# Patient Record
Sex: FEMALE | Race: WHITE | NOT HISPANIC OR LATINO | Employment: OTHER | URBAN - METROPOLITAN AREA
[De-identification: names, ages, dates, MRNs, and addresses within clinical notes are randomized per-mention and may not be internally consistent; named-entity substitution may affect disease eponyms.]

---

## 2017-01-13 ENCOUNTER — TELEPHONE (OUTPATIENT)
Dept: FAMILY MEDICINE | Facility: CLINIC | Age: 67
End: 2017-01-13

## 2017-01-13 NOTE — TELEPHONE ENCOUNTER
----- Message from Earline Cisse sent at 1/13/2017  8:14 AM CST -----  Contact: cell# [phone]  Patient wants to stop the PREDNISONE 20mg. Says it has her eating like crazy. Can you just stop taking? Can she take every other day? Or maybe 1/2 a pill?

## 2017-01-16 ENCOUNTER — OFFICE VISIT (OUTPATIENT)
Dept: BARIATRICS | Facility: CLINIC | Age: 67
End: 2017-01-16
Payer: COMMERCIAL

## 2017-01-16 VITALS
HEIGHT: 64 IN | BODY MASS INDEX: 39.37 KG/M2 | HEART RATE: 58 BPM | WEIGHT: 230.63 LBS | DIASTOLIC BLOOD PRESSURE: 72 MMHG | SYSTOLIC BLOOD PRESSURE: 110 MMHG

## 2017-01-16 DIAGNOSIS — E66.9 OBESITY, CLASS II, BMI 35-39.9: ICD-10-CM

## 2017-01-16 DIAGNOSIS — I87.2 VENOUS INSUFFICIENCY OF BOTH LOWER EXTREMITIES: Primary | ICD-10-CM

## 2017-01-16 DIAGNOSIS — Z98.84 GASTRIC BANDING STATUS: ICD-10-CM

## 2017-01-16 PROCEDURE — 99999 PR PBB SHADOW E&M-EST. PATIENT-LVL III: CPT | Mod: PBBFAC,,, | Performed by: PHYSICIAN ASSISTANT

## 2017-01-16 PROCEDURE — 99213 OFFICE O/P EST LOW 20 MIN: CPT | Mod: 25,S$GLB,, | Performed by: PHYSICIAN ASSISTANT

## 2017-01-16 PROCEDURE — S2083 ADJUSTMENT GASTRIC BAND: HCPCS | Mod: S$GLB,,, | Performed by: PHYSICIAN ASSISTANT

## 2017-01-16 PROCEDURE — 1159F MED LIST DOCD IN RCRD: CPT | Mod: S$GLB,,, | Performed by: PHYSICIAN ASSISTANT

## 2017-01-16 NOTE — PROGRESS NOTES
BARIATRIC LAP BAND FOLLOW UP    Chief Complaint: Follow-up (band)    HPI: Luz Bailey is a 66 y.o. female with a Body mass index is 39.58 kg/(m^2). who presents for follow up s/p Realize C Lap Band on 10/11/2011.  She had about 1 ml saline removed from her band and she states she has no feelings of satiety and her portions are extremely large.  She was able to eat a large plate of shrimp with mashed potatoes and salad.  She would like a band fill today.  She also wants help losing this weight again.  She was recently on steroids and stopped them yesterday.  She has lost 32 lbs approximately 25% of her excess weight.  She has no other complaints.     EXERCISE and VITAMINS: Reviewed in bariatric assessment.      DIET:  Regular bariatric diet.  3 large meals with snacks daily.  Eating starches and sweets.    MEDICATIONS AND ALLERGIES:  Reviewed in Navigator.    Review of Systems   Constitutional: Negative for chills, fever and weight loss.   Eyes: Negative for blurred vision, double vision and pain.   Respiratory: Negative for cough, shortness of breath and wheezing.    Cardiovascular: Negative for chest pain, palpitations and leg swelling.   Gastrointestinal: Negative for abdominal pain, blood in stool, constipation, diarrhea, heartburn, melena, nausea and vomiting.   Genitourinary: Negative for dysuria, frequency and hematuria.   Skin: Negative for itching and rash.   Neurological: Negative for headaches.   Psychiatric/Behavioral: Negative for depression and suicidal ideas.     Vitals:    01/16/17 1313   BP: 110/72   Pulse: (!) 58     Physical Exam   Constitutional: She is oriented to person, place, and time. She appears well-developed and well-nourished. No distress.   HENT:   Head: Normocephalic and atraumatic.   Eyes: Conjunctivae are normal. Right eye exhibits no discharge. Left eye exhibits no discharge. No scleral icterus.   Cardiovascular: Normal rate and regular rhythm.    Pulmonary/Chest: Effort  normal. No respiratory distress. She has wheezes.   Abdominal: Soft. Bowel sounds are normal. She exhibits no distension. There is no tenderness.   Musculoskeletal: She exhibits edema (bilateral pitting edema).   Neurological: She is alert and oriented to person, place, and time.   Skin: Skin is warm and dry. She is not diaphoretic.   Psychiatric: She has a normal mood and affect. Her behavior is normal.   Nursing note and vitals reviewed.    ASSESSMENT:   -  Bilateral pitting edema to the shin.  -  Inadequate band satiety.  -  12 pound weight gain since last visit in April 2016.  -  Obesity, Body mass index is 39.58 kg/(m^2). s/p Realize C Band on 10/12/11.   -  Fair overall weight loss, 32 lbs, 25% EWL.  -  Poor diet: low daily protein and excess simple carbohydrates.  -  No Exercise regimen.  -  Good vitamin regimen.  -  Not at risk for fall or abuse.     PLAN:                              -  Add 0.7 cc saline to band.  -  Modified Liquid Diet for the next month.  -  Discontinue eating all starchy carbs, anything made with: flour, rice, potato, corn, peas, and junk food (candy and chips).  -  Reviewed the importance of eating 3 solid meals and avoiding slider foods for better satiety and weight loss.    -  Patient is to keep a detailed food diary and bring back for review at next visit.  -  No Bariatric Registered Dietician Available.  All Diet education and counseling done by PA.  -  Restart regular exercise.   -  Follow up with PCP for pitting edema.   -  Return to clinic in 3-4 weeks or sooner if needed.    -  Call the office for any issues.    15 minute visit, over 50% of time spent counseling patient face to face on diet, exercise, and weight loss.    BAND ADJUSTMENT:    Provider(s) Performing the Procedure: Tiffani Vanegas PA-C    Description of the Procedure: Patient was lying supine on the exam table. The area above the port site was prepped with Chlora-prep. Then, the port site was accessed with a  2-inch Osorio needle, and saline was easily aspirated. 0.7 cc of normal saline was added to the band. The patient tolerated the procedure well and was able to drink liquids without difficulty.    The patient is to begin a liquid diet for 3 days, then advance the diet as tolerated.    Total amount in Band: 7.7 mL    Findings: clear saline in band    Estimated Blood Loss: minimal    Specimen(s) Removed: None    Post-procedure Diagnosis: band adjustment

## 2017-01-16 NOTE — MR AVS SNAPSHOT
[facility] - Bariatric Surgery  [address]  [facility] [address]  Phone: [phone]  Fax: [phone]                  Luz Bailey   2017 1:20 PM   Office Visit    Description:  Female : 1950   Provider:  Tiffani Vanegas PA-C   Department:  [facility] - Bariatric Surgery           Reason for Visit     Follow-up                To Do List           Future Appointments        Provider Department Dept Phone    2017 1:00 PM MD Shaniqua Rossi  Family Medicine [phone]    2017 8:20 AM Tiffani Vanegas PA-C [facility] - Bariatric Surgery [phone]    3/13/2017 2:00 PM [facility] [phone]      Goals (5 Years of Data)     None      [facility] On Call     [facility] On Call Nurse Care Line -  Assistance  Registered nurses in the [facility] On Call Center provide clinical advisement, health education, appointment booking, and other advisory services.  Call for this free service at [phone].             Medications           Message regarding Medications     Verify the changes and/or additions to your medication regime listed below are the same as discussed with your clinician today.  If any of these changes or additions are incorrect, please notify your healthcare provider.             Verify that the below list of medications is an accurate representation of the medications you are currently taking.  If none reported, the list may be blank. If incorrect, please contact your healthcare provider. Carry this list with you in case of emergency.           Current Medications     ACETAMINOPHEN (TYLENOL ORAL) Take by mouth every 6 (six) hours as needed.     allopurinol (ZYLOPRIM) 100 MG tablet Take 1 tablet (100 mg total) by mouth once daily. For gout prevention    amitriptyline (ELAVIL) 10 MG tablet TAKE ONE TABLET BY MOUTH QHS    ascorbic acid (VITAMIN C) 100 MG tablet Take 100 mg by mouth once daily.    BIOTIN ORAL Take by mouth  "once daily.    bumetanide (BUMEX) 2 MG tablet Take 1 tablet (2 mg total) by mouth 2 (two) times daily.    citalopram (CELEXA) 20 MG tablet TAKE ONE TABLET BY MOUTH ONCE DAILY    cyanocobalamin 1,000 mcg/mL injection INJECT 1 ML INTRAMUSCULARLY EVERY 30 DAYS.    estradiol (ESTRACE) 0.5 MG tablet Take 1 tablet (0.5 mg total) by mouth once daily.    fluticasone (FLONASE) 50 mcg/actuation nasal spray 1 spray by Nasal route once daily.     hydrocodone-acetaminophen 5-325mg (NORCO) 5-325 mg per tablet Take 1 tablet by mouth nightly as needed for Pain.    levothyroxine (SYNTHROID) 50 MCG tablet TAKE ONE TABLET BY MOUTH BEFORE BREAKFAST    potassium chloride (KLOR-CON) 10 MEQ TbSR Take 2 tablets (20 mEq total) by mouth once daily. With lasix    ranitidine (ZANTAC) 150 MG tablet Take 150 mg by mouth once daily.           Clinical Reference Information           Vital Signs - Last Recorded  Most recent update: 1/16/2017  1:14 PM by Jaja Nieto MA    BP Pulse Ht Wt BMI    110/72 (!) 58 5' 4" (1.626 m) 104.6 kg (230 lb 9.6 oz) 39.58 kg/m2      Blood Pressure          Most Recent Value    BP  110/72      Allergies as of 1/16/2017     Omeprazole      Immunizations Administered on Date of Encounter - 1/16/2017     None      Instructions    - Add 0.7 ml cc in band  - Modified Liquid diet for the next month  - No starches or sugars  - RTC 1 month    - To lose weight you want to cut 100% starchy carbohydrates out of your diet (bread, rice, pasta, potatoes, granola, flour, corn, peas, oatmeal, grits, tortillas, crackers, chips) and get  grams of protein.  Aim for 100 grams of protein daily.    - Premier Protein (Chocolate, Bananas & Cream, Strawberries & Cream, Vanilla) [employer] or [employer]    - Syntrax [place] from [employer], www.bariatricadCerulean Pharmaage.com, www.bariatricchoice.com. (LACTOSE FREE)    - Atkins Lift - [employer] & [employer] (LACTOSE FREE)    - Veggetti Pro from [employer], [employer], [employer]    - www.[employer] " (cauliflower, cloud bread, quest bar cookies, eggplant, zucchini, zucchini noodles, crustless quiche, no carb meals, taco lettuce boats)    - http://theworldaccordingtomeghna.Tugende.[employer] Sciences/

## 2017-01-16 NOTE — PATIENT INSTRUCTIONS
- Add 0.7 ml cc in band  - Modified Liquid diet for the next month  - No starches or sugars  - RTC 1 month    - To lose weight you want to cut 100% starchy carbohydrates out of your diet (bread, rice, pasta, potatoes, granola, flour, corn, peas, oatmeal, grits, tortillas, crackers, chips) and get  grams of protein.  Aim for 100 grams of protein daily.    - Premier Protein (Chocolate, Bananas & Cream, Strawberries & Cream, Vanilla) [employer] or [employer]    - Syntrax [place] from [employer], www.bariatricadvantage.com, www.bariatricchoice.com. (LACTOSE FREE)    - Atkins Lift - [employer] & [employer] (LACTOSE FREE)    - Veggetti Pro from [employer], [employer], [employer]    - www.[employer].com (cauliflower, cloud bread, quest bar cookies, eggplant, zucchini, zucchini noodles, crustless quiche, no carb meals, taco lettuce boats)    - http://theworldaccoyuvalingtobillace.LoveByte.com/

## 2017-01-17 ENCOUNTER — OFFICE VISIT (OUTPATIENT)
Dept: FAMILY MEDICINE | Facility: CLINIC | Age: 67
End: 2017-01-17
Payer: COMMERCIAL

## 2017-01-17 DIAGNOSIS — M79.662 PAIN IN BOTH LOWER LEGS: ICD-10-CM

## 2017-01-17 DIAGNOSIS — R60.0 BILATERAL LEG EDEMA: ICD-10-CM

## 2017-01-17 DIAGNOSIS — R60.1 GENERALIZED EDEMA: Primary | ICD-10-CM

## 2017-01-17 DIAGNOSIS — E66.9 OBESITY, CLASS II, BMI 35-39.9: ICD-10-CM

## 2017-01-17 DIAGNOSIS — M79.661 PAIN IN BOTH LOWER LEGS: ICD-10-CM

## 2017-01-17 PROCEDURE — 99213 OFFICE O/P EST LOW 20 MIN: CPT | Mod: S$GLB,,, | Performed by: FAMILY MEDICINE

## 2017-01-17 PROCEDURE — 1159F MED LIST DOCD IN RCRD: CPT | Mod: S$GLB,,, | Performed by: FAMILY MEDICINE

## 2017-01-17 NOTE — PROGRESS NOTES
Subjective:      Patient ID: Luz Bailey is a 66 y.o. female.    Chief Complaint: No chief complaint on file.    HPI Comments: Follow up leg issues; off prednisone; caused her to eat; off for one day; still wioth redness, swelling and pain of lower ant legs, not as bad for all 3 symptoms as before. Still has arthritis pain that did not respond to prednisone; lower back hurts the most.saw lap band doctor and had band tightened, new diet, to lose weight;    Review of Systems   Cardiovascular: Positive for leg swelling.   Skin: Positive for color change and rash.   All other systems reviewed and are negative.    Objective:     Physical Exam   Constitutional: She is oriented to person, place, and time. She appears well-developed and well-nourished.   HENT:   Head: Normocephalic.   Eyes: Conjunctivae and EOM are normal. Pupils are equal, round, and reactive to light.   Neck: Normal range of motion. Neck supple.   Cardiovascular: Normal rate, regular rhythm and normal heart sounds.    Pulmonary/Chest: Effort normal and breath sounds normal.   Musculoskeletal: Normal range of motion. She exhibits edema and tenderness.   Neurological: She is alert and oriented to person, place, and time. She has normal reflexes.   Skin: Skin is warm and dry. There is erythema.   Psychiatric: She has a normal mood and affect. Her behavior is normal. Judgment and thought content normal.   Nursing note and vitals reviewed.    Assessment:     1. Generalized edema    2. Bilateral leg edema    3. Pain in both lower legs    4. Obesity, Class II, BMI 35-39.9      Plan:     New Prescriptions    No medications on file     Discontinued Medications    HYDROCODONE-ACETAMINOPHEN 5-325MG (NORCO) 5-325 MG PER TABLET    Take 1 tablet by mouth nightly as needed for Pain.     Modified Medications    No medications on file       Generalized edema  -     Vitamin D; Future; Expected date: 1/17/17    Bilateral leg edema  -     Vitamin D; Future; Expected  date: 1/17/17    Pain in both lower legs  -     Vitamin D; Future; Expected date: 1/17/17    Obesity, Class II, BMI 35-39.9

## 2017-01-17 NOTE — MR AVS SNAPSHOT
[facility]  [address] [place] [address]  Phone: [phone]  Fax: [phone]                  Luz Bailey   2017 1:00 PM   Office Visit    Description:  Female : 1950   Provider:  Hilario Slaughter MD   Department:  [facility]           Diagnoses this Visit        Comments    Generalized edema    -  Primary     Bilateral leg edema         Pain in both lower legs         Obesity, Class II, BMI 35-39.9                To Do List           Future Appointments        Provider Department Dept Phone    2017 8:20 AM SOSA Flood [facility] - Bariatric Surgery [phone]    3/13/2017 2:00 PM Crystal Willis MD [facility] Cardiology [phone]      Goals (5 Years of Data)     None      Follow-Up and Disposition     Return in about 3 months (around 2017).      [facility] On Call     [facility] On Call Nurse [facility] Line - 24/ Assistance  Registered nurses in the [facility] On Call Center provide clinical advisement, health education, appointment booking, and other advisory services.  Call for this free service at [phone].             Medications           Message regarding Medications     Verify the changes and/or additions to your medication regime listed below are the same as discussed with your clinician today.  If any of these changes or additions are incorrect, please notify your healthcare provider.        STOP taking these medications     hydrocodone-acetaminophen 5-325mg (NORCO) 5-325 mg per tablet Take 1 tablet by mouth nightly as needed for Pain.           Verify that the below list of medications is an accurate representation of the medications you are currently taking.  If none reported, the list may be blank. If incorrect, please contact your healthcare provider. Carry this list with you in case of emergency.           Current Medications     ACETAMINOPHEN (TYLENOL ORAL) Take by mouth every 6 (six) hours as needed.      allopurinol (ZYLOPRIM) 100 MG tablet Take 1 tablet (100 mg total) by mouth once daily. For gout prevention    amitriptyline (ELAVIL) 10 MG tablet TAKE ONE TABLET BY MOUTH QHS    ascorbic acid (VITAMIN C) 100 MG tablet Take 100 mg by mouth once daily.    BIOTIN ORAL Take by mouth once daily.    bumetanide (BUMEX) 2 MG tablet Take 1 tablet (2 mg total) by mouth 2 (two) times daily.    citalopram (CELEXA) 20 MG tablet TAKE ONE TABLET BY MOUTH ONCE DAILY    cyanocobalamin 1,000 mcg/mL injection INJECT 1 ML INTRAMUSCULARLY EVERY 30 DAYS.    estradiol (ESTRACE) 0.5 MG tablet Take 1 tablet (0.5 mg total) by mouth once daily.    fluticasone (FLONASE) 50 mcg/actuation nasal spray 1 spray by Nasal route once daily.     levothyroxine (SYNTHROID) 50 MCG tablet TAKE ONE TABLET BY MOUTH BEFORE BREAKFAST    potassium chloride (KLOR-CON) 10 MEQ TbSR Take 2 tablets (20 mEq total) by mouth once daily. With lasix    ranitidine (ZANTAC) 150 MG tablet Take 150 mg by mouth once daily.           Clinical Reference Information           Allergies as of 1/17/2017     Omeprazole      Immunizations Administered on Date of Encounter - 1/17/2017     None      Orders Placed During Today's Visit     Future Labs/Procedures Expected by Expires    Vitamin D  1/17/2017 1/17/2018

## 2017-02-02 ENCOUNTER — OFFICE VISIT (OUTPATIENT)
Dept: FAMILY MEDICINE | Facility: CLINIC | Age: 67
End: 2017-02-02
Payer: COMMERCIAL

## 2017-02-02 VITALS
BODY MASS INDEX: 38.35 KG/M2 | HEIGHT: 64 IN | TEMPERATURE: 99 F | SYSTOLIC BLOOD PRESSURE: 120 MMHG | HEART RATE: 89 BPM | DIASTOLIC BLOOD PRESSURE: 70 MMHG | OXYGEN SATURATION: 96 % | WEIGHT: 224.63 LBS

## 2017-02-02 DIAGNOSIS — J40 BRONCHITIS: Primary | ICD-10-CM

## 2017-02-02 PROCEDURE — 96372 THER/PROPH/DIAG INJ SC/IM: CPT | Mod: S$GLB,,, | Performed by: NURSE PRACTITIONER

## 2017-02-02 PROCEDURE — 99213 OFFICE O/P EST LOW 20 MIN: CPT | Mod: 25,S$GLB,, | Performed by: NURSE PRACTITIONER

## 2017-02-02 RX ORDER — AZITHROMYCIN 250 MG/1
TABLET, FILM COATED ORAL
Qty: 6 TABLET | Refills: 0 | Status: SHIPPED | OUTPATIENT
Start: 2017-02-02 | End: 2017-02-07

## 2017-02-02 RX ORDER — TRIAMCINOLONE ACETONIDE 40 MG/ML
80 INJECTION, SUSPENSION INTRA-ARTICULAR; INTRAMUSCULAR
Status: COMPLETED | OUTPATIENT
Start: 2017-02-02 | End: 2017-02-02

## 2017-02-02 RX ADMIN — TRIAMCINOLONE ACETONIDE 80 MG: 40 INJECTION, SUSPENSION INTRA-ARTICULAR; INTRAMUSCULAR at 08:02

## 2017-02-02 NOTE — PROGRESS NOTES
Subjective:       Patient ID: Luz Bailey is a 66 y.o. female.    Chief Complaint: Cough (chest congestion); Nasal Congestion; and Generalized Body Aches    Cough   This is a new problem. The current episode started in the past 7 days. The problem has been unchanged. The problem occurs every few minutes. The cough is productive of sputum. Associated symptoms include nasal congestion, postnasal drip, a sore throat, shortness of breath and wheezing. Pertinent negatives include no chest pain, chills, ear congestion, ear pain, fever, headaches, heartburn, hemoptysis, myalgias, rash, rhinorrhea, sweats or weight loss. The symptoms are aggravated by lying down. Treatments tried: dayqull, tylenol PM, honey lemon and whiskey tea. The treatment provided no relief. There is no history of asthma, bronchiectasis, bronchitis, COPD, emphysema, environmental allergies or pneumonia.     Review of Systems   Constitutional: Positive for fatigue. Negative for chills, diaphoresis, fever and weight loss.   HENT: Positive for congestion, postnasal drip, sore throat and voice change. Negative for ear pain, rhinorrhea, sinus pressure and sneezing.    Eyes: Negative for photophobia.   Respiratory: Positive for cough, chest tightness, shortness of breath and wheezing. Negative for apnea and hemoptysis.    Cardiovascular: Negative for chest pain, palpitations and leg swelling.   Gastrointestinal: Negative for heartburn.   Musculoskeletal: Negative for myalgias.   Skin: Negative for color change, pallor, rash and wound.   Allergic/Immunologic: Negative for environmental allergies.   Neurological: Negative for dizziness, weakness, light-headedness and headaches.       Objective:      Physical Exam   Constitutional: She is oriented to person, place, and time. She appears well-developed and well-nourished. No distress.   HENT:   Right Ear: Tympanic membrane and external ear normal.   Left Ear: Tympanic membrane and external ear normal.    Nose: Mucosal edema and rhinorrhea present. Right sinus exhibits no maxillary sinus tenderness and no frontal sinus tenderness. Left sinus exhibits no maxillary sinus tenderness and no frontal sinus tenderness.   Mouth/Throat: No oropharyngeal exudate, posterior oropharyngeal edema or posterior oropharyngeal erythema.   Cardiovascular: Normal rate, regular rhythm and normal heart sounds.    Pulmonary/Chest: Effort normal. She has wheezes.   Neurological: She is alert and oriented to person, place, and time.   Skin: Skin is warm. She is not diaphoretic.   Psychiatric: She has a normal mood and affect. Her behavior is normal. Judgment and thought content normal.   Vitals reviewed.      Assessment:       1. Bronchitis        Plan:       Bronchitis  -     triamcinolone acetonide injection 80 mg; Inject 2 mLs (80 mg total) into the muscle one time.  -     azithromycin (Z-ROULA) 250 MG tablet; Take 2 tablets by mouth on day 1; Take 1 tablet by mouth on days 2-5  Dispense: 6 tablet; Refill: 0      Start mucinex

## 2017-03-05 RX ORDER — LEVOTHYROXINE SODIUM 50 UG/1
TABLET ORAL
Qty: 90 TABLET | Refills: 0 | Status: SHIPPED | OUTPATIENT
Start: 2017-03-05 | End: 2017-06-07 | Stop reason: SDUPTHER

## 2017-03-06 NOTE — TELEPHONE ENCOUNTER
----- Message from Earline Cisse sent at 3/6/2017  2:44 PM CST -----  Patient is requesting a medication refill.     RX name: levothyroxine (SYNTHROID) 50 MCG tablet  Strength:   Quantity: 90 day supply with refills  Directions:TAKE ONE TABLET BY MOUTH BEFORE BREAKFAST     RX name: estradiol (ESTRACE) 0.5 MG tablet  Strength:   Quantity: 90 day supply with refills  Directions:     RX name: amitriptyline (ELAVIL) 10 MG tablet  Strength:   Quantity: 90 day supply with refills   Directions:TAKE ONE TABLET BY MOUTH QHS     RX name: citalopram (CELEXA) 20 MG tablet  Strength:   Quantity: 90 day supply with refills  Directions:TAKE ONE TABLET BY MOUTH ONCE DAILY     Pharmacy name: [facility]

## 2017-03-07 RX ORDER — ESTRADIOL 0.5 MG/1
0.5 TABLET ORAL DAILY
Qty: 90 TABLET | Refills: 3 | Status: SHIPPED | OUTPATIENT
Start: 2017-03-07 | End: 2017-08-29

## 2017-03-07 RX ORDER — CITALOPRAM 20 MG/1
20 TABLET, FILM COATED ORAL DAILY
Qty: 90 TABLET | Refills: 3 | Status: SHIPPED | OUTPATIENT
Start: 2017-03-07 | End: 2018-04-07 | Stop reason: SDUPTHER

## 2017-03-07 RX ORDER — AMITRIPTYLINE HYDROCHLORIDE 10 MG/1
TABLET, FILM COATED ORAL
Qty: 90 TABLET | Refills: 3 | Status: SHIPPED | OUTPATIENT
Start: 2017-03-07 | End: 2018-03-30 | Stop reason: SDUPTHER

## 2017-05-18 RX ORDER — CYANOCOBALAMIN 1000 UG/ML
1000 INJECTION, SOLUTION INTRAMUSCULAR; SUBCUTANEOUS
Qty: 10 ML | Refills: 3 | Status: SHIPPED | OUTPATIENT
Start: 2017-05-18 | End: 2017-08-29 | Stop reason: DRUGHIGH

## 2017-06-07 RX ORDER — LEVOTHYROXINE SODIUM 50 UG/1
TABLET ORAL
Qty: 90 TABLET | Refills: 3 | Status: SHIPPED | OUTPATIENT
Start: 2017-06-07 | End: 2018-07-11 | Stop reason: SDUPTHER

## 2017-06-21 ENCOUNTER — TELEPHONE (OUTPATIENT)
Dept: DERMATOLOGY | Facility: CLINIC | Age: 67
End: 2017-06-21

## 2017-06-21 NOTE — TELEPHONE ENCOUNTER
Spoke to patient about concerns of a suspicious mole.  Informed her Dr Arauz only sees patients referred from a dermatologist that have a biopsy proven skin cancer. Gave her the number to dermatology to so she could schedule an appt.

## 2017-06-21 NOTE — TELEPHONE ENCOUNTER
----- Message from Rachele Franz sent at 6/21/2017 11:57 AM CDT -----  Contact: patient herself  Please call above patient has a mole wants to be seen in case of a surgery she might need waiting on a call from the nurse please call this number in the message [phone]

## 2017-07-21 ENCOUNTER — PATIENT MESSAGE (OUTPATIENT)
Dept: ORTHOPEDICS | Facility: CLINIC | Age: 67
End: 2017-07-21

## 2017-07-21 ENCOUNTER — PATIENT MESSAGE (OUTPATIENT)
Dept: BARIATRICS | Facility: CLINIC | Age: 67
End: 2017-07-21

## 2017-07-24 DIAGNOSIS — M54.2 NECK PAIN: Primary | ICD-10-CM

## 2017-07-24 DIAGNOSIS — M54.50 LUMBAR SPINE PAIN: ICD-10-CM

## 2017-07-31 ENCOUNTER — TELEPHONE (OUTPATIENT)
Dept: ORTHOPEDICS | Facility: CLINIC | Age: 67
End: 2017-07-31

## 2017-07-31 NOTE — TELEPHONE ENCOUNTER
Spoke to pt regarding appt Tuesday 8/1/17 at 130p with Jaky Paulson PA-C. Pt was informed to arrive on the 2nd floor at 1245p, then up to floor 5 to see Jaky. Pt verbalized understanding. Phone number was provided for any further questions.

## 2017-08-01 ENCOUNTER — OFFICE VISIT (OUTPATIENT)
Dept: ORTHOPEDICS | Facility: CLINIC | Age: 67
End: 2017-08-01
Payer: COMMERCIAL

## 2017-08-01 ENCOUNTER — HOSPITAL ENCOUNTER (OUTPATIENT)
Dept: RADIOLOGY | Facility: HOSPITAL | Age: 67
Discharge: HOME | End: 2017-08-01
Attending: ORTHOPAEDIC SURGERY
Payer: COMMERCIAL

## 2017-08-01 VITALS — BODY MASS INDEX: 38.24 KG/M2 | HEIGHT: 64 IN | WEIGHT: 224 LBS

## 2017-08-01 DIAGNOSIS — M54.2 NECK PAIN: ICD-10-CM

## 2017-08-01 DIAGNOSIS — M54.50 LUMBAR SPINE PAIN: ICD-10-CM

## 2017-08-01 DIAGNOSIS — M51.36 DDD (DEGENERATIVE DISC DISEASE), LUMBAR: ICD-10-CM

## 2017-08-01 DIAGNOSIS — M54.5 BILATERAL LOW BACK PAIN, UNSPECIFIED CHRONICITY, WITH SCIATICA PRESENCE UNSPECIFIED: ICD-10-CM

## 2017-08-01 DIAGNOSIS — M50.30 DDD (DEGENERATIVE DISC DISEASE), CERVICAL: ICD-10-CM

## 2017-08-01 DIAGNOSIS — Z98.1 HISTORY OF FUSION OF CERVICAL SPINE: Primary | ICD-10-CM

## 2017-08-01 PROCEDURE — 99214 OFFICE O/P EST MOD 30 MIN: CPT | Mod: S$GLB,,, | Performed by: PHYSICIAN ASSISTANT

## 2017-08-01 PROCEDURE — 99999 PR PBB SHADOW E&M-EST. PATIENT-LVL IV: CPT | Mod: PBBFAC,,, | Performed by: PHYSICIAN ASSISTANT

## 2017-08-01 PROCEDURE — 72120 X-RAY BEND ONLY L-S SPINE: CPT | Mod: 26,,, | Performed by: RADIOLOGY

## 2017-08-01 PROCEDURE — 72100 X-RAY EXAM L-S SPINE 2/3 VWS: CPT | Mod: 26,,, | Performed by: RADIOLOGY

## 2017-08-01 PROCEDURE — 3008F BODY MASS INDEX DOCD: CPT | Mod: S$GLB,,, | Performed by: PHYSICIAN ASSISTANT

## 2017-08-01 PROCEDURE — 72050 X-RAY EXAM NECK SPINE 4/5VWS: CPT | Mod: 26,,, | Performed by: RADIOLOGY

## 2017-08-01 RX ORDER — CICLOPIROX OLAMINE 7.7 MG/G
CREAM TOPICAL
COMMUNITY
Start: 2017-07-25 | End: 2018-08-29

## 2017-08-01 RX ORDER — TRIAMCINOLONE ACETONIDE 1 MG/G
CREAM TOPICAL
COMMUNITY
Start: 2017-07-25 | End: 2018-04-03

## 2017-08-01 RX ORDER — METHOCARBAMOL 750 MG/1
750 TABLET, FILM COATED ORAL 3 TIMES DAILY
Qty: 60 TABLET | Refills: 0 | Status: SHIPPED | OUTPATIENT
Start: 2017-08-01 | End: 2017-09-10 | Stop reason: SDUPTHER

## 2017-08-01 NOTE — PROGRESS NOTES
DATE: 8/1/2017  PATIENT: Luz Bailey    Supervising Physician: Edy Jin M.D.    CHIEF COMPLAINT: back pain and neck pain    HISTORY:  Luz Bailey is a 66 y.o. female with PMH of C5/6 cervical fusion more than 30 years ago here for initial evaluation of low back and left leg pain (Back - 6, Leg - 6).  The pain in the back is what bothers her most. The pain has been present for 2-3 months. The patient describes the pain as sharp.  The pain is worse with going from sitting to standing, getting out of bed and bending and improved by rest. There is no associated numbness and tingling. There is no subjective weakness. Prior treatments have included tylenol and a chiropractor, but no physical therapy, ESIs or surgery.    The patient also has complaints of neck pain (Neck - 6, Arm - 0).  The pain has been present for 2-3 months. The patient describes the pain as sharp. The pain is worse with range of motion and improved by rest. There is occasional associated numbness and tingling. There is no subjective weakness. Prior treatments have included tylenol and a chiropractor, but no physical therapy, ESIs or recent surgery.     The patient denies myelopathic symptoms such as handwriting changes or difficulty with buttons/coins/keys. Denies perineal paresthesias, bowel/bladder dysfunction.    PAST MEDICAL/SURGICAL HISTORY:  Past Medical History:   Diagnosis Date    Collagenous colitis     Dx 5/30/16    Hypothyroidism     OA (osteoarthritis)     Obesity     Sleep apnea      Past Surgical History:   Procedure Laterality Date    APPENDECTOMY      CHOLECYSTECTOMY      COLONOSCOPY N/A 5/30/2016    Procedure: COLONOSCOPY;  Surgeon: BINH Souza MD;  Location: [facility] ([address] [place] [address]);  Service: Endoscopy;  Laterality: N/A;    HERNIA REPAIR      HYSTERECTOMY      lap band surgery  10/11/2011    Realize C Band (11mL)    LAPAROSCOPIC GASTRIC BANDING      TOTAL KNEE ARTHROPLASTY  7/23/12     VENTRICULOPERITONEAL SHUNT         Medications:   Current Outpatient Prescriptions on File Prior to Visit   Medication Sig Dispense Refill    ACETAMINOPHEN (TYLENOL ORAL) Take by mouth every 6 (six) hours as needed.       allopurinol (ZYLOPRIM) 100 MG tablet Take 1 tablet (100 mg total) by mouth once daily. For gout prevention 90 tablet 3    amitriptyline (ELAVIL) 10 MG tablet TAKE ONE TABLET BY MOUTH QHS 90 tablet 3    ascorbic acid (VITAMIN C) 100 MG tablet Take 100 mg by mouth once daily.      BIOTIN ORAL Take by mouth once daily.      bumetanide (BUMEX) 2 MG tablet Take 1 tablet (2 mg total) by mouth 2 (two) times daily. 60 tablet 11    citalopram (CELEXA) 20 MG tablet Take 1 tablet (20 mg total) by mouth once daily. 90 tablet 3    cyanocobalamin 1,000 mcg/mL injection INJECT 1 ML INTRAMUSCULARLY EVERY 30 DAYS. 10 mL 5    cyanocobalamin 1,000 mcg/mL injection Inject 1 mL (1,000 mcg total) into the muscle every 14 (fourteen) days. 10 mL 3    estradiol (ESTRACE) 0.5 MG tablet Take 1 tablet (0.5 mg total) by mouth once daily. 90 tablet 3    fluticasone (FLONASE) 50 mcg/actuation nasal spray 1 spray by Nasal route once daily.       levothyroxine (SYNTHROID) 50 MCG tablet TAKE ONE TABLET BY MOUTH BEFORE BREAKFAST 90 tablet 3    potassium chloride (KLOR-CON) 10 MEQ TbSR Take 2 tablets (20 mEq total) by mouth once daily. With lasix 60 tablet 5    ranitidine (ZANTAC) 150 MG tablet Take 150 mg by mouth once daily.       No current facility-administered medications on file prior to visit.        Social History:   Social History     Social History    Marital status:      Spouse name: N/A    Number of children: N/A    Years of education: N/A     Occupational History    Not on file.     Social History Main Topics    Smoking status: Former Smoker     Packs/day: 2.00     Years: 25.00     Types: Cigarettes     Quit date: 1/1/2002    Smokeless tobacco: Never Used    Alcohol use 0.0 oz/week       "Comment: occasionally    Drug use: No    Sexual activity: Yes     Partners: Male     Other Topics Concern    Not on file     Social History Narrative    No narrative on file       REVIEW OF SYSTEMS:  Constitution: Negative. Negative for chills, fever and night sweats.   Cardiovascular: Negative for chest pain and syncope.   Respiratory: Negative for cough and shortness of breath.   Gastrointestinal: See HPI. Negative for nausea/vomiting. Negative for abdominal pain.  Genitourinary: See HPI. Negative for discoloration or dysuria.  Skin: Negative for dry skin, itching and rash.   Hematologic/Lymphatic: Negative for bleeding problem. Does not bruise/bleed easily.   Musculoskeletal: Negative for falls and muscle weakness.   Neurological: See HPI. No seizures.   Endocrine: Negative for polydipsia, polyphagia and polyuria.   Allergic/Immunologic: Negative for hives and persistent infections.     EXAM:  Ht 5' 4" (1.626 m)   Wt 101.6 kg (224 lb)   BMI 38.45 kg/m²     PHYSICAL EXAMINATION:    General: The patient is a very pleasant 66 y.o. female in no apparent distress, the patient is oriented to person, place and time.  Psych: Normal mood and affect  HEENT: Vision grossly intact, hearing intact to the spoken word.  Lungs: Respirations unlabored.  Gait: Normal station and gait, no difficulty with toe or heel walk.   Skin: Cervical skin and dorsal lumbar skin negative for rashes, lesions, hairy patches and surgical scars.    Range of motion: Cervical and lumbar range of motion is acceptable. There is mild tenderness to palpation of the paracervical muscles.  There is mild lumbar tenderness to palpation.  Spinal Balance: Global saggital and coronal spinal balance acceptable, no significant for scoliosis and kyphosis.  Musculoskeletal: No pain with the range of motion of the bilateral shoulders and elbows. Normal bulk and contour of the bilateral hands.  No pain with the range of motion of the bilateral hips. Mild " bilateral trochanteric tenderness to palpation.  Vascular: Bilateral upper and lower extremities warm and well perfused, radial pulses 2+ bilaterally, dorsalis pedis pulses 2+ bilaterally.  Neurological: Normal strength and tone in all major motor groups in the bilateral upper and lower extremities. Normal sensation to light touch in the C5-T1 and L2-S1 dermatomes bilaterally.  Deep tendon reflexes symmetric 2+ in the bilateral upper and lower extremities.  Negative Inverted Radial Reflex and Connor's bilaterally. Negative Babinski bilaterally. Negative straight leg raise bilaterally.     IMAGING:   Today I personally reviewed AP, Lat and Flex/Ex  upright C-spine films that demonstrate disc space narrowing at C6/7 with fusion of C5/6.     AP, Lat and Flex/Ex upright lumbar spine films demonstrate significant L4/5 disc space narrowing.     ASSESSMENT/PLAN:    Luz Caceres was seen today for pain and pain.    Diagnoses and all orders for this visit:    History of fusion of cervical spine  -     Ambulatory Referral to Physical/Occupational Therapy    DDD (degenerative disc disease), cervical  -     Ambulatory Referral to Physical/Occupational Therapy    DDD (degenerative disc disease), lumbar  -     Ambulatory Referral to Physical/Occupational Therapy    Bilateral low back pain, unspecified chronicity, with sciatica presence unspecified  -     Ambulatory Referral to Physical/Occupational Therapy    Other orders  -     methocarbamol (ROBAXIN) 750 MG Tab; Take 1 tablet (750 mg total) by mouth 3 (three) times daily. As needed for muscle spasms        The patient has primarily back and neck pain.  Referral for PT at [facility] placed today.  She will take tylenol as needed.  Follow up after therapy if symptoms persist.       Follow up after therapy if symptoms persist, sooner with any new or worsening symptoms.       Return if symptoms worsen or fail to improve.

## 2017-08-29 ENCOUNTER — OFFICE VISIT (OUTPATIENT)
Dept: FAMILY MEDICINE | Facility: CLINIC | Age: 67
End: 2017-08-29
Payer: COMMERCIAL

## 2017-08-29 VITALS
OXYGEN SATURATION: 98 % | TEMPERATURE: 98 F | HEIGHT: 64 IN | HEART RATE: 91 BPM | DIASTOLIC BLOOD PRESSURE: 70 MMHG | SYSTOLIC BLOOD PRESSURE: 126 MMHG | WEIGHT: 235 LBS | BODY MASS INDEX: 40.12 KG/M2

## 2017-08-29 DIAGNOSIS — I51.89 DIASTOLIC DYSFUNCTION: ICD-10-CM

## 2017-08-29 DIAGNOSIS — E66.9 OBESITY, CLASS II, BMI 35-39.9: ICD-10-CM

## 2017-08-29 DIAGNOSIS — I87.2 VENOUS INSUFFICIENCY OF BOTH LOWER EXTREMITIES: ICD-10-CM

## 2017-08-29 DIAGNOSIS — M17.0 OSTEOARTHRITIS OF BOTH KNEES, UNSPECIFIED OSTEOARTHRITIS TYPE: ICD-10-CM

## 2017-08-29 DIAGNOSIS — R60.9 EDEMA, UNSPECIFIED TYPE: Primary | ICD-10-CM

## 2017-08-29 PROCEDURE — 1126F AMNT PAIN NOTED NONE PRSNT: CPT | Mod: S$GLB,,, | Performed by: FAMILY MEDICINE

## 2017-08-29 PROCEDURE — 1159F MED LIST DOCD IN RCRD: CPT | Mod: S$GLB,,, | Performed by: FAMILY MEDICINE

## 2017-08-29 PROCEDURE — 3008F BODY MASS INDEX DOCD: CPT | Mod: S$GLB,,, | Performed by: FAMILY MEDICINE

## 2017-08-29 PROCEDURE — 99213 OFFICE O/P EST LOW 20 MIN: CPT | Mod: S$GLB,,, | Performed by: FAMILY MEDICINE

## 2017-08-29 NOTE — PROGRESS NOTES
Subjective:      Patient ID: Luz Bailey is a 67 y.o. female.    Chief Complaint: Annual Exam (wellness)    Form signed for her work; legs swelling still again; diuretics cause leg cramps; left leg swells worse than right; ankles feel like rubber bands around them; gained weight; SOB at times, including now; was coughing, middle of night; not asthmatic, exsmoker back in the day; quit 15 year ago; 11 pounds weight gain in 6 months; had lap band done 7 years ago; least weighed after that was 199; max weight 272; appt with lap band doctor      Review of Systems   Constitutional: Positive for unexpected weight change. Negative for activity change.   HENT: Negative for hearing loss, rhinorrhea and trouble swallowing.    Eyes: Negative for discharge and visual disturbance.   Respiratory: Negative for chest tightness and wheezing.    Cardiovascular: Positive for leg swelling. Negative for chest pain and palpitations.   Gastrointestinal: Negative for blood in stool, constipation, diarrhea and vomiting.   Endocrine: Negative for polydipsia and polyuria.   Genitourinary: Negative for difficulty urinating, dysuria, hematuria and menstrual problem.   Musculoskeletal: Positive for neck pain. Negative for arthralgias and joint swelling.   Neurological: Positive for headaches. Negative for weakness.   Psychiatric/Behavioral: Negative for confusion and dysphoric mood.   All other systems reviewed and are negative.    Objective:     Physical Exam   Constitutional: She is oriented to person, place, and time. She appears well-developed and well-nourished.   HENT:   Head: Normocephalic.   Eyes: Conjunctivae and EOM are normal. Pupils are equal, round, and reactive to light.   Neck: Normal range of motion. Neck supple.   Cardiovascular: Normal rate, regular rhythm and normal heart sounds.    Pulmonary/Chest: Effort normal and breath sounds normal.   Musculoskeletal: Normal range of motion. She exhibits edema.   2+    Neurological: She is alert and oriented to person, place, and time. She has normal reflexes.   Skin: Skin is warm and dry.   Psychiatric: She has a normal mood and affect. Her behavior is normal. Judgment and thought content normal.   Nursing note and vitals reviewed.    Assessment:     1. Edema, unspecified type    2. Diastolic dysfunction    3. Venous insufficiency of both lower extremities    4. Obesity, Class II, BMI 35-39.9    5. Osteoarthritis of both knees, unspecified osteoarthritis type      Plan:     New Prescriptions    No medications on file     Discontinued Medications    CYANOCOBALAMIN 1,000 MCG/ML INJECTION    Inject 1 mL (1,000 mcg total) into the muscle every 14 (fourteen) days.    ESTRADIOL (ESTRACE) 0.5 MG TABLET    Take 1 tablet (0.5 mg total) by mouth once daily.     Modified Medications    No medications on file   edema multifactor:  Obese, knee arthritis, venous insufficiency, diastolic dysfunction

## 2017-08-29 NOTE — PATIENT INSTRUCTIONS
Dr. Slaughter's Diet Instructions:    NO!!!  Rice  Potatoes  Corn Grits  Bread   Pasta  Juice  Milk  Sugar   Sweets   Cold Drinks   alcohol      YES!!!  Meat-Beef, Pork  Seafood-Shrimp  Sausage, Lemos   Green/Yellow Vegetables  Nuts  Cheese  Eggs  beans

## 2017-08-30 ENCOUNTER — OFFICE VISIT (OUTPATIENT)
Dept: BARIATRICS | Facility: CLINIC | Age: 67
End: 2017-08-30
Payer: COMMERCIAL

## 2017-08-30 ENCOUNTER — LAB VISIT (OUTPATIENT)
Dept: LAB | Facility: HOSPITAL | Age: 67
End: 2017-08-30
Attending: FAMILY MEDICINE
Payer: COMMERCIAL

## 2017-08-30 VITALS
DIASTOLIC BLOOD PRESSURE: 70 MMHG | WEIGHT: 234.81 LBS | HEART RATE: 62 BPM | HEIGHT: 64 IN | BODY MASS INDEX: 40.09 KG/M2 | SYSTOLIC BLOOD PRESSURE: 110 MMHG

## 2017-08-30 DIAGNOSIS — Z98.84 LAP-BAND SURGERY STATUS: ICD-10-CM

## 2017-08-30 DIAGNOSIS — M79.661 PAIN IN BOTH LOWER LEGS: ICD-10-CM

## 2017-08-30 DIAGNOSIS — M79.662 PAIN IN BOTH LOWER LEGS: ICD-10-CM

## 2017-08-30 DIAGNOSIS — R63.5 WEIGHT GAIN: ICD-10-CM

## 2017-08-30 DIAGNOSIS — R60.1 GENERALIZED EDEMA: ICD-10-CM

## 2017-08-30 DIAGNOSIS — Z98.84 LAP-BAND SURGERY STATUS: Primary | ICD-10-CM

## 2017-08-30 DIAGNOSIS — R60.9 EDEMA, UNSPECIFIED TYPE: ICD-10-CM

## 2017-08-30 DIAGNOSIS — R60.0 BILATERAL LEG EDEMA: ICD-10-CM

## 2017-08-30 LAB
25(OH)D3+25(OH)D2 SERPL-MCNC: 16 NG/ML
ALBUMIN SERPL BCP-MCNC: 3.2 G/DL
ALP SERPL-CCNC: 59 U/L
ALT SERPL W/O P-5'-P-CCNC: 9 U/L
ANION GAP SERPL CALC-SCNC: 6 MMOL/L
AST SERPL-CCNC: 16 U/L
BASOPHILS # BLD AUTO: 0.03 K/UL
BASOPHILS NFR BLD: 0.7 %
BILIRUB SERPL-MCNC: 0.3 MG/DL
BUN SERPL-MCNC: 11 MG/DL
CALCIUM SERPL-MCNC: 8.9 MG/DL
CHLORIDE SERPL-SCNC: 102 MMOL/L
CHOLEST SERPL-MCNC: 199 MG/DL
CHOLEST/HDLC SERPL: 3.3 {RATIO}
CO2 SERPL-SCNC: 28 MMOL/L
CREAT SERPL-MCNC: 0.8 MG/DL
DIFFERENTIAL METHOD: ABNORMAL
EOSINOPHIL # BLD AUTO: 0.2 K/UL
EOSINOPHIL NFR BLD: 3.3 %
ERYTHROCYTE [DISTWIDTH] IN BLOOD BY AUTOMATED COUNT: 12.3 %
EST. GFR  (AFRICAN AMERICAN): >60 ML/MIN/1.73 M^2
EST. GFR  (NON AFRICAN AMERICAN): >60 ML/MIN/1.73 M^2
GLUCOSE SERPL-MCNC: 91 MG/DL
HCT VFR BLD AUTO: 35.6 %
HDLC SERPL-MCNC: 60 MG/DL
HDLC SERPL: 30.2 %
HGB BLD-MCNC: 12 G/DL
LDLC SERPL CALC-MCNC: 112 MG/DL
LYMPHOCYTES # BLD AUTO: 1.8 K/UL
LYMPHOCYTES NFR BLD: 39.1 %
MCH RBC QN AUTO: 30.5 PG
MCHC RBC AUTO-ENTMCNC: 33.7 G/DL
MCV RBC AUTO: 91 FL
MONOCYTES # BLD AUTO: 0.4 K/UL
MONOCYTES NFR BLD: 9.5 %
NEUTROPHILS # BLD AUTO: 2.2 K/UL
NEUTROPHILS NFR BLD: 47.2 %
NONHDLC SERPL-MCNC: 139 MG/DL
PLATELET # BLD AUTO: 242 K/UL
PMV BLD AUTO: 9.3 FL
POTASSIUM SERPL-SCNC: 3.9 MMOL/L
PROT SERPL-MCNC: 6.3 G/DL
RBC # BLD AUTO: 3.93 M/UL
SODIUM SERPL-SCNC: 136 MMOL/L
TRIGL SERPL-MCNC: 135 MG/DL
TSH SERPL DL<=0.005 MIU/L-ACNC: 1.82 UIU/ML
VIT B12 SERPL-MCNC: 683 PG/ML
WBC # BLD AUTO: 4.55 K/UL

## 2017-08-30 PROCEDURE — 85025 COMPLETE CBC W/AUTO DIFF WBC: CPT

## 2017-08-30 PROCEDURE — 82607 VITAMIN B-12: CPT

## 2017-08-30 PROCEDURE — 80061 LIPID PANEL: CPT

## 2017-08-30 PROCEDURE — 84425 ASSAY OF VITAMIN B-1: CPT

## 2017-08-30 PROCEDURE — 3008F BODY MASS INDEX DOCD: CPT | Mod: S$GLB,,, | Performed by: PHYSICIAN ASSISTANT

## 2017-08-30 PROCEDURE — 99214 OFFICE O/P EST MOD 30 MIN: CPT | Mod: 25,S$GLB,, | Performed by: PHYSICIAN ASSISTANT

## 2017-08-30 PROCEDURE — 82306 VITAMIN D 25 HYDROXY: CPT

## 2017-08-30 PROCEDURE — 1125F AMNT PAIN NOTED PAIN PRSNT: CPT | Mod: S$GLB,,, | Performed by: PHYSICIAN ASSISTANT

## 2017-08-30 PROCEDURE — 80053 COMPREHEN METABOLIC PANEL: CPT

## 2017-08-30 PROCEDURE — 1159F MED LIST DOCD IN RCRD: CPT | Mod: S$GLB,,, | Performed by: PHYSICIAN ASSISTANT

## 2017-08-30 PROCEDURE — 99999 PR PBB SHADOW E&M-EST. PATIENT-LVL IV: CPT | Mod: PBBFAC,,, | Performed by: PHYSICIAN ASSISTANT

## 2017-08-30 PROCEDURE — 84443 ASSAY THYROID STIM HORMONE: CPT

## 2017-08-30 NOTE — PATIENT INSTRUCTIONS
Fruits and Vegetables     Include 1 servings of fruit daily.      1 serving of fruit includes ½ cup unsweetened applesauce, ½ medium banana, tennis ball size piece of fruit, 17 grapes, 1 cup melon, 1 cup strawberries, ¼ cup dried fruit     Include 1-2 servings of vegetables daily. 1 serving is 1 cup raw or ½ cup cooked.     Non-starchy vegetables include artichoke, asparagus, baby corn, bamboo shoots, beans: green/Italian/wax, bean sprouts, beets, broccoli, [place] sprouts, cabbage, carrots, cauliflower, celery, cucumber, eggplant, green onions or scallions, greens, jicama, leeks, mushrooms, okra, onions, pea pods, peppers, radishes, spinach, summer squash, tomatoes and salsa, turnips, vegetable juice cocktail, water chestnuts, zucchini        Sample meal plan  80-120g protein; 5723-5603 calories  Protein drinks and bars: 0-4 grams sugar  Drink lots of water throughout the day and exercise!  MENU # 1  Breakfast: 2 eggs, 1 turkey sausage johnson  Lunch: 2-3 roll-ups (sliced turkey, low-fat slice cheese), baby carrots dipped in 1 Tbsp natural peanut butter  Mid-Day Snack: ¼ cup unsalted almonds, ½ cup fruit  Supper: 1 chicken thigh simmered in tomato sauce + 2 Tbsp mozzarella cheese, ½ cup black beans, 1/2 cup steamed veggies  Evening Snack: 1/2 cup grapes with 4 cubes low-fat cheddar cheese   ___________________________________________________  MENU # 2  Breakfast: protein drink  Mid-morning snack : ¼ cup unsalted nuts  Lunch: 1 cup tuna or chicken salad made with light franco, over salad.   Supper: hamburger johnson, slice of cheese, 1 cup steamed veggies.   Snack: light yogurt      Menu #3  Breakfast: 6oz plain Greek yogurt + fruit (½ banana, ½ cup fruit - pineapple, blueberries, strawberries, peach), may add Splenda to joo.  Lunch: ½  chicken breast w/ slice pepper enid cheese, 1/2 cup steamed veggies and small salad with Salad Spritzer.    Mid-Day snack: protein drink   Supper: 4oz Grilled fish, grilled veggie  kabob ( mushrooms, onion, bell pepper, yellow squash, zucchini, cherry tomatoes)  Evening Snack: fudgsicle no-sugar-added    Menu # 4  Breakfast: vanilla iced coffee: 1 scoop vanilla protein powder + 4oz skim milk + 4oz coffee   Snack: protein bar  Lunch: 2 Lettuce tacos: ¼ cup seasoned ground turkey wrapped in a Antwan lettuce leaf with 1 Tbsp shredded cheese and dollop low-fat sour cream  Dinner: Shrimp omelet: 2 eggs, ½ cup shrimp, green onions, and shredded cheese        Menu #5  Breakfast: 1 cup low-fat cottage cheese, ½ cup peaches (no sugar added)  Lunch: 2 oz baked pork chop, 1 cup okra and tomato stew  Snack: 1 cup black beans + salsa + dollop sour cream  Dinner: Caprese chicken salad: 2 oz chicken, 1oz fresh mozzarella, sliced tomato, 1 Tbsp olive oil, basil  Snack: sugar-free pudding cup      Menu #6  Breakfast: ½ cup part-skim ricotta cheese, 2 Tbsp sugar-free strawberry preserves, ¼ cup slivered almonds  Lunch: 2 oz canned chicken, 1oz shredded cheddar cheese, ½ cup black beans, 2 Tbsp salsa  Snack: Protein drink  Dinner: 4 oz grilled salmon steak, over mixed green salad with light dressing

## 2017-08-30 NOTE — PROGRESS NOTES
"BARIATRIC LAP BAND FOLLOW UP    Chief Complaint: Follow-up (band)    HPI: Luz Bailey is a 67 y.o. female with a Body mass index is 40.3 kg/m². who presents for follow up s/p Realize C Lap Band on 10/11/2011.  She admits to being off track with her diet and she needs to get on track.  She complains that she is able to eat too much.  She describes eating friend onion rings, 3 oysters, 3 shrimp, and a salad: no vomiting.  No french fries or bread with that. No fluids while eating.  Had lemon drop martini before meal.  Was full the rest of the day.  Only other thing she ate was a pack cookie crackers.    Bf- skipped will have blood work after today's visit.   Grits, cookies, ice cream, and cakes, etc.     EXERCISE and VITAMINS: Reviewed in bariatric assessment.      DIET:  Regular diet.  Avoids meats often because they "fill her up."     MEDICATIONS AND ALLERGIES:  Reviewed in Navigator.    Review of Systems   Constitutional: Negative for chills, fever and weight loss.   Eyes: Negative for blurred vision, double vision and pain.   Respiratory: Negative for cough, shortness of breath and wheezing.    Cardiovascular: Negative for chest pain, palpitations and leg swelling.   Gastrointestinal: Negative for abdominal pain, blood in stool, constipation, diarrhea, heartburn, melena, nausea and vomiting.   Genitourinary: Negative for dysuria, frequency and hematuria.   Skin: Negative for itching and rash.   Neurological: Negative for headaches.   Psychiatric/Behavioral: Negative for depression and suicidal ideas.     Vitals:    08/30/17 1113   BP: 110/70   Pulse: 62     Physical Exam   Constitutional: She is oriented to person, place, and time. She appears well-developed and well-nourished. No distress.   HENT:   Head: Normocephalic and atraumatic.   Eyes: Conjunctivae are normal. Right eye exhibits no discharge. Left eye exhibits no discharge. No scleral icterus.   Cardiovascular: Normal rate and regular rhythm.  "   Pulmonary/Chest: Effort normal. No respiratory distress. She has wheezes.   Abdominal: Soft. Bowel sounds are normal. She exhibits no distension. There is no tenderness.   Musculoskeletal: She exhibits edema (bilateral pitting edema).   Neurological: She is alert and oriented to person, place, and time.   Skin: Skin is warm and dry. She is not diaphoretic.   Psychiatric: She has a normal mood and affect. Her behavior is normal.   Nursing note and vitals reviewed.    ASSESSMENT:   -  Good band satiety AEB feeling of fullness when she eats small portions of meat.   -  4 pound weight gain since last visit in Jan 2017, 40 pound gain overall since 2012.  -  Obesity, Body mass index is 40.3 kg/m². s/p Realize C Band on 10/12/11.   -  Fair overall weight loss, 32 lbs, 25% EWL.  -  Poor diet: low daily protein and excess simple carbohydrates.  -  No Exercise regimen.  -  Good vitamin regimen.    PLAN:                              -  Band adjustment: NO.  -  Reviewed Bariatric Diet for Band patients.  Multiple handouts provided about: meal planning, 6 day menu, and menu ideas.   -  D/C all carbohydrates and sugars.   -  Reviewed the purpose of a gastric and how to get the most benefit from it.    -  Recommend patient attend support group or group therapy with Dr Erwin to help her with battling emotional eating.  Message sent to DG for next group session.   -  Advised that she is in charge of her food options and needs to be make better decisions that align with her weight loss goals.   -  Patient is to keep a detailed food diary and bring back for review at next visit.   -  Restart regular exercise.   -  Follow up with PCP for pitting edema.   -  Return to clinic in 3-4 weeks or sooner if needed.    -  Call the office for any issues.    25 minute visit, over 50% of time spent counseling patient face to face on diet, exercise, and weight loss.

## 2017-09-05 LAB — VIT B1 SERPL-MCNC: 57 UG/L (ref 38–122)

## 2017-09-11 RX ORDER — METHOCARBAMOL 750 MG/1
TABLET, FILM COATED ORAL
Qty: 60 TABLET | Refills: 0 | Status: SHIPPED | OUTPATIENT
Start: 2017-09-11 | End: 2017-10-16 | Stop reason: SDUPTHER

## 2017-09-29 ENCOUNTER — OFFICE VISIT (OUTPATIENT)
Dept: BARIATRICS | Facility: CLINIC | Age: 67
End: 2017-09-29
Payer: COMMERCIAL

## 2017-09-29 VITALS
HEIGHT: 64 IN | HEART RATE: 65 BPM | WEIGHT: 226.63 LBS | DIASTOLIC BLOOD PRESSURE: 76 MMHG | BODY MASS INDEX: 38.69 KG/M2 | SYSTOLIC BLOOD PRESSURE: 120 MMHG

## 2017-09-29 DIAGNOSIS — Z98.84 HISTORY OF LAPAROSCOPIC ADJUSTABLE GASTRIC BANDING: Primary | ICD-10-CM

## 2017-09-29 DIAGNOSIS — R63.4 WEIGHT LOSS: ICD-10-CM

## 2017-09-29 PROCEDURE — 1159F MED LIST DOCD IN RCRD: CPT | Mod: S$GLB,,, | Performed by: PHYSICIAN ASSISTANT

## 2017-09-29 PROCEDURE — 99999 PR PBB SHADOW E&M-EST. PATIENT-LVL IV: CPT | Mod: PBBFAC,,, | Performed by: PHYSICIAN ASSISTANT

## 2017-09-29 PROCEDURE — 3008F BODY MASS INDEX DOCD: CPT | Mod: S$GLB,,, | Performed by: PHYSICIAN ASSISTANT

## 2017-09-29 PROCEDURE — 99212 OFFICE O/P EST SF 10 MIN: CPT | Mod: S$GLB,,, | Performed by: PHYSICIAN ASSISTANT

## 2017-09-29 PROCEDURE — 1126F AMNT PAIN NOTED NONE PRSNT: CPT | Mod: S$GLB,,, | Performed by: PHYSICIAN ASSISTANT

## 2017-09-29 NOTE — PROGRESS NOTES
BARIATRIC LAP BAND FOLLOW UP    Chief Complaint: Follow-up    HPI: Luz Bailey is a 67 y.o. female with a Body mass index is 38.9 kg/m². who presents for follow up s/p Realize C Lap Band on 10/11/2011.  She has lost 8 pounds since her last visit. She states that her band is good.  She states that she is pleased with her band.       EXERCISE and VITAMINS: Reviewed in bariatric assessment.      DIET:  Regular bariatric diet.  3 meals and 1 high protein snack daily.      MEDICATIONS AND ALLERGIES:  Reviewed in Navigator.    Review of Systems   Constitutional: Negative for chills, fever and weight loss.   Eyes: Negative for blurred vision, double vision and pain.   Respiratory: Negative for cough, shortness of breath and wheezing.    Cardiovascular: Negative for chest pain, palpitations and leg swelling.   Gastrointestinal: Negative for abdominal pain, blood in stool, constipation, diarrhea, heartburn, melena, nausea and vomiting.   Genitourinary: Negative for dysuria, frequency and hematuria.   Skin: Negative for itching and rash.   Neurological: Negative for headaches.   Psychiatric/Behavioral: Negative for depression and suicidal ideas.     Vitals:    09/29/17 0832   BP: 120/76   Pulse: 65     Physical Exam   Constitutional: She is oriented to person, place, and time. She appears well-developed and well-nourished. No distress.   HENT:   Head: Normocephalic and atraumatic.   Eyes: Conjunctivae are normal. Right eye exhibits no discharge. Left eye exhibits no discharge. No scleral icterus.   Cardiovascular: Normal rate and regular rhythm.    Pulmonary/Chest: Effort normal. No respiratory distress. She has wheezes.   Abdominal: Soft. Bowel sounds are normal. She exhibits no distension. There is no tenderness.   Musculoskeletal: She exhibits edema (bilateral pitting edema).   Neurological: She is alert and oriented to person, place, and time.   Skin: Skin is warm and dry. She is not diaphoretic.   Psychiatric:  She has a normal mood and affect. Her behavior is normal.   Nursing note and vitals reviewed.    ASSESSMENT:   -  Good band satiety AEB feeling of fullness when she eats small portions of meat.   -  8 pound weight loss since last visit in Aug 2017.  -  Obesity, Body mass index is 38.9 kg/m². s/p Realize C Band on 10/12/11.   -  Fair overall weight loss, 36 lbs, 28% EWL.  -  Poor diet: low daily protein and excess simple carbohydrates.  -  No Exercise regimen.  -  Good vitamin regimen.    PLAN:                              -  Band adjustment: NO.  -  Continue bariatric diet.  Great job!  -  Patient is to keep a detailed food diary and bring back for review at next visit.   -  Restart regular exercise.   -  Follow up with PCP for pitting edema.   -  Return to clinic in 1 month or sooner if needed.    -  Call the office for any issues.    10 minute visit, over 50% of time spent counseling patient face to face on diet, exercise, and weight loss.

## 2017-09-29 NOTE — PATIENT INSTRUCTIONS
Meal Ideas for Regular Bariatric Diet  *Recipes and products available at [email or web address]      Breakfast: (15-20g protein)    - Egg white omelet: 2 egg whites or ½ cup Egg Beaters. (Optional proteins: cheese, shrimp, black beans, chicken, sliced turkey) (Optional veggies: tomatoes, salsa, spinach, mushrooms, onions, green peppers, or small slice avocado)     - Egg and sausage: 1 egg or ¼ cup Egg Beaters (any variety), with 1 johnson or 2 links of Turkey sausage or Veggie breakfast sausage (Tink or Movista)    - Crust-less breakfast quiche: To make a glass pie dish, mix 4oz part skim Ricotta, 1 cup skim milk, and 2 eggs as your base. Add protein: shredded cheese, sliced lean ham or turkey, turkey gay/sausage. Add veggies: tomato, onion, green onion, mushroom, green pepper, spinach, etc.    - Yogurt parfait: Mix 1 - 6oz container Dannon Light N Fit vanilla yogurt, with ¼ cup crushed unsalted nuts    - Cottage cheese and fruit: ½ cup part-skim cottage cheese or ricotta cheese topped with fresh fruit or sugar free preserves     - Ludivina Burton's Vanilla Egg custard* (add 2 Tbsp instant coffee granules to make Cappuccino Custard*)    - Hi-Protein café latte (skim milk, decaf coffee, 1 scoop protein powder). Optional to add Sugar free syrup or extract flavoring.    - Breakfast Lox: spread fat free cream cheese on slices of smoked salmon. Serve over scrambled or egg over easy (sauteed with nonstick cookspray) OR on a cucumber slice    - Eggwhich: Scramble or cook 1 large egg over easy using nonstick cookspray. Place between 2 slices of Maldivian gay and low fat cheese.     Lunch: (20-30g protein)    - ½ cup Black bean soup (Homemade or Progresso), with ¼ cup shredded low-fat cheese. Top with chopped tomato or fresh salsa.     - Lean deli turkey breast and low-fat sliced cheese, mustard or light franco to moisten, rolled up together, or wrapped in a Antwan lettuce leaf    - Chicken salad made from dinner  leftovers, moisten with low-fat salad dressing or light franco. Also try leftover salmon, shrimp, tuna or boiled eggs. Serve ½ cup over dark green salad    - Fat-free canned refried beans, topped with ¼ cup shredded low-fat cheese. Top with chopped tomato or fresh salsa.     - Greek salad: Top mixed greens with 1-2oz grilled chicken, tomatoes, red onions, 2-3 kalamata olives, and sprinkle lightly with feta cheese. Spritz with Balsamic vinegar to taste.     - Crust-less lunch quiche: To make a glass pie dish, mix 4oz part skim Ricotta, 1 cup skim milk, and 2 eggs as your base. Add protein: shredded cheese, sliced lean ham or turkey, shrimp, chicken. Add veggies: tomato, onion, green onion, mushroom, green pepper, spinach, artichoke, broccoli, etc.    - Pizza bake: spread a  filipe jamison mushroom with tomato sauce, low-fat shredded mozzarella and turkey pepperoni or Pyrites gay. Add any veggies. Roast for 10-15 minutes, until cheese melted.     - Cucumber crab bites: Spread ¼ cup crab dip (lump crabmeat + light cream cheese and green onions) over sliced cucumber.     - Chicken with light spinach and artichoke dip*: Puree in : 6oz cooked and drained spinach, 2 cloves garlic, 1 can cannelloni beans, ½ cup chopped green onions, 1 can drained artichoke hearts (not marinated in oil), lemon juice and basil. Mix in 2oz chopped up chicken.    Supper: (20-30g protein)    - Serve grilled fish over dark green salad tossed with low-fat dressing, served with grilled asparagus guerrier     - Rotisserie chicken salad: served with sliced strawberries, walnuts, fat-free feta cheese crumbles and 1 tbsp Roys Own Light Raspberry [place] Vinaigrette    - Shrimp cocktail: Dip cold boiled shrimp in homemade low-sugar cocktail sauce (1/2 cup Eze One Carb ketchup, 2 tbsp horseradish, 1/4 tsp hot sauce, 1 tsp Worcestershire sauce, 1 tbsp freshly-squeezed lemon juice). Serve with dark green salad, walnuts, and crumbled blue  cheese drizzled with olive oil and Balsamic vinegar    - Tuna Melt: Spread tuna salad onto 2 thick slices of tomato. Top with low-fat cheese and broil until cheese is melted. May also be made with chicken salad of shrimp salad. Metter with different types of cheeses.    - Chicken or beef fajitas (no tortilla, rice, beans, chips). Top meat and veggies w/ fresh salsa, fat free sour cream.     - Homemade low-fat Chili using extra lean ground beef or ground turkey. Top with shredded cheese and salsa as desired. May add dollop fat-free sour cream if desired    - Chicken parmesan: Top chicken breast w/ low sugar marinara sauce, mozzarella cheese and bake until chicken reaches 165*.  Serve w/ spaghetti SQUASH or Cambodian cut green beans    - Dinner Omelet with shrimp or chicken and onion, green peppers and chives.    - No noodle lasagna: Use sliced zucchini or eggplant in place of noodles.  Layer with part skim ricotta cheese and low sugar meat sauce (use very lean ground beef or ground turkey).    - Mexican chicken bake: Bake chunks of chicken breast or thigh with taco seasoning, Pace brand enchilada sauce, green onions and low-fat cheese. Serve with ¼ cup black beans or fat free refried beans topped with chopped tomatoes or salsa.    - Caden frozen meatballs, simmered in Classico Marinara sauce. Different flavors of salsa or spaghetti sauce create different dishes! Sprinkle with parmesan cheese. Serve with grilled or steamed veggies, or a dark green salad.    - Simmer boneless skinless chicken thigh chunks in Classico Marinara sauce or roasted salsa until tender with chopped onion, bell pepper, garlic, mushrooms, spinach, etc.     - Hamburger or veggie burger, without the bun, dressed the way you like. Served with grilled or steamed veggies.    - Eggplant parmesan: Bake slices of eggplant at 350 degrees for 15 minutes. Layer tomato sauce, sliced eggplant and low-fat mozzarella cheese in a baking dish and cover with  foil. Bake 30-40 more minutes or until bubbly. Uncover and bake at 400 degrees for about 15 more minutes, or until top is slightly crisp.    - Fish tacos: grilled/baked white fish, wrapped in Antwan lettuce leaf, topped with salsa, shredded low-fat cheese, and light coleslaw.    - Chicken nika: Sprinkle chicken w/ 1 tsp of hidden valley ranch dip mix. Then grill chicken and top with black beans, salsa and 1 tsp fat free sour cream.     - Cauliflower pizza crust: Use cauliflower as crust (see recipe on pinterest, no flour!). Top w/ low fat cheese, turkey pepperoni and veggies and bake again    - chicken or turkey crust pizza: use ground chicken or turkey instead of cauliflower, spread in [place] and bake at 350 for about 20-30 minutes(may want to add garlic, black pepper, oregano and other herbs to ground meat mixture).  Remove and top w/ low fat cheese, turkey pepperoni and veggies and bake again for another 10 minutes or until cheese is browned.     Snacks: (100-200 calories; >5g protein)    - 1 low-fat cheese stick with 8 cherry tomatoes or 1 serving fresh fruit  - 4 thin slices fat-free turkey breast and 1 slice low-fat cheese  - 4 thin slices fat-free honey ham with wedge of melon  - 6-8 edamame pods (equivalent to about 1/4 cup edamame without pods).   - 1/4 cup unsalted nuts with ½ cup fruit  - 6-oz container Dannon Light n Fit vanilla yogurt, topped with 1oz unsalted nuts         - apple, celery or baby carrots spread with 2 Tbsp PB2  - apple slices with 1 oz slice low-fat cheese  - Apple slices dipped in 2 Tbsp of PB2  - celery, cucumber, bell pepper or baby carrots dipped in ¼ cup hummus bean spread or light spinach and artichoke dip (*recipe in lunch section)  - celery, cucumber, baby carrots dipped in high protein greek yogurt (Mix 16 oz plain greek yogurt + 1 packet of hidden valley ranch dip mix)  - Yo Links Beef Steak - 14g protein! (similar to beef jerky)  - 2 wedges Laughing Cow - Light Herb  & Garlic Cheese with sliced cucumber or green bell pepper  - 1/2 cup low-fat cottage cheese with ¼ cup fruit or ¼ cup salsa  - RTD Protein drinks: Atkins, Low Carb Slim Fast, EAS light, Muscle Milk Light, etc.  - Homemade Protein drinks: GNC Soy95, Isopure, Nectar, UNJURY, Whey Gourmet, etc. Mix 1 scoop powder with 8oz skim/1% milk or light soymilk.  - Protein bars: Atkins, EAS, Pure Protein, Think Thin, Detour, etc. Must have 0-4 grams sugar - Read the label.    Takeout Options: No more than twice/week  Deli - Salads (no pasta or rice), meats, cheeses. Roasted chicken. Lox (salmon)    Mexican - Platters which don't include tortillas, chips, or rice. Go easy on the beans. Example: Fajitas without the tortillas. Ask the  not to bring chips to the table if they are too tempting.    Greek - Meat or fish and vegetable, but no bread or rice. Including hummus, baba ganoush, etc, is OK. Most sit-down Greek restaurants can provide you with cucumber slices for dipping instead of dominic bread.    Fast Food (Avoid as much as possible) - Salads (no croutons and limit salad dressing to 2 tbsp), grilled chicken sandwich without the bun and ask for no franco. Dayas low fat chili or Taco Bell pintos and cheese.    BBQ - The meats are fine if you ask for sauces on the side, but most of the traditional side dishes are loaded with carbs. Joseph slaw, baked beans and BBQ sauce are typically made with sugar.    Chinese - Nothing deep-fried, no rice or noodles. Many Chinese sauces have starch and sugar in them, so you'll have to use your judgement. If you find that these sauces trigger cravings, or cause Dumping, you can ask for the sauce to be made without sugar or just use soy sauce.

## 2017-10-16 RX ORDER — METHOCARBAMOL 750 MG/1
TABLET, FILM COATED ORAL
Qty: 60 TABLET | Refills: 0 | Status: SHIPPED | OUTPATIENT
Start: 2017-10-16 | End: 2017-11-18 | Stop reason: SDUPTHER

## 2017-10-18 ENCOUNTER — PATIENT MESSAGE (OUTPATIENT)
Dept: PSYCHIATRY | Facility: CLINIC | Age: 67
End: 2017-10-18

## 2017-10-30 ENCOUNTER — OFFICE VISIT (OUTPATIENT)
Dept: BARIATRICS | Facility: CLINIC | Age: 67
End: 2017-10-30
Payer: COMMERCIAL

## 2017-10-30 VITALS
DIASTOLIC BLOOD PRESSURE: 81 MMHG | SYSTOLIC BLOOD PRESSURE: 145 MMHG | HEIGHT: 64 IN | HEART RATE: 70 BPM | WEIGHT: 226.63 LBS | BODY MASS INDEX: 38.69 KG/M2

## 2017-10-30 DIAGNOSIS — R63.4 WEIGHT LOSS: Primary | ICD-10-CM

## 2017-10-30 DIAGNOSIS — Z98.84 HISTORY OF LAPAROSCOPIC ADJUSTABLE GASTRIC BANDING: ICD-10-CM

## 2017-10-30 DIAGNOSIS — Z98.84 LAP-BAND SURGERY STATUS: ICD-10-CM

## 2017-10-30 PROCEDURE — 99213 OFFICE O/P EST LOW 20 MIN: CPT | Mod: S$GLB,,, | Performed by: PHYSICIAN ASSISTANT

## 2017-10-30 PROCEDURE — 99999 PR PBB SHADOW E&M-EST. PATIENT-LVL III: CPT | Mod: PBBFAC,,, | Performed by: PHYSICIAN ASSISTANT

## 2017-10-30 NOTE — PROGRESS NOTES
BARIATRIC BAND FOLLOW UP    Chief Complaint: Follow-up    HPI: Luz Bailey is a 67 y.o. female with a Body mass index is 38.9 kg/m². who presents for follow up s/p Realize C Lap Band on 10/11/2011.  She is maintaining her weight loss since her last visit. She states that her band is good.  She states that she is pleased with her band.  She states that Dr Erwin called her to set her up for emotional eating, however she likely will not be able afford the co-pay.  Her  broke his neck, and it is healing.  He will be able to remove the neck brace.  She will go on vacation with the grandchildren to [facility].  She is only able to eat 3-4 ounce of meat with a small serving of vegetables.  She is full between her meals.  She has not gained weight.  Her weight is stable from last visit.  She is pleased with her band.    EXERCISE and VITAMINS: Reviewed in bariatric assessment.      DIET:  Regular bariatric diet.  3 meals and 1 high protein snack daily.      MEDICATIONS AND ALLERGIES:  Reviewed in Navigator.    Review of Systems   Constitutional: Negative for chills, fever and weight loss.   Eyes: Negative for blurred vision, double vision and pain.   Respiratory: Negative for cough, shortness of breath and wheezing.    Cardiovascular: Negative for chest pain, palpitations and leg swelling.   Gastrointestinal: Negative for abdominal pain, blood in stool, constipation, diarrhea, heartburn, melena, nausea and vomiting.   Genitourinary: Negative for dysuria, frequency and hematuria.   Skin: Negative for itching and rash.   Neurological: Negative for headaches.   Psychiatric/Behavioral: Negative for depression and suicidal ideas.     Vitals:    10/30/17 0852   BP: (!) 145/81   Pulse: 70     Physical Exam   Constitutional: She is oriented to person, place, and time. She appears well-developed and well-nourished. No distress.   HENT:   Head: Normocephalic and atraumatic.   Eyes: Conjunctivae are normal. Right eye  exhibits no discharge. Left eye exhibits no discharge. No scleral icterus.   Cardiovascular: Normal rate and regular rhythm.    Pulmonary/Chest: Effort normal. No respiratory distress. She has wheezes.   Abdominal: Soft. Bowel sounds are normal. She exhibits no distension. There is no tenderness.   Musculoskeletal: She exhibits edema (bilateral pitting edema).   Neurological: She is alert and oriented to person, place, and time.   Skin: Skin is warm and dry. She is not diaphoretic.   Psychiatric: She has a normal mood and affect. Her behavior is normal.   Nursing note and vitals reviewed.    ASSESSMENT:   -  Good band satiety AEB feeling of fullness when she eats small portions of meat.   -  8 pound weight loss since last visit in Aug 2017.  -  Obesity, Body mass index is 38.9 kg/m². s/p Realize C Band on 10/12/11.   -  Fair overall weight loss, 36 lbs, 28% EWL.  -  Improving diet: low daily protein and excess simple carbohydrates.  -  No Exercise regimen.  -  Good vitamin regimen.    PLAN:                              -  Band adjustment: NO.  -  Continue bariatric diet.  Great job!  -  Patient is to keep a detailed food diary and bring back for review at next visit.   -  Restart regular exercise.   -  Follow up with PCP for pitting edema.   -  Return to clinic in 2-3 months or sooner if needed.    -  Call the office for any issues.    10 minute visit, over 50% of time spent counseling patient face to face on diet, exercise, and weight loss.

## 2017-11-10 ENCOUNTER — TELEPHONE (OUTPATIENT)
Dept: FAMILY MEDICINE | Facility: CLINIC | Age: 67
End: 2017-11-10

## 2017-11-20 RX ORDER — METHOCARBAMOL 750 MG/1
TABLET, FILM COATED ORAL
Qty: 60 TABLET | Refills: 0 | Status: SHIPPED | OUTPATIENT
Start: 2017-11-20 | End: 2018-02-22 | Stop reason: ALTCHOICE

## 2017-11-28 ENCOUNTER — OFFICE VISIT (OUTPATIENT)
Dept: FAMILY MEDICINE | Facility: CLINIC | Age: 67
End: 2017-11-28
Payer: COMMERCIAL

## 2017-11-28 ENCOUNTER — LAB VISIT (OUTPATIENT)
Dept: LAB | Facility: HOSPITAL | Age: 67
End: 2017-11-28
Attending: NURSE PRACTITIONER
Payer: COMMERCIAL

## 2017-11-28 VITALS
OXYGEN SATURATION: 98 % | SYSTOLIC BLOOD PRESSURE: 118 MMHG | HEIGHT: 64 IN | DIASTOLIC BLOOD PRESSURE: 82 MMHG | WEIGHT: 232.19 LBS | HEART RATE: 78 BPM | TEMPERATURE: 98 F | BODY MASS INDEX: 39.64 KG/M2

## 2017-11-28 DIAGNOSIS — M25.472 EDEMA OF BOTH ANKLES: ICD-10-CM

## 2017-11-28 DIAGNOSIS — M25.50 ARTHRALGIA, UNSPECIFIED JOINT: Primary | ICD-10-CM

## 2017-11-28 DIAGNOSIS — J34.89 SORE IN NOSE: ICD-10-CM

## 2017-11-28 DIAGNOSIS — M25.50 ARTHRALGIA, UNSPECIFIED JOINT: ICD-10-CM

## 2017-11-28 DIAGNOSIS — M25.471 EDEMA OF BOTH ANKLES: ICD-10-CM

## 2017-11-28 DIAGNOSIS — R60.0 EDEMA OF BOTH LEGS: ICD-10-CM

## 2017-11-28 DIAGNOSIS — M19.90 ARTHRITIS: ICD-10-CM

## 2017-11-28 LAB
CRP SERPL-MCNC: <0.5 MG/DL
ERYTHROCYTE [SEDIMENTATION RATE] IN BLOOD BY WESTERGREN METHOD: 18 MM/HR

## 2017-11-28 PROCEDURE — 99214 OFFICE O/P EST MOD 30 MIN: CPT | Mod: S$GLB,,, | Performed by: NURSE PRACTITIONER

## 2017-11-28 PROCEDURE — 86431 RHEUMATOID FACTOR QUANT: CPT | Mod: PO

## 2017-11-28 PROCEDURE — 85652 RBC SED RATE AUTOMATED: CPT

## 2017-11-28 PROCEDURE — 86038 ANTINUCLEAR ANTIBODIES: CPT | Mod: PO

## 2017-11-28 PROCEDURE — 36415 COLL VENOUS BLD VENIPUNCTURE: CPT | Mod: PO

## 2017-11-28 PROCEDURE — 86140 C-REACTIVE PROTEIN: CPT | Mod: PO

## 2017-11-28 RX ORDER — MUPIROCIN 20 MG/G
OINTMENT TOPICAL 2 TIMES DAILY
Qty: 22 G | Refills: 0 | Status: SHIPPED | OUTPATIENT
Start: 2017-11-28 | End: 2018-08-29

## 2017-11-28 RX ORDER — MELOXICAM 15 MG/1
15 TABLET ORAL DAILY
Qty: 30 TABLET | Refills: 1 | Status: SHIPPED | OUTPATIENT
Start: 2017-11-28 | End: 2018-02-22 | Stop reason: ALTCHOICE

## 2017-11-28 NOTE — PROGRESS NOTES
Subjective:       Patient ID: Luz Bailey is a 67 y.o. female.    Chief Complaint: Rash and Edema    Pt presents to the clinic today for joint pain. Having pain in bilateral hands, elbows, knees, hips and back. She experiencing stiffness and joint swelling in hands and knees. Also having joint tenderness. Ankle edema which is a chronic problem. Takes Bumex as needed for this issue. She had to increase her ring size by a whole size because of the joint swelling in the hands. The pain is worse in the am and when she is driving home from work. Takes her a while to get moving in the morning.         Rash   This is a new problem. The current episode started 1 to 4 weeks ago (4 weeks). Location: left nostril. The rash is characterized by dryness and redness. She was exposed to nothing. Pertinent negatives include no anorexia, congestion, cough, diarrhea, eye pain, facial edema, fatigue, fever, joint pain, nail changes, rhinorrhea, shortness of breath, sore throat or vomiting. Treatments tried: acyclivor cream. The treatment provided mild relief. There is no history of allergies, asthma, eczema or varicella.   Edema   This is a chronic problem. The current episode started 1 to 4 weeks ago. The problem occurs intermittently. The problem has been unchanged. Associated symptoms include joint swelling and a rash. Pertinent negatives include no abdominal pain, anorexia, arthralgias, change in bowel habit, chest pain, chills, congestion, coughing, diaphoresis, fatigue, fever, headaches, myalgias, nausea, neck pain, numbness, sore throat, swollen glands, urinary symptoms, vertigo, visual change, vomiting or weakness. Nothing aggravates the symptoms. Treatments tried: bumex as needed.     Review of Systems   Constitutional: Positive for activity change and unexpected weight change. Negative for chills, diaphoresis, fatigue and fever.        Weight gain     HENT: Negative for congestion, hearing loss, rhinorrhea, sore  throat and trouble swallowing.    Eyes: Negative for pain, discharge and visual disturbance.   Respiratory: Negative for cough, chest tightness, shortness of breath and wheezing.    Cardiovascular: Negative for chest pain and palpitations.   Gastrointestinal: Negative for abdominal pain, anorexia, blood in stool, change in bowel habit, constipation, diarrhea, nausea and vomiting.   Endocrine: Negative for polydipsia and polyuria.   Genitourinary: Negative for difficulty urinating, dysuria, hematuria and menstrual problem.   Musculoskeletal: Positive for joint swelling. Negative for arthralgias, joint pain, myalgias and neck pain.        Left hand pain, elbow pain, knee pain, back, hip pain, joint tenderness      Skin: Positive for rash. Negative for nail changes.        Left nostril sore     Neurological: Negative for vertigo, weakness, numbness and headaches.   Psychiatric/Behavioral: Negative for confusion and dysphoric mood.       Objective:      Physical Exam   Constitutional: She is oriented to person, place, and time. She appears well-developed and well-nourished. No distress.   HENT:   Head:       Right Ear: External ear normal.   Left Ear: External ear normal.   Cardiovascular: Normal rate, regular rhythm and normal heart sounds.    Pulmonary/Chest: Effort normal and breath sounds normal. No respiratory distress.   Musculoskeletal: Normal range of motion. She exhibits edema and tenderness. She exhibits no deformity.        Right elbow: She exhibits swelling. Tenderness found.        Left elbow: She exhibits swelling. Tenderness found.        Right ankle: She exhibits swelling.        Left ankle: She exhibits swelling.        Lumbar back: She exhibits pain.        Right hand: She exhibits tenderness and swelling.        Left hand: She exhibits tenderness and swelling.        Right foot: There is swelling.        Left foot: There is swelling.   Neurological: She is oriented to person, place, and time.   Skin:  Skin is warm and dry. No rash noted. She is not diaphoretic. No erythema. No pallor.   Psychiatric: She has a normal mood and affect. Her behavior is normal. Judgment and thought content normal.   Vitals reviewed.      Assessment:       1. Arthralgia, unspecified joint    2. Arthritis    3. Sore in nose    4. Edema of both legs    5. Edema of both ankles        Plan:       Arthralgia, unspecified joint  -     Rheumatoid factor; Future  -     Sedimentation rate, manual; Future  -     NATE; Future  -     C-reactive protein; Future    Arthritis  -     meloxicam (MOBIC) 15 MG tablet; Take 1 tablet (15 mg total) by mouth once daily.  Dispense: 30 tablet; Refill: 1    Sore in nose    Edema of both legs    Edema of both ankles    Other orders  -     mupirocin (BACTROBAN) 2 % ointment; Apply topically 2 (two) times daily.  Dispense: 22 g; Refill: 0      Take a bumex when she gets home  Elevate feet  Follow up with PCP next week

## 2017-11-29 DIAGNOSIS — Z12.31 SCREENING MAMMOGRAM, ENCOUNTER FOR: Primary | ICD-10-CM

## 2017-11-29 LAB
ANA SER QL IF: NORMAL
RHEUMATOID FACT SERPL-ACNC: <10 IU/ML

## 2017-12-05 ENCOUNTER — OFFICE VISIT (OUTPATIENT)
Dept: FAMILY MEDICINE | Facility: CLINIC | Age: 67
End: 2017-12-05
Payer: COMMERCIAL

## 2017-12-05 VITALS
OXYGEN SATURATION: 97 % | SYSTOLIC BLOOD PRESSURE: 130 MMHG | BODY MASS INDEX: 39.75 KG/M2 | TEMPERATURE: 98 F | HEART RATE: 75 BPM | WEIGHT: 232.81 LBS | DIASTOLIC BLOOD PRESSURE: 70 MMHG | HEIGHT: 64 IN

## 2017-12-05 DIAGNOSIS — M19.90 ARTHRITIS: Primary | ICD-10-CM

## 2017-12-05 PROCEDURE — 99213 OFFICE O/P EST LOW 20 MIN: CPT | Mod: 25,S$GLB,, | Performed by: FAMILY MEDICINE

## 2017-12-05 PROCEDURE — 96372 THER/PROPH/DIAG INJ SC/IM: CPT | Mod: S$GLB,,, | Performed by: FAMILY MEDICINE

## 2017-12-05 RX ORDER — TRIAMCINOLONE ACETONIDE 40 MG/ML
80 INJECTION, SUSPENSION INTRA-ARTICULAR; INTRAMUSCULAR ONCE
Status: COMPLETED | OUTPATIENT
Start: 2017-12-05 | End: 2017-12-05

## 2017-12-05 RX ADMIN — TRIAMCINOLONE ACETONIDE 80 MG: 40 INJECTION, SUSPENSION INTRA-ARTICULAR; INTRAMUSCULAR at 04:12

## 2017-12-05 NOTE — PROGRESS NOTES
Subjective:      Patient ID: Luz Bailey is a 67 y.o. female.    Chief Complaint: Follow-up    One month severe arthiritis pain hand, elbows, feet, knees, knuckles; dizzy when leans forwasrd to ground; dizzy when supine; stiffness, swelling, feel warm.  Strong famly history of arthritis; took meloxicam 15; helped a little      Review of Systems   Constitutional: Negative.    HENT: Negative.    Respiratory: Negative.    Cardiovascular: Negative.    Gastrointestinal: Negative.    Endocrine: Negative.    Genitourinary: Negative.    Musculoskeletal: Positive for arthralgias, gait problem and joint swelling.   Neurological: Positive for dizziness.   Psychiatric/Behavioral: Negative.    All other systems reviewed and are negative.    Objective:     Physical Exam   Constitutional: She is oriented to person, place, and time. She appears well-developed and well-nourished.   HENT:   Head: Normocephalic.   Eyes: Conjunctivae and EOM are normal. Pupils are equal, round, and reactive to light.   Neck: Normal range of motion. Neck supple.   Cardiovascular: Normal rate, regular rhythm and normal heart sounds.    Pulmonary/Chest: Effort normal and breath sounds normal.   Musculoskeletal: Normal range of motion.   Neurological: She is alert and oriented to person, place, and time. She has normal reflexes.   Skin: Skin is warm and dry.   Psychiatric: She has a normal mood and affect. Her behavior is normal. Judgment and thought content normal.   Nursing note and vitals reviewed.    Assessment:     1. Arthritis      Plan:     New Prescriptions    CYCLOBENZAPRINE (FLEXERIL) 5 MG TABLET    Take one-2 tabs at bedtime    TRAMADOL (ULTRAM) 50 MG TABLET    Take 1 tablet (50 mg total) by mouth every 8 (eight) hours as needed.     Discontinued Medications    No medications on file     Modified Medications    No medications on file       Arthritis  -     Cancel: Ambulatory referral to Rheumatology  -     Ambulatory referral to  Rheumatology    Other orders  -     triamcinolone acetonide injection 80 mg; Inject 2 mLs (80 mg total) into the muscle once.

## 2017-12-07 ENCOUNTER — HOSPITAL ENCOUNTER (OUTPATIENT)
Dept: RADIOLOGY | Facility: HOSPITAL | Age: 67
Discharge: HOME | End: 2017-12-07
Attending: OBSTETRICS & GYNECOLOGY
Payer: COMMERCIAL

## 2017-12-07 ENCOUNTER — HOSPITAL ENCOUNTER (OUTPATIENT)
Dept: RADIOLOGY | Facility: HOSPITAL | Age: 67
Discharge: HOME | End: 2017-12-07
Attending: INTERNAL MEDICINE
Payer: COMMERCIAL

## 2017-12-07 ENCOUNTER — INITIAL CONSULT (OUTPATIENT)
Dept: RHEUMATOLOGY | Facility: CLINIC | Age: 67
End: 2017-12-07
Payer: COMMERCIAL

## 2017-12-07 VITALS
BODY MASS INDEX: 39.42 KG/M2 | SYSTOLIC BLOOD PRESSURE: 145 MMHG | WEIGHT: 230.88 LBS | DIASTOLIC BLOOD PRESSURE: 76 MMHG | HEART RATE: 61 BPM | HEIGHT: 64 IN

## 2017-12-07 DIAGNOSIS — M13.0 POLYARTHRITIS: Primary | ICD-10-CM

## 2017-12-07 DIAGNOSIS — Z12.31 SCREENING MAMMOGRAM, ENCOUNTER FOR: ICD-10-CM

## 2017-12-07 DIAGNOSIS — M13.0 POLYARTHRITIS: ICD-10-CM

## 2017-12-07 PROCEDURE — 77077 JOINT SURVEY SINGLE VIEW: CPT | Mod: TC

## 2017-12-07 PROCEDURE — 77067 SCR MAMMO BI INCL CAD: CPT | Mod: TC,PO

## 2017-12-07 PROCEDURE — 77077 JOINT SURVEY SINGLE VIEW: CPT | Mod: 26,,, | Performed by: RADIOLOGY

## 2017-12-07 PROCEDURE — 99203 OFFICE O/P NEW LOW 30 MIN: CPT | Mod: S$GLB,,, | Performed by: INTERNAL MEDICINE

## 2017-12-07 PROCEDURE — 99999 PR PBB SHADOW E&M-EST. PATIENT-LVL IV: CPT | Mod: PBBFAC,,, | Performed by: INTERNAL MEDICINE

## 2017-12-07 RX ORDER — TRAMADOL HYDROCHLORIDE 50 MG/1
50 TABLET ORAL EVERY 8 HOURS PRN
Qty: 90 TABLET | Refills: 5 | Status: SHIPPED | OUTPATIENT
Start: 2017-12-07 | End: 2017-12-17

## 2017-12-07 RX ORDER — CYCLOBENZAPRINE HCL 5 MG
TABLET ORAL
Qty: 60 TABLET | Refills: 5 | Status: SHIPPED | OUTPATIENT
Start: 2017-12-07 | End: 2018-03-27 | Stop reason: SDUPTHER

## 2017-12-07 ASSESSMENT — ROUTINE ASSESSMENT OF PATIENT INDEX DATA (RAPID3)
AM STIFFNESS SCORE: 1, YES
PAIN SCORE: 8
MDHAQ FUNCTION SCORE: .8
PSYCHOLOGICAL DISTRESS SCORE: 3.3
PATIENT GLOBAL ASSESSMENT SCORE: 8
FATIGUE SCORE: 8
TOTAL RAPID3 SCORE: 6.22

## 2017-12-07 NOTE — PROGRESS NOTES
Subjective:       Patient ID: Luz Bailey is a 67 y.o. female.    Chief Complaint: No chief complaint on file.    HPI    68 yo F with PMH of collagenous colitis in 2016, right knee repacement in 2009, lap band surgery in 2012,  Diastolic HF, hypothyroidism,  shunt  HERE FOR EVALUATION OF JOINT PAIN.  Reports that she has discomfort in ,lower back, neck,elbows,hands, and ankles.Pain level is 7/10 is aching.  Moving hands makes pain worse. Reports swelling in hands and feet.  Reports also swelling in right elbow. Reports that her symptoms started 2 months ago.  She is on allopurinol but denies podagra.She takes meloxicam and is more tolerable.She is on methocarbamol with improvement.  Denies oral ulcers, fevers, pleurisy, or photosensitivity.          Past Medical History:   Diagnosis Date    Collagenous colitis     Dx 5/30/16    Hypothyroidism     OA (osteoarthritis)     Obesity     Sleep apnea          Review of Systems   Constitutional: Positive for fatigue. Negative for activity change, appetite change, chills and diaphoresis.   HENT: Negative for congestion, ear discharge, ear pain, facial swelling, mouth sores, sinus pressure, sneezing, sore throat, tinnitus and trouble swallowing.    Eyes: Negative for photophobia, pain, discharge, redness, itching and visual disturbance.   Respiratory: Negative for apnea, chest tightness, shortness of breath, wheezing and stridor.    Cardiovascular: Negative for leg swelling.   Gastrointestinal: Negative for abdominal distention, abdominal pain, anal bleeding, blood in stool, constipation, diarrhea and nausea.   Endocrine: Negative for cold intolerance and heat intolerance.   Genitourinary: Negative for difficulty urinating and dysuria.   Musculoskeletal: Positive for arthralgias, back pain, gait problem, joint swelling and myalgias. Negative for neck pain and neck stiffness.   Skin: Negative for color change, pallor, rash and wound.   Neurological: Negative for  dizziness, seizures, light-headedness and numbness.   Hematological: Negative for adenopathy. Does not bruise/bleed easily.   Psychiatric/Behavioral: Negative for sleep disturbance. The patient is not nervous/anxious.                    LUMBAR XRAY: (I personally reviewed)   multilevel degenerative spine changes similar to exam last year. No acute abnormality.        Objective:   LMP  (LMP Unknown)      Physical Exam   Constitutional: She is oriented to person, place, and time.   HENT:   Head: Normocephalic and atraumatic.   Right Ear: External ear normal.   Left Ear: External ear normal.   Nose: Nose normal.   Mouth/Throat: Oropharynx is clear and moist. No oropharyngeal exudate.   Eyes: Conjunctivae and EOM are normal. Pupils are equal, round, and reactive to light. Right eye exhibits no discharge. Left eye exhibits no discharge. No scleral icterus.   Neck: Neck supple. No JVD present. No thyromegaly present.   Cardiovascular: Normal rate, regular rhythm, normal heart sounds and intact distal pulses.  Exam reveals no gallop and no friction rub.    No murmur heard.  Pulmonary/Chest: Effort normal and breath sounds normal. No respiratory distress. She has no wheezes. She has no rales. She exhibits no tenderness.   Abdominal: Soft. Bowel sounds are normal. She exhibits no distension and no mass. There is no tenderness. There is no rebound and no guarding.   Lymphadenopathy:     She has no cervical adenopathy.   Neurological: She is alert and oriented to person, place, and time. No cranial nerve deficit. Gait normal. Coordination normal.   Skin: Skin is dry. No rash noted. No erythema. No pallor.     Psychiatric: Affect and judgment normal.   Musculoskeletal: She exhibits tenderness. She exhibits no edema or deformity.   Tenderness in lumbar paraspinal muscles and bilateral shins         Labs: reviewed       Assessment:     68 yo F with PMH of collagenous colitis in 2016, right knee repacement in 2009, lap band  surgery in 2012,  Diastolic HF, hypothyroidism,  shunt  HERE FOR EVALUATION OF JOINT PAIN.  Suspect pain is from OA and deconditioning from obesity.  No diagnosis found.        Plan:     start flexeril 10mg po qhs  Continue meloxicam 15mg po qday  Start tramadol 50mg po q8 hours as needed  Stop methocarbamol  *  encourage weight loss  Labs  xrays

## 2017-12-07 NOTE — LETTER
December 7, 2017      Hilario Slaughter MD  [address] [address]           [facility] - Rheumatology  [address]  [place] [address]  Phone: [phone]  Fax: [phone]          Patient: Luz Bailey   MR Number: [id number]   YOB: 1950   Date of Visit: 12/7/2017       Dear Dr. Hilario Slaughter:    Thank you for referring Luz Bailey to me for evaluation. Attached you will find relevant portions of my assessment and plan of care.    If you have questions, please do not hesitate to call me. I look forward to following Luz Bailey along with you.    Sincerely,    Ayana Kamara MD    Enclosure  CC:  No Recipients    If you would like to receive this communication electronically, please contact [email or web address] or [phone] to request more information on [employer] Link access.    For providers and/or their staff who would like to refer a patient to [facility], please contact us through our one-stop-shop provider referral line, [facility], at [phone].    If you feel you have received this communication in error or would no longer like to receive these types of communications, please e-mail [email or web address]

## 2017-12-11 ENCOUNTER — PATIENT MESSAGE (OUTPATIENT)
Dept: RHEUMATOLOGY | Facility: CLINIC | Age: 67
End: 2017-12-11

## 2018-01-29 ENCOUNTER — TELEPHONE (OUTPATIENT)
Dept: NEUROSURGERY | Facility: CLINIC | Age: 68
End: 2018-01-29

## 2018-01-29 ENCOUNTER — HOSPITAL ENCOUNTER (OUTPATIENT)
Dept: RADIOLOGY | Facility: HOSPITAL | Age: 68
Discharge: HOME | End: 2018-01-29
Attending: NEUROLOGICAL SURGERY
Payer: COMMERCIAL

## 2018-01-29 DIAGNOSIS — G91.9 HYDROCEPHALUS: ICD-10-CM

## 2018-01-29 DIAGNOSIS — Z98.2 S/P VP SHUNT: Primary | ICD-10-CM

## 2018-01-29 DIAGNOSIS — Z98.2 S/P VP SHUNT: ICD-10-CM

## 2018-01-29 PROCEDURE — 71045 X-RAY EXAM CHEST 1 VIEW: CPT | Mod: 26,,, | Performed by: RADIOLOGY

## 2018-01-29 PROCEDURE — 72020 X-RAY EXAM OF SPINE 1 VIEW: CPT | Mod: 26,,, | Performed by: RADIOLOGY

## 2018-01-29 PROCEDURE — 71045 X-RAY EXAM CHEST 1 VIEW: CPT | Mod: TC

## 2018-01-29 PROCEDURE — 74018 RADEX ABDOMEN 1 VIEW: CPT | Mod: 26,59,, | Performed by: RADIOLOGY

## 2018-01-29 PROCEDURE — 70250 X-RAY EXAM OF SKULL: CPT | Mod: 26,,, | Performed by: RADIOLOGY

## 2018-01-29 PROCEDURE — 74018 RADEX ABDOMEN 1 VIEW: CPT | Mod: TC

## 2018-01-29 NOTE — TELEPHONE ENCOUNTER
----- Message from Rachele Mallory sent at 1/29/2018 12:47 PM CST -----  Contact: Pt can be reached at [phone]  Pt is calling to speak the nurse to be seen today or tomorrow for  shunt. Please contact pt.    Pt  shunt is swollen and she is having headaches.  Pt is currently at [facility] today and tomorrow with .    Thank you!

## 2018-01-30 ENCOUNTER — HOSPITAL ENCOUNTER (OUTPATIENT)
Dept: RADIOLOGY | Facility: HOSPITAL | Age: 68
Discharge: HOME | End: 2018-01-30
Attending: NEUROLOGICAL SURGERY
Payer: COMMERCIAL

## 2018-01-30 ENCOUNTER — OFFICE VISIT (OUTPATIENT)
Dept: NEUROSURGERY | Facility: CLINIC | Age: 68
End: 2018-01-30
Payer: COMMERCIAL

## 2018-01-30 VITALS
SYSTOLIC BLOOD PRESSURE: 122 MMHG | BODY MASS INDEX: 39.27 KG/M2 | DIASTOLIC BLOOD PRESSURE: 85 MMHG | HEART RATE: 78 BPM | WEIGHT: 230 LBS | HEIGHT: 64 IN

## 2018-01-30 DIAGNOSIS — Z98.2 S/P VP SHUNT: ICD-10-CM

## 2018-01-30 DIAGNOSIS — R68.2 DRY MOUTH: ICD-10-CM

## 2018-01-30 DIAGNOSIS — R63.1 EXCESSIVE THIRST: ICD-10-CM

## 2018-01-30 DIAGNOSIS — G91.9 HYDROCEPHALUS: Primary | ICD-10-CM

## 2018-01-30 DIAGNOSIS — R35.0 URINARY FREQUENCY: ICD-10-CM

## 2018-01-30 DIAGNOSIS — Z98.2 VENTRICULO-PERITONEAL SHUNT STATUS: ICD-10-CM

## 2018-01-30 DIAGNOSIS — G91.9 HYDROCEPHALUS: ICD-10-CM

## 2018-01-30 DIAGNOSIS — G44.039 EPISODIC PAROXYSMAL HEMICRANIA, NOT INTRACTABLE: ICD-10-CM

## 2018-01-30 PROCEDURE — 70450 CT HEAD/BRAIN W/O DYE: CPT | Mod: TC

## 2018-01-30 PROCEDURE — 70450 CT HEAD/BRAIN W/O DYE: CPT | Mod: 26,,, | Performed by: RADIOLOGY

## 2018-01-30 PROCEDURE — 99205 OFFICE O/P NEW HI 60 MIN: CPT | Mod: S$GLB,,, | Performed by: PHYSICIAN ASSISTANT

## 2018-01-30 PROCEDURE — 3288F FALL RISK ASSESSMENT DOCD: CPT | Mod: CPTII,S$GLB,, | Performed by: PHYSICIAN ASSISTANT

## 2018-01-30 PROCEDURE — 1100F PTFALLS ASSESS-DOCD GE2>/YR: CPT | Mod: CPTII,S$GLB,, | Performed by: PHYSICIAN ASSISTANT

## 2018-01-30 PROCEDURE — 99999 PR PBB SHADOW E&M-EST. PATIENT-LVL IV: CPT | Mod: PBBFAC,,, | Performed by: PHYSICIAN ASSISTANT

## 2018-01-30 NOTE — PROGRESS NOTES
[facility] 7th Fl  Neurosurgery    SUBJECTIVE:     History of Present Illness:  Luz Bailey is a 67 y.o. female with hydrocephalus who presents for neurosurgical evaluation of  shunt. She is status post exploration  and revision of a right frontal  shunt with Dr. Padilla in 2010, and was last seen by nsgy in 2014. Her Strata valve was last set to 1.5 mmH2O and she has done well at this setting until recently. Since Sunday, she has experienced paroxysms of severe, shocking pain in the right side of her face into the eye and jaw, with associated tearing of the right eye. These have been relatively brief and resolved spontaneously. She presents over concern for possible VPS malfunction, as her valve is on the right. She denies increased lethargy, confusion, n/v, or worsened gait.     Of note, she reports recent dry mouth, increased thirst, and urinary frequency.    Review of patient's allergies indicates:   Allergen Reactions    Omeprazole Diarrhea       Past Medical History:   Diagnosis Date    Collagenous colitis     Dx 5/30/16    Hypothyroidism     OA (osteoarthritis)     Obesity     Sleep apnea        Past Surgical History:   Procedure Laterality Date    APPENDECTOMY      CHOLECYSTECTOMY      COLONOSCOPY N/A 5/30/2016    Procedure: COLONOSCOPY;  Surgeon: BINH Souza MD;  Location: [facility] ([address] [place] [address]);  Service: Endoscopy;  Laterality: N/A;    HERNIA REPAIR      HIATAL HERNIA REPAIR      HYSTERECTOMY      lap band surgery  10/11/2011    Realize C Band (11mL)    LAPAROSCOPIC GASTRIC BANDING      OOPHORECTOMY      TOTAL KNEE ARTHROPLASTY  7/23/12    VENTRICULOPERITONEAL SHUNT          Family History   Problem Relation Age of Onset    Alzheimer's disease Mother 84    Prostate cancer Father 70    Lupus Father     Emphysema Father     Breast cancer Sister     Cancer      Arthritis      Colon cancer Neg Hx        Social History     Social History    Marital status:  "     Spouse name: N/A    Number of children: N/A    Years of education: N/A     Occupational History    Not on file.     Social History Main Topics    Smoking status: Former Smoker     Packs/day: 2.00     Years: 25.00     Types: Cigarettes     Quit date: 1/1/2002    Smokeless tobacco: Never Used    Alcohol use 0.0 oz/week      Comment: occasionally    Drug use: No    Sexual activity: Yes     Partners: Male     Other Topics Concern    Not on file     Social History Narrative    No narrative on file       Review of Systems:  Constitutional: no fever, chills or night sweats. No changes in weight   Eyes: no visual changes   ENT: no nasal congestion or sore throat   Respiratory: no cough or shortness of breath   Cardiovascular: no chest pain or palpitations   Gastrointestinal: no nausea or vomiting   Genitourinary: no hematuria or dysuria   Integument/Breast: no rash or pruritis   Hematologic/Lymphatic: no easy bruising or lymphadenopathy   Musculoskeletal: no arthralgias or myalgias.   Neurological: no seizures or tremors   Behavioral/Psych: no auditory or visual hallucinations   Endocrine: no heat or cold intolerance     OBJECTIVE:     Vital Signs (Most Recent):  Vitals:    01/30/18 0926   BP: 122/85   Pulse: 78   Weight: 104.3 kg (230 lb)   Height: 5' 4" (1.626 m)       Physical Exam:  General: well developed, well nourished, no distress.   Head: normocephalic, atraumatic  Neurologic: Alert and oriented. Thought content appropriate.  GCS: Motor: 6/Verbal: 5/Eyes: 4 GCS Total: 15  Mental Status: Awake, Alert, Oriented x 4  Language: No aphasia  Speech: No dysarthria  Cranial nerves: face symmetric, tongue midline, CN II-XII grossly intact.   Eyes: pupils equal, round, reactive to light with accomodation, EOMI.  Pulmonary: normal respirations, no signs of respiratory distress  Abdomen: soft, non-distended, not tender to palpation  Sensory: intact to light touch throughout    Motor Strength: Moves all " extremities spontaneously with good tone.  Full strength upper and lower extremities. No abnormal movements seen.     DTR's: 2 + and symmetric in UE and LE  Pronator Drift: no drift noted  Finger-to-nose: Intact bilaterally  Connor: absent  Clonus: absent  Babinski: absent  Pulses: 2+ and symmetric radial and dorsalis pedis.  Skin: Skin is warm, dry and intact.  Straight leg raise: negative  Gait: normal  Tandem Gait: Some difficulty             Diagnostic Results:  Xray shunt series 01/30/2018 reviewed.   The distal portion of the  shunt catheter, previously noted to be in the LUQ in 2014, now resides in the RUQ. S/p gastric lap band and hernia repair, as seen in 2014.  Of note, there is incidental anterolisthesis of C4 on C5. S/p C5-6 ACDF with appropriate fusion.  CT head 01/30/2018 reviewed and is stable compared to previous study in 2014.    ASSESSMENT/PLAN:     Luz Bailey is a 67 y.o. female with hydrocephalus s/p VPS with most recent revision in 2010. I see no radiographic or clinical evidence for VPS malfunction. I will refer her to a neurologist for workup of trigeminal neuralgia. Given her recent symptoms of dry mouth, increased thirst, and urinary frequency, I have urged her to see her PCP as soon as possible to undergo workup for diabetes mellitus as there is no recent Hgb A1C in her chart. She agrees to do so. From a neurosurgical standpoint, she may follow up as needed.       Kassi Fernandez PA-C  Neurosurgery

## 2018-02-21 ENCOUNTER — TELEPHONE (OUTPATIENT)
Dept: FAMILY MEDICINE | Facility: CLINIC | Age: 68
End: 2018-02-21

## 2018-02-21 ENCOUNTER — HOSPITAL ENCOUNTER (OUTPATIENT)
Dept: RADIOLOGY | Facility: HOSPITAL | Age: 68
Discharge: HOME | End: 2018-02-21
Attending: FAMILY MEDICINE
Payer: COMMERCIAL

## 2018-02-21 DIAGNOSIS — S99.922A FOOT TRAUMA, LEFT, INITIAL ENCOUNTER: Primary | ICD-10-CM

## 2018-02-21 DIAGNOSIS — S99.922A FOOT TRAUMA, LEFT, INITIAL ENCOUNTER: ICD-10-CM

## 2018-02-21 PROCEDURE — 73630 X-RAY EXAM OF FOOT: CPT | Mod: TC,FY,PO,LT

## 2018-02-21 NOTE — TELEPHONE ENCOUNTER
----- Message from Annalisa Bojorquez sent at 2/21/2018  2:57 PM CST -----  Contact: The pt  Pt walked in.  Hurt her toe.  No available appts. Makenna said she can see her tomorrow but pt here at window now.  Can Dr Slaughter order on xray for her?

## 2018-02-22 ENCOUNTER — LAB VISIT (OUTPATIENT)
Dept: LAB | Facility: HOSPITAL | Age: 68
End: 2018-02-22
Attending: NURSE PRACTITIONER
Payer: COMMERCIAL

## 2018-02-22 ENCOUNTER — PATIENT MESSAGE (OUTPATIENT)
Dept: RHEUMATOLOGY | Facility: CLINIC | Age: 68
End: 2018-02-22

## 2018-02-22 ENCOUNTER — OFFICE VISIT (OUTPATIENT)
Dept: FAMILY MEDICINE | Facility: CLINIC | Age: 68
End: 2018-02-22
Payer: COMMERCIAL

## 2018-02-22 DIAGNOSIS — R63.1 POLYDIPSIA: ICD-10-CM

## 2018-02-22 DIAGNOSIS — J30.89 ACUTE NONSEASONAL ALLERGIC RHINITIS DUE TO OTHER ALLERGEN: Primary | ICD-10-CM

## 2018-02-22 DIAGNOSIS — S99.922A INJURY OF TOE ON LEFT FOOT, INITIAL ENCOUNTER: ICD-10-CM

## 2018-02-22 LAB
ESTIMATED AVG GLUCOSE: 100 MG/DL
HBA1C MFR BLD HPLC: 5.1 %

## 2018-02-22 PROCEDURE — 83036 HEMOGLOBIN GLYCOSYLATED A1C: CPT

## 2018-02-22 PROCEDURE — 3008F BODY MASS INDEX DOCD: CPT | Mod: S$GLB,,, | Performed by: NURSE PRACTITIONER

## 2018-02-22 PROCEDURE — 99214 OFFICE O/P EST MOD 30 MIN: CPT | Mod: 25,S$GLB,, | Performed by: NURSE PRACTITIONER

## 2018-02-22 PROCEDURE — 90715 TDAP VACCINE 7 YRS/> IM: CPT | Mod: S$GLB,,, | Performed by: FAMILY MEDICINE

## 2018-02-22 PROCEDURE — 36415 COLL VENOUS BLD VENIPUNCTURE: CPT | Mod: PO

## 2018-02-22 PROCEDURE — 1159F MED LIST DOCD IN RCRD: CPT | Mod: S$GLB,,, | Performed by: NURSE PRACTITIONER

## 2018-02-22 PROCEDURE — 90471 IMMUNIZATION ADMIN: CPT | Mod: S$GLB,,, | Performed by: FAMILY MEDICINE

## 2018-02-22 RX ORDER — MELOXICAM 15 MG/1
15 TABLET ORAL DAILY
COMMUNITY
End: 2018-03-27 | Stop reason: SDUPTHER

## 2018-02-22 RX ORDER — LORATADINE 10 MG/1
10 TABLET ORAL DAILY
Qty: 30 TABLET | Refills: 0 | Status: SHIPPED | OUTPATIENT
Start: 2018-02-22 | End: 2018-05-30

## 2018-02-22 RX ORDER — PREDNISONE 20 MG/1
20 TABLET ORAL DAILY
Qty: 5 TABLET | Refills: 0 | Status: SHIPPED | OUTPATIENT
Start: 2018-02-22 | End: 2018-03-04

## 2018-02-22 NOTE — PROGRESS NOTES
Subjective:       Patient ID: Luz Bailey is a 67 y.o. female.    Chief Complaint: Toe Pain and Sore Throat    Toe Injury   This is a new problem. The current episode started yesterday. The problem occurs constantly. The problem has been unchanged. Associated symptoms include joint swelling. Pertinent negatives include no abdominal pain, anorexia, arthralgias, change in bowel habit, chest pain, chills, congestion, coughing, diaphoresis, fatigue, fever, headaches, myalgias, nausea, neck pain, numbness, rash, sore throat, swollen glands, urinary symptoms, vertigo, visual change, vomiting or weakness. Exacerbated by: walking. Treatments tried: had a xray done yesterday. The treatment provided no relief.   Sore Throat    This is a new problem. The current episode started in the past 7 days. The problem has been unchanged. Neither side of throat is experiencing more pain than the other. There has been no fever. The pain is at a severity of 3/10. The pain is mild. Associated symptoms include trouble swallowing. Pertinent negatives include no abdominal pain, congestion, coughing, diarrhea, drooling, ear discharge, ear pain, headaches, hoarse voice, plugged ear sensation, neck pain, shortness of breath, stridor, swollen glands or vomiting. She has had no exposure to strep or mono. She has tried nothing for the symptoms.     Review of Systems   Constitutional: Negative for chills, diaphoresis, fatigue and fever.   HENT: Positive for postnasal drip and trouble swallowing. Negative for congestion, drooling, ear discharge, ear pain, hoarse voice and sore throat.    Respiratory: Negative for cough, shortness of breath and stridor.    Cardiovascular: Negative for chest pain, palpitations and leg swelling.   Gastrointestinal: Negative for abdominal pain, anorexia, change in bowel habit, diarrhea, nausea and vomiting.   Endocrine: Positive for polydipsia.   Musculoskeletal: Positive for gait problem and joint swelling.  Negative for arthralgias, myalgias and neck pain.        Second toe left foot     Skin: Negative for rash.   Neurological: Negative for vertigo, weakness, numbness and headaches.       Objective:      Physical Exam   Constitutional: She is oriented to person, place, and time. She appears well-developed and well-nourished. No distress.   HENT:   Right Ear: External ear normal.   Left Ear: External ear normal.   Mouth/Throat: Posterior oropharyngeal edema and posterior oropharyngeal erythema present. No oropharyngeal exudate.   Cardiovascular: Normal rate, regular rhythm and normal heart sounds.    Pulmonary/Chest: Effort normal and breath sounds normal. No respiratory distress. She has no wheezes.   Musculoskeletal:        Feet:    Neurological: She is alert and oriented to person, place, and time.   Skin: Skin is warm and dry. She is not diaphoretic.   Psychiatric: She has a normal mood and affect. Her behavior is normal. Judgment and thought content normal.   Vitals reviewed.      Assessment:       1. Acute nonseasonal allergic rhinitis due to other allergen    2. Injury of toe on left foot, initial encounter    3. Polydipsia        Plan:       Acute nonseasonal allergic rhinitis due to other allergen  -     predniSONE (DELTASONE) 20 MG tablet; Take 1 tablet (20 mg total) by mouth once daily.  Dispense: 5 tablet; Refill: 0  -     loratadine (CLARITIN) 10 mg tablet; Take 1 tablet (10 mg total) by mouth once daily.  Dispense: 30 tablet; Refill: 0    Injury of toe on left foot, initial encounter  -     (In Office Administered) Tdap Vaccine    Polydipsia  -     HEMOGLOBIN A1C; Future    fracture to left second toe xray done yesterday  Continue Flonase  Can kelly tape toe  Ice to toe

## 2018-03-20 ENCOUNTER — TELEPHONE (OUTPATIENT)
Dept: FAMILY MEDICINE | Facility: CLINIC | Age: 68
End: 2018-03-20

## 2018-03-20 ENCOUNTER — CLINICAL SUPPORT (OUTPATIENT)
Dept: FAMILY MEDICINE | Facility: CLINIC | Age: 68
End: 2018-03-20
Payer: COMMERCIAL

## 2018-03-20 DIAGNOSIS — R35.0 FREQUENT URINATION: Primary | ICD-10-CM

## 2018-03-20 LAB
BACTERIA #/AREA URNS AUTO: ABNORMAL /HPF
BILIRUB UR QL STRIP: NEGATIVE
CLARITY UR REFRACT.AUTO: ABNORMAL
COLOR UR AUTO: ABNORMAL
GLUCOSE UR QL STRIP: NEGATIVE
HGB UR QL STRIP: ABNORMAL
KETONES UR QL STRIP: NEGATIVE
LEUKOCYTE ESTERASE UR QL STRIP: ABNORMAL
MICROSCOPIC COMMENT: ABNORMAL
NITRITE UR QL STRIP: POSITIVE
PH UR STRIP: 6 [PH] (ref 5–8)
PROT UR QL STRIP: NEGATIVE
RBC #/AREA URNS AUTO: 1 /HPF (ref 0–4)
SP GR UR STRIP: 1 (ref 1–1.03)
SQUAMOUS #/AREA URNS AUTO: 0 /HPF
URN SPEC COLLECT METH UR: ABNORMAL
UROBILINOGEN UR STRIP-ACNC: ABNORMAL EU/DL
WBC #/AREA URNS AUTO: 100 /HPF (ref 0–5)

## 2018-03-20 PROCEDURE — 87086 URINE CULTURE/COLONY COUNT: CPT

## 2018-03-20 PROCEDURE — 87077 CULTURE AEROBIC IDENTIFY: CPT

## 2018-03-20 PROCEDURE — 87088 URINE BACTERIA CULTURE: CPT

## 2018-03-20 PROCEDURE — 87186 SC STD MICRODIL/AGAR DIL: CPT

## 2018-03-20 PROCEDURE — 81001 URINALYSIS AUTO W/SCOPE: CPT

## 2018-03-20 NOTE — TELEPHONE ENCOUNTER
----- Message from Annalisa Reno sent at 3/20/2018  9:39 AM CDT -----  Contact: the pt  Pt is requesting to get something sent to the pharmacy for a bladder infection.  Started Sunday morning with burning and pressure.  Tried Azo and got a little relief but symptoms are still very persistant.  Please send something to [facility].      She dropped off urine - unable to dip - pt took azp  We could send it out .  Please advise

## 2018-03-21 RX ORDER — CIPROFLOXACIN 500 MG/1
500 TABLET ORAL 2 TIMES DAILY
Qty: 14 TABLET | Refills: 0 | Status: SHIPPED | OUTPATIENT
Start: 2018-03-21 | End: 2018-04-02 | Stop reason: SDUPTHER

## 2018-03-23 LAB — BACTERIA UR CULT: NORMAL

## 2018-03-26 ENCOUNTER — TELEPHONE (OUTPATIENT)
Dept: FAMILY MEDICINE | Facility: CLINIC | Age: 68
End: 2018-03-26

## 2018-03-26 NOTE — TELEPHONE ENCOUNTER
Patient was advised       ----- Message from Hilario Slaughter MD sent at 3/24/2018  4:05 PM CDT -----  Urine infection should respond to cipro

## 2018-03-27 ENCOUNTER — OFFICE VISIT (OUTPATIENT)
Dept: RHEUMATOLOGY | Facility: CLINIC | Age: 68
End: 2018-03-27
Payer: COMMERCIAL

## 2018-03-27 VITALS
SYSTOLIC BLOOD PRESSURE: 122 MMHG | RESPIRATION RATE: 18 BRPM | BODY MASS INDEX: 37.39 KG/M2 | WEIGHT: 219 LBS | DIASTOLIC BLOOD PRESSURE: 85 MMHG | HEART RATE: 85 BPM | HEIGHT: 64 IN

## 2018-03-27 DIAGNOSIS — M15.9 PRIMARY OSTEOARTHRITIS INVOLVING MULTIPLE JOINTS: ICD-10-CM

## 2018-03-27 PROCEDURE — 99999 PR PBB SHADOW E&M-EST. PATIENT-LVL III: CPT | Mod: PBBFAC,,, | Performed by: INTERNAL MEDICINE

## 2018-03-27 PROCEDURE — 99214 OFFICE O/P EST MOD 30 MIN: CPT | Mod: S$GLB,,, | Performed by: INTERNAL MEDICINE

## 2018-03-27 RX ORDER — MELOXICAM 15 MG/1
15 TABLET ORAL DAILY
Qty: 90 TABLET | Refills: 0 | Status: SHIPPED | OUTPATIENT
Start: 2018-03-27 | End: 2018-04-03

## 2018-03-27 RX ORDER — CYCLOBENZAPRINE HCL 5 MG
TABLET ORAL
Qty: 60 TABLET | Refills: 5 | Status: SHIPPED | OUTPATIENT
Start: 2018-03-27 | End: 2018-06-26 | Stop reason: SDUPTHER

## 2018-03-27 RX ORDER — TRAMADOL HYDROCHLORIDE 50 MG/1
50 TABLET ORAL EVERY 12 HOURS PRN
Qty: 60 TABLET | Refills: 5 | Status: SHIPPED | OUTPATIENT
Start: 2018-03-27 | End: 2018-04-06

## 2018-03-27 NOTE — PROGRESS NOTES
Subjective:       Patient ID: Luz Bailey is a 67 y.o. female.    Chief Complaint: No chief complaint on file.    HPI    68 yo F with PMH of collagenous colitis in 2016, right knee repacement in 2009, lap band surgery in 2012,  Diastolic HF, hypothyroidism,  shunt  HERE FOR EVALUATION OF JOINT PAIN.  Reports that she has discomfort in ,lower back, neck,elbows,hands, and ankles.Pain level is 7/10 is aching.  Moving hands makes pain worse. Reports swelling in hands and feet.  Reports also swelling in right elbow. Reports that her symptoms started 2 months ago.  She is on allopurinol but denies podagra.She takes meloxicam and is more tolerable.She is on methocarbamol with improvement.  Denies oral ulcers, fevers, pleurisy, or photosensitivity.    Interval history: She fell on left arm last week.  She is not sure if she is taking meloxicam. She takes tramadol BID.  She takes flexeril 20mg at bedtime.      Past Medical History:   Diagnosis Date    Collagenous colitis     Dx 5/30/16    Hypothyroidism     OA (osteoarthritis)     Obesity     Sleep apnea          Review of Systems   Constitutional: Positive for fatigue. Negative for activity change, appetite change, chills and diaphoresis.   HENT: Negative for congestion, ear discharge, ear pain, facial swelling, mouth sores, sinus pressure, sneezing, sore throat, tinnitus and trouble swallowing.    Eyes: Negative for photophobia, pain, discharge, redness, itching and visual disturbance.   Respiratory: Negative for apnea, chest tightness, shortness of breath, wheezing and stridor.    Cardiovascular: Negative for leg swelling.   Gastrointestinal: Negative for abdominal distention, abdominal pain, anal bleeding, blood in stool, constipation, diarrhea and nausea.   Endocrine: Negative for cold intolerance and heat intolerance.   Genitourinary: Negative for difficulty urinating and dysuria.   Musculoskeletal: Positive for arthralgias, back pain, gait problem,  joint swelling and myalgias. Negative for neck pain and neck stiffness.   Skin: Negative for color change, pallor, rash and wound.   Neurological: Negative for dizziness, seizures, light-headedness and numbness.   Hematological: Negative for adenopathy. Does not bruise/bleed easily.   Psychiatric/Behavioral: Negative for sleep disturbance. The patient is not nervous/anxious.                    LUMBAR XRAY: (I personally reviewed)   multilevel degenerative spine changes similar to exam last year. No acute abnormality.        Objective:   LMP  (LMP Unknown)      Physical Exam   Constitutional: She is oriented to person, place, and time.   HENT:   Head: Normocephalic and atraumatic.   Right Ear: External ear normal.   Left Ear: External ear normal.   Nose: Nose normal.   Mouth/Throat: Oropharynx is clear and moist. No oropharyngeal exudate.   Eyes: Conjunctivae and EOM are normal. Pupils are equal, round, and reactive to light. Right eye exhibits no discharge. Left eye exhibits no discharge. No scleral icterus.   Neck: Neck supple. No JVD present. No thyromegaly present.   Cardiovascular: Normal rate, regular rhythm, normal heart sounds and intact distal pulses.  Exam reveals no gallop and no friction rub.    No murmur heard.  Pulmonary/Chest: Effort normal and breath sounds normal. No respiratory distress. She has no wheezes. She has no rales. She exhibits no tenderness.   Abdominal: Soft. Bowel sounds are normal. She exhibits no distension and no mass. There is no tenderness. There is no rebound and no guarding.   Lymphadenopathy:     She has no cervical adenopathy.   Neurological: She is alert and oriented to person, place, and time. No cranial nerve deficit. Gait normal. Coordination normal.   Skin: Skin is dry. No rash noted. No erythema. No pallor.     Psychiatric: Affect and judgment normal.   Musculoskeletal: She exhibits tenderness. She exhibits no edema or deformity.   Tenderness in lumbar paraspinal muscles  and bilateral shins         Labs: reviewed     ccp,rf-negative  Tiffanie-negative    Arthritis survey (2018): I personally reviewed; DJD  Assessment:     68 yo F with PMH of collagenous colitis in 2016, right knee repacement in 2009, lap band surgery in 2012,  Diastolic HF, hypothyroidism,  shunt  HERE FOR follow up of OA.      Plan:   Continue flexeril 10mg po qhs  Continue meloxicam 15mg po qday  Continue tramadol 50mg po q8 hours as needed  Start aspercreme with lidocaine to both knees  *  encourage weight loss  rtc in 3 months

## 2018-04-01 ENCOUNTER — TELEPHONE (OUTPATIENT)
Dept: FAMILY MEDICINE | Facility: CLINIC | Age: 68
End: 2018-04-01

## 2018-04-01 DIAGNOSIS — Z00.00 WELLNESS EXAMINATION: Primary | ICD-10-CM

## 2018-04-01 RX ORDER — AMITRIPTYLINE HYDROCHLORIDE 10 MG/1
TABLET, FILM COATED ORAL
Qty: 90 TABLET | Refills: 1 | Status: SHIPPED | OUTPATIENT
Start: 2018-04-01 | End: 2018-09-24 | Stop reason: SDUPTHER

## 2018-04-02 ENCOUNTER — TELEPHONE (OUTPATIENT)
Dept: FAMILY MEDICINE | Facility: CLINIC | Age: 68
End: 2018-04-02

## 2018-04-02 ENCOUNTER — CLINICAL SUPPORT (OUTPATIENT)
Dept: FAMILY MEDICINE | Facility: CLINIC | Age: 68
End: 2018-04-02
Payer: COMMERCIAL

## 2018-04-02 DIAGNOSIS — R31.9 HEMATURIA, UNSPECIFIED TYPE: Primary | ICD-10-CM

## 2018-04-02 DIAGNOSIS — R31.9 URINARY TRACT INFECTION WITH HEMATURIA, SITE UNSPECIFIED: Primary | ICD-10-CM

## 2018-04-02 DIAGNOSIS — N39.0 URINARY TRACT INFECTION WITH HEMATURIA, SITE UNSPECIFIED: Primary | ICD-10-CM

## 2018-04-02 LAB
BILIRUB SERPL-MCNC: ABNORMAL MG/DL
BLOOD URINE, POC: ABNORMAL
COLOR, POC UA: YELLOW
GLUCOSE UR QL STRIP: ABNORMAL
KETONES UR QL STRIP: ABNORMAL
LEUKOCYTE ESTERASE URINE, POC: ABNORMAL
NITRITE, POC UA: ABNORMAL
PH, POC UA: 5
PROTEIN, POC: ABNORMAL
SPECIFIC GRAVITY, POC UA: 1.02
UROBILINOGEN, POC UA: ABNORMAL

## 2018-04-02 PROCEDURE — 81002 URINALYSIS NONAUTO W/O SCOPE: CPT | Mod: S$GLB,,, | Performed by: FAMILY MEDICINE

## 2018-04-02 RX ORDER — CIPROFLOXACIN 500 MG/1
500 TABLET ORAL 2 TIMES DAILY
Qty: 14 TABLET | Refills: 0 | Status: SHIPPED | OUTPATIENT
Start: 2018-04-02 | End: 2018-04-03 | Stop reason: SINTOL

## 2018-04-02 NOTE — TELEPHONE ENCOUNTER
Patient dropped off urine sample   Completed cipro 500 mg (7 days) last Wednesday - culture showed e-coli  Started with pain and urine in blood on Saturday  Frequency and dysuria    Urine is cloudy and foul smelling  Large blood and leukocytes and positive nitrates    Please advise  [facility] in [place]  Pt # [phone]

## 2018-04-03 ENCOUNTER — OFFICE VISIT (OUTPATIENT)
Dept: UROLOGY | Facility: CLINIC | Age: 68
End: 2018-04-03
Payer: COMMERCIAL

## 2018-04-03 VITALS
SYSTOLIC BLOOD PRESSURE: 119 MMHG | HEIGHT: 64 IN | BODY MASS INDEX: 37.39 KG/M2 | DIASTOLIC BLOOD PRESSURE: 77 MMHG | HEART RATE: 108 BPM | WEIGHT: 219 LBS

## 2018-04-03 DIAGNOSIS — N39.0 URINARY TRACT INFECTION WITH HEMATURIA, SITE UNSPECIFIED: Primary | ICD-10-CM

## 2018-04-03 DIAGNOSIS — R31.9 URINARY TRACT INFECTION WITH HEMATURIA, SITE UNSPECIFIED: Primary | ICD-10-CM

## 2018-04-03 DIAGNOSIS — R31.0 GROSS HEMATURIA: ICD-10-CM

## 2018-04-03 PROCEDURE — 99204 OFFICE O/P NEW MOD 45 MIN: CPT | Mod: S$GLB,,, | Performed by: UROLOGY

## 2018-04-03 PROCEDURE — 99999 PR PBB SHADOW E&M-EST. PATIENT-LVL III: CPT | Mod: PBBFAC,,, | Performed by: UROLOGY

## 2018-04-03 RX ORDER — SULFAMETHOXAZOLE AND TRIMETHOPRIM 800; 160 MG/1; MG/1
1 TABLET ORAL 2 TIMES DAILY
Qty: 28 TABLET | Refills: 0 | Status: SHIPPED | OUTPATIENT
Start: 2018-04-03 | End: 2018-04-17

## 2018-04-03 RX ORDER — PREDNISONE 20 MG/1
TABLET ORAL
COMMUNITY
Start: 2018-02-22 | End: 2018-04-03

## 2018-04-03 NOTE — LETTER
April 3, 2018      Hilario Slaughter MD  [address] [place] [address]           [facility]  [address] [place] [address]  Phone: [phone]          Patient: Luz Bailey   MR Number: [id number]   YOB: 1950   Date of Visit: 4/3/2018       Dear Dr. Hilario Slaughter:    Thank you for referring Luz Bailey to me for evaluation. Attached you will find relevant portions of my assessment and plan of care.    If you have questions, please do not hesitate to call me. I look forward to following Luz Bailey along with you.    Sincerely,    Theodore Worrell MD    Enclosure  CC:  No Recipients    If you would like to receive this communication electronically, please contact [email or web address] or [phone] to request more information on [employer] Link access.    For providers and/or their staff who would like to refer a patient to [facility], please contact us through our one-stop-shop provider referral line, [facility], at [phone].    If you feel you have received this communication in error or would no longer like to receive these types of communications, please e-mail [email or web address]

## 2018-04-03 NOTE — TELEPHONE ENCOUNTER
I left message for pt to rtn call  chip not sure if anyone notified her that cipro was called in and she needs to see urology   Referral placed

## 2018-04-03 NOTE — TELEPHONE ENCOUNTER
I spoke with the pt   She will call dr marrero to self schedule    Pt had dexa with dr davis in 2016 and will repeat this year - does it Q 2 years   She will have dr davis send us a copy of the report

## 2018-04-03 NOTE — PROGRESS NOTES
HPI:  Luz Bailey is a 67 y.o. year old female that  presents with   Chief Complaint   Patient presents with    Urinary Tract Infection   .  Patient referred by her PCP with recurrent urinary tract infection    Should she started second course of Cipro for a documented Escherichia coli urinary tract infection.  Patient instructed to avoid Celexa while taking Cipro.  Symptoms are better on the antibiotics    Symptoms of infection for her are dysuria and episodes gross hematuria.  She has no fever and has no history kidney stones    There is no family history of kidney or bladder cancer and the patient is never had urologic surgery    Chart review shows no from rheumatology from last month showing joint pain for which therapy given with follow-up in 3 months.  From PCP from February shows toe and throat pain.  January 2016 patient had CT urogram showing normal kidneys.  This image is reviewed by me regarding this finding.  In August 2017 GFR is greater than 60      Past Medical History:   Diagnosis Date    Collagenous colitis     Dx 5/30/16    Hypothyroidism     OA (osteoarthritis)     Obesity     Sleep apnea      Social History     Social History    Marital status:      Spouse name: N/A    Number of children: N/A    Years of education: N/A     Occupational History    Not on file.     Social History Main Topics    Smoking status: Former Smoker     Packs/day: 2.00     Years: 25.00     Types: Cigarettes     Quit date: 1/1/2002    Smokeless tobacco: Never Used    Alcohol use 0.0 oz/week      Comment: occasionally    Drug use: No    Sexual activity: Yes     Partners: Male     Other Topics Concern    Not on file     Social History Narrative    No narrative on file     Past Surgical History:   Procedure Laterality Date    APPENDECTOMY      CHOLECYSTECTOMY      COLONOSCOPY N/A 5/30/2016    Procedure: COLONOSCOPY;  Surgeon: BINH Souza MD;  Location: [facility] ([address] [place] [address]);  Service:  "Endoscopy;  Laterality: N/A;    HERNIA REPAIR      HIATAL HERNIA REPAIR      HYSTERECTOMY      lap band surgery  10/11/2011    Realize C Band (11mL)    LAPAROSCOPIC GASTRIC BANDING      OOPHORECTOMY      TOTAL KNEE ARTHROPLASTY  7/23/12    VENTRICULOPERITONEAL SHUNT       Family History   Problem Relation Age of Onset    Alzheimer's disease Mother 84    Prostate cancer Father 70    Lupus Father     Emphysema Father     Breast cancer Sister     Cancer      Arthritis      Colon cancer Neg Hx            Review of Systems  The patient has no chest pains.  The patient has no shortness of breath  Patient wears        glasses.  All other review of systems are negative.      Physical Exam:  /77 (BP Location: Left arm, Patient Position: Sitting)   Pulse 108   Ht 5' 4" (1.626 m)   Wt 99.3 kg (219 lb)   LMP  (LMP Unknown)   BMI 37.59 kg/m²   General appearance: alert, cooperative, no distress  Constitutional:Oriented to person, place, and time.appears well-developed and well-nourished.   HEENT: Normocephalic, atraumatic, neck symmetrical, no nasal discharge   Eyes: conjunctivae/corneas clear, PERRL, EOM's intact  Lungs: clear to auscultation bilaterally, no dullness to percussion bilaterally  Heart: regular rate and rhythm without rub; no displacement of the PMI   Abdomen: soft, non-tender; bowel sounds normoactive; no organomegaly  :Urethral meatus without lesion, no urethral masses noted, bladder nontender/nondistended, vagina normal appearing,   no      cystocele, anus/perineum without lesions, uterus surgically absent  Extremities: extremities symmetric; no clubbing, cyanosis, or edema  Integument: Skin color, texture, turgor normal; no rashes; hair distrubution normal  Neurologic: Alert and oriented X 3, normal strength, normal coordination and gait  Psychiatric: no pressured speech; normal affect; no evidence of impaired cognition     LABS:    Complete Blood Count  Lab Results   Component " Value Date    RBC 3.93 (L) 08/30/2017    HGB 12.0 08/30/2017    HCT 35.6 (L) 08/30/2017    MCV 91 08/30/2017    MCH 30.5 08/30/2017    MCHC 33.7 08/30/2017    RDW 12.3 08/30/2017     08/30/2017    MPV 9.3 08/30/2017    GRAN 2.2 08/30/2017    GRAN 47.2 08/30/2017    LYMPH 1.8 08/30/2017    LYMPH 39.1 08/30/2017    MONO 0.4 08/30/2017    MONO 9.5 08/30/2017    EOS 0.2 08/30/2017    BASO 0.03 08/30/2017    EOSINOPHIL 3.3 08/30/2017    BASOPHIL 0.7 08/30/2017    DIFFMETHOD Automated 08/30/2017       Comprehensive Metabolic Panel  Lab Results   Component Value Date    GLU 91 08/30/2017    BUN 11 08/30/2017    CREATININE 0.8 08/30/2017     08/30/2017    K 3.9 08/30/2017     08/30/2017    PROT 6.3 08/30/2017    ALBUMIN 3.2 (L) 08/30/2017    BILITOT 0.3 08/30/2017    AST 16 08/30/2017    ALKPHOS 59 08/30/2017    CO2 28 08/30/2017    ALT 9 (L) 08/30/2017    ANIONGAP 6 (L) 08/30/2017    EGFRNONAA >60.0 08/30/2017    ESTGFRAFRICA >60.0 08/30/2017       PSA  No results found for: PSA      Assessment:    ICD-10-CM ICD-9-CM    1. Urinary tract infection with hematuria, site unspecified N39.0 599.0 POCT URINE DIPSTICK WITHOUT MICROSCOPE    R31.9     2. Gross hematuria R31.0 599.71      The primary encounter diagnosis was Urinary tract infection with hematuria, site unspecified. A diagnosis of Gross hematuria was also pertinent to this visit.      Plan: 1.  Urinary tract infection.  Plan.  Patient desires a different antibiotic so  that she can go back on her Celexa.  Patient therefore instructed to stop the Cipro and a 2 week course of Bactrim is prescribed.  Follow-up 1 month for recheck    #2.  Gross hematuria.  Plan.  Undoubtedly due to urinary tract infection however I discussed with patient and I think it reasonable to work her up with cystoscopy and ultrasound of kidneys. ( Patient had normal contrast study of kidneys 2016.)  Patient voices understanding and agrees.  When I see her back in 1 month, we will   work on scheduling these tests.  Orders Placed This Encounter   Procedures    POCT URINE DIPSTICK WITHOUT MICROSCOPE           Theodore Worrell MD    PLEASE NOTE:  Please be advised that portions of this note were dictated using voice recognition software and may contain dictation related errors in spelling/grammar/appropriate pronouns/syntax or other errors that might have not been found and or corrected on text review.

## 2018-04-07 RX ORDER — CITALOPRAM 20 MG/1
TABLET, FILM COATED ORAL
Qty: 90 TABLET | Refills: 1 | Status: SHIPPED | OUTPATIENT
Start: 2018-04-07 | End: 2018-09-24 | Stop reason: SDUPTHER

## 2018-04-16 ENCOUNTER — PATIENT MESSAGE (OUTPATIENT)
Dept: UROLOGY | Facility: CLINIC | Age: 68
End: 2018-04-16

## 2018-04-16 DIAGNOSIS — R31.9 URINARY TRACT INFECTION WITH HEMATURIA, SITE UNSPECIFIED: Primary | ICD-10-CM

## 2018-04-16 DIAGNOSIS — N39.0 URINARY TRACT INFECTION WITH HEMATURIA, SITE UNSPECIFIED: Primary | ICD-10-CM

## 2018-04-16 NOTE — TELEPHONE ENCOUNTER
Please contact the patient and instructed her to get a urine for culture and sensitivity.  We will contact patient if antibiotics need to be changed    Also please schedule her for renal ultrasound and cystoscopy

## 2018-04-17 ENCOUNTER — TELEPHONE (OUTPATIENT)
Dept: UROLOGY | Facility: CLINIC | Age: 68
End: 2018-04-17

## 2018-04-17 ENCOUNTER — PATIENT MESSAGE (OUTPATIENT)
Dept: NEUROSURGERY | Facility: CLINIC | Age: 68
End: 2018-04-17

## 2018-04-17 NOTE — TELEPHONE ENCOUNTER
----- Message from Arlyn Guzman sent at 4/17/2018  2:38 PM CDT -----  Contact: [phone]/self  Pt requesting to speak with you concerning scheduling test and procedures  Please call and advise

## 2018-04-17 NOTE — TELEPHONE ENCOUNTER
Spoke with patient, ultrasound and cystoscopy scheduled for 4/20/18 at 0800 and 1330 hours.  Confirmed.  Patient will drop off urine specimen to [place] site.

## 2018-04-18 ENCOUNTER — LAB VISIT (OUTPATIENT)
Dept: LAB | Facility: HOSPITAL | Age: 68
End: 2018-04-18
Attending: UROLOGY
Payer: COMMERCIAL

## 2018-04-18 DIAGNOSIS — N39.0 URINARY TRACT INFECTION WITH HEMATURIA, SITE UNSPECIFIED: ICD-10-CM

## 2018-04-18 DIAGNOSIS — R31.9 URINARY TRACT INFECTION WITH HEMATURIA, SITE UNSPECIFIED: ICD-10-CM

## 2018-04-18 PROCEDURE — 87086 URINE CULTURE/COLONY COUNT: CPT | Mod: PO

## 2018-04-20 ENCOUNTER — PATIENT MESSAGE (OUTPATIENT)
Dept: UROLOGY | Facility: CLINIC | Age: 68
End: 2018-04-20

## 2018-04-20 ENCOUNTER — PROCEDURE VISIT (OUTPATIENT)
Dept: UROLOGY | Facility: CLINIC | Age: 68
End: 2018-04-20
Payer: COMMERCIAL

## 2018-04-20 ENCOUNTER — HOSPITAL ENCOUNTER (OUTPATIENT)
Dept: RADIOLOGY | Facility: HOSPITAL | Age: 68
Discharge: HOME | End: 2018-04-20
Attending: UROLOGY
Payer: COMMERCIAL

## 2018-04-20 VITALS
HEIGHT: 64 IN | HEART RATE: 79 BPM | WEIGHT: 219 LBS | DIASTOLIC BLOOD PRESSURE: 83 MMHG | SYSTOLIC BLOOD PRESSURE: 143 MMHG | BODY MASS INDEX: 37.39 KG/M2

## 2018-04-20 DIAGNOSIS — N39.0 URINARY TRACT INFECTION WITH HEMATURIA, SITE UNSPECIFIED: ICD-10-CM

## 2018-04-20 DIAGNOSIS — R31.0 GROSS HEMATURIA: Primary | ICD-10-CM

## 2018-04-20 DIAGNOSIS — R31.9 URINARY TRACT INFECTION WITH HEMATURIA, SITE UNSPECIFIED: ICD-10-CM

## 2018-04-20 DIAGNOSIS — R03.0 BLOOD PRESSURE ELEVATED WITHOUT HISTORY OF HTN: ICD-10-CM

## 2018-04-20 DIAGNOSIS — R10.2 SUPRAPUBIC PRESSURE: ICD-10-CM

## 2018-04-20 DIAGNOSIS — Z87.440 HISTORY OF UTI: ICD-10-CM

## 2018-04-20 LAB — BACTERIA UR CULT: NO GROWTH

## 2018-04-20 PROCEDURE — 99499 UNLISTED E&M SERVICE: CPT | Mod: S$GLB,,, | Performed by: UROLOGY

## 2018-04-20 PROCEDURE — 76770 US EXAM ABDO BACK WALL COMP: CPT | Mod: TC,PO

## 2018-04-20 PROCEDURE — 52000 CYSTOURETHROSCOPY: CPT | Mod: S$GLB,,, | Performed by: UROLOGY

## 2018-04-20 NOTE — PROCEDURES
Procedures     PLEASE NOTE:  Please be advised that portions of this note were dictated using voice recognition software and may contain dictation related errors in spelling/grammar/appropriate pronouns/syntax or other errors that might have not been found and or corrected on text review.      Procedures: Flexible cystourethroscopy    Pre Procedure Diagnosis: Gross hematuria due to urinary tract infection    Post Procedure Diagnosis: Same    Date of Procedure. 04/20/2018    Indications.  This female with gross hematuria with urinary tract infection presents now for evaluation.  Recent ultrasound is normal.  In 2016 she had a normal CT urogram.  Recent urine culture is negative    Surgeon: Theodore Worrell MD    Anesthesia: 2% uro-jet lidocaine jelly for local analgesia    Flexible cysto-urethroscopy was performed after consent was obtained.    2% lidocaine urojet was used for local analgesia.  The genitalia were prepped and draped in the usual sterile fashion.    The flexible scope was advanced into the urethra and into the bladder.  Bilateral ureteral orifices were evaluated and noted to be normal with clear efflux.  The bladder was completely surveyed in a systematic fashion.   No bladder tumors or lesions were seen.    The patient tolerated the procedure well without complication.    Impression:         Gross        hematuria due to urinary tract infection    Plan.  This completes necessary evaluation on this patient.  No further evaluation needed unless patient develops recurrent gross hematuria    2.   Elevated blood pressure.  Plan.  This finding pointed out to the patient.  I recommend that the patient follow up with the patient's PCP for this.    #3.  Suprapubic pressure.  The patient I could not explain this symptom.  I offered to give her an empiric trial of an anticholinergic over patient does not want an anticholinergic due to possible side effects    #4.  History urinary tract infection.  Recent  "urine culture negative.  We'll keep an eye on the patient with recheck in 3 months    Physical exam reveals reveals a well-developed well-nourished patient  in no acute distress.  Patient is alert and oriented ×3 with normal mood and affect.  Respiratory effort is normal and there is no peripheral edema.  Skin is normal to inspection and palpation.  Patient has normal female external genitalia.  Urethra normal.  No lesions and perineum.    BP (!) 143/83 (BP Location: Left arm, Patient Position: Sitting)   Pulse 79   Ht 5' 4" (1.626 m)   Wt 99.3 kg (219 lb)   LMP  (LMP Unknown)   BMI 37.59 kg/m²   Review of Systems  General ROS: negative for chills, fever or weight loss  Respiratory ROS: no cough, shortness of breath, or wheezing  Cardiovascular ROS: no chest pain or dyspnea on exertion  Musculoskeletal ROS: negative for gait disturbance or muscular weakness    Family History   Problem Relation Age of Onset    Alzheimer's disease Mother 84    Prostate cancer Father 70    Lupus Father     Emphysema Father     Breast cancer Sister     Cancer      Arthritis      Colon cancer Neg Hx      Past Medical History:   Diagnosis Date    Collagenous colitis     Dx 5/30/16    Hypothyroidism     OA (osteoarthritis)     Obesity     Sleep apnea      Family History   Problem Relation Age of Onset    Alzheimer's disease Mother 84    Prostate cancer Father 70    Lupus Father     Emphysema Father     Breast cancer Sister     Cancer      Arthritis      Colon cancer Neg Hx      Social History   Substance Use Topics    Smoking status: Former Smoker     Packs/day: 2.00     Years: 25.00     Types: Cigarettes     Quit date: 1/1/2002    Smokeless tobacco: Never Used    Alcohol use 0.0 oz/week      Comment: occasionally       Impression:      ICD-10-CM ICD-9-CM    1. Gross hematuria R31.0 599.71 Cystoscopy   2. Blood pressure elevated without history of HTN R03.0 796.2    3. Suprapubic pressure R10.2 789.09    4. " History of UTI Z87.440 V13.02

## 2018-05-18 RX ORDER — POTASSIUM CHLORIDE 750 MG/1
TABLET, EXTENDED RELEASE ORAL
Qty: 60 TABLET | Refills: 5 | Status: SHIPPED | OUTPATIENT
Start: 2018-05-18 | End: 2022-10-12 | Stop reason: SDUPTHER

## 2018-05-18 RX ORDER — BUMETANIDE 2 MG/1
TABLET ORAL
Qty: 60 TABLET | Refills: 11 | Status: SHIPPED | OUTPATIENT
Start: 2018-05-18 | End: 2019-11-21 | Stop reason: ALTCHOICE

## 2018-05-25 ENCOUNTER — CLINICAL SUPPORT (OUTPATIENT)
Dept: FAMILY MEDICINE | Facility: CLINIC | Age: 68
End: 2018-05-25
Payer: COMMERCIAL

## 2018-05-25 DIAGNOSIS — J30.9 ALLERGIC RHINITIS, UNSPECIFIED SEASONALITY, UNSPECIFIED TRIGGER: Primary | ICD-10-CM

## 2018-05-25 PROCEDURE — 96372 THER/PROPH/DIAG INJ SC/IM: CPT | Mod: S$GLB,,, | Performed by: FAMILY MEDICINE

## 2018-05-25 RX ORDER — TRIAMCINOLONE ACETONIDE 40 MG/ML
40 INJECTION, SUSPENSION INTRA-ARTICULAR; INTRAMUSCULAR
Status: COMPLETED | OUTPATIENT
Start: 2018-05-25 | End: 2018-05-25

## 2018-05-25 RX ADMIN — TRIAMCINOLONE ACETONIDE 40 MG: 40 INJECTION, SUSPENSION INTRA-ARTICULAR; INTRAMUSCULAR at 03:05

## 2018-05-30 ENCOUNTER — OFFICE VISIT (OUTPATIENT)
Dept: FAMILY MEDICINE | Facility: CLINIC | Age: 68
End: 2018-05-30
Payer: COMMERCIAL

## 2018-05-30 VITALS
BODY MASS INDEX: 37.78 KG/M2 | DIASTOLIC BLOOD PRESSURE: 70 MMHG | WEIGHT: 221.31 LBS | HEART RATE: 83 BPM | TEMPERATURE: 98 F | SYSTOLIC BLOOD PRESSURE: 120 MMHG | OXYGEN SATURATION: 97 % | HEIGHT: 64 IN

## 2018-05-30 DIAGNOSIS — J30.89 NON-SEASONAL ALLERGIC RHINITIS DUE TO OTHER ALLERGIC TRIGGER: Primary | ICD-10-CM

## 2018-05-30 PROCEDURE — 99213 OFFICE O/P EST LOW 20 MIN: CPT | Mod: S$GLB,,, | Performed by: NURSE PRACTITIONER

## 2018-05-30 RX ORDER — LORATADINE 10 MG/1
10 TABLET ORAL DAILY
Qty: 30 TABLET | Refills: 0 | Status: SHIPPED | OUTPATIENT
Start: 2018-05-30 | End: 2018-06-26

## 2018-05-30 RX ORDER — TRAMADOL HYDROCHLORIDE 50 MG/1
TABLET ORAL
COMMUNITY
Start: 2018-05-03 | End: 2018-06-26 | Stop reason: SDUPTHER

## 2018-05-30 RX ORDER — METHYLPREDNISOLONE 4 MG/1
TABLET ORAL
Qty: 1 PACKAGE | Refills: 0 | Status: SHIPPED | OUTPATIENT
Start: 2018-05-30 | End: 2018-06-26 | Stop reason: ALTCHOICE

## 2018-05-30 NOTE — PROGRESS NOTES
Subjective:       Patient ID: Luz Bailey is a 67 y.o. female.    Chief Complaint: Sore Throat and Hoarse (with chest heaviest)    Sore Throat    This is a new problem. The current episode started in the past 7 days. The problem has been unchanged. Neither side of throat is experiencing more pain than the other. The pain is at a severity of 5/10. The pain is moderate. Associated symptoms include coughing and a hoarse voice. Pertinent negatives include no abdominal pain, congestion, diarrhea, drooling, ear discharge, ear pain, headaches, plugged ear sensation, neck pain, shortness of breath, stridor, swollen glands, trouble swallowing or vomiting. Treatments tried: steroid shot, mucinex DM. The treatment provided mild relief.     Review of Systems   Constitutional: Positive for fatigue. Negative for chills, diaphoresis and fever.   HENT: Positive for hoarse voice, postnasal drip, rhinorrhea, sore throat and voice change. Negative for congestion, drooling, ear discharge, ear pain and trouble swallowing.    Respiratory: Positive for cough. Negative for chest tightness, shortness of breath and stridor.    Cardiovascular: Negative for chest pain, palpitations and leg swelling.   Gastrointestinal: Negative for abdominal pain, diarrhea and vomiting.   Musculoskeletal: Negative for neck pain.   Neurological: Negative for headaches.       Objective:      Physical Exam   Constitutional: She is oriented to person, place, and time. She appears well-developed and well-nourished. No distress.   HENT:   Right Ear: Tympanic membrane and external ear normal.   Left Ear: External ear normal. A middle ear effusion is present.   Nose: Mucosal edema and rhinorrhea present.   Cardiovascular: Normal rate, regular rhythm and normal heart sounds.    Pulmonary/Chest: Effort normal and breath sounds normal. No respiratory distress. She has no wheezes.   Neurological: She is alert and oriented to person, place, and time.   Skin: Skin  is warm and dry. She is not diaphoretic.   Psychiatric: She has a normal mood and affect. Her behavior is normal. Judgment and thought content normal.   Vitals reviewed.      Assessment:       1. Non-seasonal allergic rhinitis due to other allergic trigger        Plan:       Non-seasonal allergic rhinitis due to other allergic trigger  -     methylPREDNISolone (MEDROL, ROULA,) 4 mg tablet; use as directed  Dispense: 1 Package; Refill: 0  -     loratadine (CLARITIN) 10 mg tablet; Take 1 tablet (10 mg total) by mouth once daily.  Dispense: 30 tablet; Refill: 0    voice rest  Warm salt water gargles

## 2018-05-30 NOTE — LETTER
May 30, 2018      [address] [address]  Phone: [phone]  Fax: [phone]       Patient: Luz Bailey   YOB: 1950  Date of Visit: 05/30/2018    To Whom It May Concern:    Wally Bailey  was at [facility] on 05/30/2018. He may return to work/school on 6/2/18 with no restrictions. If you have any questions or concerns, or if I can be of further assistance, please do not hesitate to contact me.    Sincerely,    Makenna Allan, NP

## 2018-06-22 ENCOUNTER — TELEPHONE (OUTPATIENT)
Dept: FAMILY MEDICINE | Facility: CLINIC | Age: 68
End: 2018-06-22

## 2018-06-22 ENCOUNTER — HOSPITAL ENCOUNTER (OUTPATIENT)
Dept: RADIOLOGY | Facility: HOSPITAL | Age: 68
Discharge: HOME | End: 2018-06-22
Attending: FAMILY MEDICINE
Payer: COMMERCIAL

## 2018-06-22 DIAGNOSIS — T14.90XA TRAUMA: Primary | ICD-10-CM

## 2018-06-22 DIAGNOSIS — T14.90XA TRAUMA: ICD-10-CM

## 2018-06-22 PROCEDURE — 73130 X-RAY EXAM OF HAND: CPT | Mod: TC,FY,PO,RT

## 2018-06-22 NOTE — TELEPHONE ENCOUNTER
----- Message from Earline Cisse sent at 6/22/2018 10:25 AM CDT -----  Contact: cell# [phone]  Patient requesting orders to have right hand Xray. Going to go to [employer] to have done. Thinks she may have broken it while putting Mr Bhargavi's walker in vehicle. Walker slipped causing patient hand to get caught. Has black/blue knuckles. Painful to move

## 2018-06-23 ENCOUNTER — PATIENT MESSAGE (OUTPATIENT)
Dept: FAMILY MEDICINE | Facility: CLINIC | Age: 68
End: 2018-06-23

## 2018-06-26 ENCOUNTER — OFFICE VISIT (OUTPATIENT)
Dept: RHEUMATOLOGY | Facility: CLINIC | Age: 68
End: 2018-06-26
Payer: COMMERCIAL

## 2018-06-26 VITALS
HEART RATE: 77 BPM | DIASTOLIC BLOOD PRESSURE: 75 MMHG | WEIGHT: 220 LBS | SYSTOLIC BLOOD PRESSURE: 116 MMHG | BODY MASS INDEX: 37.56 KG/M2 | HEIGHT: 64 IN

## 2018-06-26 DIAGNOSIS — M15.9 PRIMARY OSTEOARTHRITIS INVOLVING MULTIPLE JOINTS: Primary | ICD-10-CM

## 2018-06-26 PROCEDURE — 99214 OFFICE O/P EST MOD 30 MIN: CPT | Mod: S$GLB,,, | Performed by: INTERNAL MEDICINE

## 2018-06-26 PROCEDURE — 99999 PR PBB SHADOW E&M-EST. PATIENT-LVL III: CPT | Mod: PBBFAC,,, | Performed by: INTERNAL MEDICINE

## 2018-06-26 RX ORDER — MELOXICAM 15 MG/1
15 TABLET ORAL DAILY
Qty: 90 TABLET | Refills: 5 | Status: SHIPPED | OUTPATIENT
Start: 2018-06-26 | End: 2019-08-19

## 2018-06-26 RX ORDER — TRAMADOL HYDROCHLORIDE 50 MG/1
50 TABLET ORAL EVERY 8 HOURS PRN
Qty: 90 TABLET | Refills: 5 | Status: SHIPPED | OUTPATIENT
Start: 2018-06-26 | End: 2018-07-26

## 2018-06-26 RX ORDER — CYCLOBENZAPRINE HCL 5 MG
TABLET ORAL
Qty: 180 TABLET | Refills: 3 | Status: SHIPPED | OUTPATIENT
Start: 2018-06-26 | End: 2019-03-11

## 2018-06-26 ASSESSMENT — ROUTINE ASSESSMENT OF PATIENT INDEX DATA (RAPID3)
TOTAL RAPID3 SCORE: 3.89
MDHAQ FUNCTION SCORE: .2
WHEN YOU AWAKENED IN THE MORNING OVER THE LAST WEEK, PLEASE INDICATE THE AMOUNT OF TIME IT TAKES UNTIL YOU ARE AS LIMBER AS YOU WILL BE FOR THE DAY: 10 MINUTES
PSYCHOLOGICAL DISTRESS SCORE: 5.5
PATIENT GLOBAL ASSESSMENT SCORE: 4
FATIGUE SCORE: 2.5
PAIN SCORE: 7
AM STIFFNESS SCORE: 1, YES

## 2018-06-26 NOTE — PROGRESS NOTES
Subjective:       Patient ID: Luz Bailey is a 67 y.o. female.    Chief Complaint: No chief complaint on file.    HPI    66 yo F with PMH of collagenous colitis in 2016, right knee repacement in 2009, lap band surgery in 2012,  Diastolic HF, hypothyroidism,  shunt  HERE FOR EVALUATION OF JOINT PAIN.  Reports that she has discomfort in ,lower back, neck,elbows,hands, and ankles.Pain level is 7/10 is aching.  Moving hands makes pain worse. Reports swelling in hands and feet.  Reports also swelling in right elbow. Reports that her symptoms started 2 months ago.  She is on allopurinol but denies podagra.She takes meloxicam and is more tolerable.She is on methocarbamol with improvement.  Denies oral ulcers, fevers, pleurisy, or photosensitivity.    Interval history: She is taking meloxicam 15mg po qday.. She takes tramadol BID.  She takes flexeril 10mg at bedtime. She takes tramadol 50mg po BID prn.   Reports that tramadol has helped her a lot with the pain.     Past Medical History:   Diagnosis Date    Collagenous colitis     Dx 5/30/16    Hypothyroidism     OA (osteoarthritis)     Obesity     Sleep apnea          Review of Systems   Constitutional: Positive for fatigue. Negative for activity change, appetite change, chills and diaphoresis.   HENT: Negative for congestion, ear discharge, ear pain, facial swelling, mouth sores, sinus pressure, sneezing, sore throat, tinnitus and trouble swallowing.    Eyes: Negative for photophobia, pain, discharge, redness, itching and visual disturbance.   Respiratory: Negative for apnea, chest tightness, shortness of breath, wheezing and stridor.    Cardiovascular: Negative for leg swelling.   Gastrointestinal: Negative for abdominal distention, abdominal pain, anal bleeding, blood in stool, constipation, diarrhea and nausea.   Endocrine: Negative for cold intolerance and heat intolerance.   Genitourinary: Negative for difficulty urinating and dysuria.    Musculoskeletal: Positive for arthralgias, back pain, gait problem, joint swelling and myalgias. Negative for neck pain and neck stiffness.   Skin: Negative for color change, pallor, rash and wound.   Neurological: Negative for dizziness, seizures, light-headedness and numbness.   Hematological: Negative for adenopathy. Does not bruise/bleed easily.   Psychiatric/Behavioral: Negative for sleep disturbance. The patient is not nervous/anxious.                    LUMBAR XRAY: (I personally reviewed)   multilevel degenerative spine changes similar to exam last year. No acute abnormality.        Objective:   LMP  (LMP Unknown)      Physical Exam   Constitutional: She is oriented to person, place, and time.   HENT:   Head: Normocephalic and atraumatic.   Right Ear: External ear normal.   Left Ear: External ear normal.   Nose: Nose normal.   Mouth/Throat: Oropharynx is clear and moist. No oropharyngeal exudate.   Eyes: Conjunctivae and EOM are normal. Pupils are equal, round, and reactive to light. Right eye exhibits no discharge. Left eye exhibits no discharge. No scleral icterus.   Neck: Neck supple. No JVD present. No thyromegaly present.   Cardiovascular: Normal rate, regular rhythm, normal heart sounds and intact distal pulses.  Exam reveals no gallop and no friction rub.    No murmur heard.  Pulmonary/Chest: Effort normal and breath sounds normal. No respiratory distress. She has no wheezes. She has no rales. She exhibits no tenderness.   Abdominal: Soft. Bowel sounds are normal. She exhibits no distension and no mass. There is no tenderness. There is no rebound and no guarding.   Lymphadenopathy:     She has no cervical adenopathy.   Neurological: She is alert and oriented to person, place, and time. No cranial nerve deficit. Gait normal. Coordination normal.   Skin: Skin is dry. No rash noted. No erythema. No pallor.     Psychiatric: Affect and judgment normal.   Musculoskeletal: She exhibits tenderness. She  exhibits no edema or deformity.       Labs: reviewed     ccp,rf-negative  Tiffanie-negative    Arthritis survey (2018): I personally reviewed; DJD    Assessment:     66 yo F with PMH of collagenous colitis in 2016, right knee repacement in 2009, lap band surgery in 2012,  Diastolic HF, hypothyroidism,  shunt  HERE FOR follow up of OA.  Doing well on current regimen.  Plan:   Continue flexeril 10mg po qhs  Continue meloxicam 15mg po qday  Continue tramadol 50mg po q8 hours as needed  Continue  aspercreme with lidocaine to both knees  *  encourage weight loss  rtc in 3 months

## 2018-07-06 NOTE — TELEPHONE ENCOUNTER
Please send to [facility]     cyanocobalamin 1,000 mcg/mL injection [Pharmacy Med Name: [id number] INJ]  INJECT 1 ML INTO THE MUSCLE EVERY 14 DAYS   Normal   Please consider 90 day supplies to promote better adherence

## 2018-07-07 RX ORDER — CYANOCOBALAMIN 1000 UG/ML
INJECTION, SOLUTION INTRAMUSCULAR; SUBCUTANEOUS
Qty: 10 ML | Refills: 5 | Status: SHIPPED | OUTPATIENT
Start: 2018-07-07 | End: 2018-08-29

## 2018-07-13 RX ORDER — LEVOTHYROXINE SODIUM 50 UG/1
TABLET ORAL
Qty: 90 TABLET | Refills: 3 | Status: SHIPPED | OUTPATIENT
Start: 2018-07-13 | End: 2019-05-20 | Stop reason: SDUPTHER

## 2018-08-29 ENCOUNTER — TELEPHONE (OUTPATIENT)
Dept: FAMILY MEDICINE | Facility: CLINIC | Age: 68
End: 2018-08-29

## 2018-08-29 ENCOUNTER — HOSPITAL ENCOUNTER (OUTPATIENT)
Dept: RADIOLOGY | Facility: HOSPITAL | Age: 68
Discharge: HOME | End: 2018-08-29
Attending: NURSE PRACTITIONER
Payer: MEDICARE

## 2018-08-29 ENCOUNTER — OFFICE VISIT (OUTPATIENT)
Dept: FAMILY MEDICINE | Facility: CLINIC | Age: 68
End: 2018-08-29
Payer: MEDICARE

## 2018-08-29 VITALS
WEIGHT: 222.38 LBS | DIASTOLIC BLOOD PRESSURE: 64 MMHG | HEIGHT: 64 IN | TEMPERATURE: 98 F | OXYGEN SATURATION: 98 % | SYSTOLIC BLOOD PRESSURE: 110 MMHG | HEART RATE: 80 BPM | BODY MASS INDEX: 37.97 KG/M2

## 2018-08-29 DIAGNOSIS — Z23 NEED FOR PNEUMOCOCCAL VACCINATION: ICD-10-CM

## 2018-08-29 DIAGNOSIS — R59.9 SWELLING, LYMPH NODES: Primary | ICD-10-CM

## 2018-08-29 DIAGNOSIS — R59.9 SWELLING, LYMPH NODES: ICD-10-CM

## 2018-08-29 PROCEDURE — 70490 CT SOFT TISSUE NECK W/O DYE: CPT | Mod: TC,PO

## 2018-08-29 PROCEDURE — 99213 OFFICE O/P EST LOW 20 MIN: CPT | Mod: 25,S$GLB,, | Performed by: NURSE PRACTITIONER

## 2018-08-29 PROCEDURE — 90670 PCV13 VACCINE IM: CPT | Mod: S$GLB,,, | Performed by: NURSE PRACTITIONER

## 2018-08-29 PROCEDURE — G0009 ADMIN PNEUMOCOCCAL VACCINE: HCPCS | Mod: S$GLB,,, | Performed by: NURSE PRACTITIONER

## 2018-08-29 RX ORDER — TRAMADOL HYDROCHLORIDE 50 MG/1
50 TABLET ORAL EVERY 12 HOURS PRN
COMMUNITY
Start: 2018-08-09 | End: 2019-03-11

## 2018-08-29 RX ORDER — CYANOCOBALAMIN 1000 UG/ML
1000 INJECTION, SOLUTION INTRAMUSCULAR; SUBCUTANEOUS
Qty: 10 ML | Refills: 3 | Status: SHIPPED | OUTPATIENT
Start: 2018-08-29 | End: 2018-09-12 | Stop reason: SDUPTHER

## 2018-08-29 NOTE — TELEPHONE ENCOUNTER
Spoke with patient let her know results of CT scan swollen lymph nodes. Monitor them if no relief in two weeks call me

## 2018-08-29 NOTE — PROGRESS NOTES
Subjective:       Patient ID: Luz Bailey is a 68 y.o. female.    Chief Complaint: lumb/knot on neck    Pt presents to the clinic for a lump on the right side of her neck. She felt it last night when she was laying in bed. Tender to the touch can feel it when she is swallowing. Denies fever. States she has been feeling good beside she is a little dizzy.       Review of Systems   Constitutional: Negative for chills, diaphoresis, fatigue and fever.   Respiratory: Negative for cough, chest tightness, shortness of breath and wheezing.    Cardiovascular: Negative for chest pain, palpitations and leg swelling.   Hematological: Positive for adenopathy.       Objective:      Physical Exam   Constitutional: She is oriented to person, place, and time. She appears well-developed and well-nourished. No distress.   HENT:   Right Ear: External ear normal.   Left Ear: External ear normal.   Neck:       Cardiovascular: Normal rate, regular rhythm and normal heart sounds.   Pulmonary/Chest: Effort normal and breath sounds normal. No stridor. No respiratory distress.   Neurological: She is alert and oriented to person, place, and time.   Skin: Skin is warm and dry. She is not diaphoretic.   Psychiatric: She has a normal mood and affect. Her behavior is normal. Judgment and thought content normal.   Vitals reviewed.      Assessment:       1. Swelling, lymph nodes    2. Need for pneumococcal vaccination        Plan:       Swelling, lymph nodes  -     CT Soft Tissue Neck WO Contrast; Future    Need for pneumococcal vaccination  -     (In Office Administered) Pneumococcal Conjugate Vaccine (13 Valent) (IM)    Other orders  -     cyanocobalamin 1,000 mcg/mL injection; Inject 1 mL (1,000 mcg total) into the muscle every 28 days.  Dispense: 10 mL; Refill: 3        CT scheduled for today at 1430

## 2018-09-10 ENCOUNTER — PATIENT MESSAGE (OUTPATIENT)
Dept: RHEUMATOLOGY | Facility: CLINIC | Age: 68
End: 2018-09-10

## 2018-09-12 ENCOUNTER — OFFICE VISIT (OUTPATIENT)
Dept: FAMILY MEDICINE | Facility: CLINIC | Age: 68
End: 2018-09-12
Payer: MEDICARE

## 2018-09-12 VITALS
BODY MASS INDEX: 37.26 KG/M2 | WEIGHT: 218.25 LBS | HEART RATE: 87 BPM | TEMPERATURE: 98 F | DIASTOLIC BLOOD PRESSURE: 62 MMHG | SYSTOLIC BLOOD PRESSURE: 108 MMHG | OXYGEN SATURATION: 98 % | HEIGHT: 64 IN

## 2018-09-12 DIAGNOSIS — R59.0 CERVICAL LYMPHADENOPATHY: Primary | ICD-10-CM

## 2018-09-12 PROCEDURE — 1101F PT FALLS ASSESS-DOCD LE1/YR: CPT | Mod: CPTII,S$GLB,, | Performed by: NURSE PRACTITIONER

## 2018-09-12 PROCEDURE — 99213 OFFICE O/P EST LOW 20 MIN: CPT | Mod: S$GLB,,, | Performed by: NURSE PRACTITIONER

## 2018-09-12 NOTE — PROGRESS NOTES
Subjective:       Patient ID: Luz Bailey is a 68 y.o. female.    Chief Complaint: Follow-up (swollen lymph nodes)    Pt presents to the clinic today for lymph node swelling on the right side of her next. I saw her on 8/29 for this. CT of the neck was done which was normal. Did show swelling of lymph. They do not hurt. She states she feels like they are getting bigger. No other symptoms noted        Review of Systems   Constitutional: Negative.    HENT: Negative.    Respiratory: Negative for cough and shortness of breath.    Cardiovascular: Negative for chest pain.   Hematological: Positive for adenopathy.        Right side of neck         Objective:      Physical Exam   Constitutional: She is oriented to person, place, and time. She appears well-developed and well-nourished. No distress.   HENT:   Right Ear: External ear normal.   Left Ear: External ear normal.   Mouth/Throat: No oropharyngeal exudate, posterior oropharyngeal edema or posterior oropharyngeal erythema.   Cardiovascular: Normal rate, regular rhythm and normal heart sounds.   No murmur heard.  Pulmonary/Chest: Effort normal and breath sounds normal. No respiratory distress.   Lymphadenopathy:     She has cervical adenopathy.        Right cervical: Superficial cervical adenopathy present.   Neurological: She is alert and oriented to person, place, and time.   Skin: Skin is warm and dry. She is not diaphoretic.   Psychiatric: She has a normal mood and affect. Her behavior is normal. Judgment and thought content normal.   Vitals reviewed.      Assessment:       1. Cervical lymphadenopathy        Plan:       Cervical lymphadenopathy  -     Ambulatory referral to ENT

## 2018-09-14 ENCOUNTER — OFFICE VISIT (OUTPATIENT)
Dept: OTOLARYNGOLOGY | Facility: CLINIC | Age: 68
End: 2018-09-14
Payer: MEDICARE

## 2018-09-14 VITALS
BODY MASS INDEX: 36.9 KG/M2 | SYSTOLIC BLOOD PRESSURE: 116 MMHG | HEART RATE: 85 BPM | TEMPERATURE: 98 F | WEIGHT: 214.94 LBS | DIASTOLIC BLOOD PRESSURE: 78 MMHG

## 2018-09-14 DIAGNOSIS — R22.0 LOCALIZED SWELLING, MASS AND LUMP, HEAD: Primary | ICD-10-CM

## 2018-09-14 PROCEDURE — 99203 OFFICE O/P NEW LOW 30 MIN: CPT | Mod: S$PBB,,, | Performed by: OTOLARYNGOLOGY

## 2018-09-14 PROCEDURE — 99213 OFFICE O/P EST LOW 20 MIN: CPT | Mod: PBBFAC | Performed by: OTOLARYNGOLOGY

## 2018-09-14 PROCEDURE — 1101F PT FALLS ASSESS-DOCD LE1/YR: CPT | Mod: CPTII,,, | Performed by: OTOLARYNGOLOGY

## 2018-09-14 PROCEDURE — 99999 PR PBB SHADOW E&M-EST. PATIENT-LVL III: CPT | Mod: PBBFAC,,, | Performed by: OTOLARYNGOLOGY

## 2018-09-14 NOTE — PROGRESS NOTES
Chief Complaint   Patient presents with    Consult     swollen lymph nodes         68 y.o. female presents with several week history of right lower neck swelling and slight discomfort. Had a recent CT with no abnormalities seen. She has a shunt in that region and had an anterior cervical fusion on the right over 30 years ago. No other neck masses noted. No fevers, night sweats or weight loss. No throat pain. Is currently with some nasal congestion and allergy symptoms.      Review of Systems     Constitutional: Negative for fatigue and unexpected weight change.   HENT: per HPI.  Eyes: Negative for visual disturbance.   Respiratory: Negative for shortness of breath, hemoptysis  Cardiovascular: Negative for chest pain and palpitations.   Genitourinary: Negative for dysuria and difficulty urinating.   Musculoskeletal: Negative for decreased ROM, back pain.   Skin: Negative for rash, sunburn, itching.   Neurological: Negative for dizziness and seizures.   Hematological: Negative for adenopathy. Does not bruise/bleed easily.   Psychiatric/Behavioral: Negative for agitation. The patient is not nervous/anxious.   Endocrine: Negative for rapid weight loss/weight gain, heat/cold intolerance.     Past Medical History:   Diagnosis Date    Collagenous colitis     Dx 5/30/16    Hypothyroidism     OA (osteoarthritis)     Obesity     Sleep apnea        Past Surgical History:   Procedure Laterality Date    APPENDECTOMY      CHOLECYSTECTOMY      COLONOSCOPY N/A 5/30/2016    Procedure: COLONOSCOPY;  Surgeon: BINH Souza MD;  Location: [facility] ([address] [place] [address]);  Service: Endoscopy;  Laterality: N/A;    COLONOSCOPY N/A 5/30/2016    Performed by BINH Souza MD at [facility] ([address] [place] [address])    COLONOSCOPY N/A 9/1/2015    Performed by [facility] at [facility] ENDO    HERNIA REPAIR      HIATAL HERNIA REPAIR      HYSTERECTOMY      lap band surgery  10/11/2011    Realize C Band (11mL)    LAPAROSCOPIC GASTRIC BANDING       OOPHORECTOMY      TOTAL KNEE ARTHROPLASTY  7/23/12    VENTRICULOPERITONEAL SHUNT         family history includes Alzheimer's disease (age of onset: 84) in her mother; Arthritis in her unknown relative; Breast cancer in her sister; Cancer in her unknown relative; Emphysema in her father; Lupus in her father; Prostate cancer (age of onset: 70) in her father.    Pt  reports that she quit smoking about 16 years ago. Her smoking use included cigarettes. She has a 50.00 pack-year smoking history. she has never used smokeless tobacco. She reports that she drinks alcohol. She reports that she does not use drugs.    Review of patient's allergies indicates:   Allergen Reactions    Omeprazole Diarrhea        Physical Exam    Vitals:    09/14/18 1052   BP: 116/78   Pulse: 85   Temp: 98 °F (36.7 °C)     Body mass index is 36.9 kg/m².    Physical Exam   Constitutional: She is oriented to person, place, and time. She appears well-developed and well-nourished. No distress.   HENT:   Head: Normocephalic and atraumatic.   Right Ear: Hearing, tympanic membrane, external ear and ear canal normal. Tympanic membrane mobility is normal. No middle ear effusion. No decreased hearing is noted.   Left Ear: Hearing, tympanic membrane, external ear and ear canal normal. Tympanic membrane mobility is normal.  No middle ear effusion. No decreased hearing is noted.   Nose: Nose normal.   Mouth/Throat: Uvula is midline, oropharynx is clear and moist and mucous membranes are normal.   Eyes: Conjunctivae, EOM and lids are normal. Pupils are equal, round, and reactive to light. Right eye exhibits no discharge. Left eye exhibits no discharge.   Neck: Trachea normal, normal range of motion and phonation normal. Neck supple. No tracheal tenderness present. No tracheal deviation, no edema and no erythema present. No thyroid mass and no thyromegaly present.       Cardiovascular: Normal heart sounds.   Pulmonary/Chest: Breath sounds normal. No stridor.    Abdominal: Soft.   Lymphadenopathy:     She has no cervical adenopathy.   Neurological: She is alert and oriented to person, place, and time.   Skin: Skin is warm and dry. No rash noted. She is not diaphoretic. No erythema. No pallor.   Psychiatric: She has a normal mood and affect.   Nursing note and vitals reviewed.    Studies reviewed:  CT Neck 8/29/18:  There is a mildly prominent right anterior cervical chain lymph node measuring 1.5 x 0.5 x 1.4 cm.  This is likely reactive.  No suspicious masses.    Assessment     1. Localized swelling, mass and lump, head          Plan  In summary, Ms. Bailey is a 68 year old female with slight inflammation of sternal head of the SCM. No lymphadenopathy or neck mass on examination today. Reassurance given. Continue to observe. Return to clinic if new symptoms occur. All questions were answered.

## 2018-09-14 NOTE — LETTER
September 14, 2018      Makenna Allan, LORE  [address] [address]           [facility] Head[facility]  [address]  [place] [address]  Phone: [phone]  Fax: [phone]          Patient: Luz Bailey   MR Number: [id number]   YOB: 1950   Date of Visit: 9/14/2018       Dear Makenna Allan:    Thank you for referring Luz Bailey to me for evaluation. Attached you will find relevant portions of my assessment and plan of care.    If you have questions, please do not hesitate to call me. I look forward to following Luz Bailey along with you.    Sincerely,    Sanjana Jin MD    Enclosure  CC:  No Recipients    If you would like to receive this communication electronically, please contact [email or web address] or [phone] to request more information on [employer] access.    For providers and/or their staff who would like to refer a patient to [facility], please contact us through our one-stop-shop provider referral line, [facility], at [phone].    If you feel you have received this communication in error or would no longer like to receive these types of communications, please e-mail [email or web address]

## 2018-09-18 ENCOUNTER — TELEPHONE (OUTPATIENT)
Dept: RHEUMATOLOGY | Facility: CLINIC | Age: 68
End: 2018-09-18

## 2018-09-18 NOTE — TELEPHONE ENCOUNTER
----- Message from Alicja Guillory sent at 9/18/2018 10:23 AM CDT -----  Contact: Self  Patient requesting to be seen on Oct 8th around 1:30.  has an appt at the same time on main campus. No availabilities on .  [phone]

## 2018-09-25 RX ORDER — AMITRIPTYLINE HYDROCHLORIDE 10 MG/1
TABLET, FILM COATED ORAL
Qty: 90 TABLET | Refills: 1 | Status: SHIPPED | OUTPATIENT
Start: 2018-09-25 | End: 2019-08-19

## 2018-09-25 RX ORDER — CITALOPRAM 20 MG/1
TABLET, FILM COATED ORAL
Qty: 90 TABLET | Refills: 1 | Status: SHIPPED | OUTPATIENT
Start: 2018-09-25 | End: 2019-10-06 | Stop reason: SDUPTHER

## 2018-10-09 ENCOUNTER — OFFICE VISIT (OUTPATIENT)
Dept: RHEUMATOLOGY | Facility: CLINIC | Age: 68
End: 2018-10-09
Payer: MEDICARE

## 2018-10-09 VITALS
BODY MASS INDEX: 37.73 KG/M2 | HEIGHT: 63 IN | HEART RATE: 80 BPM | DIASTOLIC BLOOD PRESSURE: 79 MMHG | WEIGHT: 212.94 LBS | SYSTOLIC BLOOD PRESSURE: 121 MMHG

## 2018-10-09 DIAGNOSIS — M15.9 PRIMARY OSTEOARTHRITIS INVOLVING MULTIPLE JOINTS: Primary | ICD-10-CM

## 2018-10-09 PROCEDURE — 99214 OFFICE O/P EST MOD 30 MIN: CPT | Mod: S$PBB,,, | Performed by: INTERNAL MEDICINE

## 2018-10-09 PROCEDURE — 99999 PR PBB SHADOW E&M-EST. PATIENT-LVL II: CPT | Mod: PBBFAC,,, | Performed by: INTERNAL MEDICINE

## 2018-10-09 PROCEDURE — 99212 OFFICE O/P EST SF 10 MIN: CPT | Mod: PBBFAC | Performed by: INTERNAL MEDICINE

## 2018-10-09 PROCEDURE — 1101F PT FALLS ASSESS-DOCD LE1/YR: CPT | Mod: CPTII,,, | Performed by: INTERNAL MEDICINE

## 2018-10-09 RX ORDER — BACLOFEN 10 MG/1
TABLET ORAL
Qty: 60 TABLET | Refills: 11 | Status: SHIPPED | OUTPATIENT
Start: 2018-10-09 | End: 2019-03-11

## 2018-10-09 RX ORDER — PREDNISONE 20 MG/1
20 TABLET ORAL DAILY
Qty: 10 TABLET | Refills: 0 | Status: SHIPPED | OUTPATIENT
Start: 2018-10-09 | End: 2018-10-19

## 2018-10-09 ASSESSMENT — ROUTINE ASSESSMENT OF PATIENT INDEX DATA (RAPID3)
PAIN SCORE: 6
TOTAL RAPID3 SCORE: 4.44
WHEN YOU AWAKENED IN THE MORNING OVER THE LAST WEEK, PLEASE INDICATE THE AMOUNT OF TIME IT TAKES UNTIL YOU ARE AS LIMBER AS YOU WILL BE FOR THE DAY: 1-2 HOURS
FATIGUE SCORE: 2
PATIENT GLOBAL ASSESSMENT SCORE: 5
AM STIFFNESS SCORE: 1, YES
PSYCHOLOGICAL DISTRESS SCORE: 1.1
MDHAQ FUNCTION SCORE: .7

## 2018-10-09 NOTE — PROGRESS NOTES
Subjective:       Patient ID: Luz Bailey is a 67 y.o. female.    Chief Complaint: No chief complaint on file.    HPI    68 yo F with PMH of collagenous colitis in 2016, right knee repacement in 2009, lap band surgery in 2012,  Diastolic HF, hypothyroidism,  shunt  HERE FOR EVALUATION OF JOINT PAIN.  Reports that she has discomfort in ,lower back, neck,elbows,hands, and ankles.Pain level is 7/10 is aching.  Moving hands makes pain worse. Reports swelling in hands and feet.  Reports also swelling in right elbow. Reports that her symptoms started 2 months ago.  She is on allopurinol but denies podagra.She takes meloxicam and is more tolerable.She is on methocarbamol with improvement.  Denies oral ulcers, fevers, pleurisy, or photosensitivity.    Interval history:   She takes tramadol BID.  She takes flexeril 20mg at bedtime but insurance will not pay for it.  She takes meloxicam 15mg po qday.   Reports that pain in morning. Pain level is 5/10 and in the morning it is 10/10. Pain is aching.  Reports worsening pain in right knee. Pain is sharp and radiates down to right anterior shin.  She also reports worsening pain in her hands with swelling.    Past Medical History:   Diagnosis Date    Collagenous colitis     Dx 5/30/16    Hypothyroidism     OA (osteoarthritis)     Obesity     Sleep apnea          Review of Systems   Constitutional: Positive for fatigue. Negative for activity change, appetite change, chills and diaphoresis.   HENT: Negative for congestion, ear discharge, ear pain, facial swelling, mouth sores, sinus pressure, sneezing, sore throat, tinnitus and trouble swallowing.    Eyes: Negative for photophobia, pain, discharge, redness, itching and visual disturbance.   Respiratory: Negative for apnea, chest tightness, shortness of breath, wheezing and stridor.    Cardiovascular: Negative for leg swelling.   Gastrointestinal: Negative for abdominal distention, abdominal pain, anal bleeding, blood  in stool, constipation, diarrhea and nausea.   Endocrine: Negative for cold intolerance and heat intolerance.   Genitourinary: Negative for difficulty urinating and dysuria.   Musculoskeletal: Positive for arthralgias, back pain, gait problem, joint swelling and myalgias. Negative for neck pain and neck stiffness.   Skin: Negative for color change, pallor, rash and wound.   Neurological: Negative for dizziness, seizures, light-headedness and numbness.   Hematological: Negative for adenopathy. Does not bruise/bleed easily.   Psychiatric/Behavioral: Negative for sleep disturbance. The patient is not nervous/anxious.                    LUMBAR XRAY: (I personally reviewed)   multilevel degenerative spine changes similar to exam last year. No acute abnormality.        Objective:   LMP  (LMP Unknown)      Physical Exam   Constitutional: She is oriented to person, place, and time.   HENT:   Head: Normocephalic and atraumatic.   Right Ear: External ear normal.   Left Ear: External ear normal.   Nose: Nose normal.   Mouth/Throat: Oropharynx is clear and moist. No oropharyngeal exudate.   Eyes: Conjunctivae and EOM are normal. Pupils are equal, round, and reactive to light. Right eye exhibits no discharge. Left eye exhibits no discharge. No scleral icterus.   Neck: Neck supple. No JVD present. No thyromegaly present.   Cardiovascular: Normal rate, regular rhythm, normal heart sounds and intact distal pulses.  Exam reveals no gallop and no friction rub.    No murmur heard.  Pulmonary/Chest: Effort normal and breath sounds normal. No respiratory distress. She has no wheezes. She has no rales. She exhibits no tenderness.   Abdominal: Soft. Bowel sounds are normal. She exhibits no distension and no mass. There is no tenderness. There is no rebound and no guarding.   Lymphadenopathy:     She has no cervical adenopathy.   Neurological: She is alert and oriented to person, place, and time. No cranial nerve deficit. Gait normal.  Coordination normal.   Skin: Skin is dry. No rash noted. No erythema. No pallor.     Psychiatric: Affect and judgment normal.   Musculoskeletal: She exhibits tenderness. She exhibits no edema or deformity.   Tenderness in lumbar paraspinal muscles and bilateral shins   MILD MCP TENDERNESS bilaterally   mild right knee swelling with mild warmth  Labs: reviewed     ccp,rf-negative  Tiffanie-negative    Arthritis survey (2018): I personally reviewed; DJD  Assessment:     67 yo F with PMH of collagenous colitis in 2016, right knee repacement in 2009, lap band surgery in 2012,  Diastolic HF, hypothyroidism,  shunt  HERE FOR follow up of OA.  She has worsening pain in her hands with associated swelling.She also has increased right knee pain and swelling.  Suspect she may be developing early RA. Will give her course of steroids. If she notices improvement, will put her on plaquenil    Plan:   Switch from flexeril 20mg a day to baclofen 10-20mg po qhs (patient cannot afford flexeril)  Start prednisone 20mg a day for 10 days (if improvement, will put her on plaquenil.  Risks of starting plaquenil discussed. Risks include eye toxicity and agrees on timely follow up with optho to avoid risks of eye toxicity. Other risks include rashes such has hyperpigmentation and vertigo.)  Hold meloxicam 15mg po qday while on prednisone  Continue tramadol 50mg po q8 hours as needed  Start aspercreme with lidocaine to both knees  *  encourage weight loss  rtc in 3 months

## 2018-10-16 ENCOUNTER — PATIENT MESSAGE (OUTPATIENT)
Dept: RHEUMATOLOGY | Facility: CLINIC | Age: 68
End: 2018-10-16

## 2018-10-16 RX ORDER — HYDROXYCHLOROQUINE SULFATE 200 MG/1
200 TABLET, FILM COATED ORAL 2 TIMES DAILY
Qty: 60 TABLET | Refills: 2 | Status: SHIPPED | OUTPATIENT
Start: 2018-10-16 | End: 2019-03-11

## 2019-02-04 ENCOUNTER — TELEPHONE (OUTPATIENT)
Dept: FAMILY MEDICINE | Facility: CLINIC | Age: 69
End: 2019-02-04

## 2019-02-04 NOTE — TELEPHONE ENCOUNTER
----- Message from Earline Cisse sent at 2/4/2019  2:53 PM CST -----  Patient stopped by office.  is back in hospital  Patient requesting a shot and/or something called into pharmacy to get some relief for cold symptoms. Pt has had cough & sore throat for couple of days. Taking OTC Day & Night Quil    Patient heading home now to shower & change before heading back to hospital with Mr Burk. Would like to stop by to get shot if at all possible. Please leave voice message in case she misses office call

## 2019-02-05 ENCOUNTER — CLINICAL SUPPORT (OUTPATIENT)
Dept: FAMILY MEDICINE | Facility: CLINIC | Age: 69
End: 2019-02-05
Payer: MEDICARE

## 2019-02-05 DIAGNOSIS — J30.9 ALLERGIC RHINITIS, UNSPECIFIED SEASONALITY, UNSPECIFIED TRIGGER: Primary | ICD-10-CM

## 2019-02-05 PROCEDURE — 96372 PR INJECTION,THERAP/PROPH/DIAG2ST, IM OR SUBCUT: ICD-10-PCS | Mod: S$GLB,,, | Performed by: FAMILY MEDICINE

## 2019-02-05 PROCEDURE — 96372 THER/PROPH/DIAG INJ SC/IM: CPT | Mod: S$GLB,,, | Performed by: FAMILY MEDICINE

## 2019-02-05 RX ORDER — TRIAMCINOLONE ACETONIDE 40 MG/ML
80 INJECTION, SUSPENSION INTRA-ARTICULAR; INTRAMUSCULAR
Status: COMPLETED | OUTPATIENT
Start: 2019-02-05 | End: 2019-02-05

## 2019-02-05 RX ADMIN — TRIAMCINOLONE ACETONIDE 80 MG: 40 INJECTION, SUSPENSION INTRA-ARTICULAR; INTRAMUSCULAR at 11:02

## 2019-02-08 ENCOUNTER — PATIENT MESSAGE (OUTPATIENT)
Dept: FAMILY MEDICINE | Facility: CLINIC | Age: 69
End: 2019-02-08

## 2019-02-08 NOTE — TELEPHONE ENCOUNTER
Patient Review of Clinical Information     Problems   This clinical information was not verified.   Medications   This clinical information was not verified, but there are updates pending review:   No Longer Applicable Medications Start Date Reported By  Comments   cyanocobalamin 1,000 mcg/mL injection 8/6/2015 Luz Bailey have two preescriptions   Allergies   This clinical information was not verified.

## 2019-03-11 ENCOUNTER — OFFICE VISIT (OUTPATIENT)
Dept: OBSTETRICS AND GYNECOLOGY | Facility: CLINIC | Age: 69
End: 2019-03-11
Payer: MEDICARE

## 2019-03-11 VITALS
WEIGHT: 204 LBS | SYSTOLIC BLOOD PRESSURE: 120 MMHG | HEIGHT: 64 IN | DIASTOLIC BLOOD PRESSURE: 80 MMHG | BODY MASS INDEX: 34.83 KG/M2

## 2019-03-11 DIAGNOSIS — N90.89 VULVAR LESION: ICD-10-CM

## 2019-03-11 DIAGNOSIS — Z01.419 WELL WOMAN EXAM WITH ROUTINE GYNECOLOGICAL EXAM: Primary | ICD-10-CM

## 2019-03-11 DIAGNOSIS — Z12.11 SCREEN FOR COLON CANCER: ICD-10-CM

## 2019-03-11 DIAGNOSIS — Z12.39 SCREENING FOR BREAST CANCER: ICD-10-CM

## 2019-03-11 PROCEDURE — G0101 CA SCREEN;PELVIC/BREAST EXAM: HCPCS | Mod: S$GLB,,, | Performed by: OBSTETRICS & GYNECOLOGY

## 2019-03-11 PROCEDURE — 99999 PR PBB SHADOW E&M-EST. PATIENT-LVL III: CPT | Mod: PBBFAC,,, | Performed by: OBSTETRICS & GYNECOLOGY

## 2019-03-11 PROCEDURE — 99999 PR PBB SHADOW E&M-EST. PATIENT-LVL III: ICD-10-PCS | Mod: PBBFAC,,, | Performed by: OBSTETRICS & GYNECOLOGY

## 2019-03-11 PROCEDURE — G0101 PR CA SCREEN;PELVIC/BREAST EXAM: ICD-10-PCS | Mod: S$GLB,,, | Performed by: OBSTETRICS & GYNECOLOGY

## 2019-03-11 NOTE — PROGRESS NOTES
History & Physical  Gynecology      SUBJECTIVE:     Chief Complaint: Well Woman       History of Present Illness:  Annual Exam-Postmenopausal  Patient presents for annual exam. The patient has complaints today of a small bump near the urethra for the past week.  It is painful.  She has been using neosporin on it daily, no pus or drainage.  She has also been using a hot compress in the area as well. Patient denies post-menopausal vaginal bleeding. The patient is sexually active, but does not have penile/vaginal intercourse. The patient is not taking hormone replacement therapy.  The patient participates in regular exercise: yes.  She does not smoke.     GYN screening history: hyst  Mammogram history: 2017  Colonoscopy history:   Dexa history: ordered      Review of patient's allergies indicates:   Allergen Reactions    Omeprazole Diarrhea       Past Medical History:   Diagnosis Date    Collagenous colitis     Dx 16    Hypothyroidism     OA (osteoarthritis)     Obesity     Sleep apnea      Past Surgical History:   Procedure Laterality Date    APPENDECTOMY      CHOLECYSTECTOMY      COLONOSCOPY N/A 2016    Per[facility] at [facility] END[facility] ([facility]TH FLR)    COLONOSCOPY N/A 2015    [facility]    HERNIA REPAIR      HIATAL HERNIA REPAIR      HYSTERECTOMY      lap band surgery  10/11/2011    Realize C Band (11mL)    LAPAROSCOPIC GASTRIC BANDING      OOPHORECTOMY      TOTAL KNEE ARTHROPLASTY  12    VENTRICULOPERITONEAL SHUNT       OB History      Para Term  AB Living    4 3 3   1      SAB TAB Ectopic Multiple Live Births    1                Family History   Problem Relation Age of Onset    Alzheimer's disease Mother 84    Prostate cancer Father 70    Lupus Father     Emphysema Father     Breast cancer Sister     Cancer Unknown     Arthritis Unknown     Colon cancer Neg Hx      Social History     Tobacco Use    Smoking  status: Former Smoker     Packs/day: 2.00     Years: 25.00     Pack years: 50.00     Types: Cigarettes     Last attempt to quit: 2002     Years since quittin.2    Smokeless tobacco: Never Used   Substance Use Topics    Alcohol use: Yes     Alcohol/week: 0.0 oz     Comment: occasionally    Drug use: No       Current Outpatient Medications   Medication Sig    ACETAMINOPHEN (TYLENOL ORAL) Take by mouth every 6 (six) hours as needed.     amitriptyline (ELAVIL) 10 MG tablet TAKE 1 TABLET BY MOUTH AT BEDTIME    ascorbic acid (VITAMIN C) 100 MG tablet Take 500 mg by mouth once daily.     BIOTIN ORAL Take by mouth once daily.    bumetanide (BUMEX) 2 MG tablet TAKE ONE TABLET BY MOUTH TWICE DAILY (Patient taking differently: TAKE ONE TABLET BY MOUTH TWICE DAILY as needed)    citalopram (CELEXA) 20 MG tablet TAKE 1 TABLET BY MOUTH ONCE DAILY    cyanocobalamin 1,000 mcg/mL injection INJECT 1 ML INTRAMUSCULARLY EVERY 30 DAYS.    fluticasone (FLONASE) 50 mcg/actuation nasal spray 1 spray by Nasal route once daily.     levothyroxine (SYNTHROID) 50 MCG tablet TAKE ONE TABLET BY MOUTH BEFORE BREAKFAST    meloxicam (MOBIC) 15 MG tablet Take 1 tablet (15 mg total) by mouth once daily.    multivit,tx with iron,minerals (THERA-M ORAL)     potassium chloride (KLOR-CON) 10 MEQ TbSR TAKE TWO TABLETS BY MOUTH ONCE DAILY WITH LASIX    ranitidine (ZANTAC) 150 MG tablet Take 150 mg by mouth once daily.     No current facility-administered medications for this visit.        Review of Systems:  Review of Systems   Constitutional: Negative for appetite change, fever and unexpected weight change.   Respiratory: Negative for shortness of breath.    Cardiovascular: Negative for chest pain.   Gastrointestinal: Negative for nausea and vomiting.   Genitourinary: Positive for genital sores. Negative for dyspareunia, frequency, pelvic pain, urgency, vaginal bleeding, vaginal discharge, vaginal pain, urinary incontinence,  postcoital bleeding, postmenopausal bleeding and vaginal odor.        OBJECTIVE:     Physical Exam:  Physical Exam   Constitutional: She is oriented to person, place, and time. She appears well-developed and well-nourished.   Neck: Normal range of motion. Neck supple. No tracheal deviation present. No thyromegaly present.   Cardiovascular: Normal rate, regular rhythm and normal heart sounds. Exam reveals no gallop and no friction rub.   No murmur heard.  Pulmonary/Chest: Effort normal and breath sounds normal. No respiratory distress. She has no wheezes. She has no rales. Right breast exhibits no inverted nipple, no mass, no nipple discharge, no skin change and no tenderness. Left breast exhibits no inverted nipple, no mass, no nipple discharge, no skin change and no tenderness. Breasts are symmetrical.   Abdominal: Soft. Bowel sounds are normal. She exhibits no distension. There is no tenderness. There is no rebound and no guarding.   Genitourinary: Vagina normal.       No labial fusion. There is lesion on the right labia. There is no rash, tenderness or injury on the right labia. There is no rash, tenderness, lesion or injury on the left labia. Right adnexum displays no mass, no tenderness and no fullness. Left adnexum displays no mass, no tenderness and no fullness. No erythema, tenderness or bleeding in the vagina. No foreign body in the vagina. No signs of injury around the vagina. No vaginal discharge found.   Genitourinary Comments: Urethra: normal appearing urethra with no masses, tenderness or lesions  Urethral meatus: normal size, anterior vaginal wall with no prolapse, no lesions   Neurological: She is alert and oriented to person, place, and time.   Psychiatric: She has a normal mood and affect. Her behavior is normal. Judgment and thought content normal.   Nursing note and vitals reviewed.      Chaperoned by: Rivera    ASSESSMENT:       ICD-10-CM ICD-9-CM    1. Well woman exam with routine  gynecological exam Z01.419 V72.31 Mammo Digital Screening Bilat   2. Screening for breast cancer Z12.31 V76.10 Mammo Digital Screening Bilat   3. Screen for colon cancer Z12.11 V76.51 Case request GI: COLONOSCOPY   4. Vulvar lesion N90.89 624.8 HSV by Rapid PCR, Non-Blood [facility]; Vagina          Plan:      Luz Bailey was seen today for well woman.    Diagnoses and all orders for this visit:    Well woman exam with routine gynecological exam  -     Mammo Digital Screening Bilat; Future    Screening for breast cancer  -     Mammo Digital Screening Bilat; Future    Screen for colon cancer  -     Case request GI: COLONOSCOPY    Vulvar lesion  -     HSV by Rapid PCR, Non-Blood [facility]; Vagina; Future        Orders Placed This Encounter   Procedures    Mammo Digital Screening Bilat    HSV by Rapid PCR, Non-Blood [facility]; Vagina       Well Woman:   - Pap smear: no longer required  - Mammogram: ordered  - Colonoscopy: ordered, patient given number to call/scheduled  - Dexa: scheduled from prior order by PCP  - Immunizations: with PCP  - Labs: with PCP  - Exercise counseling provided  - herpes swab for vulvar lesion    Follow up in two years  for annual, or prn.    Barbara Moss

## 2019-03-15 ENCOUNTER — HOSPITAL ENCOUNTER (OUTPATIENT)
Dept: RADIOLOGY | Facility: HOSPITAL | Age: 69
Discharge: HOME | End: 2019-03-15
Attending: OBSTETRICS & GYNECOLOGY
Payer: MEDICARE

## 2019-03-15 ENCOUNTER — HOSPITAL ENCOUNTER (OUTPATIENT)
Dept: RADIOLOGY | Facility: HOSPITAL | Age: 69
Discharge: HOME | End: 2019-03-15
Attending: FAMILY MEDICINE
Payer: MEDICARE

## 2019-03-15 DIAGNOSIS — Z00.00 WELLNESS EXAMINATION: ICD-10-CM

## 2019-03-15 DIAGNOSIS — Z01.419 WELL WOMAN EXAM WITH ROUTINE GYNECOLOGICAL EXAM: ICD-10-CM

## 2019-03-15 DIAGNOSIS — Z12.39 SCREENING FOR BREAST CANCER: ICD-10-CM

## 2019-03-15 PROCEDURE — 77080 DXA BONE DENSITY AXIAL: CPT | Mod: TC,PO

## 2019-03-15 PROCEDURE — 77067 SCR MAMMO BI INCL CAD: CPT | Mod: TC,PO

## 2019-03-17 ENCOUNTER — TELEPHONE (OUTPATIENT)
Dept: FAMILY MEDICINE | Facility: CLINIC | Age: 69
End: 2019-03-17

## 2019-03-17 DIAGNOSIS — E55.9 VITAMIN D DEFICIENCY: Primary | ICD-10-CM

## 2019-03-19 ENCOUNTER — TELEPHONE (OUTPATIENT)
Dept: FAMILY MEDICINE | Facility: CLINIC | Age: 69
End: 2019-03-19

## 2019-03-19 NOTE — TELEPHONE ENCOUNTER
Pt notified.     ----- Message from Hilario Slaughter MD sent at 3/17/2019 10:17 PM CDT -----  Osteopenia; needs another Vit D level; last one low almost 2 years ago

## 2019-03-20 ENCOUNTER — LAB VISIT (OUTPATIENT)
Dept: LAB | Facility: HOSPITAL | Age: 69
End: 2019-03-20
Attending: FAMILY MEDICINE
Payer: MEDICARE

## 2019-03-20 DIAGNOSIS — E55.9 VITAMIN D DEFICIENCY: ICD-10-CM

## 2019-03-20 LAB — 25(OH)D3+25(OH)D2 SERPL-MCNC: 20 NG/ML

## 2019-03-20 PROCEDURE — 82306 VITAMIN D 25 HYDROXY: CPT | Mod: PO

## 2019-03-20 PROCEDURE — 36415 COLL VENOUS BLD VENIPUNCTURE: CPT | Mod: PO

## 2019-03-24 ENCOUNTER — TELEPHONE (OUTPATIENT)
Dept: FAMILY MEDICINE | Facility: CLINIC | Age: 69
End: 2019-03-24

## 2019-03-24 RX ORDER — ERGOCALCIFEROL 1.25 MG/1
50000 CAPSULE ORAL
Qty: 13 CAPSULE | Refills: 3 | Status: SHIPPED | OUTPATIENT
Start: 2019-03-24 | End: 2019-12-12 | Stop reason: SDUPTHER

## 2019-03-28 RX ORDER — CITALOPRAM 20 MG/1
TABLET, FILM COATED ORAL
Qty: 90 TABLET | Refills: 1 | Status: SHIPPED | OUTPATIENT
Start: 2019-03-28 | End: 2019-12-12

## 2019-04-23 ENCOUNTER — TELEPHONE (OUTPATIENT)
Dept: FAMILY MEDICINE | Facility: CLINIC | Age: 69
End: 2019-04-23

## 2019-04-23 DIAGNOSIS — M54.2 NECK PAIN: ICD-10-CM

## 2019-04-23 NOTE — TELEPHONE ENCOUNTER
Patient stopped by the office  C/o falling Sunday flat back on her head, hit cement  She was dancing when she fell  She had a goose egg she said but it has since went down  I see bruising on her head   C/o headache and neck pain since falling no nausea  Pupils are reactive to light  Tylenol helps the headaches but it returns once it wears off    I advisied she needed to see the MD  But she declines   She would like to know if you can just order CT or xray for her head and neck?      [phone]

## 2019-04-24 ENCOUNTER — HOSPITAL ENCOUNTER (OUTPATIENT)
Dept: RADIOLOGY | Facility: HOSPITAL | Age: 69
Discharge: HOME | End: 2019-04-24
Attending: FAMILY MEDICINE
Payer: MEDICARE

## 2019-04-24 DIAGNOSIS — M54.2 NECK PAIN: ICD-10-CM

## 2019-04-24 PROCEDURE — 70450 CT HEAD/BRAIN W/O DYE: CPT | Mod: TC,PO

## 2019-04-24 PROCEDURE — 72125 CT NECK SPINE W/O DYE: CPT | Mod: TC,PO

## 2019-05-21 RX ORDER — LEVOTHYROXINE SODIUM 50 UG/1
TABLET ORAL
Qty: 90 TABLET | Refills: 3 | Status: SHIPPED | OUTPATIENT
Start: 2019-05-21 | End: 2020-06-03

## 2019-07-22 ENCOUNTER — TELEPHONE (OUTPATIENT)
Dept: FAMILY MEDICINE | Facility: CLINIC | Age: 69
End: 2019-07-22

## 2019-07-22 RX ORDER — DOXYCYCLINE 100 MG/1
200 CAPSULE ORAL ONCE
Qty: 2 CAPSULE | Refills: 0 | Status: SHIPPED | OUTPATIENT
Start: 2019-07-22 | End: 2019-07-22

## 2019-08-08 ENCOUNTER — PATIENT MESSAGE (OUTPATIENT)
Dept: OBSTETRICS AND GYNECOLOGY | Facility: CLINIC | Age: 69
End: 2019-08-08

## 2019-08-19 ENCOUNTER — OFFICE VISIT (OUTPATIENT)
Dept: OBSTETRICS AND GYNECOLOGY | Facility: CLINIC | Age: 69
End: 2019-08-19
Payer: MEDICARE

## 2019-08-19 VITALS
HEIGHT: 64 IN | WEIGHT: 207.44 LBS | BODY MASS INDEX: 35.41 KG/M2 | DIASTOLIC BLOOD PRESSURE: 71 MMHG | SYSTOLIC BLOOD PRESSURE: 134 MMHG

## 2019-08-19 DIAGNOSIS — N39.41 URGE URINARY INCONTINENCE: ICD-10-CM

## 2019-08-19 DIAGNOSIS — N63.21 BREAST LUMP ON LEFT SIDE AT 2 O'CLOCK POSITION: Primary | ICD-10-CM

## 2019-08-19 PROCEDURE — 99213 OFFICE O/P EST LOW 20 MIN: CPT | Mod: S$GLB,,, | Performed by: OBSTETRICS & GYNECOLOGY

## 2019-08-19 PROCEDURE — 99999 PR PBB SHADOW E&M-EST. PATIENT-LVL IV: CPT | Mod: PBBFAC,,, | Performed by: OBSTETRICS & GYNECOLOGY

## 2019-08-19 PROCEDURE — 99213 PR OFFICE/OUTPT VISIT, EST, LEVL III, 20-29 MIN: ICD-10-PCS | Mod: S$GLB,,, | Performed by: OBSTETRICS & GYNECOLOGY

## 2019-08-19 PROCEDURE — 1101F PT FALLS ASSESS-DOCD LE1/YR: CPT | Mod: CPTII,S$GLB,, | Performed by: OBSTETRICS & GYNECOLOGY

## 2019-08-19 PROCEDURE — 1101F PR PT FALLS ASSESS DOC 0-1 FALLS W/OUT INJ PAST YR: ICD-10-PCS | Mod: CPTII,S$GLB,, | Performed by: OBSTETRICS & GYNECOLOGY

## 2019-08-19 PROCEDURE — 87086 URINE CULTURE/COLONY COUNT: CPT

## 2019-08-19 PROCEDURE — 99999 PR PBB SHADOW E&M-EST. PATIENT-LVL IV: ICD-10-PCS | Mod: PBBFAC,,, | Performed by: OBSTETRICS & GYNECOLOGY

## 2019-08-19 RX ORDER — TURMERIC 400 MG
1500 CAPSULE ORAL
COMMUNITY
Start: 2019-02-01 | End: 2021-04-06 | Stop reason: CLARIF

## 2019-08-19 NOTE — PATIENT INSTRUCTIONS
Bladder Irritants  Certain foods and drinks have been associated with worsening symptoms of urinary frequency, urgency, urge incontinence, or bladder pain. If you suffer from any of these conditions, you may wish to try eliminating one or more of these foods from your diet and see if your symptoms improve. If bladder symptoms are related to dietary factors, strict adherence to a diet thateliminates the food should bring marked relief in 10 days. Once you are feeling better, you can begin to add foods back into your diet, one at a time. If symptoms return, you will be able to identify the irritant. As you add foods back to your diet it is very important that you drink significant amounts of water.    -----------------------------------------------------------------------------------------------  List of Common Bladder Irritants*  Alcoholic beverages  Apples and apple juice  Cantaloupe  Carbonated beverages  Chili and spicy foods  Chocolate  Citrus fruit  Coffee (including decaffeinated)  Cranberries and cranberry juice  Grapes  Guava  Milk Products: milk, cheese, cottage cheese, yogurt, ice cream  Peaches  Pineapple  Plums  Strawberries  Sugar especially artificial sweeteners, saccharin, aspartame, corn sweeteners, honey, fructose, sucrose, lactose  Tea  Tomatoes and tomato juice  Vitamin B complex  Vinegar  *Most people are not sensitive to ALL of these products; your goal is to find the foods that make YOUR symptoms worse.  ---------------------------------------------------------------------------------------------------    Low-acid fruit substitutions include apricots, papaya, pears and watermelon. Coffee drinkers can drink Kava or other lowacid instant drinks. Tea drinkers can substitute non-citrus herbal and sun brewed teas. Calcium carbonate co-buffered with calcium ascorbate can be substituted for Vitamin C. Prelief is a dietary supplement that works as an acid blocker for the bladder.    Where to get more  information:        Overcoming Bladder Disorders by Berenice Jordan and Ira Coronado, 1990        You Dont Have to Live with Cystitis! By Kristin Esparza, 1988  · http://[email or web address].org/oab

## 2019-08-20 LAB — BACTERIA UR CULT: NO GROWTH

## 2019-08-29 ENCOUNTER — TELEPHONE (OUTPATIENT)
Dept: OBSTETRICS AND GYNECOLOGY | Facility: CLINIC | Age: 69
End: 2019-08-29

## 2019-08-29 ENCOUNTER — PATIENT MESSAGE (OUTPATIENT)
Dept: OBSTETRICS AND GYNECOLOGY | Facility: CLINIC | Age: 69
End: 2019-08-29

## 2019-08-30 ENCOUNTER — TELEPHONE (OUTPATIENT)
Dept: RADIOLOGY | Facility: HOSPITAL | Age: 69
End: 2019-08-30

## 2019-09-06 ENCOUNTER — HOSPITAL ENCOUNTER (OUTPATIENT)
Dept: RADIOLOGY | Facility: HOSPITAL | Age: 69
Discharge: HOME | End: 2019-09-06
Attending: OBSTETRICS & GYNECOLOGY
Payer: MEDICARE

## 2019-09-06 DIAGNOSIS — N63.20 LUMP OF LEFT BREAST: ICD-10-CM

## 2019-09-06 DIAGNOSIS — N63.21 BREAST LUMP ON LEFT SIDE AT 2 O'CLOCK POSITION: ICD-10-CM

## 2019-09-06 PROCEDURE — 76642 ULTRASOUND BREAST LIMITED: CPT | Mod: 26,LT,, | Performed by: RADIOLOGY

## 2019-09-06 PROCEDURE — 77061 MAMMO DIGITAL DIAGNOSTIC LEFT WITH TOMOSYNTHESIS_CAD: ICD-10-PCS | Mod: 26,LT,, | Performed by: RADIOLOGY

## 2019-09-06 PROCEDURE — 77061 BREAST TOMOSYNTHESIS UNI: CPT | Mod: 26,LT,, | Performed by: RADIOLOGY

## 2019-09-06 PROCEDURE — 76642 ULTRASOUND BREAST LIMITED: CPT | Mod: TC,LT

## 2019-09-06 PROCEDURE — 77061 BREAST TOMOSYNTHESIS UNI: CPT | Mod: TC,LT

## 2019-09-06 PROCEDURE — 77065 DX MAMMO INCL CAD UNI: CPT | Mod: 26,LT,, | Performed by: RADIOLOGY

## 2019-09-06 PROCEDURE — 77065 MAMMO DIGITAL DIAGNOSTIC LEFT WITH TOMOSYNTHESIS_CAD: ICD-10-PCS | Mod: 26,LT,, | Performed by: RADIOLOGY

## 2019-09-06 PROCEDURE — 77065 DX MAMMO INCL CAD UNI: CPT | Mod: TC,LT

## 2019-09-06 PROCEDURE — 76642 US BREAST LEFT LIMITED: ICD-10-PCS | Mod: 26,LT,, | Performed by: RADIOLOGY

## 2019-10-06 RX ORDER — CITALOPRAM 20 MG/1
TABLET, FILM COATED ORAL
Qty: 90 TABLET | Refills: 1 | Status: SHIPPED | OUTPATIENT
Start: 2019-10-06 | End: 2020-03-30

## 2019-11-18 ENCOUNTER — PATIENT OUTREACH (OUTPATIENT)
Dept: ADMINISTRATIVE | Facility: OTHER | Age: 69
End: 2019-11-18

## 2019-11-20 ENCOUNTER — OFFICE VISIT (OUTPATIENT)
Dept: OPTOMETRY | Facility: CLINIC | Age: 69
End: 2019-11-20
Payer: MEDICARE

## 2019-11-20 DIAGNOSIS — Z46.0 ENCOUNTER FOR FITTING OR ADJUSTMENT OF SPECTACLES OR CONTACT LENSES: Primary | ICD-10-CM

## 2019-11-20 DIAGNOSIS — Z98.42 S/P BILATERAL CATARACT EXTRACTION: ICD-10-CM

## 2019-11-20 DIAGNOSIS — Z96.1 PSEUDOPHAKIA OF BOTH EYES: ICD-10-CM

## 2019-11-20 DIAGNOSIS — Z98.41 S/P BILATERAL CATARACT EXTRACTION: ICD-10-CM

## 2019-11-20 DIAGNOSIS — H52.7 REFRACTIVE ERRORS: ICD-10-CM

## 2019-11-20 DIAGNOSIS — H43.393 VITREOUS FLOATERS OF BOTH EYES: Primary | ICD-10-CM

## 2019-11-20 PROCEDURE — 92015 DETERMINE REFRACTIVE STATE: CPT | Mod: S$GLB,,, | Performed by: OPTOMETRIST

## 2019-11-20 PROCEDURE — 99999 PR PBB SHADOW E&M-EST. PATIENT-LVL II: CPT | Mod: PBBFAC,,, | Performed by: OPTOMETRIST

## 2019-11-20 PROCEDURE — 92015 PR REFRACTION: ICD-10-PCS | Mod: S$GLB,,, | Performed by: OPTOMETRIST

## 2019-11-20 PROCEDURE — 92004 PR EYE EXAM, NEW PATIENT,COMPREHESV: ICD-10-PCS | Mod: S$GLB,,, | Performed by: OPTOMETRIST

## 2019-11-20 PROCEDURE — 99999 PR PBB SHADOW E&M-EST. PATIENT-LVL III: CPT | Mod: PBBFAC,,, | Performed by: OPTOMETRIST

## 2019-11-20 PROCEDURE — 92310 CONTACT LENS FITTING OU: CPT | Mod: 52,CSM,, | Performed by: OPTOMETRIST

## 2019-11-20 PROCEDURE — 99499 NO LOS: ICD-10-PCS | Mod: S$GLB,,, | Performed by: OPTOMETRIST

## 2019-11-20 PROCEDURE — 99999 PR PBB SHADOW E&M-EST. PATIENT-LVL III: ICD-10-PCS | Mod: PBBFAC,,, | Performed by: OPTOMETRIST

## 2019-11-20 PROCEDURE — 92310 PR CONTACT LENS FITTING (NO CHANGE): ICD-10-PCS | Mod: 52,CSM,, | Performed by: OPTOMETRIST

## 2019-11-20 PROCEDURE — 99999 PR PBB SHADOW E&M-EST. PATIENT-LVL II: ICD-10-PCS | Mod: PBBFAC,,, | Performed by: OPTOMETRIST

## 2019-11-20 PROCEDURE — 99499 UNLISTED E&M SERVICE: CPT | Mod: S$GLB,,, | Performed by: OPTOMETRIST

## 2019-11-20 PROCEDURE — 92004 COMPRE OPH EXAM NEW PT 1/>: CPT | Mod: S$GLB,,, | Performed by: OPTOMETRIST

## 2019-11-20 NOTE — PATIENT INSTRUCTIONS
Good ocular health in both eyes.  Few vitreous floaters without evidence of retinal etiology.  S/p cataract surgery in both eyes, with bilateral posterior chamber intraocular lens.    Slight residual refractive surgery in each eye, with good best-corrected VA in each eye.  New spectacle lens Rx issued for use in lieu of the CL in the right eye.    Wearing Air Optix soft CL in the right eye only (for near), to achieve monovision effect.  Good lens fit.  Wearing CL well in the right eye.  Power of the contact lens fine as is.    New (duplicate) SCL Rx issued, for lens for use in the right eye only (for near) to achieve monovision effect.    Return for recheck in one year - or prior, if any problem noted in the interim.

## 2019-11-20 NOTE — PATIENT INSTRUCTIONS
Good ocular health in both eyes.  S/p cataract surgery in both eyes, with bilateral posterior chamber intraocular lens.    Slight residual refractive surgery in each eye, with good best-corrected VA in each eye.  New spectacle lens Rx issued for use in lieu of the CL in the right eye.    Wearing Air Optix soft CL in the right eye only (for near), to achieve monovision effect.  Good lens fit.  Wearing CL well in the right eye.  Power of the contact lens fine as is.    New (duplicate) SCL Rx issued, for lens for use in the right eye only (for near) to achieve monovision effect.    Return for recheck in one year - or prior, if any problem noted in the interim.

## 2019-11-20 NOTE — PROGRESS NOTES
HPI     eye examination       Additional comments: General eye exam and refraction and contact lens   follow-upl  Wears SCL in OD only, for near, to achieve monovision effect.  Happy with near VA, and happy with distance VA.  Pt states vision is stable wearing OD SCL's for near va               Comments     Patient's age: 69 y.o. WM  Originally from [place], [place].   Occupation: Retired   Approximate date of last eye examination:  Over a yr ago  Name of last eye doctor seen: Dr. Rubio   City/State: [facility]  Wears glasses? No      If yes, wears  Full-time or part-time?  No   Present glasses are: Bifocal, SV Distance, SV Reading?  No   Approximate age of present glasses:  No    Got new glasses following last exam, or subsequently?:  No    Any problem with VA with glasses?  No problems   Wears CLs?:  Air Optix  8.6  14.2  +2.00  (near)            If yes:              Type of CL worn:  SCL's OD only for reading               Wears full-time or part-time:   Full time                Sleeps with contact lenses:  No                CL Solution used:  Generic                How often replace CLs:  3-4 weeks               Any problem with VA with CLs?  No                  Headaches?  No   Eye pain/discomfort?  No                                                                                      Flashes?  No   Floaters?  No   Diplopia/Double vision?  No   Patient's Ocular History:         Any eye surgeries? S/p phaco OU          Any eye injury?  No          Any treatment for eye disease?  No   Family history of eye disease?  No   Significant patient medical history:         1. Diabetes?  No        If yes, IDDM or NIDDM?  N/a    2. HBP?  No               3. Other (describe):   shunt     ! OTC eyedrops currently using:  AT's/ visine for redness    ! Prescription eye meds currently using:  No    ! Any history of allergy/adverse reaction to any eye meds used   previously?  No     ! Any history of allergy/adverse reaction to  "eyedrops used during prior   eye exam(s)? No     ! Any history of allergy/adverse reaction to Novacaine or similar meds?   No    ! Any history of allergy/adverse reaction to Epinephrine or similar meds?   No     ! Patient okay with use of anesthetic eyedrops to check eye pressure?    Yes         ! Patient okay with use of eyedrops to dilate pupils today?  Yes    !  Allergies/Medications/Medical History/Family History reviewed today?    Yes       PD =   63/59  Desired reading distance =  15.5"                                                                   Last edited by Graeme Yañez, OD on 11/20/2019  3:16 PM. (History)            Assessment /Plan     For exam results, see Encounter Report.    1. Vitreous floaters of both eyes     2. S/P bilateral cataract extraction     3. Pseudophakia of both eyes     4. Refractive errors                  Good ocular health in both eyes.  Few vitreous floaters, without evidence of retinal etiology.  S/p cataract surgery in both eyes, with bilateral posterior chamber intraocular lens.    Slight residual refractive surgery in each eye, with good best-corrected VA in each eye.  New spectacle lens Rx issued for use in lieu of the CL in the right eye.    Wearing Air Optix soft CL in the right eye only (for near), to achieve monovision effect.  Good lens fit.  Wearing CL well in the right eye.  Power of the contact lens fine as is.    New (duplicate) SCL Rx issued, for lens for use in the right eye only (for near) to achieve monovision effect.    Return for recheck in one year - or prior, if any problem noted in the interim.       "

## 2019-11-20 NOTE — PROGRESS NOTES
Subjective:       Patient ID: Luz Burk is a 69 y.o. female.    Chief Complaint:   Chief Complaint   Patient presents with    Dizziness    Headache    Sinus Problem    Toe Pain     rt great toe       Headache    This is a new (History of a  shunt) problem. The current episode started in the past 7 days. The problem occurs constantly. The problem has been unchanged. The pain is located in the frontal region. The pain does not radiate. The quality of the pain is described as aching. The pain is mild. Associated symptoms include dizziness and rhinorrhea. Pertinent negatives include no abdominal pain, abnormal behavior, anorexia, back pain, blurred vision, coughing, drainage, ear pain, eye pain, eye redness, eye watering, facial sweating, fever, hearing loss, insomnia, loss of balance, muscle aches, nausea, neck pain, numbness, phonophobia, photophobia, scalp tenderness, seizures, sinus pressure, sore throat, swollen glands, tingling, tinnitus, visual change, vomiting, weakness or weight loss. Exacerbated by: bending over. Treatments tried: fluticasone. The treatment provided mild relief.   Nail Problem   This is a new problem. The current episode started in the past 7 days ( pulled out an ingrown nail.  Now toe is red and draining pus. ). The problem occurs constantly. Associated symptoms include headaches. Pertinent negatives include no abdominal pain, anorexia, arthralgias, change in bowel habit, chest pain, congestion, coughing, fever, joint swelling, nausea, neck pain, numbness, sore throat, swollen glands, visual change, vomiting or weakness. Treatments tried: neosporin. The treatment provided mild relief.     Review of Systems   Constitutional: Negative for activity change, fever, unexpected weight change and weight loss.   HENT: Positive for rhinorrhea. Negative for congestion, ear pain, hearing loss, sinus pressure, sore throat, tinnitus and trouble swallowing.    Eyes: Negative for  blurred vision, photophobia, pain, discharge, redness and visual disturbance.   Respiratory: Negative for cough, chest tightness and wheezing.    Cardiovascular: Negative for chest pain.   Gastrointestinal: Negative for abdominal pain, anorexia, blood in stool, change in bowel habit, constipation, diarrhea, nausea and vomiting.   Endocrine: Negative for polydipsia and polyuria.   Genitourinary: Negative for difficulty urinating, dysuria, hematuria and menstrual problem.   Musculoskeletal: Negative for arthralgias, back pain, joint swelling and neck pain.   Neurological: Positive for dizziness and headaches. Negative for tingling, seizures, weakness, numbness and loss of balance.   Psychiatric/Behavioral: Negative for confusion and dysphoric mood. The patient does not have insomnia.        Past Medical History:   Diagnosis Date    Cataract     Collagenous colitis     Dx 16    Hypothyroidism     OA (osteoarthritis)     Obesity     Sleep apnea      Social History     Socioeconomic History    Marital status:      Spouse name: Not on file    Number of children: Not on file    Years of education: Not on file    Highest education level: Not on file   Occupational History    Not on file   Social Needs    Financial resource strain: Not very hard    Food insecurity:     Worry: Never true     Inability: Never true    Transportation needs:     Medical: No     Non-medical: No   Tobacco Use    Smoking status: Former Smoker     Packs/day: 2.00     Years: 25.00     Pack years: 50.00     Types: Cigarettes     Last attempt to quit: 2002     Years since quittin.8    Smokeless tobacco: Never Used   Substance and Sexual Activity    Alcohol use: Yes     Alcohol/week: 0.0 standard drinks     Frequency: 2-3 times a week     Drinks per session: 1 or 2     Binge frequency: Never     Comment: occasionally    Drug use: No    Sexual activity: Yes     Partners: Male   Lifestyle    Physical activity:      "Days per week: 5 days     Minutes per session: 60 min    Stress: Not at all   Relationships    Social connections:     Talks on phone: More than three times a week     Gets together: More than three times a week     Attends Sikhism service: Not on file     Active member of club or organization: No     Attends meetings of clubs or organizations: Not on file     Relationship status:    Other Topics Concern    Not on file   Social History Narrative    Not on file       Objective:     BP 98/60 (BP Location: Right arm, Patient Position: Sitting)   Pulse 74   Temp 97.9 °F (36.6 °C) (Oral)   Ht 5' 4" (1.626 m)   Wt 95 kg (209 lb 5.2 oz)   LMP  (LMP Unknown)   SpO2 98%   BMI 35.93 kg/m²     Physical Exam   Constitutional: She appears well-developed and well-nourished.   HENT:   Head: Normocephalic.   Right Ear: External ear and ear canal normal. A middle ear effusion is present.   Left Ear: External ear and ear canal normal. A middle ear effusion is present.   Nose: Rhinorrhea present.   Mouth/Throat: Posterior oropharyngeal erythema present.   Eyes: Conjunctivae are normal.   Neck: Normal range of motion. Neck supple.   Cardiovascular: Normal rate, regular rhythm and normal heart sounds.   Pulmonary/Chest: Effort normal and breath sounds normal.   Neurological: She is alert.   Skin: Skin is warm and dry.   Redness and purulent drainage at medial edge of right great toenail.    Psychiatric: Her behavior is normal.       Assessment:       Cold sore  -     acyclovir 5% (ZOVIRAX) 5 % ointment; Apply topically 5 (five) times daily.  Dispense: 30 g; Refill: 1    Need for pneumococcal vaccination  -     (In Office Administered) Pneumococcal Conjugate Vaccine (13 Valent) (IM)    Acute non-recurrent maxillary sinusitis  -     loratadine (CLARITIN) 10 mg tablet; Take 1 tablet (10 mg total) by mouth once daily.  Dispense: 30 tablet; Refill: 3    Ingrown toenail of right foot with infection  -     clindamycin " (CLEOCIN) 300 MG capsule; Take 1 capsule (300 mg total) by mouth every 8 (eight) hours.  Dispense: 15 capsule; Refill: 0    B12 deficiency  -     cyanocobalamin 1,000 mcg/mL injection; INJECT 1 ML INTRAMUSCULARLY EVERY 30 DAYS.  Dispense: 10 mL; Refill: 5    Other orders  -     furosemide (LASIX) 20 MG tablet; Take 1 tablet (20 mg total) by mouth once daily.  Dispense: 30 tablet; Refill: 11

## 2019-11-20 NOTE — PROGRESS NOTES
HPI     Contact Lens Follow Up      Additional comments: Patient in today for contact lens follow-up with   general eye examination.  Refer to additional patient encounter notes   dated 11/20/2020.                Comments     Patient in today for contact lens follow-up with general eye examination.    Refer to additional patient encounter notes dated 11/20/2020.            Last edited by Graeme Yañez, OD on 11/20/2019  2:53 PM. (History)            Assessment /Plan     For exam results, see Encounter Report.    1. Encounter for fitting or adjustment of spectacles or contact lenses                Good ocular health in both eyes.  S/p cataract surgery in both eyes, with bilateral posterior chamber intraocular lens.    Slight residual refractive surgery in each eye, with good best-corrected VA in each eye.  New spectacle lens Rx issued for use in lieu of the CL in the right eye.    Wearing Air Optix soft CL in the right eye only (for near), to achieve monovision effect.  Good lens fit.  Wearing CL well in the right eye.  Power of the contact lens fine as is.    New (duplicate) SCL Rx issued, for lens for use in the right eye only (for near) to achieve monovision effect.    Return for recheck in one year - or prior, if any problem noted in the interim.

## 2019-11-21 ENCOUNTER — OFFICE VISIT (OUTPATIENT)
Dept: FAMILY MEDICINE | Facility: CLINIC | Age: 69
End: 2019-11-21
Payer: MEDICARE

## 2019-11-21 VITALS
HEART RATE: 74 BPM | TEMPERATURE: 98 F | BODY MASS INDEX: 35.73 KG/M2 | DIASTOLIC BLOOD PRESSURE: 60 MMHG | OXYGEN SATURATION: 98 % | WEIGHT: 209.31 LBS | HEIGHT: 64 IN | SYSTOLIC BLOOD PRESSURE: 98 MMHG

## 2019-11-21 DIAGNOSIS — E53.8 B12 DEFICIENCY: ICD-10-CM

## 2019-11-21 DIAGNOSIS — L60.0 INGROWN TOENAIL OF RIGHT FOOT WITH INFECTION: ICD-10-CM

## 2019-11-21 DIAGNOSIS — Z23 NEED FOR PNEUMOCOCCAL VACCINATION: ICD-10-CM

## 2019-11-21 DIAGNOSIS — B00.1 COLD SORE: Primary | ICD-10-CM

## 2019-11-21 DIAGNOSIS — J01.00 ACUTE NON-RECURRENT MAXILLARY SINUSITIS: ICD-10-CM

## 2019-11-21 PROCEDURE — 90670 PNEUMOCOCCAL CONJUGATE VACCINE 13-VALENT LESS THAN 5YO & GREATER THAN: ICD-10-PCS | Mod: S$GLB,,, | Performed by: FAMILY MEDICINE

## 2019-11-21 PROCEDURE — 1159F MED LIST DOCD IN RCRD: CPT | Mod: S$GLB,,, | Performed by: FAMILY MEDICINE

## 2019-11-21 PROCEDURE — 1126F AMNT PAIN NOTED NONE PRSNT: CPT | Mod: S$GLB,,, | Performed by: FAMILY MEDICINE

## 2019-11-21 PROCEDURE — G0009 ADMIN PNEUMOCOCCAL VACCINE: HCPCS | Mod: 59,S$GLB,, | Performed by: FAMILY MEDICINE

## 2019-11-21 PROCEDURE — 1100F PTFALLS ASSESS-DOCD GE2>/YR: CPT | Mod: CPTII,S$GLB,, | Performed by: FAMILY MEDICINE

## 2019-11-21 PROCEDURE — 1100F PR PT FALLS ASSESS DOC 2+ FALLS/FALL W/INJURY/YR: ICD-10-PCS | Mod: CPTII,S$GLB,, | Performed by: FAMILY MEDICINE

## 2019-11-21 PROCEDURE — 3288F PR FALLS RISK ASSESSMENT DOCUMENTED: ICD-10-PCS | Mod: CPTII,S$GLB,, | Performed by: FAMILY MEDICINE

## 2019-11-21 PROCEDURE — G0009 PNEUMOCOCCAL CONJUGATE VACCINE 13-VALENT LESS THAN 5YO & GREATER THAN: ICD-10-PCS | Mod: 59,S$GLB,, | Performed by: FAMILY MEDICINE

## 2019-11-21 PROCEDURE — 90670 PCV13 VACCINE IM: CPT | Mod: S$GLB,,, | Performed by: FAMILY MEDICINE

## 2019-11-21 PROCEDURE — 99214 PR OFFICE/OUTPT VISIT, EST, LEVL IV, 30-39 MIN: ICD-10-PCS | Mod: 25,S$GLB,, | Performed by: FAMILY MEDICINE

## 2019-11-21 PROCEDURE — 1159F PR MEDICATION LIST DOCUMENTED IN MEDICAL RECORD: ICD-10-PCS | Mod: S$GLB,,, | Performed by: FAMILY MEDICINE

## 2019-11-21 PROCEDURE — 3288F FALL RISK ASSESSMENT DOCD: CPT | Mod: CPTII,S$GLB,, | Performed by: FAMILY MEDICINE

## 2019-11-21 PROCEDURE — 99214 OFFICE O/P EST MOD 30 MIN: CPT | Mod: 25,S$GLB,, | Performed by: FAMILY MEDICINE

## 2019-11-21 PROCEDURE — 1126F PR PAIN SEVERITY QUANTIFIED, NO PAIN PRESENT: ICD-10-PCS | Mod: S$GLB,,, | Performed by: FAMILY MEDICINE

## 2019-11-21 RX ORDER — CYANOCOBALAMIN 1000 UG/ML
INJECTION, SOLUTION INTRAMUSCULAR; SUBCUTANEOUS
Qty: 10 ML | Refills: 5 | Status: SHIPPED | OUTPATIENT
Start: 2019-11-21 | End: 2020-12-10

## 2019-11-21 RX ORDER — LORATADINE 10 MG/1
10 TABLET ORAL DAILY
Qty: 30 TABLET | Refills: 3 | Status: SHIPPED | OUTPATIENT
Start: 2019-11-21 | End: 2020-03-24 | Stop reason: SDUPTHER

## 2019-11-21 RX ORDER — CLINDAMYCIN HYDROCHLORIDE 300 MG/1
300 CAPSULE ORAL EVERY 8 HOURS
Qty: 15 CAPSULE | Refills: 0 | Status: SHIPPED | OUTPATIENT
Start: 2019-11-21 | End: 2020-08-07

## 2019-11-21 RX ORDER — ACYCLOVIR 400 MG/1
TABLET ORAL
Qty: 60 TABLET | Refills: 11 | Status: SHIPPED | OUTPATIENT
Start: 2019-11-21 | End: 2021-04-06 | Stop reason: CLARIF

## 2019-11-21 RX ORDER — ACYCLOVIR 50 MG/G
OINTMENT TOPICAL
Qty: 30 G | Refills: 1 | Status: SHIPPED | OUTPATIENT
Start: 2019-11-21 | End: 2019-11-21 | Stop reason: CLARIF

## 2019-11-21 RX ORDER — PRASTERONE (DHEA) 50 MG
CAPSULE ORAL
COMMUNITY
End: 2020-08-07

## 2019-11-21 RX ORDER — FUROSEMIDE 20 MG/1
20 TABLET ORAL DAILY
Qty: 30 TABLET | Refills: 11 | Status: SHIPPED | OUTPATIENT
Start: 2019-11-21 | End: 2022-10-12 | Stop reason: SDUPTHER

## 2019-11-22 ENCOUNTER — TELEPHONE (OUTPATIENT)
Dept: FAMILY MEDICINE | Facility: CLINIC | Age: 69
End: 2019-11-22

## 2019-11-22 NOTE — TELEPHONE ENCOUNTER
----- Message from Setphanie Jiménez sent at 11/22/2019  8:12 AM CST -----  Contact: [phone]/pharm  Type:  Pharmacy Calling to Clarify an RX    Name of Caller:   Pharmacy Name: [facility] [address] [place] [address] [address] [facility]  Prescription Name: acyclovir (ZOVIRAX) 400 MG tablet  What do they need to clarify?: directions: is it 1 tablet 3 times a day?    Can you please advise of the correct directions?

## 2019-11-22 NOTE — TELEPHONE ENCOUNTER
Jessica at [facility] pharmacy has been notified and verbalized understanding of below Zovirax instructions.

## 2019-11-27 ENCOUNTER — TELEPHONE (OUTPATIENT)
Dept: FAMILY MEDICINE | Facility: CLINIC | Age: 69
End: 2019-11-27

## 2019-11-27 DIAGNOSIS — R60.0 EDEMA OF BOTH LEGS: ICD-10-CM

## 2019-11-27 DIAGNOSIS — Z00.00 WELLNESS EXAMINATION: Primary | ICD-10-CM

## 2019-11-27 DIAGNOSIS — E55.9 VITAMIN D DEFICIENCY: ICD-10-CM

## 2019-11-27 DIAGNOSIS — M19.90 ARTHRITIS: ICD-10-CM

## 2019-11-27 DIAGNOSIS — E53.8 B12 DEFICIENCY: ICD-10-CM

## 2019-11-27 DIAGNOSIS — E66.9 OBESITY, CLASS II, BMI 35-39.9: ICD-10-CM

## 2019-11-27 DIAGNOSIS — I51.89 DIASTOLIC DYSFUNCTION: ICD-10-CM

## 2019-11-27 NOTE — TELEPHONE ENCOUNTER
I spoke with the patient about this. Patient advised lab orders placed at [facility].  No further action needed at this time.

## 2019-12-04 ENCOUNTER — LAB VISIT (OUTPATIENT)
Dept: LAB | Facility: HOSPITAL | Age: 69
End: 2019-12-04
Attending: FAMILY MEDICINE
Payer: MEDICARE

## 2019-12-04 ENCOUNTER — CLINICAL SUPPORT (OUTPATIENT)
Dept: FAMILY MEDICINE | Facility: CLINIC | Age: 69
End: 2019-12-04
Payer: MEDICARE

## 2019-12-04 ENCOUNTER — TELEPHONE (OUTPATIENT)
Dept: FAMILY MEDICINE | Facility: CLINIC | Age: 69
End: 2019-12-04

## 2019-12-04 DIAGNOSIS — I51.89 DIASTOLIC DYSFUNCTION: ICD-10-CM

## 2019-12-04 DIAGNOSIS — E55.9 VITAMIN D DEFICIENCY: ICD-10-CM

## 2019-12-04 DIAGNOSIS — E53.8 B12 DEFICIENCY: ICD-10-CM

## 2019-12-04 DIAGNOSIS — E66.9 OBESITY, CLASS II, BMI 35-39.9: ICD-10-CM

## 2019-12-04 DIAGNOSIS — M19.90 ARTHRITIS: ICD-10-CM

## 2019-12-04 DIAGNOSIS — Z00.00 WELLNESS EXAMINATION: ICD-10-CM

## 2019-12-04 DIAGNOSIS — R60.0 EDEMA OF BOTH LEGS: ICD-10-CM

## 2019-12-04 DIAGNOSIS — R31.9 HEMATURIA, UNSPECIFIED TYPE: Primary | ICD-10-CM

## 2019-12-04 LAB
25(OH)D3+25(OH)D2 SERPL-MCNC: 31 NG/ML (ref 30–96)
ALBUMIN SERPL BCP-MCNC: 4 G/DL (ref 3.5–5.2)
ALP SERPL-CCNC: 63 U/L (ref 38–126)
ALT SERPL W/O P-5'-P-CCNC: 13 U/L (ref 10–44)
ANION GAP SERPL CALC-SCNC: 7 MMOL/L (ref 8–16)
AST SERPL-CCNC: 26 U/L (ref 15–46)
BASOPHILS # BLD AUTO: 0.03 K/UL (ref 0–0.2)
BASOPHILS NFR BLD: 0.4 % (ref 0–1.9)
BILIRUB SERPL-MCNC: 0.5 MG/DL (ref 0.1–1)
BILIRUB SERPL-MCNC: ABNORMAL MG/DL
BILIRUB SERPL-MCNC: ABNORMAL MG/DL
BLOOD URINE, POC: ABNORMAL
BLOOD URINE, POC: ABNORMAL
BUN SERPL-MCNC: 11 MG/DL (ref 7–17)
CALCIUM SERPL-MCNC: 9.3 MG/DL (ref 8.7–10.5)
CHLORIDE SERPL-SCNC: 101 MMOL/L (ref 95–110)
CHOLEST SERPL-MCNC: 240 MG/DL (ref 120–199)
CHOLEST/HDLC SERPL: 3.7 {RATIO} (ref 2–5)
CO2 SERPL-SCNC: 29 MMOL/L (ref 23–29)
COLOR, POC UA: YELLOW
COLOR, POC UA: YELLOW
CREAT SERPL-MCNC: 0.77 MG/DL (ref 0.5–1.4)
DIFFERENTIAL METHOD: ABNORMAL
EOSINOPHIL # BLD AUTO: 0.2 K/UL (ref 0–0.5)
EOSINOPHIL NFR BLD: 2.4 % (ref 0–8)
ERYTHROCYTE [DISTWIDTH] IN BLOOD BY AUTOMATED COUNT: 12.3 % (ref 11.5–14.5)
EST. GFR  (AFRICAN AMERICAN): >60 ML/MIN/1.73 M^2
EST. GFR  (NON AFRICAN AMERICAN): >60 ML/MIN/1.73 M^2
GLUCOSE SERPL-MCNC: 93 MG/DL (ref 70–110)
GLUCOSE UR QL STRIP: ABNORMAL
GLUCOSE UR QL STRIP: ABNORMAL
HCT VFR BLD AUTO: 38.2 % (ref 37–48.5)
HDLC SERPL-MCNC: 65 MG/DL (ref 40–75)
HDLC SERPL: 27.1 % (ref 20–50)
HGB BLD-MCNC: 12.1 G/DL (ref 12–16)
KETONES UR QL STRIP: ABNORMAL
KETONES UR QL STRIP: ABNORMAL
LDLC SERPL CALC-MCNC: 153.4 MG/DL (ref 63–159)
LEUKOCYTE ESTERASE URINE, POC: ABNORMAL
LEUKOCYTE ESTERASE URINE, POC: ABNORMAL
LYMPHOCYTES # BLD AUTO: 1.8 K/UL (ref 1–4.8)
LYMPHOCYTES NFR BLD: 23.4 % (ref 18–48)
MCH RBC QN AUTO: 30.7 PG (ref 27–31)
MCHC RBC AUTO-ENTMCNC: 31.7 G/DL (ref 32–36)
MCV RBC AUTO: 97 FL (ref 82–98)
MONOCYTES # BLD AUTO: 0.6 K/UL (ref 0.3–1)
MONOCYTES NFR BLD: 7.8 % (ref 4–15)
NEUTROPHILS # BLD AUTO: 4.9 K/UL (ref 1.8–7.7)
NEUTROPHILS NFR BLD: 66 % (ref 38–73)
NITRITE, POC UA: ABNORMAL
NITRITE, POC UA: ABNORMAL
NONHDLC SERPL-MCNC: 175 MG/DL
PH, POC UA: 7
PH, POC UA: 7
PLATELET # BLD AUTO: 304 K/UL (ref 150–350)
PMV BLD AUTO: 10.1 FL (ref 9.2–12.9)
POTASSIUM SERPL-SCNC: 4.2 MMOL/L (ref 3.5–5.1)
PROT SERPL-MCNC: 6.9 G/DL (ref 6–8.4)
PROTEIN, POC: ABNORMAL
PROTEIN, POC: ABNORMAL
RBC # BLD AUTO: 3.94 M/UL (ref 4–5.4)
SODIUM SERPL-SCNC: 137 MMOL/L (ref 136–145)
SPECIFIC GRAVITY, POC UA: 1
SPECIFIC GRAVITY, POC UA: 1
TRIGL SERPL-MCNC: 108 MG/DL (ref 30–150)
TSH SERPL DL<=0.005 MIU/L-ACNC: 1.95 UIU/ML (ref 0.4–4)
UROBILINOGEN, POC UA: ABNORMAL
UROBILINOGEN, POC UA: ABNORMAL
VIT B12 SERPL-MCNC: 562 PG/ML (ref 210–950)
WBC # BLD AUTO: 7.53 K/UL (ref 3.9–12.7)

## 2019-12-04 PROCEDURE — 84443 ASSAY THYROID STIM HORMONE: CPT | Mod: PO

## 2019-12-04 PROCEDURE — 80061 LIPID PANEL: CPT

## 2019-12-04 PROCEDURE — 85025 COMPLETE CBC W/AUTO DIFF WBC: CPT | Mod: PO

## 2019-12-04 PROCEDURE — 80053 COMPREHEN METABOLIC PANEL: CPT | Mod: PO

## 2019-12-04 PROCEDURE — 87088 URINE BACTERIA CULTURE: CPT

## 2019-12-04 PROCEDURE — 82607 VITAMIN B-12: CPT | Mod: PO

## 2019-12-04 PROCEDURE — 36415 COLL VENOUS BLD VENIPUNCTURE: CPT | Mod: PO

## 2019-12-04 PROCEDURE — 81002 POCT URINE DIPSTICK WITHOUT MICROSCOPE: ICD-10-PCS | Mod: S$GLB,,, | Performed by: FAMILY MEDICINE

## 2019-12-04 PROCEDURE — 81002 URINALYSIS NONAUTO W/O SCOPE: CPT | Mod: S$GLB,,, | Performed by: FAMILY MEDICINE

## 2019-12-04 PROCEDURE — 87077 CULTURE AEROBIC IDENTIFY: CPT

## 2019-12-04 PROCEDURE — 82306 VITAMIN D 25 HYDROXY: CPT | Mod: PO

## 2019-12-04 PROCEDURE — 87086 URINE CULTURE/COLONY COUNT: CPT

## 2019-12-04 PROCEDURE — 87186 SC STD MICRODIL/AGAR DIL: CPT

## 2019-12-04 RX ORDER — SULFAMETHOXAZOLE AND TRIMETHOPRIM 800; 160 MG/1; MG/1
1 TABLET ORAL 2 TIMES DAILY
Qty: 20 TABLET | Refills: 0 | Status: SHIPPED | OUTPATIENT
Start: 2019-12-04 | End: 2019-12-14

## 2019-12-04 NOTE — TELEPHONE ENCOUNTER
----- Message from Stephanie Jiménez sent at 12/4/2019 10:39 AM CST -----  Contact: [phone]/self  Patient is requesting a call back regarding coming in to check her urine for a UTI. Please call

## 2019-12-04 NOTE — TELEPHONE ENCOUNTER
Pt has been notified and verbalized understanding that rx has been sent to pharmacy, and also that urine was sent for culture.

## 2019-12-04 NOTE — TELEPHONE ENCOUNTER
Pt dropped of urine for poct   C/o hematuria and frequency    Urine show  Large blood and large leukocytes  Urine was cloudy    Please advise

## 2019-12-06 LAB — BACTERIA UR CULT: ABNORMAL

## 2019-12-09 ENCOUNTER — TELEPHONE (OUTPATIENT)
Dept: FAMILY MEDICINE | Facility: CLINIC | Age: 69
End: 2019-12-09

## 2019-12-09 RX ORDER — SULFAMETHOXAZOLE AND TRIMETHOPRIM 800; 160 MG/1; MG/1
1 TABLET ORAL 2 TIMES DAILY
Qty: 20 TABLET | Refills: 0 | Status: SHIPPED | OUTPATIENT
Start: 2019-12-09 | End: 2019-12-12

## 2019-12-12 ENCOUNTER — OFFICE VISIT (OUTPATIENT)
Dept: FAMILY MEDICINE | Facility: CLINIC | Age: 69
End: 2019-12-12
Payer: MEDICARE

## 2019-12-12 VITALS
SYSTOLIC BLOOD PRESSURE: 100 MMHG | HEIGHT: 64 IN | OXYGEN SATURATION: 97 % | DIASTOLIC BLOOD PRESSURE: 60 MMHG | HEART RATE: 70 BPM | BODY MASS INDEX: 35.87 KG/M2 | TEMPERATURE: 98 F | WEIGHT: 210.13 LBS

## 2019-12-12 DIAGNOSIS — Z87.891 EX-SMOKER: ICD-10-CM

## 2019-12-12 DIAGNOSIS — K59.00 CONSTIPATION, UNSPECIFIED CONSTIPATION TYPE: ICD-10-CM

## 2019-12-12 DIAGNOSIS — Z00.00 WELLNESS EXAMINATION: Primary | ICD-10-CM

## 2019-12-12 PROCEDURE — 1101F PT FALLS ASSESS-DOCD LE1/YR: CPT | Mod: CPTII,S$GLB,, | Performed by: FAMILY MEDICINE

## 2019-12-12 PROCEDURE — 1126F PR PAIN SEVERITY QUANTIFIED, NO PAIN PRESENT: ICD-10-PCS | Mod: S$GLB,,, | Performed by: FAMILY MEDICINE

## 2019-12-12 PROCEDURE — 1126F AMNT PAIN NOTED NONE PRSNT: CPT | Mod: S$GLB,,, | Performed by: FAMILY MEDICINE

## 2019-12-12 PROCEDURE — 1159F PR MEDICATION LIST DOCUMENTED IN MEDICAL RECORD: ICD-10-PCS | Mod: S$GLB,,, | Performed by: FAMILY MEDICINE

## 2019-12-12 PROCEDURE — 1101F PR PT FALLS ASSESS DOC 0-1 FALLS W/OUT INJ PAST YR: ICD-10-PCS | Mod: CPTII,S$GLB,, | Performed by: FAMILY MEDICINE

## 2019-12-12 PROCEDURE — 99214 OFFICE O/P EST MOD 30 MIN: CPT | Mod: S$GLB,,, | Performed by: FAMILY MEDICINE

## 2019-12-12 PROCEDURE — 99214 PR OFFICE/OUTPT VISIT, EST, LEVL IV, 30-39 MIN: ICD-10-PCS | Mod: S$GLB,,, | Performed by: FAMILY MEDICINE

## 2019-12-12 PROCEDURE — 1159F MED LIST DOCD IN RCRD: CPT | Mod: S$GLB,,, | Performed by: FAMILY MEDICINE

## 2019-12-12 RX ORDER — DOCUSATE SODIUM 100 MG/1
100 CAPSULE, LIQUID FILLED ORAL 2 TIMES DAILY
Qty: 180 CAPSULE | Refills: 3 | Status: SHIPPED | OUTPATIENT
Start: 2019-12-12 | End: 2021-01-11

## 2019-12-12 RX ORDER — ERGOCALCIFEROL 1.25 MG/1
50000 CAPSULE ORAL
Qty: 13 CAPSULE | Refills: 3 | Status: SHIPPED | OUTPATIENT
Start: 2019-12-12 | End: 2021-01-11

## 2019-12-12 NOTE — PROGRESS NOTES
"Subjective:      Patient ID: Luz Burk is a 69 y.o. female.    Chief Complaint: Annual Exam      Vitals:    12/12/19 1129   BP: 100/60   Pulse: 70   Temp: 98 °F (36.7 °C)   TempSrc: Oral   SpO2: 97%   Weight: 95.3 kg (210 lb 1.6 oz)   Height: 5' 4" (1.626 m)        HPI   Wellness; had a recetn UTI, still on ABX; had labs    Review of Systems   Constitutional: Negative for activity change and unexpected weight change.   HENT: Negative for hearing loss, rhinorrhea and trouble swallowing.    Eyes: Negative for discharge and visual disturbance.   Respiratory: Negative for chest tightness and wheezing.    Cardiovascular: Negative for chest pain and palpitations.   Gastrointestinal: Positive for constipation. Negative for blood in stool, diarrhea and vomiting.   Endocrine: Negative for polydipsia and polyuria.   Genitourinary: Positive for pelvic pain. Negative for difficulty urinating, dysuria, hematuria and menstrual problem.   Musculoskeletal: Positive for arthralgias and joint swelling. Negative for neck pain.   Neurological: Positive for headaches. Negative for weakness.   Psychiatric/Behavioral: Negative for confusion and dysphoric mood.   All other systems reviewed and are negative.    Objective:     Physical Exam   Constitutional: She is oriented to person, place, and time. She appears well-developed and well-nourished.   HENT:   Head: Normocephalic.   Eyes: Pupils are equal, round, and reactive to light. Conjunctivae and EOM are normal.   Neck: Normal range of motion. Neck supple.   Cardiovascular: Normal rate, regular rhythm and normal heart sounds.   Pulmonary/Chest: Effort normal and breath sounds normal.   Musculoskeletal: Normal range of motion.   Neurological: She is alert and oriented to person, place, and time. She has normal reflexes.   Skin: Skin is warm and dry.   Psychiatric: She has a normal mood and affect. Her behavior is normal. Judgment and thought content normal.   Nursing note and " vitals reviewed.    Assessment:     1. Wellness examination    2. Constipation, unspecified constipation type    3. Ex-smoker      Plan:        Medication List           Accurate as of December 12, 2019 11:59 PM. If you have any questions, ask your nurse or doctor.               START taking these medications    docusate sodium 100 MG capsule  Commonly known as:  Colace  Take 1 capsule (100 mg total) by mouth 2 (two) times daily.  Started by:  Hilario Slaughter MD        CHANGE how you take these medications    citalopram 20 MG tablet  Commonly known as:  CELEXA  TAKE 1 TABLET BY MOUTH ONCE DAILY  What changed:  Another medication with the same name was removed. Continue taking this medication, and follow the directions you see here.  Changed by:  Hilario Slaughter MD     sulfamethoxazole-trimethoprim 800-160mg 800-160 mg Tab  Commonly known as:  BACTRIM DS  Take 1 tablet by mouth 2 (two) times daily. for 10 days  What changed:  Another medication with the same name was removed. Continue taking this medication, and follow the directions you see here.  Changed by:  Hilario Slaughter MD        CONTINUE taking these medications    acyclovir 400 MG tablet  Commonly known as:  ZOVIRAX  Take 3 times a day for 5 days at the first sign of a cold sore.     BIOTIN ORAL     CANNABIDIOL (CBD) EXTRACT ORAL     clindamycin 300 MG capsule  Commonly known as:  CLEOCIN  Take 1 capsule (300 mg total) by mouth every 8 (eight) hours.     cyanocobalamin 1,000 mcg/mL injection  INJECT 1 ML INTRAMUSCULARLY EVERY 30 DAYS.     ergocalciferol 50,000 unit Cap  Commonly known as:  ERGOCALCIFEROL  Take 1 capsule (50,000 Units total) by mouth every 7 days.     fluticasone propionate 50 mcg/actuation nasal spray  Commonly known as:  FLONASE     furosemide 20 MG tablet  Commonly known as:  LASIX  Take 1 tablet (20 mg total) by mouth once daily.     dewey (Zingiber officinalis) 500 mg Cap     levothyroxine 50 MCG tablet  Commonly known as:   SYNTHROID  TAKE 1 TABLET BY MOUTH BEFORE BREAKFAST     loratadine 10 mg tablet  Commonly known as:  CLARITIN  Take 1 tablet (10 mg total) by mouth once daily.     potassium chloride 10 MEQ Tbsr  Commonly known as:  KLOR-CON  TAKE TWO TABLETS BY MOUTH ONCE DAILY WITH LASIX     ranitidine 150 MG tablet  Commonly known as:  ZANTAC     THERA-M ORAL     turmeric 400 mg Cap     TYLENOL ORAL     Vitamin C 100 MG tablet  Generic drug:  ascorbic acid (vitamin C)        STOP taking these medications    Fluzone High-Dose 2019-20 (PF) 180 mcg/0.5 mL Syrg  Generic drug:  flu vacc [id number](65yr up)PF  Stopped by:  Hilario Slaughter MD           Where to Get Your Medications      These medications were sent to [facility] - [address] [facility]  [address] [place] [address]    Phone:  [phone]   · docusate sodium 100 MG capsule  · ergocalciferol 50,000 unit Cap       Wellness examination    Constipation, unspecified constipation type    Ex-smoker    Other orders  -     docusate sodium (COLACE) 100 MG capsule; Take 1 capsule (100 mg total) by mouth 2 (two) times daily.  Dispense: 180 capsule; Refill: 3  -     ergocalciferol (ERGOCALCIFEROL) 50,000 unit Cap; Take 1 capsule (50,000 Units total) by mouth every 7 days.  Dispense: 13 capsule; Refill: 3

## 2020-01-02 ENCOUNTER — OFFICE VISIT (OUTPATIENT)
Dept: ORTHOPEDICS | Facility: CLINIC | Age: 70
End: 2020-01-02
Payer: MEDICARE

## 2020-01-02 ENCOUNTER — HOSPITAL ENCOUNTER (OUTPATIENT)
Dept: RADIOLOGY | Facility: HOSPITAL | Age: 70
Discharge: HOME | End: 2020-01-02
Attending: ORTHOPAEDIC SURGERY
Payer: MEDICARE

## 2020-01-02 DIAGNOSIS — M17.12 PRIMARY OSTEOARTHRITIS OF LEFT KNEE: ICD-10-CM

## 2020-01-02 DIAGNOSIS — M25.561 PAIN IN BOTH KNEES, UNSPECIFIED CHRONICITY: ICD-10-CM

## 2020-01-02 DIAGNOSIS — M23.204 DEGENERATIVE TEAR OF LEFT MEDIAL MENISCUS: ICD-10-CM

## 2020-01-02 DIAGNOSIS — M70.51 PES ANSERINUS BURSITIS OF RIGHT KNEE: Primary | ICD-10-CM

## 2020-01-02 DIAGNOSIS — Z96.651 STATUS POST TOTAL RIGHT KNEE REPLACEMENT: ICD-10-CM

## 2020-01-02 DIAGNOSIS — M25.562 PAIN IN BOTH KNEES, UNSPECIFIED CHRONICITY: ICD-10-CM

## 2020-01-02 PROCEDURE — 1125F AMNT PAIN NOTED PAIN PRSNT: CPT | Mod: S$GLB,,, | Performed by: ORTHOPAEDIC SURGERY

## 2020-01-02 PROCEDURE — 1159F MED LIST DOCD IN RCRD: CPT | Mod: S$GLB,,, | Performed by: ORTHOPAEDIC SURGERY

## 2020-01-02 PROCEDURE — 1159F PR MEDICATION LIST DOCUMENTED IN MEDICAL RECORD: ICD-10-PCS | Mod: S$GLB,,, | Performed by: ORTHOPAEDIC SURGERY

## 2020-01-02 PROCEDURE — 1125F PR PAIN SEVERITY QUANTIFIED, PAIN PRESENT: ICD-10-PCS | Mod: S$GLB,,, | Performed by: ORTHOPAEDIC SURGERY

## 2020-01-02 PROCEDURE — 73564 X-RAY EXAM KNEE 4 OR MORE: CPT | Mod: 26,50,, | Performed by: RADIOLOGY

## 2020-01-02 PROCEDURE — 73564 X-RAY EXAM KNEE 4 OR MORE: CPT | Mod: TC,50

## 2020-01-02 PROCEDURE — 99999 PR PBB SHADOW E&M-EST. PATIENT-LVL II: CPT | Mod: PBBFAC,,, | Performed by: ORTHOPAEDIC SURGERY

## 2020-01-02 PROCEDURE — 73564 XR KNEE ORTHO BILAT WITH FLEXION: ICD-10-PCS | Mod: 26,50,, | Performed by: RADIOLOGY

## 2020-01-02 PROCEDURE — 99214 PR OFFICE/OUTPT VISIT, EST, LEVL IV, 30-39 MIN: ICD-10-PCS | Mod: S$GLB,,, | Performed by: ORTHOPAEDIC SURGERY

## 2020-01-02 PROCEDURE — 1101F PT FALLS ASSESS-DOCD LE1/YR: CPT | Mod: CPTII,S$GLB,, | Performed by: ORTHOPAEDIC SURGERY

## 2020-01-02 PROCEDURE — 1101F PR PT FALLS ASSESS DOC 0-1 FALLS W/OUT INJ PAST YR: ICD-10-PCS | Mod: CPTII,S$GLB,, | Performed by: ORTHOPAEDIC SURGERY

## 2020-01-02 PROCEDURE — 99214 OFFICE O/P EST MOD 30 MIN: CPT | Mod: S$GLB,,, | Performed by: ORTHOPAEDIC SURGERY

## 2020-01-02 PROCEDURE — 99999 PR PBB SHADOW E&M-EST. PATIENT-LVL II: ICD-10-PCS | Mod: PBBFAC,,, | Performed by: ORTHOPAEDIC SURGERY

## 2020-01-02 NOTE — PROGRESS NOTES
HPI:  69-year-old female fell on the ice while on vacation about 2 weeks ago.  She has a history of right total knee replacement in 2012 by Dr. [facility].  She has been ambulating well since that point time but does complain of pain in the anterior portion of the right knee as well some clicking, popping and occasional locking of the left knee. Pain is exacerbated my excessive activity and relieved by rest.    ROS:  Patient denies constitutional symptoms, cardiac symptoms, respiratory symptoms, GI symptoms.  The remainder of the musculoskeletal ROS is included in the HPI.    PE:    AA&O x 4.  NAD  HEENT:  NCAT, sclera nonicteric  Lungs:  Respirations are equal and unlabored.  CV:  2+ bilateral upper and lower extremity pulses.  Skin:  Intact throughout.    MS:  RLE - 0/0/130 ROM.  No ligamentous laxity.  Normal patellar tracking and tilt.  Well-healed old anterior scar.  Mild swelling and ecchymosis about the pes insertion.  Tenderness to palpation about the pes bursa and distal hamstring tendons.  LLE - 0/0/120 you ROM.  Negative Lachman's, pivot shift, posterior drawer. No varus/valgus instability.  Normal patellar tracking and tilt.  Tenderness to palpation medial joint line. No palpable locking or popping with Wilbert testing.    Rads:  Intact right total knee replacement with good alignment and no signs of loosening.  Left knee with varus arthritis with medial bone on bone. No significant osteophytosis.    A/P:  I think she has a strain at the distal hamstring insertion/pes bursa on the right and this will resolve with time.  On the left knee she has arthritis but does not wish to proceed with left total knee replacement at this point time. She undoubtedly has degenerative meniscal tearing on that side as she is just at the bone on bone point on the left.  I told her we should just give it a little bit of time and see if the mechanical symptoms subside.  If she continues to have locking but does not wish to  proceed with total knee arthroplasty, knee arthroscopy to remove any meniscal tissue that may be causing the mechanical symptoms is reasonable, but she does have moderate arthritis at this point.  She is going to see how she does with continuing activity and will let me know if her problems persist so we can talk about arthroscopy or proceeding with left knee replacement should she desire.  In that case I will send her back to see Dr. [facility].

## 2020-01-14 ENCOUNTER — TELEPHONE (OUTPATIENT)
Dept: FAMILY MEDICINE | Facility: CLINIC | Age: 70
End: 2020-01-14

## 2020-01-14 DIAGNOSIS — R19.7 DIARRHEA, UNSPECIFIED TYPE: Primary | ICD-10-CM

## 2020-01-14 NOTE — TELEPHONE ENCOUNTER
Pt stated that she's been having lots of diarrhea x 1 month. She has taken approximately 22 tablets of immodium within the last few days without relief. She's requesting to get a GI referral to an [facility] provider.

## 2020-01-14 NOTE — TELEPHONE ENCOUNTER
----- Message from Arlyn Guzman sent at 1/14/2020  2:50 PM CST -----  Contact: [phone]/self  Patient requesting a referral for a Gastroenterologist at the [facility].  Thank you

## 2020-01-16 ENCOUNTER — LAB VISIT (OUTPATIENT)
Dept: LAB | Facility: HOSPITAL | Age: 70
End: 2020-01-16
Attending: INTERNAL MEDICINE
Payer: MEDICARE

## 2020-01-16 ENCOUNTER — OFFICE VISIT (OUTPATIENT)
Dept: GASTROENTEROLOGY | Facility: CLINIC | Age: 70
End: 2020-01-16
Payer: MEDICARE

## 2020-01-16 VITALS — WEIGHT: 215.19 LBS | HEIGHT: 64 IN | BODY MASS INDEX: 36.74 KG/M2

## 2020-01-16 DIAGNOSIS — K21.9 GASTROESOPHAGEAL REFLUX DISEASE, ESOPHAGITIS PRESENCE NOT SPECIFIED: Chronic | ICD-10-CM

## 2020-01-16 DIAGNOSIS — R19.7 ACUTE DIARRHEA: ICD-10-CM

## 2020-01-16 DIAGNOSIS — K52.831 COLLAGENOUS COLITIS: Primary | ICD-10-CM

## 2020-01-16 DIAGNOSIS — K52.831 COLLAGENOUS COLITIS: ICD-10-CM

## 2020-01-16 LAB
C DIFF GDH STL QL: NEGATIVE
C DIFF TOX A+B STL QL IA: NEGATIVE

## 2020-01-16 PROCEDURE — 87209 SMEAR COMPLEX STAIN: CPT | Mod: PO

## 2020-01-16 PROCEDURE — 87324 CLOSTRIDIUM AG IA: CPT | Mod: PO

## 2020-01-16 PROCEDURE — 87427 SHIGA-LIKE TOXIN AG IA: CPT | Mod: PO

## 2020-01-16 PROCEDURE — 99204 PR OFFICE/OUTPT VISIT, NEW, LEVL IV, 45-59 MIN: ICD-10-PCS | Mod: S$GLB,,, | Performed by: INTERNAL MEDICINE

## 2020-01-16 PROCEDURE — 99999 PR PBB SHADOW E&M-EST. PATIENT-LVL III: CPT | Mod: PBBFAC,,, | Performed by: INTERNAL MEDICINE

## 2020-01-16 PROCEDURE — 87449 NOS EACH ORGANISM AG IA: CPT | Mod: PO

## 2020-01-16 PROCEDURE — 99999 PR PBB SHADOW E&M-EST. PATIENT-LVL III: ICD-10-PCS | Mod: PBBFAC,,, | Performed by: INTERNAL MEDICINE

## 2020-01-16 PROCEDURE — 87045 FECES CULTURE AEROBIC BACT: CPT | Mod: PO

## 2020-01-16 PROCEDURE — 87329 GIARDIA AG IA: CPT | Mod: PO

## 2020-01-16 PROCEDURE — 99204 OFFICE O/P NEW MOD 45 MIN: CPT | Mod: S$GLB,,, | Performed by: INTERNAL MEDICINE

## 2020-01-16 PROCEDURE — 87046 STOOL CULTR AEROBIC BACT EA: CPT | Mod: PO

## 2020-01-16 PROCEDURE — 89055 LEUKOCYTE ASSESSMENT FECAL: CPT | Mod: PO

## 2020-01-16 RX ORDER — BUDESONIDE 9 MG/1
9 TABLET, FILM COATED, EXTENDED RELEASE ORAL DAILY
Qty: 30 EACH | Refills: 0 | Status: SHIPPED | OUTPATIENT
Start: 2020-01-16 | End: 2020-01-16 | Stop reason: SDUPTHER

## 2020-01-16 RX ORDER — BUDESONIDE 9 MG/1
9 TABLET, FILM COATED, EXTENDED RELEASE ORAL DAILY
Qty: 30 EACH | Refills: 0 | Status: SHIPPED | OUTPATIENT
Start: 2020-01-16 | End: 2020-08-07

## 2020-01-16 NOTE — LETTER
January 16, 2020      Hilario Slaughter MD  [address] [facility] [address]           [place] [facility]  [address] [place], [place] [address], [address]  [facility] [address]  Phone: [phone]  Fax: [phone]          Patient: Luz Burk   MR Number: [id number]   YOB: 1950   Date of Visit: 1/16/2020       Dear Dr. Hilario Slaughter:    Thank you for referring Luz Burk to me for evaluation. Attached you will find relevant portions of my assessment and plan of care.    If you have questions, please do not hesitate to call me. I look forward to following Luz Bruk along with you.    Sincerely,    Antonio Weller MD    Enclosure  CC:  No Recipients    If you would like to receive this communication electronically, please contact [email or web address] or [phone] to request more information on [employer] access.    For providers and/or their staff who would like to refer a patient to [facility], please contact us through our one-stop-shop provider referral line, [facility], at [phone].    If you feel you have received this communication in error or would no longer like to receive these types of communications, please e-mail [email or web address]

## 2020-01-16 NOTE — PROGRESS NOTES
GASTROENTEROLOGY CLINIC NOTE    Reason for visit: The primary encounter diagnosis was Collagenous colitis. Diagnoses of Acute diarrhea and Gastroesophageal reflux disease, esophagitis presence not specified were also pertinent to this visit.  Referring provider/PCP: Hilario Slaughter MD    HPI:  Luz Burk is a 69 y.o. female here today for 1 month acute diarrhea. New patient jayleen.  Saw ora at [facility] 2016 for collagenous colitis.    Patient took a trip to [place] about 4 weeks ago and she experienced acute diarrhea.  She is going up to 5-10 times a day at least.  She is taking multiple Imodium a day, she believes she has taken 20 Imodium is a over the past week.  She has gone 5 times just this morning.  There is no blood in the stool.  Anything she eats she has diarrhea.  She states she recently stopped taking her Zantac because the recall but started taking lansoprazole but only took that for about 5 days.  This was exactly around the time when her diarrhea started.  She has not taken it in about 3 weeks.  She knows that she has had a reaction a diarrhea to the omeprazole in the past.  She also has been taking a lot of Aleve and ibuprofen because of a fall she had during her trip in some swelling in her legs.  She has taking this as recently as 5 days ago.  Has associated nausea, no vomiting. Gurgling in stomach. Lower cramps and back cramps. Recent Abx within a month for UTI.  Prior to this, she has actually having constipation.    Back in 2016 she was diagnosed on colonoscopy with collagenous colitis while she was taking Mobic and omeprazole.  She was seen at [facility] and ultimately the diarrhea resolved with stopping the medications alone as well as with the help of Imodium.  She did not require and budesonide.      Prior Endoscopy:  EGD: none  Colon:  5/201 colon  Impression:           - The entire examined colon is normal. Biopsied.                        - The examined portion of  the ileum was normal.  Recommendation:       - Discharge patient to home (ambulatory).                        - High fiber diet for the rest of the patient's                         life.                        - Continue present medications.                        - Await pathology results.                        - Repeat colonoscopy in 3 - 5 years for                         surveillance based on pathology results.  PATH collagenous colitis    9/2015 opal  Impression:           - Stool in the right colon from fair bowel                         preparation.                        - Internal and external hemorrhoids.  Recommendation:       - Discharge patient to home.                        - Repeat colonoscopy in 3 years because the bowel                         preparation was suboptimal.    (Portions of this note were dictated using voice recognition software and may contain dictation related errors in spelling/grammar/syntax not found on text review)    Review of Systems   Constitutional: Negative for fever and weight loss.   HENT: Negative for nosebleeds and sore throat.    Eyes: Negative for double vision and photophobia.   Respiratory: Negative for cough and shortness of breath.    Cardiovascular: Negative for chest pain and leg swelling.   Gastrointestinal: Positive for abdominal pain, diarrhea, heartburn and nausea. Negative for blood in stool.   Genitourinary: Negative for dysuria and hematuria.   Musculoskeletal: Positive for back pain. Negative for joint pain and neck pain.   Skin: Negative for itching and rash.   Neurological: Negative for dizziness and headaches.   Psychiatric/Behavioral: Negative for hallucinations. The patient does not have insomnia.        Past Medical History: has a past medical history of Cataract, Collagenous colitis, Hypothyroidism, OA (osteoarthritis), Obesity, and Sleep apnea.    Past Surgical History: has a past surgical history that includes Total knee arthroplasty  (7/23/12); lap band surgery (10/11/2011); Hernia repair; Ventriculoperitoneal shunt; Hysterectomy; Cholecystectomy; Laparoscopic gastric banding; Colonoscopy (N/A, 5/30/2016); Appendectomy; Hiatal hernia repair; Oophorectomy; and Cataract extraction.    Family History:family history includes Alzheimer's disease (age of onset: 84) in her mother; Arthritis in her unknown relative; Breast cancer in her sister; Cancer in her unknown relative; Emphysema in her father; Lupus in her father; Prostate cancer (age of onset: 70) in her father.    Allergies:   Review of patient's allergies indicates:   Allergen Reactions    Omeprazole Diarrhea       Social History: reports that she quit smoking about 18 years ago. Her smoking use included cigarettes. She has a 50.00 pack-year smoking history. She has never used smokeless tobacco. She reports that she drinks alcohol. She reports that she does not use drugs.    Home medications:   Current Outpatient Medications on File Prior to Visit   Medication Sig Dispense Refill    ACETAMINOPHEN (TYLENOL ORAL) Take by mouth every 6 (six) hours as needed.       acyclovir (ZOVIRAX) 400 MG tablet Take 3 times a day for 5 days at the first sign of a cold sore. 60 tablet 11    ascorbic acid (VITAMIN C) 100 MG tablet Take 500 mg by mouth once daily.       BIOTIN ORAL Take by mouth once daily.      CANNABIDIOL, CBD, EXTRACT ORAL       citalopram (CELEXA) 20 MG tablet TAKE 1 TABLET BY MOUTH ONCE DAILY 90 tablet 1    cyanocobalamin 1,000 mcg/mL injection INJECT 1 ML INTRAMUSCULARLY EVERY 30 DAYS. 10 mL 5    ergocalciferol (ERGOCALCIFEROL) 50,000 unit Cap Take 1 capsule (50,000 Units total) by mouth every 7 days. 13 capsule 3    fluticasone (FLONASE) 50 mcg/actuation nasal spray 1 spray by Nasal route once daily.       furosemide (LASIX) 20 MG tablet Take 1 tablet (20 mg total) by mouth once daily. 30 tablet 11    ginger, Zingiber officinalis, 500 mg Cap Take by mouth.      levothyroxine  "(SYNTHROID) 50 MCG tablet TAKE 1 TABLET BY MOUTH BEFORE BREAKFAST 90 tablet 3    loratadine (CLARITIN) 10 mg tablet Take 1 tablet (10 mg total) by mouth once daily. 30 tablet 3    multivit,tx with iron,minerals (THERA-M ORAL)       potassium chloride (KLOR-CON) 10 MEQ TbSR TAKE TWO TABLETS BY MOUTH ONCE DAILY WITH LASIX 60 tablet 5    turmeric 400 mg Cap       clindamycin (CLEOCIN) 300 MG capsule Take 1 capsule (300 mg total) by mouth every 8 (eight) hours. (Patient not taking: Reported on 1/2/2020) 15 capsule 0    docusate sodium (COLACE) 100 MG capsule Take 1 capsule (100 mg total) by mouth 2 (two) times daily. (Patient not taking: Reported on 1/16/2020) 180 capsule 3    ranitidine (ZANTAC) 150 MG tablet Take 150 mg by mouth once daily.       No current facility-administered medications on file prior to visit.        Vital signs:  Ht 5' 4" (1.626 m)   Wt 97.6 kg (215 lb 2.7 oz)   LMP  (LMP Unknown)   BMI 36.93 kg/m²     Physical Exam   Constitutional: She is oriented to person, place, and time. No distress.   HENT:   Head: Normocephalic and atraumatic.   Eyes: Conjunctivae are normal. No scleral icterus.   Neck: Neck supple. No thyromegaly present.    shunt device felt in right upper neck area   Cardiovascular: Normal rate, normal heart sounds and intact distal pulses.   Pulmonary/Chest: Effort normal and breath sounds normal. No stridor. No respiratory distress.   Abdominal: Soft. Bowel sounds are normal. She exhibits no distension and no mass. There is no tenderness. There is no rebound and no guarding.   Musculoskeletal: She exhibits no tenderness or deformity.   Neurological: She is alert and oriented to person, place, and time. Gait normal.   Skin: Skin is warm and dry. No rash noted. She is not diaphoretic.   Psychiatric: She has a normal mood and affect. Her behavior is normal.   Vitals reviewed.      Routine labs:  Lab Results   Component Value Date    WBC 7.53 12/04/2019    HGB 12.1 12/04/2019 "    HCT 38.2 12/04/2019    MCV 97 12/04/2019     12/04/2019     Lab Results   Component Value Date    INR 1.5 (A) 08/20/2012     Lab Results   Component Value Date    IRON 76 12/02/2010    TIBC 368 12/02/2010    FESATURATED 21 12/02/2010     Lab Results   Component Value Date     12/04/2019    K 4.2 12/04/2019     12/04/2019    CO2 29 12/04/2019    BUN 11 12/04/2019    CREATININE 0.77 12/04/2019     Lab Results   Component Value Date    ALBUMIN 4.0 12/04/2019    ALT 13 12/04/2019    AST 26 12/04/2019    ALKPHOS 63 12/04/2019    BILITOT 0.5 12/04/2019     No results found for: GLUCOSE    I have reviewed prior labs, imaging, notes from last month      Assessment:  1. Collagenous colitis    2. Acute diarrhea    3. Gastroesophageal reflux disease, esophagitis presence not specified        Hx of collagenous colitis in 2016, attributed to mobic and omeprazole use.  Now with recent use of lansoprazole and some NSAIDs as well.    Plan:  Orders Placed This Encounter    Clostridium difficile EIA    Stool culture    WBC, Stool    Giardia / Cryptosporidum, EIA    Stool Exam-Ova,Cysts,Parasites    Celiac Disease Panel    Basic metabolic panel    C-reactive protein    TSH    budesonide (UCERIS) 9 mg TaDE     Stop NSAIDs and avoid PPIs  Stool studies to rule out infection.  Blood work  Budesonide 9mg daily x 30 days  immodium and pepto bismol PRN  Use Pepcid for GERD    RTC , she will call us with update in 2 weeks   - if no improvement, will schedule colonoscopy   - if ultimately gets better, we can schedule colonoscopy for routine screening in next few months      Antonio Weller MD  [facility] [place]

## 2020-01-16 NOTE — PATIENT INSTRUCTIONS
Microscopic colitis  Definition:  Collagenous (ikc-VGID-[id number]-nus) colitis and lymphocytic colitis are inflammatory conditions of the colon that cause persistent watery diarrhea. Some researchers believe that collagenous colitis and lymphocytic colitis are different phases of the same condition rather than separate conditions.     Both collagenous colitis and lymphocytic colitis are sometimes referred to collectively as microscopic colitis -- because the diagnosis is confirmed by examining a sample of tissue using a microscope.     Treatment for collagenous colitis and lymphocytic colitis often begins with lifestyle changes. In many cases, adjusting your diet will be enough to resolve your symptoms. If not, your doctor can suggest a number of effective medications for collagenous colitis and lymphocytic colitis. In rare cases, surgery is necessary.     Signs and symptoms of collagenous colitis and lymphocytic colitis include:  Chronic, watery diarrhea  Abdominal pain or cramps  Weight loss  Nausea  Fecal incontinence  When to see a doctor   If you have watery diarrhea that lasts more than a few days, contact your doctor so that the condition can be diagnosed and properly treated.     Causes:  * If you have never been checked for celiac disease it is important to be checked  It's not clear what causes collagenous colitis and lymphocytic colitis. The inflammation affects the colon differently in the two types of colitis:     Collagenous colitis causes the layer of connective tissue (collagen) in the colon to thicken.  Lymphocytic colitis causes increased levels of a specialized type of white blood cell (lymphocyte) in the colon.  Theories about causes:   Doctors aren't sure what causes collagenous colitis and lymphocytic colitis, but researchers theorize that causes could include:  Bacteria that produce toxins that irritate the lining of the colon.  Viruses that trigger inflammation.  Immune system problems, such as  autoimmune disorders that occur when the body's immune system attacks healthy tissues. People with collagenous colitis or lymphocytic colitis sometimes also have an autoimmune disorder, such as celiac disease, rheumatoid arthritis or scleroderma.     Medications linked to these conditions include:  Clozapine (Clozaril)  Entacapone (Comtan)  Esomeprazole (Nexium)  Flutamide  Lansoprazole (Prevacid)  Nonsteroidal anti-inflammatory drugs (NSAIDs), such as aspirin and ibuprofen (Advil, Motrin, others)  Omeprazole (Prilosec)  Ranitidine (Zantac)  Sertraline (Zoloft)  Simvastatin (Zocor)  Ticlopidine (Ticlid)  It's not clear why some people who use these medications develop collagenous colitis or lymphocytic colitis, while others don't.     What you can do in the meantime:  While you're waiting for your appointment, you may find some relief from persistent diarrhea by making changes to your diet. Try to:  Eat low fat foods  Avoid dairy products  Not drink caffeine or alcohol  Choose bland foods and avoid spicy foods     Treatment:  If your signs and symptoms persist, your doctor may recommend medications, such as:     Anti-diarrhea drugs. Medications that control diarrhea include loperamide (Imodium A-D), bismuth subsalicylate (Pepto-Bismol) and a combination of the drugs diphenoxylate and atropine (Lomotil).  Drugs that block bile acids. The drug cholestyramine (Questran) may help treat lymphocytic colitis by absorbing bile acids that can contribute to diarrhea.  Steroid medications to block inflammation. A corticosteroid drug, such as budesonide (Entocort) or prednisone, can help control inflammation in your colon.  Anti-inflammatory drugs. Other drugs used to control colon inflammation include mesalamine (Asacol, Pentasa, others) and sulfasalazine (Azulfidine).  Drugs that suppress the immune system. Immunosuppressive drugs that can help reduce inflammation in the colon include methotrexate (Rheumatrex) and  azathioprine (Azasan, Imuran).  Surgery to remove a portion of your colon   When the symptoms of collagenous colitis and lymphocytic colitis are severe, and medications aren't effective, your doctor may recommend surgery to remove all or part of your colon in a procedure called a colectomy. Surgery is rare for these conditions.     Changes to your diet may help relieve diarrhea that you experience with collagenous colitis and lymphocytic colitis. Try to:     Drink plenty of fluids. Water is best, but fluids with added sodium and potassium (electrolytes) may be beneficial as well. Try drinking broth or watered-down fruit juice. Avoid beverages that are high in sugar or contain alcohol or caffeine, such as coffee, tea and heydi, which may aggravate your symptoms.  Choose soft, easy-to-digest foods. These include applesauce, bananas and rice. Avoid high-fiber foods such as beans, nuts and vegetables. If you feel like your symptoms are improving, slowly add high-fiber foods back to your diet.  Try eating several small meals, rather than a few large meals. Space meals throughout the day instead of eating two or three large ones.  Avoid irritating foods. Stay away from spicy, fatty or fried foods and any other foods that make your symptoms worse.

## 2020-01-17 LAB
CRYPTOSP AG STL QL IA: NEGATIVE
G LAMBLIA AG STL QL IA: NEGATIVE
O+P STL TRI STN: NORMAL
WBC #/AREA STL HPF: NORMAL /[HPF]

## 2020-01-19 LAB
E COLI SXT1 STL QL IA: NEGATIVE
E COLI SXT2 STL QL IA: NEGATIVE

## 2020-01-20 LAB — BACTERIA STL CULT: NORMAL

## 2020-01-29 ENCOUNTER — PATIENT MESSAGE (OUTPATIENT)
Dept: GASTROENTEROLOGY | Facility: CLINIC | Age: 70
End: 2020-01-29

## 2020-02-28 ENCOUNTER — TELEPHONE (OUTPATIENT)
Dept: FAMILY MEDICINE | Facility: CLINIC | Age: 70
End: 2020-02-28

## 2020-02-28 RX ORDER — OFLOXACIN 3 MG/ML
1 SOLUTION/ DROPS OPHTHALMIC 4 TIMES DAILY
Qty: 1 BOTTLE | Refills: 0 | Status: SHIPPED | OUTPATIENT
Start: 2020-02-28 | End: 2020-08-07

## 2020-02-28 NOTE — TELEPHONE ENCOUNTER
Spoke to pt and she would like a script for ofloxacin eye drops. Pt states she has runny eyes and she was using her husbands and it works. She would like it sent to [facility] in [place].

## 2020-03-18 ENCOUNTER — TELEPHONE (OUTPATIENT)
Dept: FAMILY MEDICINE | Facility: CLINIC | Age: 70
End: 2020-03-18

## 2020-03-18 RX ORDER — SULFAMETHOXAZOLE AND TRIMETHOPRIM 800; 160 MG/1; MG/1
1 TABLET ORAL 2 TIMES DAILY
Qty: 20 TABLET | Refills: 0 | Status: SHIPPED | OUTPATIENT
Start: 2020-03-18 | End: 2020-03-28

## 2020-03-18 NOTE — TELEPHONE ENCOUNTER
----- Message from Stephanie Jiménez sent at 3/18/2020  9:11 AM CDT -----  Contact: [phone] /self  Type:  Needs Medical Advice    Who Called:  self  Symptoms (please be specific):  UTI  Pressure in lower abdomen and burning while urinating    How long has patient had these symptoms:   yesterday  Pharmacy name and phone #:   [facility]  Would the patient rather a call back or a response via [facility]?  call  Best Call Back Number:  [phone] /self  Additional Information:

## 2020-03-18 NOTE — TELEPHONE ENCOUNTER
Spoke to pt and she thinks she has a uti. She is having burning, pain, frequent urination. Pt would like a script sent to [facility].

## 2020-03-24 DIAGNOSIS — J01.00 ACUTE NON-RECURRENT MAXILLARY SINUSITIS: ICD-10-CM

## 2020-03-24 NOTE — TELEPHONE ENCOUNTER
----- Message from Arielle Jaimes sent at 3/24/2020  1:58 PM CDT -----  Type:  RX Refill Request    Who Called:  Luz     RX Name and Strength:  loratadine (CLARITIN) 10 mg tablet         How is the patient currently taking it? (ex. 1XDay): 1 x a day     Is this a 30 day or 90 day RX: 90 day    Preferred Pharmacy with phone number:[facility] [address] [id number]  [place] [facility] [phone] (Phone)     Local or Mail Order:  local    Ordering Provider:  Dr Cox    Would the patient rather a call back or a response via [facility]?  Call     Best Call Back Number: [phone]     Additional Information:  Refill to 90 days

## 2020-03-25 RX ORDER — LORATADINE 10 MG/1
10 TABLET ORAL DAILY
Qty: 90 TABLET | Refills: 3 | Status: SHIPPED | OUTPATIENT
Start: 2020-03-25

## 2020-03-30 RX ORDER — CITALOPRAM 20 MG/1
TABLET, FILM COATED ORAL
Qty: 90 TABLET | Refills: 3 | Status: SHIPPED | OUTPATIENT
Start: 2020-03-30 | End: 2021-03-28

## 2020-05-19 ENCOUNTER — OFFICE VISIT (OUTPATIENT)
Dept: GASTROENTEROLOGY | Facility: CLINIC | Age: 70
End: 2020-05-19
Payer: MEDICARE

## 2020-05-19 VITALS
DIASTOLIC BLOOD PRESSURE: 82 MMHG | BODY MASS INDEX: 36.1 KG/M2 | SYSTOLIC BLOOD PRESSURE: 128 MMHG | HEART RATE: 65 BPM | HEIGHT: 64 IN | WEIGHT: 211.44 LBS

## 2020-05-19 DIAGNOSIS — K59.04 CHRONIC IDIOPATHIC CONSTIPATION: ICD-10-CM

## 2020-05-19 DIAGNOSIS — K52.831 COLLAGENOUS COLITIS: ICD-10-CM

## 2020-05-19 DIAGNOSIS — Z86.010 PERSONAL HISTORY OF COLONIC POLYPS: ICD-10-CM

## 2020-05-19 DIAGNOSIS — K21.9 GASTROESOPHAGEAL REFLUX DISEASE, ESOPHAGITIS PRESENCE NOT SPECIFIED: Primary | ICD-10-CM

## 2020-05-19 PROCEDURE — 99214 OFFICE O/P EST MOD 30 MIN: CPT | Mod: S$GLB,,, | Performed by: INTERNAL MEDICINE

## 2020-05-19 PROCEDURE — 99214 PR OFFICE/OUTPT VISIT, EST, LEVL IV, 30-39 MIN: ICD-10-PCS | Mod: S$GLB,,, | Performed by: INTERNAL MEDICINE

## 2020-05-19 PROCEDURE — 99999 PR PBB SHADOW E&M-EST. PATIENT-LVL III: ICD-10-PCS | Mod: PBBFAC,,, | Performed by: INTERNAL MEDICINE

## 2020-05-19 PROCEDURE — 99999 PR PBB SHADOW E&M-EST. PATIENT-LVL III: CPT | Mod: PBBFAC,,, | Performed by: INTERNAL MEDICINE

## 2020-05-19 RX ORDER — SODIUM, POTASSIUM,MAG SULFATES 17.5-3.13G
1 SOLUTION, RECONSTITUTED, ORAL ORAL DAILY
Qty: 354 ML | Refills: 0 | Status: SHIPPED | OUTPATIENT
Start: 2020-05-19 | End: 2020-05-21

## 2020-05-19 NOTE — PATIENT INSTRUCTIONS
Covid Testing at [facility] on 6/15/20 at 11:00am              SUPREP Instructions    You are scheduled for a colonoscopy with Dr. Weller on 6/17/20 at [facility] located at [address] [place] [address].  Check in at the admit desk, first floor of the hospital (which is the building on the left).     You will receive a call 2-3 days before your colonoscopy to tell you the time to arrive.  If you have not received a call by the day before your procedure, call the Endoscopy Lab at [phone].    To ensure that your test is accurate and complete, you MUST follow these instructions listed below.  If you have any questions, please call our office at [phone].  Plan on being at the hospital for your procedure for 3-4 hours.    1.  Follow a CLEAR LIQUID DIET for the entire day before your scheduled colonoscopy.  This means no solid food the entire day starting when you wake.  You may have as much of the clear liquids as you want throughout the day.   CLEAR LIQUID DIET:   - Avoid Red, Orange, Purple, and/or Blue food coloring   - NO DAIRY   - You can have:  Coffee with sugar (no creamer), tea, water, soda, apple or white grape juice, chicken or beef broth/bouillon (no meat, noodles, or veggies), green/yellow popsicles, green/yellow Jell-O, lemonade.    2.  AT 5 pm the evening before your colonoscopy, POUR ONE (1) BOTTLE OF SUPREP INTO THE MIXING CONTAINER, PROVIDED INSIDE THE BOX.  ADD WATER TO THE LINE ON THE CONTAINER AND MIX IT WELL.  DRINK THE ENTIRE CONTAINER AND THEN DRINK TWO (2) MORE CONTAINERS OF WATER OVER THE NEXT 1 HOUR.  This is sometimes easier to drink if this solution is cold, so you can mix the solution a few hours ahead of time and place in the refrigerator prior to drinking.  You have to drink the solution within 24 hours of mixing it.  Do NOT put this solution over ice.  It IS ok to drink with a straw.    3.  The endoscopy department will call you 2 days before your colonoscopy to tell you  the exact time to arrive, AND to tell you the exact time to drink the 2nd portion of your prep (which will be FIVE HOURS BEFORE YOUR ARRIVAL TIME).  At this time given to you, POUR ONE (1) BOTTLE OF SUPREP INTO THE MIXING CONTAINER, PROVIDED INSIDE THE BOX.  ADD WATER TO THE LINE ON THE CONTAINER AND MIX IT WELL.  DRINK THE ENTIRE CONTAINER AND THEN DRINK TWO (2) MORE CONTAINERS OF WATER OVER THE NEXT 1 HOUR.  This is sometimes easier to drink if this solution is cold, so you can mix the solution a few hours ahead of time and place in the refrigerator prior to drinking.  You have to drink the solution within 24 hours of mixing it.  Do NOT put this solution over ice.  It IS ok to drink with a straw. Once this is complete, you may not have ANYTHING else by mouth!    4.  You must have someone with you to DRIVE YOU HOME since you will be receiving IV sedation for the colonoscopy.    5.  It is ok to take your heart, blood pressure, and seizure medications in the morning of your test with a SIP of water.  Hold other medications until after your procedure.  Do NOT have anything else to eat or drink the morning of your colonoscopy.  It is ok to brush your teeth.    6.  If you are on blood thinners THAT YOU HAVE BEEN INSTRUCTED TO HOLD BY YOUR DOCTOR FOR THIS PROCEDURE, then do NOT take this the morning of your colonoscopy.  Do NOT stop these medications on your own, they must be approved to be held by your doctor.  Your colonoscopy can NOT be done if you are on these medications.  Examples of blood thinners include: Coumadin, Aggrenox, Plavix, Pradaxa, Reapro, Pletal, Xarelto, Ticagrelor, Brilinta, Eliquis, and high dose aspirin (325 mg).  You do not have to stop baby aspirin 81 mg.    7.  IF YOU ARE DIABETIC:  NO INSULIN OR ORAL MEDICATIONS THE MORNING OF THE COLONOSCOPY.  TAKE ONLY HALF THE DOSE OF YOUR INSULIN THE DAY BEFORE THE COLONOSCOPY.  DO NOT TAKE ANY ORAL DIABETIC MEDICATIONS THE DAY BEFORE THE COLONOSCOPY.  IF  YOU ARE AN INSULIN DEPENDENT DIABETIC WITH UNSTABLE BLOOD SUGARS, NOTIFY YOUR PRIMARY CARE PHYSICIAN FOR INSTRUCTIONS.

## 2020-05-19 NOTE — PROGRESS NOTES
GASTROENTEROLOGY CLINIC NOTE    Reason for visit: The primary encounter diagnosis was Gastroesophageal reflux disease, esophagitis presence not specified. Diagnoses of Collagenous colitis, Personal history of colonic polyps, and Chronic idiopathic constipation were also pertinent to this visit.  Referring provider/PCP: Hilario Slaughter MD    HPI:  Luz Burk is a 69 y.o. female here today for follow up diarrhea / micro colitis / screening colon.   Saw [facility] at [facility] 2016 for collagenous colitis. Hx of collagenous colitis in 2016, attributed to mobic and omeprazole use.  Hx of lap band    Interval:  Last visit we started budesonide for suspected flare of collagenous colitis induced by nsaid and ppi use. That has resolved. That episode she was having 10 + BM / day, all diarrhea. Since stopping meds and short course of budesonide, her stools are better.   Now more constipated, goes almost a week soemtiems without a BM. Taking colace at least twice a day and now somewhat more formed stool. Still fluctuates between diarrhea and cosntipation. Had one large black stool week or so ago but never happened again. No further blood in stool. Sometimes straining.     Took zantac for awhile. Now off and now taking pepcid.  Still with some reflux, no dsyphagia, rarely vomiting if eating too fast.     Tried:  miralax  Stool softeners - docusate  Metamucil  Dulcolax.    ======================  Initial HPI  Patient took a trip to [place] about 4 weeks ago and she experienced acute diarrhea.  She is going up to 5-10 times a day at least.  She is taking multiple Imodium a day, she believes she has taken 20 Imodium is a over the past week.  She has gone 5 times just this morning.  There is no blood in the stool.  Anything she eats she has diarrhea.  She states she recently stopped taking her Zantac because the recall but started taking lansoprazole but only took that for about 5 days.  This was exactly around the  time when her diarrhea started.  She has not taken it in about 3 weeks.  She knows that she has had a reaction a diarrhea to the omeprazole in the past.  She also has been taking a lot of Aleve and ibuprofen because of a fall she had during her trip in some swelling in her legs.  She has taking this as recently as 5 days ago.  Has associated nausea, no vomiting. Gurgling in stomach. Lower cramps and back cramps. Recent Abx within a month for UTI.  Prior to this, she has actually having constipation.    Back in 2016 she was diagnosed on colonoscopy with collagenous colitis while she was taking Mobic and omeprazole.  She was seen at [facility] and ultimately the diarrhea resolved with stopping the medications alone as well as with the help of Imodium.  She did not require and budesonide.      Prior Endoscopy:  EGD: none  Colon:  5/201 colon  Impression:           - The entire examined colon is normal. Biopsied.                        - The examined portion of the ileum was normal.  Recommendation:       - Discharge patient to home (ambulatory).                        - High fiber diet for the rest of the patient's                         life.                        - Continue present medications.                        - Await pathology results.                        - Repeat colonoscopy in 3 - 5 years for                         surveillance based on pathology results.  PATH collagenous colitis    9/2015 opal  Impression:           - Stool in the right colon from fair bowel                         preparation.                        - Internal and external hemorrhoids.  Recommendation:       - Discharge patient to home.                        - Repeat colonoscopy in 3 years because the bowel                         preparation was suboptimal.    (Portions of this note were dictated using voice recognition software and may contain dictation related errors in spelling/grammar/syntax not found on text  review)    Review of Systems   Constitutional: Negative for fever and weight loss.   HENT: Negative for nosebleeds and sore throat.    Eyes: Negative for double vision and photophobia.   Respiratory: Negative for cough and shortness of breath.    Cardiovascular: Negative for chest pain and leg swelling.   Gastrointestinal: Positive for abdominal pain, constipation, diarrhea, heartburn and nausea. Negative for blood in stool.   Genitourinary: Negative for dysuria and hematuria.   Musculoskeletal: Positive for back pain. Negative for joint pain and neck pain.   Skin: Negative for itching and rash.   Neurological: Negative for dizziness and headaches.   Psychiatric/Behavioral: Negative for hallucinations. The patient does not have insomnia.        Past Medical History: has a past medical history of Cataract, Collagenous colitis, Hypothyroidism, OA (osteoarthritis), Obesity, and Sleep apnea.    Past Surgical History: has a past surgical history that includes Total knee arthroplasty (7/23/12); lap band surgery (10/11/2011); Hernia repair; Ventriculoperitoneal shunt; Hysterectomy; Cholecystectomy; Laparoscopic gastric banding; Colonoscopy (N/A, 5/30/2016); Appendectomy; Hiatal hernia repair; Oophorectomy; and Cataract extraction.    Family History:family history includes Alzheimer's disease (age of onset: 84) in her mother; Arthritis in her unknown relative; Breast cancer in her sister; Cancer in her unknown relative; Emphysema in her father; Lupus in her father; Prostate cancer (age of onset: 70) in her father.    Allergies:   Review of patient's allergies indicates:   Allergen Reactions    Omeprazole Diarrhea       Social History: reports that she quit smoking about 18 years ago. Her smoking use included cigarettes. She has a 50.00 pack-year smoking history. She has never used smokeless tobacco. She reports that she drinks alcohol. She reports that she does not use drugs.    Home medications:   Current Outpatient  Medications on File Prior to Visit   Medication Sig Dispense Refill    ACETAMINOPHEN (TYLENOL ORAL) Take by mouth every 6 (six) hours as needed.       acyclovir (ZOVIRAX) 400 MG tablet Take 3 times a day for 5 days at the first sign of a cold sore. 60 tablet 11    ascorbic acid (VITAMIN C) 100 MG tablet Take 500 mg by mouth once daily.       BIOTIN ORAL Take by mouth once daily.      budesonide (UCERIS) 9 mg TaDE Take 1 tablet (9 mg total) by mouth once daily. 30 each 0    CANNABIDIOL, CBD, EXTRACT ORAL       citalopram (CELEXA) 20 MG tablet Take 1 tablet by mouth once daily 90 tablet 3    clindamycin (CLEOCIN) 300 MG capsule Take 1 capsule (300 mg total) by mouth every 8 (eight) hours. 15 capsule 0    cyanocobalamin 1,000 mcg/mL injection INJECT 1 ML INTRAMUSCULARLY EVERY 30 DAYS. 10 mL 5    docusate sodium (COLACE) 100 MG capsule Take 1 capsule (100 mg total) by mouth 2 (two) times daily. 180 capsule 3    ergocalciferol (ERGOCALCIFEROL) 50,000 unit Cap Take 1 capsule (50,000 Units total) by mouth every 7 days. 13 capsule 3    fluticasone (FLONASE) 50 mcg/actuation nasal spray 1 spray by Nasal route once daily.       furosemide (LASIX) 20 MG tablet Take 1 tablet (20 mg total) by mouth once daily. 30 tablet 11    ginger, Zingiber officinalis, 500 mg Cap Take by mouth.      Lactobacillus rhamnosus GG (CULTURELLE) 10 billion cell capsule Take 1 capsule by mouth once daily.      levothyroxine (SYNTHROID) 50 MCG tablet TAKE 1 TABLET BY MOUTH BEFORE BREAKFAST 90 tablet 3    loratadine (CLARITIN) 10 mg tablet Take 1 tablet (10 mg total) by mouth once daily. 90 tablet 3    multivit,tx with iron,minerals (THERA-M ORAL)       ofloxacin (OCUFLOX) 0.3 % ophthalmic solution Place 1 drop into both eyes 4 (four) times daily. 1 Bottle 0    potassium chloride (KLOR-CON) 10 MEQ TbSR TAKE TWO TABLETS BY MOUTH ONCE DAILY WITH LASIX 60 tablet 5    turmeric 400 mg Cap       [DISCONTINUED] ranitidine (ZANTAC) 150 MG  "tablet Take 150 mg by mouth once daily.       No current facility-administered medications on file prior to visit.        Vital signs:  /82   Pulse 65   Ht 5' 4" (1.626 m)   Wt 95.9 kg (211 lb 6.7 oz)   LMP  (LMP Unknown)   BMI 36.29 kg/m²     Physical Exam   Constitutional: She is oriented to person, place, and time. No distress.   Eyes: Conjunctivae are normal. No scleral icterus.   Neck: Neck supple. No thyromegaly present.    shunt device felt in right upper neck area   Cardiovascular: Normal rate, normal heart sounds and intact distal pulses.   Abdominal: Soft. Bowel sounds are normal. She exhibits no mass. There is no tenderness. There is no rebound.   Neurological: She is alert and oriented to person, place, and time. Gait normal.   Skin: Skin is warm and dry. No rash noted. She is not diaphoretic.   Psychiatric: She has a normal mood and affect. Her behavior is normal.   Vitals reviewed.      Routine labs:  Lab Results   Component Value Date    WBC 7.53 12/04/2019    HGB 12.1 12/04/2019    HCT 38.2 12/04/2019    MCV 97 12/04/2019     12/04/2019     Lab Results   Component Value Date    INR 1.5 (A) 08/20/2012     Lab Results   Component Value Date    IRON 76 12/02/2010    TIBC 368 12/02/2010    FESATURATED 21 12/02/2010     Lab Results   Component Value Date     01/16/2020    K 3.9 01/16/2020     01/16/2020    CO2 31 (H) 01/16/2020    BUN 12 01/16/2020    CREATININE 0.67 01/16/2020     Lab Results   Component Value Date    ALBUMIN 4.0 12/04/2019    ALT 13 12/04/2019    AST 26 12/04/2019    ALKPHOS 63 12/04/2019    BILITOT 0.5 12/04/2019     No results found for: GLUCOSE    I have reviewed prior labs, imaging, notes from last month      Assessment:  1. Gastroesophageal reflux disease, esophagitis presence not specified    2. Collagenous colitis    3. Personal history of colonic polyps    4. Chronic idiopathic constipation      Hx of collagenous colitis, now resolved. Fluctuating " bowel habits, predominant constipation.    Plan:  Orders Placed This Encounter    COVID-19 Routine Screening    linaCLOtide (LINZESS) 145 mcg Cap capsule    sodium,potassium,mag sulfates (SUPREP BOWEL PREP KIT) 17.5-3.13-1.6 gram SolR    Case request GI: EGD (ESOPHAGOGASTRODUODENOSCOPY), COLONOSCOPY     AVOID NSAIDs and PPIs    EGD for gerd, melena  Colon for hx of polyps.    Trial of linzess 145  Continue pepcid     RTC as needed      Antonio Weller MD  [facility] [place]

## 2020-05-19 NOTE — H&P (VIEW-ONLY)
GASTROENTEROLOGY CLINIC NOTE    Reason for visit: The primary encounter diagnosis was Gastroesophageal reflux disease, esophagitis presence not specified. Diagnoses of Collagenous colitis, Personal history of colonic polyps, and Chronic idiopathic constipation were also pertinent to this visit.  Referring provider/PCP: Hilario Slaughter MD    HPI:  Luz Burk is a 69 y.o. female here today for follow up diarrhea / micro colitis / screening colon.   [facility] for collagenous colitis. Hx of collagenous colitis in 2016, attributed to mobic and omeprazole use.  Hx of lap band    Interval:  Last visit we started budesonide for suspected flare of collagenous colitis induced by nsaid and ppi use. That has resolved. That episode she was having 10 + BM / day, all diarrhea. Since stopping meds and short course of budesonide, her stools are better.   Now more constipated, goes almost a week soemtiems without a BM. Taking colace at least twice a day and now somewhat more formed stool. Still fluctuates between diarrhea and cosntipation. Had one large black stool week or so ago but never happened again. No further blood in stool. Sometimes straining.     Took zantac for awhile. Now off and now taking pepcid.  Still with some reflux, no dsyphagia, rarely vomiting if eating too fast.     Tried:  miralax  Stool softeners - docusate  Metamucil  Dulcolax.    ======================  Initial HPI  Patient took a trip to [place] about 4 weeks ago and she experienced acute diarrhea.  She is going up to 5-10 times a day at least.  She is taking multiple Imodium a day, she believes she has taken 20 Imodium is a over the past week.  She has gone 5 times just this morning.  There is no blood in the stool.  Anything she eats she has diarrhea.  She states she recently stopped taking her Zantac because the recall but started taking lansoprazole but only took that for about 5 days.  This was exactly around the  time when her diarrhea started.  She has not taken it in about 3 weeks.  She knows that she has had a reaction a diarrhea to the omeprazole in the past.  She also has been taking a lot of Aleve and ibuprofen because of a fall she had during her trip in some swelling in her legs.  She has taking this as recently as 5 days ago.  Has associated nausea, no vomiting. Gurgling in stomach. Lower cramps and back cramps. Recent Abx within a month for UTI.  Prior to this, she has actually having constipation.    Back in 2016 she was diagnosed on colonoscopy with collagenous colitis while she was taking Mobic and omeprazole.  She was seen at [facility] and ultimately the diarrhea resolved with stopping the medications alone as well as with the help of Imodium.  She did not require and budesonide.      Prior Endoscopy:  EGD: none  Colon:  5/201 colon  Impression:           - The entire examined colon is normal. Biopsied.                        - The examined portion of the ileum was normal.  Recommendation:       - Discharge patient to home (ambulatory).                        - High fiber diet for the rest of the patient's                         life.                        - Continue present medications.                        - Await pathology results.                        - Repeat colonoscopy in 3 - 5 years for                         surveillance based on pathology results.  PATH collagenous colitis    9/2015 opal  Impression:           - Stool in the right colon from fair bowel                         preparation.                        - Internal and external hemorrhoids.  Recommendation:       - Discharge patient to home.                        - Repeat colonoscopy in 3 years because the bowel                         preparation was suboptimal.    (Portions of this note were dictated using voice recognition software and may contain dictation related errors in spelling/grammar/syntax not found on text  review)    Review of Systems   Constitutional: Negative for fever and weight loss.   HENT: Negative for nosebleeds and sore throat.    Eyes: Negative for double vision and photophobia.   Respiratory: Negative for cough and shortness of breath.    Cardiovascular: Negative for chest pain and leg swelling.   Gastrointestinal: Positive for abdominal pain, constipation, diarrhea, heartburn and nausea. Negative for blood in stool.   Genitourinary: Negative for dysuria and hematuria.   Musculoskeletal: Positive for back pain. Negative for joint pain and neck pain.   Skin: Negative for itching and rash.   Neurological: Negative for dizziness and headaches.   Psychiatric/Behavioral: Negative for hallucinations. The patient does not have insomnia.        Past Medical History: has a past medical history of Cataract, Collagenous colitis, Hypothyroidism, OA (osteoarthritis), Obesity, and Sleep apnea.    Past Surgical History: has a past surgical history that includes Total knee arthroplasty (7/23/12); lap band surgery (10/11/2011); Hernia repair; Ventriculoperitoneal shunt; Hysterectomy; Cholecystectomy; Laparoscopic gastric banding; Colonoscopy (N/A, 5/30/2016); Appendectomy; Hiatal hernia repair; Oophorectomy; and Cataract extraction.    Family History:family history includes Alzheimer's disease (age of onset: 84) in her mother; Arthritis in her unknown relative; Breast cancer in her sister; Cancer in her unknown relative; Emphysema in her father; Lupus in her father; Prostate cancer (age of onset: 70) in her father.    Allergies:   Review of patient's allergies indicates:   Allergen Reactions    Omeprazole Diarrhea       Social History: reports that she quit smoking about 18 years ago. Her smoking use included cigarettes. She has a 50.00 pack-year smoking history. She has never used smokeless tobacco. She reports that she drinks alcohol. She reports that she does not use drugs.    Home medications:   Current Outpatient  Medications on File Prior to Visit   Medication Sig Dispense Refill    ACETAMINOPHEN (TYLENOL ORAL) Take by mouth every 6 (six) hours as needed.       acyclovir (ZOVIRAX) 400 MG tablet Take 3 times a day for 5 days at the first sign of a cold sore. 60 tablet 11    ascorbic acid (VITAMIN C) 100 MG tablet Take 500 mg by mouth once daily.       BIOTIN ORAL Take by mouth once daily.      budesonide (UCERIS) 9 mg TaDE Take 1 tablet (9 mg total) by mouth once daily. 30 each 0    CANNABIDIOL, CBD, EXTRACT ORAL       citalopram (CELEXA) 20 MG tablet Take 1 tablet by mouth once daily 90 tablet 3    clindamycin (CLEOCIN) 300 MG capsule Take 1 capsule (300 mg total) by mouth every 8 (eight) hours. 15 capsule 0    cyanocobalamin 1,000 mcg/mL injection INJECT 1 ML INTRAMUSCULARLY EVERY 30 DAYS. 10 mL 5    docusate sodium (COLACE) 100 MG capsule Take 1 capsule (100 mg total) by mouth 2 (two) times daily. 180 capsule 3    ergocalciferol (ERGOCALCIFEROL) 50,000 unit Cap Take 1 capsule (50,000 Units total) by mouth every 7 days. 13 capsule 3    fluticasone (FLONASE) 50 mcg/actuation nasal spray 1 spray by Nasal route once daily.       furosemide (LASIX) 20 MG tablet Take 1 tablet (20 mg total) by mouth once daily. 30 tablet 11    ginger, Zingiber officinalis, 500 mg Cap Take by mouth.      Lactobacillus rhamnosus GG (CULTURELLE) 10 billion cell capsule Take 1 capsule by mouth once daily.      levothyroxine (SYNTHROID) 50 MCG tablet TAKE 1 TABLET BY MOUTH BEFORE BREAKFAST 90 tablet 3    loratadine (CLARITIN) 10 mg tablet Take 1 tablet (10 mg total) by mouth once daily. 90 tablet 3    multivit,tx with iron,minerals (THERA-M ORAL)       ofloxacin (OCUFLOX) 0.3 % ophthalmic solution Place 1 drop into both eyes 4 (four) times daily. 1 Bottle 0    potassium chloride (KLOR-CON) 10 MEQ TbSR TAKE TWO TABLETS BY MOUTH ONCE DAILY WITH LASIX 60 tablet 5    turmeric 400 mg Cap       [DISCONTINUED] ranitidine (ZANTAC) 150 MG  "tablet Take 150 mg by mouth once daily.       No current facility-administered medications on file prior to visit.        Vital signs:  /82   Pulse 65   Ht 5' 4" (1.626 m)   Wt 95.9 kg (211 lb 6.7 oz)   LMP  (LMP Unknown)   BMI 36.29 kg/m²     Physical Exam   Constitutional: She is oriented to person, place, and time. No distress.   Eyes: Conjunctivae are normal. No scleral icterus.   Neck: Neck supple. No thyromegaly present.    shunt device felt in right upper neck area   Cardiovascular: Normal rate, normal heart sounds and intact distal pulses.   Abdominal: Soft. Bowel sounds are normal. She exhibits no mass. There is no tenderness. There is no rebound.   Neurological: She is alert and oriented to person, place, and time. Gait normal.   Skin: Skin is warm and dry. No rash noted. She is not diaphoretic.   Psychiatric: She has a normal mood and affect. Her behavior is normal.   Vitals reviewed.      Routine labs:  Lab Results   Component Value Date    WBC 7.53 12/04/2019    HGB 12.1 12/04/2019    HCT 38.2 12/04/2019    MCV 97 12/04/2019     12/04/2019     Lab Results   Component Value Date    INR 1.5 (A) 08/20/2012     Lab Results   Component Value Date    IRON 76 12/02/2010    TIBC 368 12/02/2010    FESATURATED 21 12/02/2010     Lab Results   Component Value Date     01/16/2020    K 3.9 01/16/2020     01/16/2020    CO2 31 (H) 01/16/2020    BUN 12 01/16/2020    CREATININE 0.67 01/16/2020     Lab Results   Component Value Date    ALBUMIN 4.0 12/04/2019    ALT 13 12/04/2019    AST 26 12/04/2019    ALKPHOS 63 12/04/2019    BILITOT 0.5 12/04/2019     No results found for: GLUCOSE    I have reviewed prior labs, imaging, notes from last month      Assessment:  1. Gastroesophageal reflux disease, esophagitis presence not specified    2. Collagenous colitis    3. Personal history of colonic polyps    4. Chronic idiopathic constipation      Hx of collagenous colitis, now resolved. Fluctuating " bowel habits, predominant constipation.    Plan:  Orders Placed This Encounter    COVID-19 Routine Screening    linaCLOtide (LINZESS) 145 mcg Cap capsule    sodium,potassium,mag sulfates (SUPREP BOWEL PREP KIT) 17.5-3.13-1.6 gram SolR    Case request GI: EGD (ESOPHAGOGASTRODUODENOSCOPY), COLONOSCOPY     AVOID NSAIDs and PPIs    EGD for gerd, melena  Colon for hx of polyps.    Trial of linzess 145  Continue pepcid     RTC as needed      Antonio Weller MD  [facility] [place]

## 2020-05-26 ENCOUNTER — TELEPHONE (OUTPATIENT)
Dept: ORTHOPEDICS | Facility: CLINIC | Age: 70
End: 2020-05-26

## 2020-05-26 NOTE — TELEPHONE ENCOUNTER
Spoke with pt informing her Dr Ayon next available is 6/11/2020.  Pt understood and will keep appt.

## 2020-05-26 NOTE — TELEPHONE ENCOUNTER
----- Message from Antonino Loza sent at 5/26/2020  2:35 PM CDT -----  Contact: RICHELLE SO [[id number]]  Name of Who is Calling:RICHELLE SO [[id number]]      What is the request in detail: Patient would like a call back regarding a sooner appointment first available..... Please contact to further discuss and advise       Can the clinic reply by [facility]: yes       What Number to Call Back if not in [facility]:[phone]

## 2020-06-08 DIAGNOSIS — R52 PAIN: Primary | ICD-10-CM

## 2020-06-09 ENCOUNTER — HOSPITAL ENCOUNTER (OUTPATIENT)
Dept: RADIOLOGY | Facility: HOSPITAL | Age: 70
Discharge: HOME | End: 2020-06-09
Attending: ORTHOPAEDIC SURGERY
Payer: MEDICARE

## 2020-06-09 DIAGNOSIS — R52 PAIN: ICD-10-CM

## 2020-06-09 PROCEDURE — 73110 X-RAY EXAM OF WRIST: CPT | Mod: TC,FY,PO,RT

## 2020-06-11 ENCOUNTER — OFFICE VISIT (OUTPATIENT)
Dept: ORTHOPEDICS | Facility: CLINIC | Age: 70
End: 2020-06-11
Payer: MEDICARE

## 2020-06-11 VITALS
BODY MASS INDEX: 36.02 KG/M2 | HEIGHT: 64 IN | WEIGHT: 211 LBS | DIASTOLIC BLOOD PRESSURE: 69 MMHG | SYSTOLIC BLOOD PRESSURE: 119 MMHG | HEART RATE: 65 BPM

## 2020-06-11 DIAGNOSIS — G56.01 RIGHT CARPAL TUNNEL SYNDROME: Primary | ICD-10-CM

## 2020-06-11 DIAGNOSIS — M67.431 GANGLION CYST OF VOLAR ASPECT OF RIGHT WRIST: ICD-10-CM

## 2020-06-11 PROCEDURE — 99999 PR PBB SHADOW E&M-EST. PATIENT-LVL IV: ICD-10-PCS | Mod: PBBFAC,,, | Performed by: ORTHOPAEDIC SURGERY

## 2020-06-11 PROCEDURE — 1125F PR PAIN SEVERITY QUANTIFIED, PAIN PRESENT: ICD-10-PCS | Mod: S$GLB,,, | Performed by: ORTHOPAEDIC SURGERY

## 2020-06-11 PROCEDURE — 1159F PR MEDICATION LIST DOCUMENTED IN MEDICAL RECORD: ICD-10-PCS | Mod: S$GLB,,, | Performed by: ORTHOPAEDIC SURGERY

## 2020-06-11 PROCEDURE — 99204 PR OFFICE/OUTPT VISIT, NEW, LEVL IV, 45-59 MIN: ICD-10-PCS | Mod: S$GLB,,, | Performed by: ORTHOPAEDIC SURGERY

## 2020-06-11 PROCEDURE — 99204 OFFICE O/P NEW MOD 45 MIN: CPT | Mod: S$GLB,,, | Performed by: ORTHOPAEDIC SURGERY

## 2020-06-11 PROCEDURE — 99999 PR PBB SHADOW E&M-EST. PATIENT-LVL IV: CPT | Mod: PBBFAC,,, | Performed by: ORTHOPAEDIC SURGERY

## 2020-06-11 PROCEDURE — 1159F MED LIST DOCD IN RCRD: CPT | Mod: S$GLB,,, | Performed by: ORTHOPAEDIC SURGERY

## 2020-06-11 PROCEDURE — 1101F PT FALLS ASSESS-DOCD LE1/YR: CPT | Mod: CPTII,S$GLB,, | Performed by: ORTHOPAEDIC SURGERY

## 2020-06-11 PROCEDURE — 1125F AMNT PAIN NOTED PAIN PRSNT: CPT | Mod: S$GLB,,, | Performed by: ORTHOPAEDIC SURGERY

## 2020-06-11 PROCEDURE — 1101F PR PT FALLS ASSESS DOC 0-1 FALLS W/OUT INJ PAST YR: ICD-10-PCS | Mod: CPTII,S$GLB,, | Performed by: ORTHOPAEDIC SURGERY

## 2020-06-11 NOTE — PROGRESS NOTES
DATE: 6/11/2020  PATIENT: Luz Burk    CHIEF COMPLAINT: right hand numbness and pain    HISTORY:  Luz Burk is a 69 y.o. female retired  here for initial evaluation of right hand numbness and pain. Notes a small ganglion over volar radial wrist, which has been present for a month, tender. The pain has been present for 1 month. There is no history of trauma. The patient describes the pain as sharp.  The pain is worse with activity and improved by rest. There is associated numbness and tingling.  Prior treatments have included none.      PAST MEDICAL/SURGICAL HISTORY:  Past Medical History:   Diagnosis Date    Cataract     Collagenous colitis     Dx 5/30/16    Hypothyroidism     OA (osteoarthritis)     Obesity     Sleep apnea      Past Surgical History:   Procedure Laterality Date    APPENDECTOMY      CATARACT EXTRACTION      CHOLECYSTECTOMY      COLONOSCOPY N/A 5/30/2016    Procedure: COLONOSCOPY;  Surgeon: BINH Souza MD;  Location: [facility] ([address] [place] [address]);  Service: Endoscopy;  Laterality: N/A;    HERNIA REPAIR      HIATAL HERNIA REPAIR      HYSTERECTOMY      lap band surgery  10/11/2011    Realize C Band (11mL)    LAPAROSCOPIC GASTRIC BANDING      OOPHORECTOMY      TOTAL KNEE ARTHROPLASTY  7/23/12    VENTRICULOPERITONEAL SHUNT         Current Medications:   Current Outpatient Medications:     ACETAMINOPHEN (TYLENOL ORAL), Take by mouth every 6 (six) hours as needed. , Disp: , Rfl:     acyclovir (ZOVIRAX) 400 MG tablet, Take 3 times a day for 5 days at the first sign of a cold sore., Disp: 60 tablet, Rfl: 11    ascorbic acid (VITAMIN C) 100 MG tablet, Take 500 mg by mouth once daily. , Disp: , Rfl:     BIOTIN ORAL, Take by mouth once daily., Disp: , Rfl:     budesonide (UCERIS) 9 mg TaDE, Take 1 tablet (9 mg total) by mouth once daily., Disp: 30 each, Rfl: 0    CANNABIDIOL, CBD, EXTRACT ORAL, , Disp: , Rfl:     citalopram (CELEXA) 20 MG tablet,  Take 1 tablet by mouth once daily, Disp: 90 tablet, Rfl: 3    clindamycin (CLEOCIN) 300 MG capsule, Take 1 capsule (300 mg total) by mouth every 8 (eight) hours., Disp: 15 capsule, Rfl: 0    cyanocobalamin 1,000 mcg/mL injection, INJECT 1 ML INTRAMUSCULARLY EVERY 30 DAYS., Disp: 10 mL, Rfl: 5    docusate sodium (COLACE) 100 MG capsule, Take 1 capsule (100 mg total) by mouth 2 (two) times daily., Disp: 180 capsule, Rfl: 3    ergocalciferol (ERGOCALCIFEROL) 50,000 unit Cap, Take 1 capsule (50,000 Units total) by mouth every 7 days., Disp: 13 capsule, Rfl: 3    EUTHYROX 50 mcg tablet, TAKE 1 TABLET BY MOUTH BEFORE BREAKFAST, Disp: 90 tablet, Rfl: 0    fluticasone (FLONASE) 50 mcg/actuation nasal spray, 1 spray by Nasal route once daily. , Disp: , Rfl:     furosemide (LASIX) 20 MG tablet, Take 1 tablet (20 mg total) by mouth once daily., Disp: 30 tablet, Rfl: 11    dewey, Zingiber officinalis, 500 mg Cap, Take by mouth., Disp: , Rfl:     Lactobacillus rhamnosus GG (CULTURELLE) 10 billion cell capsule, Take 1 capsule by mouth once daily., Disp: , Rfl:     linaCLOtide (LINZESS) 145 mcg Cap capsule, Take 1 capsule (145 mcg total) by mouth once daily. (Patient not taking: Reported on 6/11/2020), Disp: 30 capsule, Rfl: 3    loratadine (CLARITIN) 10 mg tablet, Take 1 tablet (10 mg total) by mouth once daily., Disp: 90 tablet, Rfl: 3    multivit,tx with iron,minerals (THERA-M ORAL), , Disp: , Rfl:     ofloxacin (OCUFLOX) 0.3 % ophthalmic solution, Place 1 drop into both eyes 4 (four) times daily., Disp: 1 Bottle, Rfl: 0    potassium chloride (KLOR-CON) 10 MEQ TbSR, TAKE TWO TABLETS BY MOUTH ONCE DAILY WITH LASIX, Disp: 60 tablet, Rfl: 5    turmeric 400 mg Cap, , Disp: , Rfl:     Social History:   Social History     Socioeconomic History    Marital status:      Spouse name: Not on file    Number of children: Not on file    Years of education: Not on file    Highest education level: Not on file    Occupational History    Not on file   Social Needs    Financial resource strain: Not very hard    Food insecurity:     Worry: Never true     Inability: Never true    Transportation needs:     Medical: No     Non-medical: No   Tobacco Use    Smoking status: Former Smoker     Packs/day: 2.00     Years: 25.00     Pack years: 50.00     Types: Cigarettes     Last attempt to quit: 2002     Years since quittin.4    Smokeless tobacco: Never Used   Substance and Sexual Activity    Alcohol use: Yes     Alcohol/week: 0.0 standard drinks     Frequency: 2-3 times a week     Drinks per session: 1 or 2     Binge frequency: Never     Comment: occasionally    Drug use: No    Sexual activity: Yes     Partners: Male   Lifestyle    Physical activity:     Days per week: 5 days     Minutes per session: 60 min    Stress: Not at all   Relationships    Social connections:     Talks on phone: More than three times a week     Gets together: More than three times a week     Attends Yazidism service: Not on file     Active member of club or organization: No     Attends meetings of clubs or organizations: Not on file     Relationship status:    Other Topics Concern    Not on file   Social History Narrative    Not on file       REVIEW OF SYSTEMS:  Constitution: Negative. Negative for chills, fever and night sweats.   Cardiovascular: Negative for chest pain and syncope.   Respiratory: Negative for cough and shortness of breath.   Gastrointestinal: See HPI. Negative for nausea/vomiting. Negative for abdominal pain.  Genitourinary: See HPI. Negative for discoloration or dysuria.  Skin: Negative for dry skin, itching and rash.   Hematologic/Lymphatic: Negative for bleeding problem. Does not bruise/bleed easily.   Musculoskeletal: Negative for falls and muscle weakness.   Neurological: See HPI. No seizures.   Endocrine: Negative for polydipsia, polyphagia and polyuria.   Allergic/Immunologic: Negative for hives and  "persistent infections.    PHYSICAL EXAMINATION:    /69   Pulse 65   Ht 5' 4" (1.626 m)   Wt 95.7 kg (211 lb)   LMP  (LMP Unknown)   BMI 36.22 kg/m²     General: The patient is a 69 y.o. female in no apparent distress, the patient is orientatied to person, place and time.   Psych: Normal mood and affect  HEENT:  NCAT  Lungs:  Respirations are equal and unlabored.  CV:  2+ bilateral upper and lower extremity pulses.  Skin:  Intact throughout.    MSK:  BUE:  Small, 5 mm x 5mm volar ganglion over radial aspect of wrist, tender  Positive Tinel on right  Positive compression test on right wrist  AIN, PIN, M, R, U intact  Mild thenar wasting on right      IMAGING:     Radiographs of the right wrist were ordered and personally reviewed with the patient today.  They show no acute osseous abnormality.    ASSESSMENT/PLAN:    Luz was seen today for pain and numbness.    Diagnoses and all orders for this visit:    Right carpal tunnel syndrome  -     EMG W/ ULTRASOUND AND NERVE CONDUCTION TEST 2 Extremities; Future    Ganglion cyst of volar aspect of right wrist      - EMG to assess severity of right CTS  - Follow-up after EMG  - Dispense wrist brace today  - Discussed that if she needed right CTS release, could also do ganglions excision as well  "

## 2020-06-15 ENCOUNTER — LAB VISIT (OUTPATIENT)
Dept: FAMILY MEDICINE | Facility: CLINIC | Age: 70
End: 2020-06-15
Payer: MEDICARE

## 2020-06-15 ENCOUNTER — TELEPHONE (OUTPATIENT)
Dept: ENDOSCOPY | Facility: HOSPITAL | Age: 70
End: 2020-06-15

## 2020-06-15 ENCOUNTER — TELEPHONE (OUTPATIENT)
Dept: ORTHOPEDICS | Facility: CLINIC | Age: 70
End: 2020-06-15

## 2020-06-15 DIAGNOSIS — Z86.010 PERSONAL HISTORY OF COLONIC POLYPS: ICD-10-CM

## 2020-06-15 PROCEDURE — U0003 INFECTIOUS AGENT DETECTION BY NUCLEIC ACID (DNA OR RNA); SEVERE ACUTE RESPIRATORY SYNDROME CORONAVIRUS 2 (SARS-COV-2) (CORONAVIRUS DISEASE [COVID-19]), AMPLIFIED PROBE TECHNIQUE, MAKING USE OF HIGH THROUGHPUT TECHNOLOGIES AS DESCRIBED BY CMS-2020-01-R: HCPCS

## 2020-06-15 NOTE — TELEPHONE ENCOUNTER
Spoke c pt. R/s appt as requested. Confirmed appt location & time. Pt expressed understanding & was thankful.

## 2020-06-15 NOTE — TELEPHONE ENCOUNTER
Left message instructing patient to call dept @ [phone] between 8am-4pm.    Arrival time to be given @4861  EGD/Colon (Suprep)

## 2020-06-15 NOTE — TELEPHONE ENCOUNTER
----- Message from Elvi Carpenter sent at 6/15/2020 12:04 PM CDT -----  Regarding: self  Type:  Sooner Appointment Request    Patient is requesting a sooner appointment.  Patient declined first available appointment listed as well as another facility and provider .  Patient will not accept being placed on the waitlist and is requesting a message be sent to doctor.    Name of Caller: self    When is the first available appointment? 07-14-20    Symptoms: r/l wrist pain    Would the patient rather a call back or a response via [facility]? call    Best Call Back Number: [phone]    Additional Information:  Pt would like to come on 07/07/20 @ 3;00pm since her  has appointments at [facility] as well.

## 2020-06-16 ENCOUNTER — TELEPHONE (OUTPATIENT)
Dept: ENDOSCOPY | Facility: HOSPITAL | Age: 70
End: 2020-06-16

## 2020-06-16 NOTE — TELEPHONE ENCOUNTER
Spoke with patient about arrival time @. 1115    Prep instructions reviewed: the day before the procedure, follow a clear liquid diet all day, then start the first 1/2 of prep at 5pm and take 2nd 1/2 of prep @.0615  Pt must be completely NPO when prep completed @. 0815             Medications: Do not take Insulin or oral diabetic medications the day of the procedure.  Take as prescribed: heart, seizure and blood pressure medication in the morning with a sip of water (less than an ounce).  Take any breathing medications and bring inhalers to hospital with you Leave all valuables and jewelry at home.     Wear comfortable clothes to procedure to change into hospital gown You cannot drive for 24 hours after your procedure because you will receive sedation for your procedure to make you comfortable.  A ride must be provided at discharge.

## 2020-06-16 NOTE — TELEPHONE ENCOUNTER
Spoke with patient about arrival time @. 1115    Prep instructions reviewed: the day before the procedure, follow a clear liquid diet all day, then start the first 1/2 of prep at 5pm and take 2nd 1/2 of prep @. 0615Pt must be completely NPO when prep completed @.0815              Medications: Do not take Insulin or oral diabetic medications the day of the procedure.  Take as prescribed: heart, seizure and blood pressure medication in the morning with a sip of water (less than an ounce).  Take any breathing medications and bring inhalers to hospital with you Leave all valuables and jewelry at home.     Wear comfortable clothes to procedure to change into hospital gown You cannot drive for 24 hours after your procedure because you will receive sedation for your procedure to make you comfortable.  A ride must be provided at discharge.

## 2020-06-17 ENCOUNTER — HOSPITAL ENCOUNTER (OUTPATIENT)
Facility: HOSPITAL | Age: 70
Discharge: HOME | End: 2020-06-17
Attending: INTERNAL MEDICINE | Admitting: INTERNAL MEDICINE
Payer: MEDICARE

## 2020-06-17 ENCOUNTER — ANESTHESIA (OUTPATIENT)
Dept: ENDOSCOPY | Facility: HOSPITAL | Age: 70
End: 2020-06-17
Payer: MEDICARE

## 2020-06-17 ENCOUNTER — ANESTHESIA EVENT (OUTPATIENT)
Dept: ENDOSCOPY | Facility: HOSPITAL | Age: 70
End: 2020-06-17
Payer: MEDICARE

## 2020-06-17 VITALS
OXYGEN SATURATION: 100 % | RESPIRATION RATE: 16 BRPM | HEART RATE: 60 BPM | DIASTOLIC BLOOD PRESSURE: 63 MMHG | WEIGHT: 207 LBS | SYSTOLIC BLOOD PRESSURE: 107 MMHG | BODY MASS INDEX: 35.34 KG/M2 | HEIGHT: 64 IN | TEMPERATURE: 97 F

## 2020-06-17 DIAGNOSIS — K63.5 POLYP OF COLON, UNSPECIFIED PART OF COLON, UNSPECIFIED TYPE: Primary | ICD-10-CM

## 2020-06-17 DIAGNOSIS — Z12.11 COLON CANCER SCREENING: ICD-10-CM

## 2020-06-17 LAB — SARS-COV-2 RNA RESP QL NAA+PROBE: NOT DETECTED

## 2020-06-17 PROCEDURE — 43239 PR EGD, FLEX, W/BIOPSY, SGL/MULTI: ICD-10-PCS | Mod: 51,,, | Performed by: INTERNAL MEDICINE

## 2020-06-17 PROCEDURE — 27201012 HC FORCEPS, HOT/COLD, DISP: Performed by: INTERNAL MEDICINE

## 2020-06-17 PROCEDURE — 43239 EGD BIOPSY SINGLE/MULTIPLE: CPT | Performed by: INTERNAL MEDICINE

## 2020-06-17 PROCEDURE — 45385 COLONOSCOPY W/LESION REMOVAL: CPT | Mod: PT,,, | Performed by: INTERNAL MEDICINE

## 2020-06-17 PROCEDURE — 25000003 PHARM REV CODE 250: Performed by: INTERNAL MEDICINE

## 2020-06-17 PROCEDURE — 43239 EGD BIOPSY SINGLE/MULTIPLE: CPT | Mod: 51,,, | Performed by: INTERNAL MEDICINE

## 2020-06-17 PROCEDURE — 25000003 PHARM REV CODE 250: Performed by: NURSE ANESTHETIST, CERTIFIED REGISTERED

## 2020-06-17 PROCEDURE — 88305 TISSUE EXAM BY PATHOLOGIST: ICD-10-PCS | Mod: 26,,, | Performed by: PATHOLOGY

## 2020-06-17 PROCEDURE — 45380 COLONOSCOPY AND BIOPSY: CPT | Mod: 59,,, | Performed by: INTERNAL MEDICINE

## 2020-06-17 PROCEDURE — 45380 COLONOSCOPY AND BIOPSY: CPT | Performed by: INTERNAL MEDICINE

## 2020-06-17 PROCEDURE — 88305 TISSUE EXAM BY PATHOLOGIST: CPT | Mod: 26,,, | Performed by: PATHOLOGY

## 2020-06-17 PROCEDURE — 88302 TISSUE EXAM BY PATHOLOGIST: CPT | Performed by: PATHOLOGY

## 2020-06-17 PROCEDURE — 37000008 HC ANESTHESIA 1ST 15 MINUTES: Performed by: INTERNAL MEDICINE

## 2020-06-17 PROCEDURE — 88305 TISSUE EXAM BY PATHOLOGIST: CPT | Performed by: PATHOLOGY

## 2020-06-17 PROCEDURE — 27201089 HC SNARE, DISP (ANY): Performed by: INTERNAL MEDICINE

## 2020-06-17 PROCEDURE — 37000009 HC ANESTHESIA EA ADD 15 MINS: Performed by: INTERNAL MEDICINE

## 2020-06-17 PROCEDURE — 45380 PR COLONOSCOPY,BIOPSY: ICD-10-PCS | Mod: 59,,, | Performed by: INTERNAL MEDICINE

## 2020-06-17 PROCEDURE — 45385 COLONOSCOPY W/LESION REMOVAL: CPT | Performed by: INTERNAL MEDICINE

## 2020-06-17 PROCEDURE — 45385 PR COLONOSCOPY,REMV LESN,SNARE: ICD-10-PCS | Mod: PT,,, | Performed by: INTERNAL MEDICINE

## 2020-06-17 PROCEDURE — 63600175 PHARM REV CODE 636 W HCPCS: Performed by: NURSE ANESTHETIST, CERTIFIED REGISTERED

## 2020-06-17 RX ORDER — PROPOFOL 10 MG/ML
VIAL (ML) INTRAVENOUS
Status: DISCONTINUED | OUTPATIENT
Start: 2020-06-17 | End: 2020-06-17

## 2020-06-17 RX ORDER — ACETAMINOPHEN, DIPHENHYDRAMINE HCL, PHENYLEPHRINE HCL 325; 25; 5 MG/1; MG/1; MG/1
TABLET ORAL NIGHTLY
COMMUNITY

## 2020-06-17 RX ORDER — SODIUM CHLORIDE 9 MG/ML
INJECTION, SOLUTION INTRAVENOUS CONTINUOUS
Status: DISCONTINUED | OUTPATIENT
Start: 2020-06-17 | End: 2020-06-17 | Stop reason: HOSPADM

## 2020-06-17 RX ORDER — SODIUM CHLORIDE 0.9 % (FLUSH) 0.9 %
10 SYRINGE (ML) INJECTION
Status: DISCONTINUED | OUTPATIENT
Start: 2020-06-17 | End: 2020-06-17 | Stop reason: HOSPADM

## 2020-06-17 RX ORDER — LIDOCAINE HCL/PF 100 MG/5ML
SYRINGE (ML) INTRAVENOUS
Status: DISCONTINUED | OUTPATIENT
Start: 2020-06-17 | End: 2020-06-17

## 2020-06-17 RX ORDER — PROPOFOL 10 MG/ML
VIAL (ML) INTRAVENOUS CONTINUOUS PRN
Status: DISCONTINUED | OUTPATIENT
Start: 2020-06-17 | End: 2020-06-17

## 2020-06-17 RX ADMIN — PROPOFOL 150 MCG/KG/MIN: 10 INJECTION, EMULSION INTRAVENOUS at 12:06

## 2020-06-17 RX ADMIN — PROPOFOL 120 MG: 10 INJECTION, EMULSION INTRAVENOUS at 12:06

## 2020-06-17 RX ADMIN — PROPOFOL 30 MG: 10 INJECTION, EMULSION INTRAVENOUS at 12:06

## 2020-06-17 RX ADMIN — LIDOCAINE HYDROCHLORIDE 75 MG: 20 INJECTION, SOLUTION INTRAVENOUS at 12:06

## 2020-06-17 RX ADMIN — SODIUM CHLORIDE: 0.9 INJECTION, SOLUTION INTRAVENOUS at 12:06

## 2020-06-17 NOTE — [employer]ATION PATIENT INSTRUCTIONS
Discharge Summary/Instructions after an Endoscopic Procedure  Patient Name: Luz Burk  Patient MRN: [id number]  Patient YOB: 1950 Wednesday, June 17, 2020  Antonio Weller MD  Your health is very important to us during the Covid Crisis. Following your   procedure today, you will receive a daily text for 2 weeks asking about   signs or symptoms of Covid 19.  Please respond to this text when you   receive it so we can follow up and keep you as safe as possible.   RESTRICTIONS:  During your procedure today, you received medications for sedation.  These   medications may affect your judgment, balance and coordination.  Therefore,   for 24 hours, you have the following restrictions:   - DO NOT drive a car, operate machinery, make legal/financial decisions,   sign important papers or drink alcohol.    ACTIVITY:  Today: no heavy lifting, straining or running due to procedural   sedation/anesthesia.  The following day: return to full activity including work.  DIET:  Eat and drink normally unless instructed otherwise.     TREATMENT FOR COMMON SIDE EFFECTS:  - Mild abdominal pain, nausea, belching, bloating or excessive gas:  rest,   eat lightly and use a heating pad.  - Sore Throat: treat with throat lozenges and/or gargle with warm salt   water.  - Because air was used during the procedure, expelling large amounts of air   from your rectum or belching is normal.  - If a bowel prep was taken, you may not have a bowel movement for 1-3 days.    This is normal.  SYMPTOMS TO WATCH FOR AND REPORT TO YOUR PHYSICIAN:  1. Abdominal pain or bloating, other than gas cramps.  2. Chest pain.  3. Back pain.  4. Signs of infection such as: chills or fever occurring within 24 hours   after the procedure.  5. Rectal bleeding, which would show as bright red, maroon, or black stools.   (A tablespoon of blood from the rectum is not serious, especially if   hemorrhoids are present.)  6. Vomiting.  7. Weakness or dizziness.  GO  DIRECTLY TO THE NEAREST EMERGENCY ROOM IF YOU HAVE ANY OF THE FOLLOWING:      Difficulty breathing              Chills and/or fever over 101 F   Persistent vomiting and/or vomiting blood   Severe abdominal pain   Severe chest pain   Black, tarry stools   Bleeding- more than one tablespoon   Any other symptom or condition that you feel may need urgent attention  Your doctor recommends these additional instructions:  If any biopsies were taken, your doctors clinic will contact you in 1 to 2   weeks with any results.  - Discharge patient to home.   - Patient has a contact number available for emergencies.  The signs and   symptoms of potential delayed complications were discussed with the   patient.  Return to normal activities tomorrow.  Written discharge   instructions were provided to the patient.   - Resume previous diet.   - Continue present medications.   - Await pathology results.   - Repeat colonoscopy in 5 years for surveillance.  For questions, problems or results please call your physician - Antonio Weller MD at Work:  [phone].  EMERGENCY PHONE NUMBER: [phone],  LAB RESULTS: [phone]  IF A COMPLICATION OR EMERGENCY SITUATION ARISES AND YOU ARE UNABLE TO REACH   YOUR PHYSICIAN - GO DIRECTLY TO THE EMERGENCY ROOM.  Antonio Weller MD  6/17/2020 1:19:03 PM  This report has been verified and signed electronically.  [employer]

## 2020-06-17 NOTE — TRANSFER OF CARE
"Anesthesia Transfer of Care Note    Patient: Luz Burk    Procedure(s) Performed: Procedure(s) (LRB):  EGD (ESOPHAGOGASTRODUODENOSCOPY) (N/A)  COLONOSCOPYSuprep (N/A)    Patient location: GI    Anesthesia Type: MAC    Transport from OR: Transported from OR on room air with adequate spontaneous ventilation    Post pain: adequate analgesia    Post assessment: no apparent anesthetic complications    Post vital signs: stable    Level of consciousness: awake    Nausea/Vomiting: no nausea/vomiting    Complications: none    Transfer of care protocol was followed      Last vitals:   Visit Vitals  /71 (BP Location: Left arm, Patient Position: Sitting)   Pulse 68   Temp 36.8 °C (98.2 °F) (Temporal)   Resp 16   Ht 5' 4" (1.626 m)   Wt 93.9 kg (207 lb)   LMP  (LMP Unknown)   SpO2 97%   Breastfeeding No   BMI 35.53 kg/m²     "

## 2020-06-17 NOTE — ANESTHESIA PREPROCEDURE EVALUATION
06/17/2020  Luz Burk is a 69 y.o., female presents for colonoscopy/EGD under MAC    Past Medical History:   Diagnosis Date    Cataract     Collagenous colitis     Dx 5/30/16    Hypothyroidism     OA (osteoarthritis)     Obesity     Sleep apnea      Past Surgical History:   Procedure Laterality Date    APPENDECTOMY      CATARACT EXTRACTION      CHOLECYSTECTOMY      COLONOSCOPY N/A 5/30/2016    Procedure: COLONOSCOPY;  Surgeon: BINH Souza MD;  Location: [address]);  Service: Endoscopy;  Laterality: N/A;    HERNIA REPAIR      HIATAL HERNIA REPAIR      HYSTERECTOMY      lap band surgery  10/11/2011    Realize C Band (11mL)    LAPAROSCOPIC GASTRIC BANDING      OOPHORECTOMY      TOTAL KNEE ARTHROPLASTY  7/23/12    VENTRICULOPERITONEAL SHUNT           Pre-op Assessment    I have reviewed the Patient Summary Reports.     I have reviewed the Nursing Notes. I have reviewed the NPO Status.   I have reviewed the Medications.     Review of Systems  Anesthesia Hx:  No problems with previous Anesthesia  History of prior surgery of interest to airway management or planning: Denies Family Hx of Anesthesia complications.   Denies Personal Hx of Anesthesia complications.   Cardiovascular:  Cardiovascular Normal  ECG has been reviewed.    Pulmonary:   Sleep Apnea    Renal/:  Renal/ Normal     Hepatic/GI:   GERD S/p lap band   Musculoskeletal:   Arthritis  S/p cervical disc removal Spine Disorders: cervical Disc disease    Neurological:  Neurology Normal    Endocrine:   Hypothyroidism        Physical Exam  General:  Well nourished, Obesity    Airway/Jaw/Neck:  Airway Findings: Mouth Opening: Normal Tongue: Normal  General Airway Assessment: Adult  Mallampati: I  Improves to I with phonation.  TM Distance: Normal, at least 6 cm  Jaw/Neck Findings:  Micrognathia: Negative Neck ROM:  Normal ROM      Dental:  Dental Findings: In tact   Chest/Lungs:  Chest/Lungs Findings: Clear to auscultation, Normal Respiratory Rate     Heart/Vascular:  Heart Findings: Rate: Normal  Rhythm: Regular Rhythm  Sounds: Normal  Heart murmur: negative    Abdomen:  Abdomen Findings:  Normal, Nontender, Soft       Mental Status:  Mental Status Findings:  Cooperative, Alert and Oriented         Anesthesia Plan  Type of Anesthesia, risks & benefits discussed:  Anesthesia Type:  general  Patient's Preference:   Intra-op Monitoring Plan:   Intra-op Monitoring Plan Comments:   Post Op Pain Control Plan: multimodal analgesia  Post Op Pain Control Plan Comments:   Induction:   IV  Beta Blocker:  Patient is not currently on a Beta-Blocker (No further documentation required).       Informed Consent: Patient understands risks and agrees with Anesthesia plan.  Questions answered. Anesthesia consent signed with patient.  ASA Score: 2     Day of Surgery Review of History & Physical:    H&P update referred to the provider.         Ready For Surgery From Anesthesia Perspective.

## 2020-06-17 NOTE — PROVATION PATIENT INSTRUCTIONS
Discharge Summary/Instructions after an Endoscopic Procedure  Patient Name: Luz Burk  Patient MRN: [id number]  Patient YOB: 1950 Wednesday, June 17, 2020  Antonio Weller MD  Your health is very important to us during the Covid Crisis. Following your   procedure today, you will receive a daily text for 2 weeks asking about   signs or symptoms of Covid 19.  Please respond to this text when you   receive it so we can follow up and keep you as safe as possible.   RESTRICTIONS:  During your procedure today, you received medications for sedation.  These   medications may affect your judgment, balance and coordination.  Therefore,   for 24 hours, you have the following restrictions:   - DO NOT drive a car, operate machinery, make legal/financial decisions,   sign important papers or drink alcohol.    ACTIVITY:  Today: no heavy lifting, straining or running due to procedural   sedation/anesthesia.  The following day: return to full activity including work.  DIET:  Eat and drink normally unless instructed otherwise.     TREATMENT FOR COMMON SIDE EFFECTS:  - Mild abdominal pain, nausea, belching, bloating or excessive gas:  rest,   eat lightly and use a heating pad.  - Sore Throat: treat with throat lozenges and/or gargle with warm salt   water.  - Because air was used during the procedure, expelling large amounts of air   from your rectum or belching is normal.  - If a bowel prep was taken, you may not have a bowel movement for 1-3 days.    This is normal.  SYMPTOMS TO WATCH FOR AND REPORT TO YOUR PHYSICIAN:  1. Abdominal pain or bloating, other than gas cramps.  2. Chest pain.  3. Back pain.  4. Signs of infection such as: chills or fever occurring within 24 hours   after the procedure.  5. Rectal bleeding, which would show as bright red, maroon, or black stools.   (A tablespoon of blood from the rectum is not serious, especially if   hemorrhoids are present.)  6. Vomiting.  7. Weakness or dizziness.  GO  DIRECTLY TO THE NEAREST EMERGENCY ROOM IF YOU HAVE ANY OF THE FOLLOWING:      Difficulty breathing              Chills and/or fever over 101 F   Persistent vomiting and/or vomiting blood   Severe abdominal pain   Severe chest pain   Black, tarry stools   Bleeding- more than one tablespoon   Any other symptom or condition that you feel may need urgent attention  Your doctor recommends these additional instructions:  If any biopsies were taken, your doctors clinic will contact you in 1 to 2   weeks with any results.  - Discharge patient to home.   - Patient has a contact number available for emergencies.  The signs and   symptoms of potential delayed complications were discussed with the   patient.  Return to normal activities tomorrow.  Written discharge   instructions were provided to the patient.   - Resume previous diet.   - Continue present medications.   - Await pathology results.   - No aspirin, ibuprofen, naproxen, or other non-steroidal anti-inflammatory   drugs unless needed for cardiovascular protection.  - she would benefit from PPI given ongoing reflux, but she has had prior   microscopic colitis, suspected related to PPI use. will have to maximize H2   blocker therapy. She may need to consider removal of lap band as this is   creating a functional 5 cm hiatal hernia.  - Perform a colonoscopy today.  For questions, problems or results please call your physician - Antonio Weller MD at Work:  [phone].  EMERGENCY PHONE NUMBER: [phone],  LAB RESULTS: [phone]  IF A COMPLICATION OR EMERGENCY SITUATION ARISES AND YOU ARE UNABLE TO REACH   YOUR PHYSICIAN - GO DIRECTLY TO THE EMERGENCY ROOM.  Antonio Weller MD  6/17/2020 1:23:26 PM  This report has been verified and signed electronically.  [employer]

## 2020-06-17 NOTE — ANESTHESIA POSTPROCEDURE EVALUATION
Anesthesia Post Evaluation    Patient: Luz Burk    Procedure(s) Performed: Procedure(s) (LRB):  EGD (ESOPHAGOGASTRODUODENOSCOPY) (N/A)  COLONOSCOPYSuprep (N/A)    Final Anesthesia Type: MAC    Patient location during evaluation: GI PACU  Patient participation: Yes- Able to Participate  Level of consciousness: awake and alert  Post-procedure vital signs: reviewed and stable  Pain management: adequate  Airway patency: patent    PONV status at discharge: No PONV  Anesthetic complications: no      Cardiovascular status: blood pressure returned to baseline and hemodynamically stable  Respiratory status: unassisted, spontaneous ventilation and room air  Hydration status: euvolemic  Follow-up not needed.          Vitals Value Taken Time   /51 06/17/20 1325   Temp 38.6 06/17/20 1325   Pulse 68 06/17/20 1325   Resp 14 06/17/20 1325   SpO2 98 06/17/20 1325         No case tracking events are documented in the log.      Pain/Dawson Score: No data recorded

## 2020-06-18 ENCOUNTER — TELEPHONE (OUTPATIENT)
Dept: GASTROENTEROLOGY | Facility: CLINIC | Age: 70
End: 2020-06-18

## 2020-06-18 NOTE — TELEPHONE ENCOUNTER
----- Message from Elaine Walter sent at 6/18/2020  3:34 PM CDT -----  Contact: SELF [phone]  Patient would like to speak with you about having constant sneezing since her procedure. Please advise

## 2020-06-18 NOTE — TELEPHONE ENCOUNTER
Spoke with pt and she continues to have a runny nose, sneeze, and watery eyes after her procedure. Pt would like to know if this is normal. She said that it is not as bad as it was yesterday. She would like to know if she should be concerned about this.

## 2020-06-19 LAB
FINAL PATHOLOGIC DIAGNOSIS: NORMAL
GROSS: NORMAL

## 2020-06-19 NOTE — TELEPHONE ENCOUNTER
Called patient.  Having runny nose , sneezing.  She thinks from nasal cannula and I agree.    Continue allergy treatments etc.  Give us update if worsening    All questions answered.

## 2020-06-22 ENCOUNTER — PROCEDURE VISIT (OUTPATIENT)
Dept: PHYSICAL MEDICINE AND REHAB | Facility: CLINIC | Age: 70
End: 2020-06-22
Payer: MEDICARE

## 2020-06-22 DIAGNOSIS — G56.01 RIGHT CARPAL TUNNEL SYNDROME: ICD-10-CM

## 2020-06-22 PROCEDURE — 95886 MUSC TEST DONE W/N TEST COMP: CPT | Mod: S$GLB,,, | Performed by: PHYSICAL MEDICINE & REHABILITATION

## 2020-06-22 PROCEDURE — 95910 PR NERVE CONDUCTION STUDY; 7-8 STUDIES: ICD-10-PCS | Mod: S$GLB,,, | Performed by: PHYSICAL MEDICINE & REHABILITATION

## 2020-06-22 PROCEDURE — 95886 PR EMG COMPLETE, W/ NERVE CONDUCTION STUDIES, 5+ MUSCLES: ICD-10-PCS | Mod: S$GLB,,, | Performed by: PHYSICAL MEDICINE & REHABILITATION

## 2020-06-22 PROCEDURE — 95910 NRV CNDJ TEST 7-8 STUDIES: CPT | Mod: S$GLB,,, | Performed by: PHYSICAL MEDICINE & REHABILITATION

## 2020-06-22 NOTE — PROCEDURES
Test Date:  2020    Patient: Luz Burk : 1950 Physician: Trav Gregory D.O.   ID#:  SEX: Female Ref. Phys: Darian Wallis*     HPI: Luz Burk is a 69 y.o.female who presents for NCS/EMG to evaluate R CTS vs cervical radiculopathy    NCV & EMG Findings:   Evaluation of the right median sensory nerve showed prolonged distal peak latency and decreased conduction velocity.   All remaining nerves (as indicated in the following tables) were within normal limits.   All examined muscles (as indicated in the following table) showed no evidence of electrical instability.    Impression:  There is electrophysiologic evidence of a right sensory median mononeuropathy across the wrist (I.e. Carpal tunnel syndrome).  There is no motor axonal loss.  There is is no active denervation.  This is graded as Mild in severity on the right.        ___________________________  Trav Gregory D.O.        NCS+  Motor Nerve Results      Latency Amplitude F-Lat Segment Distance CV Comment   Site (ms) Norm (mV) Norm (ms)  (cm) (m/s) Norm    Left Median (APB)   Wrist 3.2  < 4.4 11.9  > 3.8  Wrist-Palm - - -    Elbow 6.9 - 12.1 -  Elbow-Wrist 22 59  > 51    Right Median (APB)   Wrist 3.3  < 4.4 9.1  > 3.8  Wrist-Palm - - -    Elbow 7.1 - 8.2 -  Elbow-Wrist 24 63  > 51    Left Ulnar (ADM)   Wrist 2.8  < 3.7 7.4  > 3.0         Bel Elbow 5.7 - 9.4 -  Bel Elbow-Wrist 24 83  > 52    Right Ulnar (ADM)   Wrist 2.0  < 3.7 6.6  > 3.0         Bel Elbow 6.2 - 7.4 -  Bel Elbow-Wrist 25 60  > 52    Abv Elbow 8.4 - 6.9 -  Abv Elbow-Bel Elbow 14 64  > 43      Sensory Nerve Results      Latency (Peak) Amplitude (P-P) Segment Distance CV Comment   Site (ms) Norm (µV) Norm  (cm) (m/s) Norm    Left Median (Stim:Wrist)   Dig II 3.8  < 4.0 25  > 8 Wrist-Dig II 16 42  > 39    Right Median (Stim:Wrist)   Dig II *4.1  < 4.0 17  > 8 Wrist-Dig II 14 *34  > 39    Left Ulnar (Stim:Wrist)   Dig V 3.9  < 4.0 18  > 4 Wrist-Dig V 16  41  > 38    Right Ulnar (Stim:Wrist)   Dig V 3.6  < 4.0 46  > 4 Wrist-Dig V 14 39  > 38      EMG+     Side Muscle Nerve Root Ins Act Fibs Psw Amp Dur Poly Recrt Int Pat Comment   Right Biceps Musculocut C5-C6 Nml Nml Nml Nml Nml 0 Nml Nml    Right Triceps Radial C6-C8 Nml Nml Nml Nml Nml 0 Nml Nml    Right Pronator Teres Median C6-C7 Nml Nml Nml Nml Nml 0 Nml Nml    Right Brachiorad Radial C5-C6 Nml Nml Nml Nml Nml 0 Nml Nml    Right FDI Ulnar C8-T1 Nml Nml Nml Nml Nml 0 Nml Nml    Right APB Median C8-T1 Nml Nml Nml Nml Nml 0 Nml Nml    Right Cervical Parasp (Lower) Rami C7-C8 Nml Nml Nml                 Waveforms:    Motor              Sensory

## 2020-06-25 ENCOUNTER — TELEPHONE (OUTPATIENT)
Dept: ORTHOPEDICS | Facility: CLINIC | Age: 70
End: 2020-06-25

## 2020-06-25 NOTE — TELEPHONE ENCOUNTER
----- Message from Matilde Loza sent at 6/25/2020  2:26 PM CDT -----  Type: Patient Call Back    Who called: Self     What is the request in detail: patient says she would like to be seen sooner than her appt. On 7/7. Please call     Can the clinic reply by [facility]? No     Would the patient rather a call back or a response via [facility]?  Call     Best call back number: [phone]

## 2020-06-25 NOTE — TELEPHONE ENCOUNTER
Spoke c pt. Informed her 07/07/20 is Dr. Oswald's first available appt as she will be out on mandatory leave per [facility] next week. Declined appt c PA. Pt will keep appt as it. Pt expressed understanding & was thankful.

## 2020-07-06 NOTE — PROGRESS NOTES
DATE: 7/6/2020  PATIENT: Luz Burk    CHIEF COMPLAINT: right hand numbness and pain    HPI 6/11/20  Luz Burk is a 69 y.o. female retired  here for initial evaluation of right hand numbness and pain. Notes a small ganglion over volar radial wrist, which has been present for a month, tender. The pain has been present for 1 month. There is no history of trauma. The patient describes the pain as sharp.  The pain is worse with activity and improved by rest. There is associated numbness and tingling.  Prior treatments have included none.    7/7/20   Pt prevents for follow up right hand numbness and pain, EMG results. EMG with mild right carpal tunnel. Pt also has a right wrist volar ganglion cyst. She has been unable to tolerate bracing due to pressure on the ganglion cyst.      PAST MEDICAL/SURGICAL HISTORY:  Past Medical History:   Diagnosis Date    Cataract     Collagenous colitis     Dx 5/30/16    Hypothyroidism     OA (osteoarthritis)     Obesity     Sleep apnea      Past Surgical History:   Procedure Laterality Date    APPENDECTOMY      CATARACT EXTRACTION      CHOLECYSTECTOMY      COLONOSCOPY N/A 5/30/2016    Procedure: COLONOSCOPY;  Surgeon: BINH Souza MD;  Location: [facility] ([address] [place] [address]);  Service: Endoscopy;  Laterality: N/A;    COLONOSCOPY N/A 6/17/2020    Procedure: COLONOSCOPYSuprep;  Surgeon: Antonio Weller MD;  Location: [facility];  Service: Endoscopy;  Laterality: N/A;    ESOPHAGOGASTRODUODENOSCOPY N/A 6/17/2020    Procedure: EGD (ESOPHAGOGASTRODUODENOSCOPY);  Surgeon: Antonio Weller MD;  Location: [facility];  Service: Endoscopy;  Laterality: N/A;    HERNIA REPAIR      HIATAL HERNIA REPAIR      HYSTERECTOMY      lap band surgery  10/11/2011    Realize C Band (11mL)    LAPAROSCOPIC GASTRIC BANDING      OOPHORECTOMY      TONSILLECTOMY      TOTAL KNEE ARTHROPLASTY  7/23/12    VENTRICULOPERITONEAL SHUNT         Current Medications:    Current Outpatient Medications:     ACETAMINOPHEN (TYLENOL ORAL), Take by mouth every 6 (six) hours as needed. , Disp: , Rfl:     acyclovir (ZOVIRAX) 400 MG tablet, Take 3 times a day for 5 days at the first sign of a cold sore., Disp: 60 tablet, Rfl: 11    ascorbic acid (VITAMIN C) 100 MG tablet, Take 500 mg by mouth once daily. , Disp: , Rfl:     BIOTIN ORAL, Take by mouth once daily., Disp: , Rfl:     budesonide (UCERIS) 9 mg TaDE, Take 1 tablet (9 mg total) by mouth once daily., Disp: 30 each, Rfl: 0    CANNABIDIOL, CBD, EXTRACT ORAL, , Disp: , Rfl:     citalopram (CELEXA) 20 MG tablet, Take 1 tablet by mouth once daily, Disp: 90 tablet, Rfl: 3    clindamycin (CLEOCIN) 300 MG capsule, Take 1 capsule (300 mg total) by mouth every 8 (eight) hours., Disp: 15 capsule, Rfl: 0    cyanocobalamin 1,000 mcg/mL injection, INJECT 1 ML INTRAMUSCULARLY EVERY 30 DAYS., Disp: 10 mL, Rfl: 5    docusate sodium (COLACE) 100 MG capsule, Take 1 capsule (100 mg total) by mouth 2 (two) times daily., Disp: 180 capsule, Rfl: 3    ergocalciferol (ERGOCALCIFEROL) 50,000 unit Cap, Take 1 capsule (50,000 Units total) by mouth every 7 days., Disp: 13 capsule, Rfl: 3    EUTHYROX 50 mcg tablet, TAKE 1 TABLET BY MOUTH BEFORE BREAKFAST, Disp: 90 tablet, Rfl: 0    fluticasone (FLONASE) 50 mcg/actuation nasal spray, 1 spray by Nasal route once daily. , Disp: , Rfl:     furosemide (LASIX) 20 MG tablet, Take 1 tablet (20 mg total) by mouth once daily., Disp: 30 tablet, Rfl: 11    ginger, Zingiber officinalis, 500 mg Cap, Take by mouth., Disp: , Rfl:     Lactobacillus rhamnosus GG (CULTURELLE) 10 billion cell capsule, Take 1 capsule by mouth once daily., Disp: , Rfl:     linaCLOtide (LINZESS) 145 mcg Cap capsule, Take 1 capsule (145 mcg total) by mouth once daily. (Patient not taking: Reported on 6/11/2020), Disp: 30 capsule, Rfl: 3    loratadine (CLARITIN) 10 mg tablet, Take 1 tablet (10 mg total) by mouth once daily., Disp: 90  tablet, Rfl: 3    melatonin 10 mg Tab, Take by mouth., Disp: , Rfl:     multivit,tx with iron,minerals (THERA-M ORAL), , Disp: , Rfl:     ofloxacin (OCUFLOX) 0.3 % ophthalmic solution, Place 1 drop into both eyes 4 (four) times daily., Disp: 1 Bottle, Rfl: 0    potassium chloride (KLOR-CON) 10 MEQ TbSR, TAKE TWO TABLETS BY MOUTH ONCE DAILY WITH LASIX, Disp: 60 tablet, Rfl: 5    turmeric 400 mg Cap, , Disp: , Rfl:     Social History:   Social History     Socioeconomic History    Marital status:      Spouse name: Not on file    Number of children: Not on file    Years of education: Not on file    Highest education level: Not on file   Occupational History    Not on file   Social Needs    Financial resource strain: Not very hard    Food insecurity     Worry: Never true     Inability: Never true    Transportation needs     Medical: No     Non-medical: No   Tobacco Use    Smoking status: Former Smoker     Packs/day: 2.00     Years: 25.00     Pack years: 50.00     Types: Cigarettes     Quit date: 2002     Years since quittin.5    Smokeless tobacco: Never Used   Substance and Sexual Activity    Alcohol use: Yes     Alcohol/week: 0.0 standard drinks     Frequency: 2-3 times a week     Drinks per session: 1 or 2     Binge frequency: Never     Comment: occasionally    Drug use: No    Sexual activity: Yes     Partners: Male   Lifestyle    Physical activity     Days per week: 5 days     Minutes per session: 60 min    Stress: Not at all   Relationships    Social connections     Talks on phone: More than three times a week     Gets together: More than three times a week     Attends Judaism service: Not on file     Active member of club or organization: No     Attends meetings of clubs or organizations: Not on file     Relationship status:    Other Topics Concern    Not on file   Social History Narrative    Not on file       REVIEW OF SYSTEMS:  Constitution: Negative. Negative for  chills, fever and night sweats.   Cardiovascular: Negative for chest pain and syncope.   Respiratory: Negative for cough and shortness of breath.   Gastrointestinal: See HPI. Negative for nausea/vomiting. Negative for abdominal pain.  Genitourinary: See HPI. Negative for discoloration or dysuria.  Skin: Negative for dry skin, itching and rash.   Hematologic/Lymphatic: Negative for bleeding problem. Does not bruise/bleed easily.   Musculoskeletal: Negative for falls and muscle weakness.   Neurological: See HPI. No seizures.   Endocrine: Negative for polydipsia, polyphagia and polyuria.   Allergic/Immunologic: Negative for hives and persistent infections.    PHYSICAL EXAMINATION:    LMP  (LMP Unknown)     General: The patient is a 69 y.o. female in no apparent distress, the patient is orientatied to person, place and time.   Psych: Normal mood and affect  HEENT:  NCAT  Lungs:  Respirations are equal and unlabored.  CV:  2+ bilateral upper and lower extremity pulses.  Skin:  Intact throughout.    MSK:  RUE:  Small, 5 mm x 5mm volar ganglion over radial aspect of wrist, tender  Positive Tinel on right  Positive compression test on right wrist  AIN, PIN, M, R, U intact  Mild thenar wasting on right      IMAGING:   Radiographs of the right wrist were ordered and personally reviewed with the patient today.  They show no acute osseous abnormality.    EMG 6/22/20  Impression:   There is electrophysiologic evidence of a right sensory median   mononeuropathy across the wrist (I.e. Carpal tunnel syndrome).     There is no motor axonal loss.  There is is no active   denervation.  This is graded as Mild in severity on the right.       ASSESSMENT/PLAN:    Diagnoses and all orders for this visit:    Right carpal tunnel syndrome    Ganglion cyst of volar aspect of right wrist      - EMG reviewed, treatment options discussed. She is interested in ganglion cyst excision and carpal tunnel release. Discussed risks, benefits, and  expected post-op course. All questions answered. Consent signed in clinic.

## 2020-07-07 ENCOUNTER — TELEPHONE (OUTPATIENT)
Dept: ORTHOPEDICS | Facility: CLINIC | Age: 70
End: 2020-07-07

## 2020-07-07 ENCOUNTER — OFFICE VISIT (OUTPATIENT)
Dept: ORTHOPEDICS | Facility: CLINIC | Age: 70
End: 2020-07-07
Payer: MEDICARE

## 2020-07-07 VITALS
SYSTOLIC BLOOD PRESSURE: 113 MMHG | BODY MASS INDEX: 35.34 KG/M2 | HEART RATE: 63 BPM | HEIGHT: 64 IN | WEIGHT: 207 LBS | DIASTOLIC BLOOD PRESSURE: 79 MMHG

## 2020-07-07 DIAGNOSIS — Z01.818 PREOP TESTING: Primary | ICD-10-CM

## 2020-07-07 DIAGNOSIS — G56.01 RIGHT CARPAL TUNNEL SYNDROME: Primary | ICD-10-CM

## 2020-07-07 DIAGNOSIS — M67.431 GANGLION CYST OF VOLAR ASPECT OF RIGHT WRIST: ICD-10-CM

## 2020-07-07 PROCEDURE — 99999 PR PBB SHADOW E&M-EST. PATIENT-LVL IV: CPT | Mod: PBBFAC,,, | Performed by: ORTHOPAEDIC SURGERY

## 2020-07-07 PROCEDURE — 99999 PR PBB SHADOW E&M-EST. PATIENT-LVL IV: ICD-10-PCS | Mod: PBBFAC,,, | Performed by: ORTHOPAEDIC SURGERY

## 2020-07-07 PROCEDURE — 1125F PR PAIN SEVERITY QUANTIFIED, PAIN PRESENT: ICD-10-PCS | Mod: S$GLB,,, | Performed by: ORTHOPAEDIC SURGERY

## 2020-07-07 PROCEDURE — 1101F PT FALLS ASSESS-DOCD LE1/YR: CPT | Mod: CPTII,S$GLB,, | Performed by: ORTHOPAEDIC SURGERY

## 2020-07-07 PROCEDURE — 99214 PR OFFICE/OUTPT VISIT, EST, LEVL IV, 30-39 MIN: ICD-10-PCS | Mod: S$GLB,,, | Performed by: ORTHOPAEDIC SURGERY

## 2020-07-07 PROCEDURE — 1125F AMNT PAIN NOTED PAIN PRSNT: CPT | Mod: S$GLB,,, | Performed by: ORTHOPAEDIC SURGERY

## 2020-07-07 PROCEDURE — 1159F PR MEDICATION LIST DOCUMENTED IN MEDICAL RECORD: ICD-10-PCS | Mod: S$GLB,,, | Performed by: ORTHOPAEDIC SURGERY

## 2020-07-07 PROCEDURE — 99214 OFFICE O/P EST MOD 30 MIN: CPT | Mod: S$GLB,,, | Performed by: ORTHOPAEDIC SURGERY

## 2020-07-07 PROCEDURE — 3008F PR BODY MASS INDEX (BMI) DOCUMENTED: ICD-10-PCS | Mod: CPTII,S$GLB,, | Performed by: ORTHOPAEDIC SURGERY

## 2020-07-07 PROCEDURE — 1101F PR PT FALLS ASSESS DOC 0-1 FALLS W/OUT INJ PAST YR: ICD-10-PCS | Mod: CPTII,S$GLB,, | Performed by: ORTHOPAEDIC SURGERY

## 2020-07-07 PROCEDURE — 3008F BODY MASS INDEX DOCD: CPT | Mod: CPTII,S$GLB,, | Performed by: ORTHOPAEDIC SURGERY

## 2020-07-07 PROCEDURE — 1159F MED LIST DOCD IN RCRD: CPT | Mod: S$GLB,,, | Performed by: ORTHOPAEDIC SURGERY

## 2020-07-10 DIAGNOSIS — M67.431 GANGLION CYST OF VOLAR ASPECT OF RIGHT WRIST: ICD-10-CM

## 2020-07-10 DIAGNOSIS — G56.01 RIGHT CARPAL TUNNEL SYNDROME: Primary | ICD-10-CM

## 2020-07-13 ENCOUNTER — ANESTHESIA EVENT (OUTPATIENT)
Dept: SURGERY | Facility: HOSPITAL | Age: 70
End: 2020-07-13
Payer: MEDICARE

## 2020-07-13 NOTE — H&P (VIEW-ONLY)
I have personally taken the history and examined the patient. I agree with the Hand Surgery PA's note. The plan will be :    EMG reviewed, treatment options discussed. She is interested in ganglion cyst excision and carpal tunnel release. Discussed risks, benefits, and expected post-op course. All questions answered. Consent signed in clinic.

## 2020-07-13 NOTE — ANESTHESIA PREPROCEDURE EVALUATION
[facility]  Anesthesia Pre-Operative Evaluation       Patient Name: Luz Burk  YOB: 1950  MRN: [id number]  [facility]: [id number]      Code Status: Prior   Date of Procedure: 7/24/2020  Anesthesia: Regional Procedure: Procedure(s) (LRB):  RELEASE, CARPAL TUNNEL, RIGHT (Right)  EXCISION, GANGLION CYST, WRIST, RIGHT volar (Right)  Pre-Operative Diagnosis: Right carpal tunnel syndrome [G56.01]  Ganglion cyst of volar aspect of right wrist [M67.431]  Proceduralist: Surgeon(s) and Role:     * Sugey Ayon MD - Primary        SUBJECTIVE:   Luz Burk is a 69 y.o. female who  has a past medical history of Cataract, Collagenous colitis, Hypothyroidism, OA (osteoarthritis), Obesity, and Sleep apnea..   Revised cardiac risk index (RCRI) score is 0.    she has a current medication list which includes the following long-term medication(s): acyclovir, citalopram, cyanocobalamin, euthyrox, furosemide, and loratadine.     ALLERGIES:     Review of patient's allergies indicates:   Allergen Reactions    Omeprazole Diarrhea     LDA:      Lines/Drains/Airways     Airway                 Airway - Non-Surgical 06/17/20 1236 Nasal Cannula 36 days               Anesthesia Evaluation      Airway   Mallampati: III  TM distance: Normal, at least 6 cm  Neck ROM: Normal ROM  Dental    (+) In tact    Pulmonary    (+) sleep apnea,   Cardiovascular   Exercise tolerance: good    Rate: Normal    Neuro/Psych    (+) neuromuscular disease, headaches,     GI/Hepatic/Renal    (+) GERD,     Endo/Other    (+) hypothyroidism, arthritis  Abdominal                MEDICATIONS:     Antibiotics (From admission, onward)    None        VTE Risk Mitigation (From admission, onward)    None        No current facility-administered medications for this encounter.      Current Outpatient Medications   Medication Sig Dispense Refill    ACETAMINOPHEN (TYLENOL ORAL) Take by mouth every 6 (six) hours as needed.        acyclovir (ZOVIRAX) 400 MG tablet Take 3 times a day for 5 days at the first sign of a cold sore. 60 tablet 11    ascorbic acid (VITAMIN C) 100 MG tablet Take 500 mg by mouth once daily.       BIOTIN ORAL Take by mouth once daily.      budesonide (UCERIS) 9 mg TaDE Take 1 tablet (9 mg total) by mouth once daily. 30 each 0    CANNABIDIOL, CBD, EXTRACT ORAL       citalopram (CELEXA) 20 MG tablet Take 1 tablet by mouth once daily 90 tablet 3    clindamycin (CLEOCIN) 300 MG capsule Take 1 capsule (300 mg total) by mouth every 8 (eight) hours. 15 capsule 0    cyanocobalamin 1,000 mcg/mL injection INJECT 1 ML INTRAMUSCULARLY EVERY 30 DAYS. 10 mL 5    docusate sodium (COLACE) 100 MG capsule Take 1 capsule (100 mg total) by mouth 2 (two) times daily. 180 capsule 3    ergocalciferol (ERGOCALCIFEROL) 50,000 unit Cap Take 1 capsule (50,000 Units total) by mouth every 7 days. 13 capsule 3    EUTHYROX 50 mcg tablet TAKE 1 TABLET BY MOUTH BEFORE BREAKFAST 90 tablet 0    fluticasone (FLONASE) 50 mcg/actuation nasal spray 1 spray by Nasal route once daily.       furosemide (LASIX) 20 MG tablet Take 1 tablet (20 mg total) by mouth once daily. 30 tablet 11    ginger, Zingiber officinalis, 500 mg Cap Take by mouth.      [START ON 7/24/2020] HYDROcodone-acetaminophen (NORCO) 5-325 mg per tablet Take 1 tablet by mouth every 4 (four) hours as needed for Pain. 20 tablet 0    Lactobacillus rhamnosus GG (CULTURELLE) 10 billion cell capsule Take 1 capsule by mouth once daily.      linaCLOtide (LINZESS) 145 mcg Cap capsule Take 1 capsule (145 mcg total) by mouth once daily. (Patient not taking: Reported on 6/11/2020) 30 capsule 3    loratadine (CLARITIN) 10 mg tablet Take 1 tablet (10 mg total) by mouth once daily. 90 tablet 3    melatonin 10 mg Tab Take by mouth.      multivit,tx with iron,minerals (THERA-M ORAL)       ofloxacin (OCUFLOX) 0.3 % ophthalmic solution Place 1 drop into both eyes 4 (four) times daily. 1 Bottle  0    potassium chloride (KLOR-CON) 10 MEQ TbSR TAKE TWO TABLETS BY MOUTH ONCE DAILY WITH LASIX 60 tablet 5    turmeric 400 mg Cap             History:   There are no hospital problems to display for this patient.    Surgical History:    has a past surgical history that includes Total knee arthroplasty (7/23/12); lap band surgery (10/11/2011); Hernia repair; Ventriculoperitoneal shunt; Hysterectomy; Cholecystectomy; Laparoscopic gastric banding; Colonoscopy (N/A, 5/30/2016); Appendectomy; Hiatal hernia repair; Oophorectomy; Cataract extraction; Tonsillectomy; Esophagogastroduodenoscopy (N/A, 6/17/2020); and Colonoscopy (N/A, 6/17/2020).   Social History:    reports being sexually active and has had partner(s) who are Male.  reports that she quit smoking about 18 years ago. Her smoking use included cigarettes. She has a 50.00 pack-year smoking history. She has never used smokeless tobacco. She reports current alcohol use. She reports that she does not use drugs.     OBJECTIVE:     Vital Signs (Most Recent):    Vital Signs Range (Last 24H):          There is no height or weight on file to calculate BMI.   Wt Readings from Last 4 Encounters:   07/07/20 93.9 kg (207 lb)   06/17/20 93.9 kg (207 lb)   06/11/20 95.7 kg (211 lb)   05/19/20 95.9 kg (211 lb 6.7 oz)     Significant Labs:  Lab Results   Component Value Date    WBC 7.53 12/04/2019    HGB 12.1 12/04/2019    HCT 38.2 12/04/2019     12/04/2019     01/16/2020    K 3.9 01/16/2020     01/16/2020    CREATININE 0.67 01/16/2020    BUN 12 01/16/2020    CO2 31 (H) 01/16/2020     (H) 01/16/2020    CALCIUM 8.9 01/16/2020    MG 1.9 11/29/2016    PHOS 2.9 10/13/2011    ALKPHOS 63 12/04/2019    ALT 13 12/04/2019    AST 26 12/04/2019    ALBUMIN 4.0 12/04/2019    INR 1.5 (A) 08/20/2012    HGBA1C 5.1 02/22/2018    BNP 39 11/29/2016     No LMP recorded (lmp unknown). Patient has had a hysterectomy.  Recent Results (from the past 72 hour(s))   COVID-19  Routine Screening    Collection Time: 07/21/20  2:07 PM   Result Value Ref Range    SARS-CoV2 (COVID-19) Qualitative PCR Not Detected Not Detected     EKG:   Results for orders placed or performed in visit on 11/29/16   EKG 12-lead    Collection Time: 11/29/16  3:18 PM    Narrative    Test Reason : R60.1  Blood Pressure : ** mmHG  Vent. Rate : 056 BPM     Atrial Rate : 056 BPM     P-R Int : 170 ms          QRS Dur : 080 ms      QT Int : 502 ms       P-R-T Axes : 073 045 055 degrees     QTc Int : 484 ms    Sinus bradycardia  Otherwise normal ECG  When compared with ECG of 02-DEC-2010 08:42,  No significant change was found  Confirmed by Crystal Willis MD ([id number]) on 12/5/2016 5:19:03 PM    Referred By:  TORIE           Confirmed By:Crystal Willis MD     TTE:  No results found for this or any previous visit.  No results found for: EF   Results for orders placed or performed during the hospital encounter of 12/01/16   2D echo with color flow doppler   Result Value Ref Range    QEF 65 55 - 65    Diastolic Dysfunction Yes (A)     Est. PA Systolic Pressure 16.84     Pericardial Effusion NONE    CONCLUSIONS     1 - Normal left ventricular systolic function (EF 60-65%).     2 - Concentric remodeling.     3 - Left ventricular diastolic dysfunction.     4 - Normal right ventricular systolic function .     ASSESSMENT/PLAN:       Anesthesia Evaluation    I have reviewed the Patient Summary Reports.    I have reviewed the Nursing Notes.    I have reviewed the Medications.     Review of Systems  Anesthesia Hx:  No problems with previous Anesthesia    Hematology/Oncology:  Hematology Normal   Oncology Normal     EENT/Dental:EENT/Dental Normal   Cardiovascular:  Cardiovascular Normal Exercise tolerance: good     Pulmonary:   Sleep Apnea    Renal/:  Renal/ Normal     Hepatic/GI:   GERD    Musculoskeletal:   Arthritis     Neurological:   Neuromuscular Disease, Headaches    Endocrine:   Hypothyroidism    Dermatological:  Skin Normal     Psych:  Psychiatric Normal           Physical Exam  General:  Well nourished, Obesity    Airway/Jaw/Neck:  Airway Findings: Mouth Opening: Normal Tongue: Normal  General Airway Assessment: Adult  Mallampati: III  Improves to II with phonation.  TM Distance: Normal, at least 6 cm  Jaw/Neck Findings:  Neck ROM: Normal ROM     Eyes/Ears/Nose:  EYES/EARS/NOSE FINDINGS: Normal   Dental:  Dental Findings: In tact   Chest/Lungs:  Chest/Lungs Findings: Clear to auscultation     Heart/Vascular:  Heart Findings: Rate: Normal  Rhythm: Regular Rhythm  Sounds: Normal  Heart murmur: negative Vascular Findings: Normal    Abdomen:  Abdomen Findings: Normal    Musculoskeletal:  Musculoskeletal Findings: Normal   Skin:  Skin Findings: Normal    Mental Status:  Mental Status Findings:  Cooperative, Alert and Oriented         Anesthesia Plan  Type of Anesthesia, risks & benefits discussed:  Anesthesia Type:  general, MAC, regional  Patient's Preference:   Intra-op Monitoring Plan: standard ASA monitors  Intra-op Monitoring Plan Comments:   Post Op Pain Control Plan: per primary service following discharge from PACU and multimodal analgesia  Post Op Pain Control Plan Comments:   Induction:   IV  Beta Blocker:  Patient is not currently on a Beta-Blocker (No further documentation required).       Informed Consent: Patient understands risks and agrees with Anesthesia plan.  Questions answered. Anesthesia consent signed with patient.  ASA Score: 2     Day of Surgery Review of History & Physical:     H&P completed by Anesthesiologist.   Anesthesia Plan Notes: Chart reviewed, patient interviewed and examined.  The anesthetic plan was explained.  Risks, benefits, and alternatives were discussed. Questions were answered and the consent was signed.        SAM Atkins M.D.         Ready For Surgery From Anesthesia Perspective.

## 2020-07-21 ENCOUNTER — LAB VISIT (OUTPATIENT)
Dept: FAMILY MEDICINE | Facility: CLINIC | Age: 70
End: 2020-07-21
Payer: MEDICARE

## 2020-07-21 DIAGNOSIS — Z01.818 PREOP TESTING: ICD-10-CM

## 2020-07-21 DIAGNOSIS — M67.431 GANGLION CYST OF VOLAR ASPECT OF RIGHT WRIST: Primary | ICD-10-CM

## 2020-07-21 PROCEDURE — U0003 INFECTIOUS AGENT DETECTION BY NUCLEIC ACID (DNA OR RNA); SEVERE ACUTE RESPIRATORY SYNDROME CORONAVIRUS 2 (SARS-COV-2) (CORONAVIRUS DISEASE [COVID-19]), AMPLIFIED PROBE TECHNIQUE, MAKING USE OF HIGH THROUGHPUT TECHNOLOGIES AS DESCRIBED BY CMS-2020-01-R: HCPCS

## 2020-07-21 RX ORDER — HYDROCODONE BITARTRATE AND ACETAMINOPHEN 5; 325 MG/1; MG/1
1 TABLET ORAL EVERY 4 HOURS PRN
Qty: 20 TABLET | Refills: 0 | Status: SHIPPED | OUTPATIENT
Start: 2020-07-24 | End: 2021-01-29

## 2020-07-22 LAB — SARS-COV-2 RNA RESP QL NAA+PROBE: NOT DETECTED

## 2020-07-23 ENCOUNTER — TELEPHONE (OUTPATIENT)
Dept: ORTHOPEDICS | Facility: CLINIC | Age: 70
End: 2020-07-23

## 2020-07-23 NOTE — TELEPHONE ENCOUNTER
Informed patient to be at the surgery center 07/24/20 for   6:30 a.m. Also reminded patient of post op appointment on 8/7/20.Patient verbalized understanding.

## 2020-07-24 ENCOUNTER — HOSPITAL ENCOUNTER (OUTPATIENT)
Facility: HOSPITAL | Age: 70
Discharge: HOME | End: 2020-07-24
Attending: ORTHOPAEDIC SURGERY | Admitting: ORTHOPAEDIC SURGERY
Payer: MEDICARE

## 2020-07-24 ENCOUNTER — ANESTHESIA (OUTPATIENT)
Dept: SURGERY | Facility: HOSPITAL | Age: 70
End: 2020-07-24
Payer: MEDICARE

## 2020-07-24 DIAGNOSIS — G56.01 CARPAL TUNNEL SYNDROME ON RIGHT: ICD-10-CM

## 2020-07-24 DIAGNOSIS — G56.01 RIGHT CARPAL TUNNEL SYNDROME: Primary | ICD-10-CM

## 2020-07-24 PROCEDURE — 37000008 HC ANESTHESIA 1ST 15 MINUTES: Performed by: ORTHOPAEDIC SURGERY

## 2020-07-24 PROCEDURE — 99900035 HC TECH TIME PER 15 MIN (STAT)

## 2020-07-24 PROCEDURE — 25111 PR EXCIS PRIMARY GANGLION WRIST: ICD-10-PCS | Mod: 59,51,RT, | Performed by: ORTHOPAEDIC SURGERY

## 2020-07-24 PROCEDURE — D9220A PRA ANESTHESIA: Mod: ANES,,, | Performed by: ANESTHESIOLOGY

## 2020-07-24 PROCEDURE — 94770 HC EXHALED C02 TEST: CPT

## 2020-07-24 PROCEDURE — 63600175 PHARM REV CODE 636 W HCPCS: Performed by: ANESTHESIOLOGY

## 2020-07-24 PROCEDURE — S0028 INJECTION, FAMOTIDINE, 20 MG: HCPCS | Performed by: NURSE ANESTHETIST, CERTIFIED REGISTERED

## 2020-07-24 PROCEDURE — 88304 TISSUE EXAM BY PATHOLOGIST: CPT | Mod: 26,,, | Performed by: PATHOLOGY

## 2020-07-24 PROCEDURE — D9220A PRA ANESTHESIA: ICD-10-PCS | Mod: ANES,,, | Performed by: ANESTHESIOLOGY

## 2020-07-24 PROCEDURE — 64721 CARPAL TUNNEL SURGERY: CPT | Mod: RT,,, | Performed by: ORTHOPAEDIC SURGERY

## 2020-07-24 PROCEDURE — 25111 REMOVE WRIST TENDON LESION: CPT | Mod: 59,51,RT, | Performed by: ORTHOPAEDIC SURGERY

## 2020-07-24 PROCEDURE — 71000015 HC POSTOP RECOV 1ST HR: Performed by: ORTHOPAEDIC SURGERY

## 2020-07-24 PROCEDURE — 76942 ECHO GUIDE FOR BIOPSY: CPT | Performed by: ANESTHESIOLOGY

## 2020-07-24 PROCEDURE — 36000706: Performed by: ORTHOPAEDIC SURGERY

## 2020-07-24 PROCEDURE — 36000707: Performed by: ORTHOPAEDIC SURGERY

## 2020-07-24 PROCEDURE — 37000009 HC ANESTHESIA EA ADD 15 MINS: Performed by: ORTHOPAEDIC SURGERY

## 2020-07-24 PROCEDURE — 25000003 PHARM REV CODE 250: Performed by: ANESTHESIOLOGY

## 2020-07-24 PROCEDURE — 63600175 PHARM REV CODE 636 W HCPCS: Performed by: ORTHOPAEDIC SURGERY

## 2020-07-24 PROCEDURE — 76942 PR U/S GUIDANCE FOR NEEDLE GUIDANCE: ICD-10-PCS | Mod: 26,,, | Performed by: ANESTHESIOLOGY

## 2020-07-24 PROCEDURE — 76942 ECHO GUIDE FOR BIOPSY: CPT | Mod: 26,,, | Performed by: ANESTHESIOLOGY

## 2020-07-24 PROCEDURE — 25000003 PHARM REV CODE 250: Performed by: ORTHOPAEDIC SURGERY

## 2020-07-24 PROCEDURE — 94761 N-INVAS EAR/PLS OXIMETRY MLT: CPT

## 2020-07-24 PROCEDURE — 63600175 PHARM REV CODE 636 W HCPCS: Performed by: NURSE ANESTHETIST, CERTIFIED REGISTERED

## 2020-07-24 PROCEDURE — 27201423 OPTIME MED/SURG SUP & DEVICES STERILE SUPPLY: Performed by: ORTHOPAEDIC SURGERY

## 2020-07-24 PROCEDURE — 64415 NJX AA&/STRD BRCH PLXS IMG: CPT | Mod: 59,RT,, | Performed by: ANESTHESIOLOGY

## 2020-07-24 PROCEDURE — 88304 TISSUE EXAM BY PATHOLOGIST: CPT | Performed by: PATHOLOGY

## 2020-07-24 PROCEDURE — 64721 PR REVISE MEDIAN N/CARPAL TUNNEL SURG: ICD-10-PCS | Mod: RT,,, | Performed by: ORTHOPAEDIC SURGERY

## 2020-07-24 PROCEDURE — 64415 NJX AA&/STRD BRCH PLXS IMG: CPT | Performed by: ANESTHESIOLOGY

## 2020-07-24 PROCEDURE — 71000033 HC RECOVERY, INTIAL HOUR: Performed by: ORTHOPAEDIC SURGERY

## 2020-07-24 PROCEDURE — 64415 PR NERVE BLOCK INJ, ANES/STEROID, BRACHIAL PLEXUS, INCL IMAG GUIDANCE: ICD-10-PCS | Mod: 59,RT,, | Performed by: ANESTHESIOLOGY

## 2020-07-24 PROCEDURE — D9220A PRA ANESTHESIA: ICD-10-PCS | Mod: CRNA,,, | Performed by: NURSE ANESTHETIST, CERTIFIED REGISTERED

## 2020-07-24 PROCEDURE — D9220A PRA ANESTHESIA: Mod: CRNA,,, | Performed by: NURSE ANESTHETIST, CERTIFIED REGISTERED

## 2020-07-24 PROCEDURE — 88304 PR  SURG PATH,LEVEL III: ICD-10-PCS | Mod: 26,,, | Performed by: PATHOLOGY

## 2020-07-24 PROCEDURE — 25000003 PHARM REV CODE 250: Performed by: NURSE ANESTHETIST, CERTIFIED REGISTERED

## 2020-07-24 RX ORDER — FENTANYL CITRATE 50 UG/ML
25 INJECTION, SOLUTION INTRAMUSCULAR; INTRAVENOUS EVERY 5 MIN PRN
Status: DISCONTINUED | OUTPATIENT
Start: 2020-07-24 | End: 2020-07-24 | Stop reason: HOSPADM

## 2020-07-24 RX ORDER — ROPIVACAINE HYDROCHLORIDE 5 MG/ML
INJECTION, SOLUTION EPIDURAL; INFILTRATION; PERINEURAL
Status: DISCONTINUED | OUTPATIENT
Start: 2020-07-24 | End: 2020-07-24

## 2020-07-24 RX ORDER — SODIUM CHLORIDE 9 MG/ML
INJECTION, SOLUTION INTRAVENOUS CONTINUOUS
Status: DISCONTINUED | OUTPATIENT
Start: 2020-07-24 | End: 2020-07-24 | Stop reason: HOSPADM

## 2020-07-24 RX ORDER — CEFAZOLIN SODIUM 1 G/3ML
2 INJECTION, POWDER, FOR SOLUTION INTRAMUSCULAR; INTRAVENOUS
Status: COMPLETED | OUTPATIENT
Start: 2020-07-24 | End: 2020-07-24

## 2020-07-24 RX ORDER — FAMOTIDINE 10 MG/ML
INJECTION INTRAVENOUS
Status: DISCONTINUED | OUTPATIENT
Start: 2020-07-24 | End: 2020-07-24

## 2020-07-24 RX ORDER — LIDOCAINE HYDROCHLORIDE 20 MG/ML
INJECTION INTRAVENOUS
Status: DISCONTINUED | OUTPATIENT
Start: 2020-07-24 | End: 2020-07-24

## 2020-07-24 RX ORDER — MIDAZOLAM HYDROCHLORIDE 1 MG/ML
2 INJECTION INTRAMUSCULAR; INTRAVENOUS
Status: COMPLETED | OUTPATIENT
Start: 2020-07-24 | End: 2020-07-24

## 2020-07-24 RX ORDER — LIDOCAINE HYDROCHLORIDE 10 MG/ML
1 INJECTION, SOLUTION EPIDURAL; INFILTRATION; INTRACAUDAL; PERINEURAL ONCE
Status: ACTIVE | OUTPATIENT
Start: 2020-07-24

## 2020-07-24 RX ORDER — DEXAMETHASONE SODIUM PHOSPHATE 4 MG/ML
INJECTION, SOLUTION INTRA-ARTICULAR; INTRALESIONAL; INTRAMUSCULAR; INTRAVENOUS; SOFT TISSUE
Status: DISCONTINUED | OUTPATIENT
Start: 2020-07-24 | End: 2020-07-24

## 2020-07-24 RX ORDER — BACITRACIN ZINC 500 UNIT/G
OINTMENT (GRAM) TOPICAL
Status: DISCONTINUED | OUTPATIENT
Start: 2020-07-24 | End: 2020-07-24 | Stop reason: HOSPADM

## 2020-07-24 RX ORDER — PROPOFOL 10 MG/ML
VIAL (ML) INTRAVENOUS
Status: DISCONTINUED | OUTPATIENT
Start: 2020-07-24 | End: 2020-07-24

## 2020-07-24 RX ORDER — PROPOFOL 10 MG/ML
VIAL (ML) INTRAVENOUS CONTINUOUS PRN
Status: DISCONTINUED | OUTPATIENT
Start: 2020-07-24 | End: 2020-07-24

## 2020-07-24 RX ORDER — FENTANYL CITRATE 50 UG/ML
25 INJECTION, SOLUTION INTRAMUSCULAR; INTRAVENOUS EVERY 5 MIN PRN
Status: DISCONTINUED | OUTPATIENT
Start: 2020-07-24 | End: 2020-10-29

## 2020-07-24 RX ORDER — ONDANSETRON 2 MG/ML
INJECTION INTRAMUSCULAR; INTRAVENOUS
Status: DISCONTINUED | OUTPATIENT
Start: 2020-07-24 | End: 2020-07-24

## 2020-07-24 RX ORDER — ACETAMINOPHEN 500 MG
1000 TABLET ORAL
Status: COMPLETED | OUTPATIENT
Start: 2020-07-24 | End: 2020-07-24

## 2020-07-24 RX ORDER — ONDANSETRON 2 MG/ML
4 INJECTION INTRAMUSCULAR; INTRAVENOUS DAILY PRN
Status: DISCONTINUED | OUTPATIENT
Start: 2020-07-24 | End: 2020-07-24 | Stop reason: HOSPADM

## 2020-07-24 RX ADMIN — FENTANYL CITRATE 100 MCG: 50 INJECTION INTRAMUSCULAR; INTRAVENOUS at 07:07

## 2020-07-24 RX ADMIN — ROPIVACAINE HYDROCHLORIDE 25 ML: 5 INJECTION, SOLUTION EPIDURAL; INFILTRATION; PERINEURAL at 07:07

## 2020-07-24 RX ADMIN — CEFAZOLIN 2 G: 330 INJECTION, POWDER, FOR SOLUTION INTRAMUSCULAR; INTRAVENOUS at 08:07

## 2020-07-24 RX ADMIN — ACETAMINOPHEN 1000 MG: 500 TABLET ORAL at 07:07

## 2020-07-24 RX ADMIN — FAMOTIDINE 20 MG: 10 INJECTION, SOLUTION INTRAVENOUS at 08:07

## 2020-07-24 RX ADMIN — MIDAZOLAM HYDROCHLORIDE 2 MG: 1 INJECTION, SOLUTION INTRAMUSCULAR; INTRAVENOUS at 08:07

## 2020-07-24 RX ADMIN — ONDANSETRON 4 MG: 2 INJECTION INTRAMUSCULAR; INTRAVENOUS at 08:07

## 2020-07-24 RX ADMIN — PROPOFOL 50 MG: 10 INJECTION, EMULSION INTRAVENOUS at 08:07

## 2020-07-24 RX ADMIN — PROPOFOL 50 MCG/KG/MIN: 10 INJECTION, EMULSION INTRAVENOUS at 08:07

## 2020-07-24 RX ADMIN — LIDOCAINE HYDROCHLORIDE 50 MG: 20 INJECTION, SOLUTION INTRAVENOUS at 08:07

## 2020-07-24 RX ADMIN — DEXAMETHASONE SODIUM PHOSPHATE 4 MG: 4 INJECTION, SOLUTION INTRAMUSCULAR; INTRAVENOUS at 08:07

## 2020-07-24 RX ADMIN — SODIUM CHLORIDE 1000 ML: 0.9 INJECTION, SOLUTION INTRAVENOUS at 07:07

## 2020-07-24 NOTE — ANESTHESIA PROCEDURE NOTES
Supracalvicular Single Injection right    Patient location during procedure: pre-op   Block not for primary anesthetic.  Reason for block: at surgeon's request and post-op pain management   Post-op Pain Location: right upper extremity  Start time: 7/24/2020 7:55 AM  Timeout: 7/24/2020 7:53 AM   End time: 7/24/2020 8:00 AM    Staffing  Authorizing Provider: Dianna Amor MD  Performing Provider: Dianna Amor MD    Preanesthetic Checklist  Completed: patient identified, site marked, surgical consent, pre-op evaluation, timeout performed, IV checked, risks and benefits discussed and monitors and equipment checked  Peripheral Block  Patient position: supine  Prep: ChloraPrep  Patient monitoring: heart rate, cardiac monitor, continuous pulse ox, continuous capnometry and frequent blood pressure checks  Block type: supraclavicular  Laterality: right  Injection technique: single shot  Needle  Needle type: Stimuplex   Needle gauge: 22 G  Needle length: 2 in  Needle localization: anatomical landmarks and ultrasound guidance   -ultrasound image captured on disc.  Assessment  Injection assessment: negative aspiration, negative parasthesia and local visualized surrounding nerve  Paresthesia pain: none  Heart rate change: no  Slow fractionated injection: yes  Additional Notes  VSS.  [facility] RN monitoring vitals throughout procedure.  Patient tolerated procedure well.

## 2020-07-24 NOTE — DISCHARGE INSTRUCTIONS
AFTER HAND SURGERY    DOS:   Keep affected wrist elevated above the level of your heart   Check circulation frequently in fingers by pressing on the nail bed. Nail bed should turn white and then pink when released.   Exercise fingers to promote circulation.   Advance diet as tolerated   Resume home medications today.   Keep dressing clean and DRY.   Take you pain medication prior to bedtime on night 1 post op. (7/24/20)      DONT:   No driving for 24 hours or while taking narcotic pain medication   DO NOT TAKE ADDITIONAL TYLENOL/ACETAMINOPHEN WHILE TAKING NARCOTIC PAIN MEDICATION THAT CONTAINS TYLENOL/ACETAMINOPHEN.   Do not remove dressing.     CALL PHYSICIAN FOR:   Obvious bleeding   Excessive swelling   Drainage (pus) from the wound   Pain unrelieved by pain medication.

## 2020-07-24 NOTE — ANESTHESIA POSTPROCEDURE EVALUATION
Anesthesia Post Evaluation    Patient: Luz Burk    Procedure(s) Performed: Procedure(s) (LRB):  RELEASE, CARPAL TUNNEL, RIGHT (Right)  EXCISION, GANGLION CYST, WRIST, RIGHT volar (Right)    Final Anesthesia Type: general    Patient location during evaluation: PACU  Patient participation: Yes- Able to Participate  Level of consciousness: awake and alert  Post-procedure vital signs: reviewed and stable  Pain management: adequate  Airway patency: patent    PONV status at discharge: No PONV  Anesthetic complications: no      Cardiovascular status: blood pressure returned to baseline  Respiratory status: unassisted, spontaneous ventilation and room air  Hydration status: euvolemic  Follow-up not needed.          Vitals Value Taken Time   /63 07/24/20 0912   Temp  07/24/20 0916   Pulse 63 07/24/20 0915   Resp 21 07/24/20 0915   SpO2 100 % 07/24/20 0915   Vitals shown include unvalidated device data.      No case tracking events are documented in the log.      Pain/Dawson Score: Pain Rating Prior to Med Admin: 0 (7/24/2020  7:21 AM)  Dawson Score: 9 (7/24/2020  8:15 AM)

## 2020-07-24 NOTE — BRIEF OP NOTE
[facility]  Brief Operative Note    Surgery Date: 7/24/2020     Surgeon(s) and Role:     * Sugey Ayon MD - Primary    Assisting Surgeon: None    Pre-op Diagnosis:  Right carpal tunnel syndrome [G56.01]  Ganglion cyst of volar aspect of right wrist [M67.431]    Post-op Diagnosis:  Post-Op Diagnosis Codes:     * Right carpal tunnel syndrome [G56.01]     * Ganglion cyst of volar aspect of right wrist [M67.431]    Procedure(s) (LRB):  RELEASE, CARPAL TUNNEL, RIGHT (Right)  EXCISION, GANGLION CYST, WRIST, RIGHT volar (Right)    Anesthesia: Regional    Description of the findings of the procedure(s): see op note    Estimated Blood Loss: * No values recorded between 7/24/2020  8:32 AM and 7/24/2020  9:04 AM *         Specimens:   Specimen (12h ago, onward)    None            Discharge Note    OUTCOME: Patient tolerated treatment/procedure well without complication and is now ready for discharge.    DISPOSITION: Home or Self Care    FINAL DIAGNOSIS:  Carpal tunnel syndrome on right    FOLLOWUP: In clinic    DISCHARGE INSTRUCTIONS:  No discharge procedures on file.

## 2020-07-24 NOTE — PLAN OF CARE
VSS.  Patient tolerating oral liquids without difficulty. No apparent s&s of distress noted at this time, no complaints voiced at this time. Discharge instructions reviewed with patient and spouse with good verbal feedback received.   Post op medications delivered via bedside delivery. Dr. Ayon at bedside.  Waiting for PA to come to bedside to schedule appt for spouse per dr ayon.  Patient ready for discharge.

## 2020-07-24 NOTE — TRANSFER OF CARE
"Anesthesia Transfer of Care Note    Patient: Luz Burk    Procedure(s) Performed: Procedure(s) (LRB):  RELEASE, CARPAL TUNNEL, RIGHT (Right)  EXCISION, GANGLION CYST, WRIST, RIGHT volar (Right)    Patient location: PACU    Anesthesia Type: general    Transport from OR: Transported from OR on 100% O2 by closed face mask with adequate spontaneous ventilation    Post pain: adequate analgesia    Post assessment: no apparent anesthetic complications and tolerated procedure well    Post vital signs: stable    Level of consciousness: awake, alert and oriented    Nausea/Vomiting: no nausea/vomiting    Complications: none    Transfer of care protocol was followed      Last vitals:   Visit Vitals  /71 (BP Location: Left arm, Patient Position: Lying)   Pulse 65   Temp 36.6 °C (97.8 °F) (Oral)   Resp 14   Ht 5' 4" (1.626 m)   Wt 95.3 kg (210 lb)   LMP  (LMP Unknown)   SpO2 100%   Breastfeeding No   BMI 36.05 kg/m²     "

## 2020-07-27 VITALS
RESPIRATION RATE: 17 BRPM | HEART RATE: 60 BPM | WEIGHT: 210 LBS | SYSTOLIC BLOOD PRESSURE: 131 MMHG | TEMPERATURE: 97 F | HEIGHT: 64 IN | DIASTOLIC BLOOD PRESSURE: 63 MMHG | OXYGEN SATURATION: 99 % | BODY MASS INDEX: 35.85 KG/M2

## 2020-07-27 NOTE — OP NOTE
[facility]  Surgery Department  Operative Note    SUMMARY     Date of Procedure: 7/24/2020     Procedure: Procedure(s) (LRB):  RELEASE, CARPAL TUNNEL, RIGHT (Right)  EXCISION, GANGLION CYST, WRIST, RIGHT volar (Right)     Surgeon(s) and Role:     * Sugey Ayon MD - Primary    Assisting Surgeon: Rob PEREZ plastics resdent    Pre-Operative Diagnosis: Right carpal tunnel syndrome [G56.01]  Ganglion cyst of volar aspect of right wrist [M67.431]    Post-Operative Diagnosis: Post-Op Diagnosis Codes:     * Right carpal tunnel syndrome [G56.01]     * Ganglion cyst of volar aspect of right wrist [M67.431]    Anesthesia: Regional    Technical Procedures Used: surgery    Description of the Findings of the Procedure:  INDICATIONS FOR PROCEDURE: Ms. Burk is a 69-year-old fe,male who had numbness   and tingling into her right hand. She has failed conservative treatment, EMG   was positive for carpal tunnel syndrome. Therefore, operative intervention was   deemed necessary. Risks and benefits were explained to the patient in clinic   since performed in clinic.  She also had a volar ganglion cyst on the same site that she wanted removed at time of her carpal tunnel release  PROCEDURE IN DETAIL: After correct site was marked with the patient's   participation in the holding area, the patient was brought to the Operating   Room, placed in supine position, underwent MAC anesthesia. A well-padded   nonsterile tourniquet was placed on the right arm. A time-out was called for   correct site, procedure and patient to be indicated. Under sterile conditions,   an injection of lidocaine 1% was injected into the carpal tunnel space. The arm   was prepped and draped in normal sterile fashion. The incision was marked out   using Oro cardinal lines. The arm was exsanguinated using Esmarch.   Tourniquet was insufflated to 250 mmHg and that is where it remained for a total   of 15 minutes. The incision was made  down to the palmar fascia. The palmar   fascia was sharply incised. The transverse ligament was identified. It was   very thick in nature. It was sharply incised. Median nerve was identified. A   Mosquito hemostat was passed from the undersurface of the transverse ligament   proximally and distally to free it from the median nerve. Metzenbaum scissors   were utilized to cut the transverse ligament proximally and distally. Once that   was completely released, a Mosquito hemostat was passed again to confirm a   complete release. Once that was performed, the area was irrigated with copious   amounts of normal saline. Nylon closed the skin.  Our attention was turned to the ganglion cyst V type incision was marked out considering it did cross the wrist crease very radial and the incision was made careful dissection around this was maintained at the cyst did appear to come from the joint itself was very small in nature less then a cm it was removed in piecemeal with a rongeur down to the level of the joint of note patient had a very bulbous vein at this point it was very swollen it was cauterized appropriately no other masses were identified tourniquet was deflated prior to closure the areas were irrigated copious amounts normal saline nylon closed the skin sterile dressing was applied patient was placed in a well-padded splint tolerated suture was brought to cover area in stable condition POSTOPERATIVE PLAN FOR THIS PATIENT: She is to keep her dressing clean, dry and   intact. We will see her back in 2 weeks for stitch removal.      Significant Surgical Tasks Conducted by the Assistant(s), if Applicable: none    Complications: No    Estimated Blood Loss (EBL): * No values recorded between 7/24/2020  8:32 AM and 7/24/2020  9:04 AM *           Implants: * No implants in log *    Specimens:   Specimen (12h ago, onward)    None                  Condition: Good    Disposition: PACU - hemodynamically stable.    Attestation: I  performed the procedure.    Discharge Note    SUMMARY     Admit Date: 7/24/2020    Discharge Date and Time: 7/24/2020 10:00 AM    Hospital Course (synopsis of major diagnoses, care, treatment, and services provided during the course of the hospital stay): surgery     Final Diagnosis: Post-Op Diagnosis Codes:     * Right carpal tunnel syndrome [G56.01]     * Ganglion cyst of volar aspect of right wrist [M67.431]    Disposition: Home or Self Care    Follow Up/Patient Instructions:     Medications:  Reconciled Home Medications:      Medication List      CONTINUE taking these medications    acyclovir 400 MG tablet  Commonly known as: ZOVIRAX  Take 3 times a day for 5 days at the first sign of a cold sore.     BIOTIN ORAL  Take by mouth once daily.     CANNABIDIOL (CBD) EXTRACT ORAL     citalopram 20 MG tablet  Commonly known as: CELEXA  Take 1 tablet by mouth once daily     cyanocobalamin 1,000 mcg/mL injection  INJECT 1 ML INTRAMUSCULARLY EVERY 30 DAYS.     ergocalciferol 50,000 unit Cap  Commonly known as: ERGOCALCIFEROL  Take 1 capsule (50,000 Units total) by mouth every 7 days.     EUTHYROX 50 MCG tablet  Generic drug: levothyroxine  TAKE 1 TABLET BY MOUTH BEFORE BREAKFAST     fluticasone propionate 50 mcg/actuation nasal spray  Commonly known as: FLONASE  1 spray by Nasal route once daily.     furosemide 20 MG tablet  Commonly known as: LASIX  Take 1 tablet (20 mg total) by mouth once daily.     HYDROcodone-acetaminophen 5-325 mg per tablet  Commonly known as: NORCO  Take 1 tablet by mouth every 4 (four) hours as needed for Pain.     Lactobacillus rhamnosus GG 10 billion cell capsule  Commonly known as: CULTURELLE  Take 1 capsule by mouth once daily.     LINZESS 145 mcg Cap capsule  Generic drug: linaCLOtide  Take 1 capsule (145 mcg total) by mouth once daily.     loratadine 10 mg tablet  Commonly known as: CLARITIN  Take 1 tablet (10 mg total) by mouth once daily.     melatonin 10 mg Tab  Take by mouth.      potassium chloride 10 MEQ Tbsr  Commonly known as: KLOR-CON  TAKE TWO TABLETS BY MOUTH ONCE DAILY WITH LASIX     THERA-M ORAL     turmeric 400 mg Cap     TYLENOL ORAL  Take by mouth every 6 (six) hours as needed.     VITAMIN C 100 MG tablet  Generic drug: ascorbic acid (vitamin C)  Take 500 mg by mouth once daily.        ASK your doctor about these medications    clindamycin 300 MG capsule  Commonly known as: CLEOCIN  Take 1 capsule (300 mg total) by mouth every 8 (eight) hours.     docusate sodium 100 MG capsule  Commonly known as: COLACE  Take 1 capsule (100 mg total) by mouth 2 (two) times daily.     ginger (Zingiber officinalis) 500 mg Cap  Take by mouth.     ofloxacin 0.3 % ophthalmic solution  Commonly known as: OCUFLOX  Place 1 drop into both eyes 4 (four) times daily.     UCERIS 9 mg Tade  Generic drug: budesonide  Take 1 tablet (9 mg total) by mouth once daily.          No discharge procedures on file.

## 2020-07-27 NOTE — OP NOTE
[facility]  Surgery Department  Operative Note    SUMMARY     Date of Procedure: 7/24/2020     Procedure: Procedure(s) (LRB):  RELEASE, CARPAL TUNNEL, RIGHT (Right)  EXCISION, GANGLION CYST, WRIST, RIGHT volar (Right)     Surgeon(s) and Role:     * Sugey Ayon MD - Primary    Assisting Surgeon: None    Pre-Operative Diagnosis: Right carpal tunnel syndrome [G56.01]  Ganglion cyst of volar aspect of right wrist [M67.431]    Post-Operative Diagnosis: Post-Op Diagnosis Codes:     * Right carpal tunnel syndrome [G56.01]     * Ganglion cyst of volar aspect of right wrist [M67.431]    Anesthesia: Regional    Technical Procedures Used: surgery    Description of the Findings of the Procedure:INDICATIONS FOR PROCEDURE: Ms Burk is a 69-year-old female who had numbness   and tingling into her right hand. She has failed conservative treatment, EMG   was positive for carpal tunnel syndrome. Therefore, operative intervention was   deemed necessary. Risks and benefits were explained to the patient in clinic   since performed in clinic.   PROCEDURE IN DETAIL: After correct site was marked with the patient's   participation in the holding area, the patient was brought to the Operating   Room, placed in supine position, underwent MAC anesthesia. A well-padded   nonsterile tourniquet was placed on the right arm. A time-out was called for   correct site, procedure and patient to be indicated. Under sterile conditions,   an injection of lidocaine 1% was injected into the carpal tunnel space. The arm   was prepped and draped in normal sterile fashion. The incision was marked out   using Oro cardinal lines. The arm was exsanguinated using Esmarch.   Tourniquet was insufflated to 250 mmHg and that is where it remained for a total   of 20 minutes. The incision was made down to the palmar fascia. The palmar   fascia was sharply incised. The transverse ligament was identified. It was   very thick in nature.  It was sharply incised. Median nerve was identified. A   Mosquito hemostat was passed from the undersurface of the transverse ligament   proximally and distally to free it from the median nerve. Metzenbaum scissors   were utilized to cut the transverse ligament proximally and distally. Once that   was completely released, a Mosquito hemostat was passed again to confirm a   complete release. Once that was performed, the area was irrigated with copious   amounts of normal saline. Nylon closed the skin.   Our attention was turned to the volar ganglion cyst the incision was made careful dissection around the cyst was maintained the cyst was excised there was large vein the of this was also cauterized the cyst was removed in completion it was less than 1 cm in size areas irrigated copious amounts normal saline nylon closed the skin. Sterile dressing was applied.   Tourniquet was deflated. Brisk capillary refill ensued.  Splint was placed across the wrist crease The patient was   transported to the Recovery Room in stable condition.   POSTOPERATIVE PLAN FOR THIS PATIENT: She is to keep her dressing clean, dry and   intact. We will see her back in 2 weeks for stitch removal.      Significant Surgical Tasks Conducted by the Assistant(s), if Applicable: retraction    Complications: No    Estimated Blood Loss (EBL): * No values recorded between 7/24/2020  8:32 AM and 7/24/2020  9:04 AM *           Implants: * No implants in log *    Specimens:   Specimen (12h ago, onward)    None                  Condition: Good    Disposition: PACU - hemodynamically stable.    Attestation: I performed the procedure.    Discharge Note    SUMMARY     Admit Date: 7/24/2020    Discharge Date and Time: 7/24/2020 10:00 AM    Hospital Course (synopsis of major diagnoses, care, treatment, and services provided during the course of the hospital stay): surgery     Final Diagnosis: Post-Op Diagnosis Codes:     * Right carpal tunnel syndrome [G56.01]      * Ganglion cyst of volar aspect of right wrist [M67.431]    Disposition: Home or Self Care    Follow Up/Patient Instructions:     Medications:  Reconciled Home Medications:      Medication List      CONTINUE taking these medications    acyclovir 400 MG tablet  Commonly known as: ZOVIRAX  Take 3 times a day for 5 days at the first sign of a cold sore.     BIOTIN ORAL  Take by mouth once daily.     CANNABIDIOL (CBD) EXTRACT ORAL     citalopram 20 MG tablet  Commonly known as: CELEXA  Take 1 tablet by mouth once daily     cyanocobalamin 1,000 mcg/mL injection  INJECT 1 ML INTRAMUSCULARLY EVERY 30 DAYS.     ergocalciferol 50,000 unit Cap  Commonly known as: ERGOCALCIFEROL  Take 1 capsule (50,000 Units total) by mouth every 7 days.     EUTHYROX 50 MCG tablet  Generic drug: levothyroxine  TAKE 1 TABLET BY MOUTH BEFORE BREAKFAST     fluticasone propionate 50 mcg/actuation nasal spray  Commonly known as: FLONASE  1 spray by Nasal route once daily.     furosemide 20 MG tablet  Commonly known as: LASIX  Take 1 tablet (20 mg total) by mouth once daily.     HYDROcodone-acetaminophen 5-325 mg per tablet  Commonly known as: NORCO  Take 1 tablet by mouth every 4 (four) hours as needed for Pain.     Lactobacillus rhamnosus GG 10 billion cell capsule  Commonly known as: CULTURELLE  Take 1 capsule by mouth once daily.     LINZESS 145 mcg Cap capsule  Generic drug: linaCLOtide  Take 1 capsule (145 mcg total) by mouth once daily.     loratadine 10 mg tablet  Commonly known as: CLARITIN  Take 1 tablet (10 mg total) by mouth once daily.     melatonin 10 mg Tab  Take by mouth.     potassium chloride 10 MEQ Tbsr  Commonly known as: KLOR-CON  TAKE TWO TABLETS BY MOUTH ONCE DAILY WITH LASIX     THERA-M ORAL     turmeric 400 mg Cap     TYLENOL ORAL  Take by mouth every 6 (six) hours as needed.     VITAMIN C 100 MG tablet  Generic drug: ascorbic acid (vitamin C)  Take 500 mg by mouth once daily.        ASK your doctor about these medications     clindamycin 300 MG capsule  Commonly known as: CLEOCIN  Take 1 capsule (300 mg total) by mouth every 8 (eight) hours.     docusate sodium 100 MG capsule  Commonly known as: COLACE  Take 1 capsule (100 mg total) by mouth 2 (two) times daily.     dewey (Zingiber officinalis) 500 mg Cap  Take by mouth.     ofloxacin 0.3 % ophthalmic solution  Commonly known as: OCUFLOX  Place 1 drop into both eyes 4 (four) times daily.     UCERIS 9 mg Tade  Generic drug: budesonide  Take 1 tablet (9 mg total) by mouth once daily.          No discharge procedures on file.

## 2020-07-30 LAB
FINAL PATHOLOGIC DIAGNOSIS: NORMAL
GROSS: NORMAL

## 2020-08-03 ENCOUNTER — OFFICE VISIT (OUTPATIENT)
Dept: ORTHOPEDICS | Facility: CLINIC | Age: 70
End: 2020-08-03
Payer: MEDICARE

## 2020-08-03 ENCOUNTER — TELEPHONE (OUTPATIENT)
Dept: ORTHOPEDICS | Facility: CLINIC | Age: 70
End: 2020-08-03

## 2020-08-03 ENCOUNTER — DOCUMENTATION ONLY (OUTPATIENT)
Dept: ORTHOPEDICS | Facility: CLINIC | Age: 70
End: 2020-08-03

## 2020-08-03 DIAGNOSIS — Z98.890 POST-OPERATIVE STATE: Primary | ICD-10-CM

## 2020-08-03 PROCEDURE — 99999 PR PBB SHADOW E&M-EST. PATIENT-LVL II: CPT | Mod: PBBFAC,,, | Performed by: PHYSICIAN ASSISTANT

## 2020-08-03 PROCEDURE — 99024 PR POST-OP FOLLOW-UP VISIT: ICD-10-PCS | Mod: S$GLB,,, | Performed by: PHYSICIAN ASSISTANT

## 2020-08-03 PROCEDURE — 99999 PR PBB SHADOW E&M-EST. PATIENT-LVL II: ICD-10-PCS | Mod: PBBFAC,,, | Performed by: PHYSICIAN ASSISTANT

## 2020-08-03 PROCEDURE — 99024 POSTOP FOLLOW-UP VISIT: CPT | Mod: S$GLB,,, | Performed by: PHYSICIAN ASSISTANT

## 2020-08-03 NOTE — TELEPHONE ENCOUNTER
Patient called in to centralized scheduling and said her splint was cutting her and she needed a dressing change.I asked if she could make it her before 11:00am to day the patient agreed.   [facility]  PROCEDURE NOTE    PREOPERATIVE DIAGNOSIS:  1.  Venous lake of the right upper eyelid    PREPROCEDURE PATHOLOGY:   None    POSTOPERATIVE DIAGNOSIS:  Same    PROCEDURE PERFORMED:  1.  Excision of venous lake from the right upper eyelid with a lesion plus margin excision diameter of 0.8 cm.  2.  Simple closure of right upper eyelid wound measuring 0.8 cm.    Surgeon:  Blair Cao MD     Anesthesia:  1% lidocaine with epinephrine, 1.5 mL    Skin Preparation:  betadine    Complications:  None     Estimated Blood Loss: None     Pre-procedure Antibiotics:  None     Specimens:   1.  Skin ellipse with underlying venous lake from the right upper eyelid.      The lesion was placed directly into a specimen container(s), marked with the patient's name and sent for pathologic diagnosis.    Findings:     Lesion:  Venous lake the right upper eyelid that measured 0.8 x 0.5 cm   Margin:  Not applicable    PATIENT COUNSELING  Patient referred by self. The problematic lesion was identified with a mirror and confirmed by the patient. I discussed the recommended surgical options with the patient.  I offered observation or excision of the venous lake and closure.  The incisions and follow up care were discussed in detail with the patient.     Risks and benefits of venous lake excision were discussed in detail with the patient.  Risks of excision include but are not limited to: bleeding, infection, poor cosmesis, necrosis of tissue, poor wound healing that requires additional wound care or surgery, pain, numbness, and injury to nerves, bloods vessels or adjacent structures.      It was discussed with the patient that if the pathologic margins still demonstrate cancer, further excision would be required.  In addition the risk of recurrence of the  venous lake was discussed with the patient.      It was explained to the patient that all surgical excisions result in a permanent scar. On average  scars take a year to fully mature.  After full maturation additional procedures or scar revisions may need to be performed.  Alternatives of the procedure were also discussed with the patient which include no excision.  All of the patient's questions were answered.         DESCRIPTION OF PROCEDURE:    The patient rested in the supine  position.  Measurements and markings were made as indicated in the findings section above.  Anesthesia was administered followed by skin preparation.  A scalpel was used to incise the skin.  Sharp dissection was used to deepen the dissection into the subdermal layer with the venous lake was identified and completely removed leaving the orbicularis muscle intact.  The wound was irrigated with sterile saline solution and hemostasis was obtained with pressure and minor cautery as needed.    Skin closure was completed by placing 5-0 fast absorbing suture in a interrupted fashion.       Sterile dressings were applied and the patient was given both verbal and written forms of wound care instructions.      PLAN:   · We will call the patient with pathology results in 7-10 days.   · The patient will not require a follow-up in our office for suture removal.

## 2020-08-03 NOTE — PROGRESS NOTES
Pt presents for a splint change. She is s/p right carpal tunnel release and right ganglion wrist excision by Dr. Ayon on 7/24/20. She reports the splint is rubbing on the incision.     New right volar slab plaster splint applied.     RTC at routine post op.

## 2020-08-07 ENCOUNTER — OFFICE VISIT (OUTPATIENT)
Dept: ORTHOPEDICS | Facility: CLINIC | Age: 70
End: 2020-08-07
Payer: MEDICARE

## 2020-08-07 ENCOUNTER — TELEPHONE (OUTPATIENT)
Dept: BARIATRICS | Facility: CLINIC | Age: 70
End: 2020-08-07

## 2020-08-07 VITALS
HEIGHT: 64 IN | WEIGHT: 210.13 LBS | BODY MASS INDEX: 35.87 KG/M2 | SYSTOLIC BLOOD PRESSURE: 138 MMHG | HEART RATE: 84 BPM | DIASTOLIC BLOOD PRESSURE: 84 MMHG

## 2020-08-07 DIAGNOSIS — Z98.890 POST-OPERATIVE STATE: Primary | ICD-10-CM

## 2020-08-07 PROCEDURE — 99024 POSTOP FOLLOW-UP VISIT: CPT | Mod: S$GLB,,, | Performed by: PHYSICIAN ASSISTANT

## 2020-08-07 PROCEDURE — 99999 PR PBB SHADOW E&M-EST. PATIENT-LVL IV: ICD-10-PCS | Mod: PBBFAC,,, | Performed by: PHYSICIAN ASSISTANT

## 2020-08-07 PROCEDURE — 99024 PR POST-OP FOLLOW-UP VISIT: ICD-10-PCS | Mod: S$GLB,,, | Performed by: PHYSICIAN ASSISTANT

## 2020-08-07 PROCEDURE — 99999 PR PBB SHADOW E&M-EST. PATIENT-LVL IV: CPT | Mod: PBBFAC,,, | Performed by: PHYSICIAN ASSISTANT

## 2020-08-07 NOTE — PROGRESS NOTES
"Ms. Burk is here today for a post-operative visit.  She is 14 days status post right carpal tunnel release and right volar ganglion cyst excision by Dr. Ayon on 7/24/20. She reports that she is doing well.  Pain is minimal, some soreness over the incision.  She is not taking pain medication.  She denies fever, chills, and sweats since the time of the surgery.     Physical exam:    Vitals:    08/07/20 1304   BP: 138/84   Pulse: 84   Weight: 95.3 kg (210 lb 1.6 oz)   Height: 5' 4" (1.626 m)   PainSc:   7     Vital signs are stable, patient is afebrile.  Patient is well dressed and well groomed, no acute distress.  Alert and oriented to person, place, and time.  Post op dressing taken down.  Incisions are clean, dry and intact.  There is no erythema or exudate.  There is no sign of any infection. She is NVI. Sutures removed without difficulty.      Pathology   Soft tissue, right wrist (biopsy):  - Consistent with benign ganglion cyst, with focal calcification.    Assessment: status post right carpal tunnel release and right volar ganglion cyst excision by Dr. Ayon on 7/24/20    Plan:  Luz was seen today for post-op evaluation.    Diagnoses and all orders for this visit:    Post-operative state      - PO instruction reviewed and provided to patient  -pathology report reviewed with pt  -right gel brace provided for prn use   -RTC prn       "

## 2020-08-28 ENCOUNTER — PATIENT MESSAGE (OUTPATIENT)
Dept: ORTHOPEDICS | Facility: CLINIC | Age: 70
End: 2020-08-28

## 2020-08-31 ENCOUNTER — OFFICE VISIT (OUTPATIENT)
Dept: ORTHOPEDICS | Facility: CLINIC | Age: 70
End: 2020-08-31
Payer: MEDICARE

## 2020-08-31 VITALS
HEART RATE: 75 BPM | BODY MASS INDEX: 35.85 KG/M2 | SYSTOLIC BLOOD PRESSURE: 124 MMHG | WEIGHT: 210 LBS | HEIGHT: 64 IN | DIASTOLIC BLOOD PRESSURE: 75 MMHG

## 2020-08-31 DIAGNOSIS — M18.11 ARTHRITIS OF CARPOMETACARPAL (CMC) JOINT OF RIGHT THUMB: Primary | ICD-10-CM

## 2020-08-31 PROCEDURE — 77002 NEEDLE LOCALIZATION BY XRAY: CPT | Mod: 26,S$GLB,, | Performed by: PHYSICIAN ASSISTANT

## 2020-08-31 PROCEDURE — 99024 PR POST-OP FOLLOW-UP VISIT: ICD-10-PCS | Mod: S$GLB,,, | Performed by: PHYSICIAN ASSISTANT

## 2020-08-31 PROCEDURE — 20600 PR DRAIN/INJECT SMALL JOINT/BURSA: ICD-10-PCS | Mod: 79,RT,S$GLB, | Performed by: PHYSICIAN ASSISTANT

## 2020-08-31 PROCEDURE — 99024 POSTOP FOLLOW-UP VISIT: CPT | Mod: S$GLB,,, | Performed by: PHYSICIAN ASSISTANT

## 2020-08-31 PROCEDURE — 20600 DRAIN/INJ JOINT/BURSA W/O US: CPT | Mod: 79,RT,S$GLB, | Performed by: PHYSICIAN ASSISTANT

## 2020-08-31 PROCEDURE — 99999 PR PBB SHADOW E&M-EST. PATIENT-LVL IV: CPT | Mod: PBBFAC,,, | Performed by: PHYSICIAN ASSISTANT

## 2020-08-31 PROCEDURE — 99999 PR PBB SHADOW E&M-EST. PATIENT-LVL IV: ICD-10-PCS | Mod: PBBFAC,,, | Performed by: PHYSICIAN ASSISTANT

## 2020-08-31 PROCEDURE — 77002 PR FLUOROSCOPIC GUIDANCE NEEDLE PLACEMENT: ICD-10-PCS | Mod: 26,S$GLB,, | Performed by: PHYSICIAN ASSISTANT

## 2020-08-31 RX ORDER — DEXAMETHASONE SODIUM PHOSPHATE 4 MG/ML
4 INJECTION, SOLUTION INTRA-ARTICULAR; INTRALESIONAL; INTRAMUSCULAR; INTRAVENOUS; SOFT TISSUE
Status: COMPLETED | OUTPATIENT
Start: 2020-08-31 | End: 2020-08-31

## 2020-08-31 RX ORDER — LIDOCAINE HYDROCHLORIDE 10 MG/ML
0.5 INJECTION, SOLUTION EPIDURAL; INFILTRATION; INTRACAUDAL; PERINEURAL ONCE
Status: COMPLETED | OUTPATIENT
Start: 2020-08-31 | End: 2020-08-31

## 2020-08-31 RX ADMIN — LIDOCAINE HYDROCHLORIDE 5 MG: 10 INJECTION, SOLUTION EPIDURAL; INFILTRATION; INTRACAUDAL; PERINEURAL at 03:08

## 2020-08-31 RX ADMIN — DEXAMETHASONE SODIUM PHOSPHATE 4 MG: 4 INJECTION, SOLUTION INTRA-ARTICULAR; INTRALESIONAL; INTRAMUSCULAR; INTRAVENOUS; SOFT TISSUE at 02:08

## 2020-08-31 NOTE — PROGRESS NOTES
"Ms. Burk is here today for a post-operative visit.  She is 5 weeks status post right carpal tunnel release and right volar ganglion cyst excision by Dr. Ayon on 7/24/20. She reports that she is doing okay. She reports pain at the base of the right thumb, it is increased with use especially gripping/ turning objects.  She denies fever, chills, and sweats since the time of the surgery.       Physical exam:    Vitals:    08/31/20 1315   BP: 124/75   Pulse: 75   Weight: 95.3 kg (210 lb)   Height: 5' 4" (1.626 m)   PainSc:   9   PainLoc: Wrist     Vital signs are stable, patient is afebrile.  Patient is well dressed and well groomed, no acute distress.  Alert and oriented to person, place, and time.  Incisions are clean, dry and intact.  There is no erythema or exudate.  There is no sign of any infection. She is NVI. ttp at the right thumb CMC.     Pathology   Soft tissue, right wrist (biopsy):  - Consistent with benign ganglion cyst, with focal calcification.    Assessment: status post right carpal tunnel release and right volar ganglion cyst excision by Dr. Ayon on 7/24/20    Plan:  Luz was seen today for post-op evaluation.    Diagnoses and all orders for this visit:    Arthritis of carpometacarpal (CMC) joint of right thumb  -     lidocaine (PF) 10 mg/ml (1%) injection 5 mg  -     dexamethasone injection 4 mg      - PO instruction reviewed and provided to patient  -right thumb CMC injection today. Info provided on comfort cool. She has a paraffin machine but has not been using in.   -RTC prn        PROCEDURE:  I have explained the risks, benefits, and alternatives of the procedure in detail.  The patient voices understanding and all questions have been answered.  The patient agrees to proceed as planned. Fluoroscopy used. So after a sterile prep of the skin in the normal fashion the right thumb CMC is injected using a 25 gauge needle with a combination of 0.5cc 1% plain lidocaine and 4 mg of " dexamethasone.  The patient is cautioned and immediate relief of pain is secondary to the local anesthetic and will be temporary.  After the anesthetic wears off there may be a increase in pain that may last for a few hours or a few days and they should use ice to help alleviate this flair up of pain. Patient tolerated the procedure well.

## 2020-09-05 ENCOUNTER — OFFICE VISIT (OUTPATIENT)
Dept: FAMILY MEDICINE | Facility: CLINIC | Age: 70
End: 2020-09-05
Payer: MEDICARE

## 2020-09-05 VITALS
HEART RATE: 72 BPM | BODY MASS INDEX: 36.56 KG/M2 | OXYGEN SATURATION: 97 % | DIASTOLIC BLOOD PRESSURE: 76 MMHG | SYSTOLIC BLOOD PRESSURE: 112 MMHG | WEIGHT: 212.94 LBS

## 2020-09-05 DIAGNOSIS — L03.119 CELLULITIS OF FOOT: Primary | ICD-10-CM

## 2020-09-05 PROCEDURE — 3008F BODY MASS INDEX DOCD: CPT | Mod: CPTII,S$GLB,, | Performed by: FAMILY MEDICINE

## 2020-09-05 PROCEDURE — 1159F PR MEDICATION LIST DOCUMENTED IN MEDICAL RECORD: ICD-10-PCS | Mod: S$GLB,,, | Performed by: FAMILY MEDICINE

## 2020-09-05 PROCEDURE — 1159F MED LIST DOCD IN RCRD: CPT | Mod: S$GLB,,, | Performed by: FAMILY MEDICINE

## 2020-09-05 PROCEDURE — 1101F PR PT FALLS ASSESS DOC 0-1 FALLS W/OUT INJ PAST YR: ICD-10-PCS | Mod: CPTII,S$GLB,, | Performed by: FAMILY MEDICINE

## 2020-09-05 PROCEDURE — 99213 PR OFFICE/OUTPT VISIT, EST, LEVL III, 20-29 MIN: ICD-10-PCS | Mod: S$GLB,,, | Performed by: FAMILY MEDICINE

## 2020-09-05 PROCEDURE — 1101F PT FALLS ASSESS-DOCD LE1/YR: CPT | Mod: CPTII,S$GLB,, | Performed by: FAMILY MEDICINE

## 2020-09-05 PROCEDURE — 1126F PR PAIN SEVERITY QUANTIFIED, NO PAIN PRESENT: ICD-10-PCS | Mod: S$GLB,,, | Performed by: FAMILY MEDICINE

## 2020-09-05 PROCEDURE — 3008F PR BODY MASS INDEX (BMI) DOCUMENTED: ICD-10-PCS | Mod: CPTII,S$GLB,, | Performed by: FAMILY MEDICINE

## 2020-09-05 PROCEDURE — 1126F AMNT PAIN NOTED NONE PRSNT: CPT | Mod: S$GLB,,, | Performed by: FAMILY MEDICINE

## 2020-09-05 PROCEDURE — 99213 OFFICE O/P EST LOW 20 MIN: CPT | Mod: S$GLB,,, | Performed by: FAMILY MEDICINE

## 2020-09-05 RX ORDER — CLINDAMYCIN HYDROCHLORIDE 300 MG/1
300 CAPSULE ORAL 3 TIMES DAILY
Qty: 21 CAPSULE | Refills: 0 | Status: SHIPPED | OUTPATIENT
Start: 2020-09-05 | End: 2021-01-11

## 2020-09-05 NOTE — PROGRESS NOTES
Chief Complaint  Chief Complaint   Patient presents with    Foot Pain     cut on screw around 8/18       HPI  Luz Burk is a 70 y.o. female with multiple medical diagnoses as listed in the medical history and problem list that presents for fever and infected foot.  About 1 week ago cut her foot on a screw.  Seemed to be healing well.  Tetnus is up to date.  Started having swelling and increased pain and fever about 2 days ago.        PAST MEDICAL HISTORY:  Past Medical History:   Diagnosis Date    Cataract     Collagenous colitis     Dx 5/30/16    Hypothyroidism     OA (osteoarthritis)     Obesity     Sleep apnea        PAST SURGICAL HISTORY:  Past Surgical History:   Procedure Laterality Date    APPENDECTOMY      CARPAL TUNNEL RELEASE Right 7/24/2020    Procedure: RELEASE, CARPAL TUNNEL, RIGHT;  Surgeon: Sugey Ayon MD;  Location: [facility];  Service: Orthopedics;  Laterality: Right;  [facility]    CATARACT EXTRACTION      CHOLECYSTECTOMY      COLONOSCOPY N/A 5/30/2016    Procedure: COLONOSCOPY;  Surgeon: BINH Souza MD;  Location: [address]);  Service: Endoscopy;  Laterality: N/A;    COLONOSCOPY N/A 6/17/2020    Procedure: COLONOSCOPYSuprep;  Surgeon: Antonio Weller MD;  Location: [facility];  Service: Endoscopy;  Laterality: N/A;    ESOPHAGOGASTRODUODENOSCOPY N/A 6/17/2020    Procedure: EGD (ESOPHAGOGASTRODUODENOSCOPY);  Surgeon: Antonio Weller MD;  Location: [facility];  Service: Endoscopy;  Laterality: N/A;    EXCISION OF GANGLION OF WRIST Right 7/24/2020    Procedure: EXCISION, GANGLION CYST, WRIST, RIGHT volar;  Surgeon: Sugey Ayon MD;  Location: [facility];  Service: Orthopedics;  Laterality: Right;  [facility]    HERNIA REPAIR      HIATAL HERNIA REPAIR      HYSTERECTOMY      lap band surgery  10/11/2011    Realize C Band (11mL)    LAPAROSCOPIC GASTRIC BANDING      OOPHORECTOMY      TONSILLECTOMY      TOTAL KNEE ARTHROPLASTY  7/23/12     VENTRICULOPERITONEAL SHUNT         SOCIAL HISTORY:  Social History     Socioeconomic History    Marital status:      Spouse name: Not on file    Number of children: Not on file    Years of education: Not on file    Highest education level: Not on file   Occupational History    Not on file   Social Needs    Financial resource strain: Not very hard    Food insecurity     Worry: Never true     Inability: Never true    Transportation needs     Medical: No     Non-medical: No   Tobacco Use    Smoking status: Former Smoker     Packs/day: 2.00     Years: 25.00     Pack years: 50.00     Types: Cigarettes     Quit date: 2002     Years since quittin.6    Smokeless tobacco: Never Used   Substance and Sexual Activity    Alcohol use: Yes     Alcohol/week: 0.0 standard drinks     Frequency: 2-3 times a week     Drinks per session: 1 or 2     Binge frequency: Never     Comment: occasionally    Drug use: No    Sexual activity: Yes     Partners: Male   Lifestyle    Physical activity     Days per week: 5 days     Minutes per session: 60 min    Stress: Not at all   Relationships    Social connections     Talks on phone: More than three times a week     Gets together: More than three times a week     Attends Yazdanism service: Not on file     Active member of club or organization: No     Attends meetings of clubs or organizations: Not on file     Relationship status:    Other Topics Concern    Not on file   Social History Narrative    Not on file       FAMILY HISTORY:  Family History   Problem Relation Age of Onset    Alzheimer's disease Mother 84    Prostate cancer Father 70    Lupus Father     Emphysema Father     Breast cancer Sister     Cancer Unknown     Arthritis Unknown     Colon cancer Neg Hx        ALLERGIES AND MEDICATIONS: updated and reviewed.  Review of patient's allergies indicates:   Allergen Reactions    Omeprazole Diarrhea     Current Outpatient Medications   Medication  Sig Dispense Refill    ACETAMINOPHEN (TYLENOL ORAL) Take by mouth every 6 (six) hours as needed.       acyclovir (ZOVIRAX) 400 MG tablet Take 3 times a day for 5 days at the first sign of a cold sore. 60 tablet 11    ascorbic acid (VITAMIN C) 100 MG tablet Take 500 mg by mouth once daily.       BIOTIN ORAL Take by mouth once daily.      calcium carbonate-vitamin D3 600mg (1,000mg) -1,000 unit Tab       CANNABIDIOL, CBD, EXTRACT ORAL       citalopram (CELEXA) 20 MG tablet Take 1 tablet by mouth once daily 90 tablet 3    cyanocobalamin 1,000 mcg/mL injection INJECT 1 ML INTRAMUSCULARLY EVERY 30 DAYS. 10 mL 5    docusate sodium (COLACE) 100 MG capsule Take 1 capsule (100 mg total) by mouth 2 (two) times daily. 180 capsule 3    ergocalciferol (ERGOCALCIFEROL) 50,000 unit Cap Take 1 capsule (50,000 Units total) by mouth every 7 days. 13 capsule 3    EUTHYROX 50 mcg tablet TAKE 1 TABLET BY MOUTH BEFORE BREAKFAST 90 tablet 3    fluticasone (FLONASE) 50 mcg/actuation nasal spray 1 spray by Nasal route once daily.       furosemide (LASIX) 20 MG tablet Take 1 tablet (20 mg total) by mouth once daily. 30 tablet 11    HYDROcodone-acetaminophen (NORCO) 5-325 mg per tablet Take 1 tablet by mouth every 4 (four) hours as needed for Pain. 20 tablet 0    Lactobacillus rhamnosus GG (CULTURELLE) 10 billion cell capsule Take 1 capsule by mouth once daily.      loratadine (CLARITIN) 10 mg tablet Take 1 tablet (10 mg total) by mouth once daily. 90 tablet 3    melatonin 10 mg Tab Take by mouth.      multivit,tx with iron,minerals (THERA-M ORAL)       potassium chloride (KLOR-CON) 10 MEQ TbSR TAKE TWO TABLETS BY MOUTH ONCE DAILY WITH LASIX 60 tablet 5    turmeric 400 mg Cap       clindamycin (CLEOCIN) 300 MG capsule Take 1 capsule (300 mg total) by mouth 3 (three) times daily. 21 capsule 0     No current facility-administered medications for this visit.      Facility-Administered Medications Ordered in Other Visits    Medication Dose Route Frequency Provider Last Rate Last Dose    fentaNYL injection 25 mcg  25 mcg Intravenous Q5 Min PRN Erik Queen MD   100 mcg at 07/24/20 0754    lidocaine (PF) 10 mg/ml (1%) injection 10 mg  1 mL Intradermal Once Rachele Gonzalez MD             ROS  Review of Systems   Constitutional: Positive for fever. Negative for activity change, appetite change, chills and fatigue.   HENT: Negative for congestion, ear discharge, ear pain, rhinorrhea, sinus pain, sore throat and trouble swallowing.    Eyes: Negative for photophobia, pain, redness, itching and visual disturbance.   Respiratory: Negative for cough, chest tightness, shortness of breath and wheezing.    Cardiovascular: Negative for chest pain, palpitations and leg swelling.   Gastrointestinal: Negative for abdominal distention, abdominal pain, blood in stool, diarrhea, nausea and vomiting.   Genitourinary: Negative for dysuria, pelvic pain, vaginal bleeding, vaginal discharge and vaginal pain.   Musculoskeletal: Negative for arthralgias, back pain, gait problem and neck pain.   Skin: Positive for wound. Negative for color change, pallor and rash.   Neurological: Negative for dizziness, tremors, weakness, light-headedness, numbness and headaches.   Psychiatric/Behavioral: Negative for agitation, behavioral problems, confusion and sleep disturbance.           PHYSICAL EXAM  Vitals:    09/05/20 1039   BP: 112/76   Pulse: 72   SpO2: 97%   Weight: 96.6 kg (212 lb 15.4 oz)    Body mass index is 36.56 kg/m².  Weight: 96.6 kg (212 lb 15.4 oz)         Physical Exam  Constitutional:       Appearance: She is well-developed.   HENT:      Head: Normocephalic.   Eyes:      Conjunctiva/sclera: Conjunctivae normal.   Neck:      Musculoskeletal: Normal range of motion and neck supple.   Cardiovascular:      Rate and Rhythm: Normal rate and regular rhythm.      Heart sounds: Normal heart sounds.   Pulmonary:      Effort: Pulmonary effort is  normal.      Breath sounds: Normal breath sounds.   Musculoskeletal:        Feet:    Skin:     General: Skin is warm and dry.   Neurological:      Mental Status: She is alert.   Psychiatric:         Behavior: Behavior normal.           Health Maintenance       Date Due Completion Date    Influenza Vaccine (1) 08/01/2020 11/21/2019    Override on 11/13/2019: Done    Pneumococcal Vaccine (65+ Low/Medium Risk) (2 of 2 - PPSV23) 11/21/2020 11/21/2019    Mammogram 09/06/2021 9/6/2019    Override on 8/4/2014: Done    DEXA SCAN 03/15/2022 3/15/2019    Override on 1/1/2016: Done (dexa at dr daivs -)    Lipid Panel 12/04/2024 12/4/2019    Colorectal Cancer Screening 06/17/2025 6/17/2020    TETANUS VACCINE 02/22/2028 2/22/2018            Assessment & Plan      Luz was seen today for foot pain.    Diagnoses and all orders for this visit:    Cellulitis of foot    Other orders  -     clindamycin (CLEOCIN) 300 MG capsule; Take 1 capsule (300 mg total) by mouth 3 (three) times daily.  - Wound was lacnced open and drained after numbing the area with 2% lidocain plus epi.  Covered area with a clean dry dressing.  Patient instructed to soak the foot daily          Follow-up: No follow-ups on file.

## 2020-09-29 ENCOUNTER — PATIENT OUTREACH (OUTPATIENT)
Dept: ADMINISTRATIVE | Facility: OTHER | Age: 70
End: 2020-09-29

## 2020-09-30 ENCOUNTER — OFFICE VISIT (OUTPATIENT)
Dept: ORTHOPEDICS | Facility: CLINIC | Age: 70
End: 2020-09-30
Payer: MEDICARE

## 2020-09-30 ENCOUNTER — HOSPITAL ENCOUNTER (OUTPATIENT)
Dept: RADIOLOGY | Facility: OTHER | Age: 70
Discharge: HOME | End: 2020-09-30
Attending: PHYSICIAN ASSISTANT
Payer: MEDICARE

## 2020-09-30 VITALS
BODY MASS INDEX: 36.19 KG/M2 | HEART RATE: 69 BPM | WEIGHT: 212 LBS | DIASTOLIC BLOOD PRESSURE: 75 MMHG | HEIGHT: 64 IN | SYSTOLIC BLOOD PRESSURE: 123 MMHG

## 2020-09-30 DIAGNOSIS — D64.9 ANEMIA, UNSPECIFIED TYPE: ICD-10-CM

## 2020-09-30 DIAGNOSIS — G56.01 RIGHT CARPAL TUNNEL SYNDROME: ICD-10-CM

## 2020-09-30 DIAGNOSIS — M19.049 HAND ARTHRITIS: ICD-10-CM

## 2020-09-30 DIAGNOSIS — M19.049 HAND ARTHRITIS: Primary | ICD-10-CM

## 2020-09-30 DIAGNOSIS — M67.431 GANGLION CYST OF VOLAR ASPECT OF RIGHT WRIST: ICD-10-CM

## 2020-09-30 PROCEDURE — 99214 PR OFFICE/OUTPT VISIT, EST, LEVL IV, 30-39 MIN: ICD-10-PCS | Mod: 24,S$GLB,, | Performed by: PHYSICIAN ASSISTANT

## 2020-09-30 PROCEDURE — 99024 PR POST-OP FOLLOW-UP VISIT: ICD-10-PCS | Mod: ,,, | Performed by: PHYSICIAN ASSISTANT

## 2020-09-30 PROCEDURE — 99999 PR PBB SHADOW E&M-EST. PATIENT-LVL V: CPT | Mod: PBBFAC,,, | Performed by: PHYSICIAN ASSISTANT

## 2020-09-30 PROCEDURE — 99214 OFFICE O/P EST MOD 30 MIN: CPT | Mod: 24,S$GLB,, | Performed by: PHYSICIAN ASSISTANT

## 2020-09-30 PROCEDURE — 99024 POSTOP FOLLOW-UP VISIT: CPT | Mod: ,,, | Performed by: PHYSICIAN ASSISTANT

## 2020-09-30 PROCEDURE — 73130 XR HAND COMPLETE 3 VIEWS BILATERAL: ICD-10-PCS | Mod: 26,50,, | Performed by: RADIOLOGY

## 2020-09-30 PROCEDURE — 1159F MED LIST DOCD IN RCRD: CPT | Mod: S$GLB,,, | Performed by: PHYSICIAN ASSISTANT

## 2020-09-30 PROCEDURE — 1125F AMNT PAIN NOTED PAIN PRSNT: CPT | Mod: S$GLB,,, | Performed by: PHYSICIAN ASSISTANT

## 2020-09-30 PROCEDURE — 73130 X-RAY EXAM OF HAND: CPT | Mod: 26,50,, | Performed by: RADIOLOGY

## 2020-09-30 PROCEDURE — 1101F PT FALLS ASSESS-DOCD LE1/YR: CPT | Mod: CPTII,S$GLB,, | Performed by: PHYSICIAN ASSISTANT

## 2020-09-30 PROCEDURE — 1159F PR MEDICATION LIST DOCUMENTED IN MEDICAL RECORD: ICD-10-PCS | Mod: S$GLB,,, | Performed by: PHYSICIAN ASSISTANT

## 2020-09-30 PROCEDURE — 73130 X-RAY EXAM OF HAND: CPT | Mod: TC,50,FY

## 2020-09-30 PROCEDURE — 3008F PR BODY MASS INDEX (BMI) DOCUMENTED: ICD-10-PCS | Mod: CPTII,S$GLB,, | Performed by: PHYSICIAN ASSISTANT

## 2020-09-30 PROCEDURE — 1101F PR PT FALLS ASSESS DOC 0-1 FALLS W/OUT INJ PAST YR: ICD-10-PCS | Mod: CPTII,S$GLB,, | Performed by: PHYSICIAN ASSISTANT

## 2020-09-30 PROCEDURE — 3008F BODY MASS INDEX DOCD: CPT | Mod: CPTII,S$GLB,, | Performed by: PHYSICIAN ASSISTANT

## 2020-09-30 PROCEDURE — 99999 PR PBB SHADOW E&M-EST. PATIENT-LVL V: ICD-10-PCS | Mod: PBBFAC,,, | Performed by: PHYSICIAN ASSISTANT

## 2020-09-30 PROCEDURE — 1125F PR PAIN SEVERITY QUANTIFIED, PAIN PRESENT: ICD-10-PCS | Mod: S$GLB,,, | Performed by: PHYSICIAN ASSISTANT

## 2020-09-30 NOTE — PROGRESS NOTES
"Ms. Burk is here today for right hand pain.  She is 68 days status post right carpal tunnel release and right volar ganglion cyst excision by Dr. Ayon on 7/24/20.  She underwent right CMC injection at her last visit on 8/31/2020, reports it provided 2-3 weeks of pain improvement.  Her CMC brace is "not helping, and actually a little painful."  She reports that she gets short term relief with use of paraffin wax. She has also noticed pain and swelling in the area where the volar wrist ganglion was removed. She has a new lump in the right palm at the radial aspect of the 2nd metacarpal head.  She has pain at the radial right wrist, base of the thumb, and the PIPs of all fingers on the right hand.  Minimal left hand discomfort.  She does report that her finger numbness and tingling have resolved.    She reports a family history of Lupus in her father; states that her brother was diagnosed with Rheumatoid Arthritis.  She reports being anemic when she tried to donate blood last month.     PATHOLOGY:   Soft tissue, right wrist (biopsy):  - Consistent with benign ganglion cyst, with focal calcification.    ROS:  Constitutional: no fever or chills  Skin: no rash or suspicious lesions  Musculoskeletal: See HPI.   Neurological: no headaches, lightheadedness, or dizziness.   Psychological/behavioral: no anxiety or depression        Physical exam:    Vitals:    09/30/20 0955   BP: 123/75   Pulse: 69   Weight: 96.2 kg (212 lb)   Height: 5' 4" (1.626 m)   PainSc:   8     Vital signs are stable, patient is afebrile.  Patient is well dressed and well groomed, no acute distress.  Alert and oriented to person, place, and time.  Incisions are well healed - clean, dry and intact.  Tender to palpation at the radial wrist incision, palpable thickening noted, possible recurrence. She is also tender at the right CMC joint, pain with grind test. There is no erythema or exudate.  There is no sign of any infection. She is NVI- good " sensation and motor function. Good finger ROM.    RADIOLOGY:  Right Wrist XRay, 6/9/2020  FINDINGS:  Three views of the right wrist were obtained.  No evidence of acute fracture or dislocation.  Bony mineralization is normal. Soft tissues are unremarkable.   Mild degenerative changes.  Impression:  No acute fracture or dislocation      Assessment: status post right carpal tunnel release and right volar ganglion cyst excision by Dr. Ayon on 7/24/20    Plan:  Luz was seen today for pain.    Diagnoses and all orders for this visit:    Hand arthritis  -     X-Ray Hand 3 View Bilateral; Future  -     CBC auto differential; Future  -     Sedimentation rate; Future  -     C-reactive protein; Future  -     NATE; Future  -     Cyclic citrul peptide antibody, IgG; Future  -     Rheumatoid factor; Future  -     HLA B27 Antigen; Future    Anemia, unspecified type  -     CBC auto differential; Future        - continue regular use of paraffin wax  - orders for OT  - hand x-ray today  - labs ordered  - follow-up with Rheumatology pending labs  - discussed option of ultrasound to evaluate for mass recurrence and to evaluate her new mass, she would prefer to start with x-ray and labs  - follow-up in 6-8 weeks, sooner if needed  - call with questions or concerns

## 2020-09-30 NOTE — PROGRESS NOTES
Health Maintenance Due   Topic Date Due    Influenza Vaccine (1) 08/01/2020     Updates were requested from care everywhere.  Chart was reviewed for overdue Proactive [facility] Encounters ([place]) topics (CRS, Breast Cancer Screening, Eye exam)  Health Maintenance has been updated.  [facility] immunization registry triggered.  Immunizations were reconciled.

## 2020-10-05 ENCOUNTER — CLINICAL SUPPORT (OUTPATIENT)
Dept: REHABILITATION | Facility: HOSPITAL | Age: 70
End: 2020-10-05
Payer: MEDICARE

## 2020-10-05 DIAGNOSIS — M62.81 MUSCLE WEAKNESS: Primary | ICD-10-CM

## 2020-10-05 DIAGNOSIS — M79.641 PAIN OF RIGHT HAND: ICD-10-CM

## 2020-10-05 DIAGNOSIS — M25.641 DECREASED RANGE OF MOTION OF FINGER OF RIGHT HAND: ICD-10-CM

## 2020-10-05 PROCEDURE — 97018 PARAFFIN BATH THERAPY: CPT | Mod: PO,59

## 2020-10-05 PROCEDURE — 97110 THERAPEUTIC EXERCISES: CPT | Mod: PO

## 2020-10-05 PROCEDURE — 97140 MANUAL THERAPY 1/> REGIONS: CPT | Mod: PO

## 2020-10-05 PROCEDURE — 97165 OT EVAL LOW COMPLEX 30 MIN: CPT | Mod: PO

## 2020-10-05 NOTE — PATIENT INSTRUCTIONS
[facility], OCCUPATIONAL THERAPY  HOME EXERCISE PROGRAM     Complete the following massages for 3-5 min each, 2-3x/day.                          Complete the following exercises with 10-15 repetitions each, 2-3x/day.     AROM: Wrist Flexion / Extension               Bend your wrist forward and back as far as possible.      AROM: Wrist Radial / Ulnar Deviation  Bend your wrist from side to side as far as possible.    AROM: Wrist Flexion / Extension  Make a fist, then bend your wrist forward then back as far as possible.         AROM: Wrist Radial / Ulnar Deviation   Make a fist then bend your wrist toward your body, then away.       MP Flexion (Assistive)      With hand resting on little finger side, slide thumb across palm.     IP Flexion (Active Blocked)        Brace thumb below tip joint. Bend joint as far as possible.    Radial Adduction/Abduction (Active)        Move thumb out to side. Move back alongside index finger.    Palmar Adduction/Abduction (Active)        Move thumb down, away from palm. Move back to rest along palm.    MP Extension (Active)        With palm on table, lift thumb up. Hold ____ seconds. Relax and lower thumb.    Copyright © [employer]. All rights reserved.     Therapist: Kemar Oneill, OT

## 2020-10-05 NOTE — PLAN OF CARE
[facility] Occupational Therapy  Initial Evaluation     Date: 10/5/2020  Name: Luz Burk  Clinic Number: [id number]    Therapy Diagnosis:   Encounter Diagnoses   Name Primary?    Pain of right hand     Decreased range of motion of finger of right hand     Muscle weakness Yes     Physician: Hilda Woodruff PA    Physician Orders: Hand pain/OA; S/P CTR and Ganglion cyst excision  Eval and Treat, modalities as needed  12+ visits  Medical Diagnosis:   M19.049 (ICD-10-CM) - Hand arthritis   M67.431 (ICD-10-CM) - Ganglion cyst of volar aspect of right wrist   G56.01 (ICD-10-CM) - Right carpal tunnel syndrome     Surgical Procedure and Date: R CTR & Ganglion Cyst Excision, 07/24/20  Evaluation Date: 10/05/20  Insurance Authorization Period Expiration: 10/19/20  Plan of Care Certification Period: 10/05/20 to 11/16/20  Date of Return to MD: TBD    Visit # / Visits authorized: 1 / 6  Time In: 2:35 pm  Time Out: 3:40 pm  Total Billable Time: 65 minutes    Precautions:  Standard    Subjective     Involved Side: R hand  Dominant Side: Right  Date of Onset: about 1 month before surgery  Mechanism of Injury: gradual onset of symptoms  History of Current Condition: patient had symptoms of carpal tunnel syndrome and ganglion cyst that progressively worsened.  Surgical intervention was required for improvement of condition.  She went for a follow up appointment with an ortho PA and received a cortisone injection in her R thumb, which provided relief from pain for 2 days. She has a thumb support wrap, but patient feels it doesn't provide pain relief either. Patient has a paraffin unit at home and uses it regularly and she reports this does help relieve some pain.   Surgical Procedure: R CTR & Ganglion Cyst removal  Imaging: X-rays  Previous Therapy: none    Past Medical History/Physical Systems Review:   Luz Burk  has a past medical history of Cataract, Collagenous colitis, Hypothyroidism, OA  "(osteoarthritis), Obesity, and Sleep apnea.    Luz Burk  has a past surgical history that includes Total knee arthroplasty (7/23/12); lap band surgery (10/11/2011); Hernia repair; Ventriculoperitoneal shunt; Hysterectomy; Cholecystectomy; Laparoscopic gastric banding; Colonoscopy (N/A, 5/30/2016); Appendectomy; Hiatal hernia repair; Oophorectomy; Cataract extraction; Tonsillectomy; Esophagogastroduodenoscopy (N/A, 6/17/2020); Colonoscopy (N/A, 6/17/2020); Carpal tunnel release (Right, 7/24/2020); and Excision of ganglion of wrist (Right, 7/24/2020).    Luz has a current medication list which includes the following prescription(s): acetaminophen, acyclovir, ascorbic acid (vitamin c), biotin, calcium carbonate-vitamin d3, cannabidiol (cbd), citalopram, clindamycin, cyanocobalamin, docusate sodium, ergocalciferol, euthyrox, fluticasone propionate, furosemide, hydrocodone-acetaminophen, lactobacillus rhamnosus gg, loratadine, melatonin, multivit,tx with iron,minerals, potassium chloride, and turmeric, and the following Facility-Administered Medications: fentanyl and lidocaine (pf) 10 mg/ml (1%).    Review of patient's allergies indicates:   Allergen Reactions    Omeprazole Diarrhea        Patient's Goals for Therapy: get rid of the pain and be able to use my R hand more    Pain:  Functional Pain Scale Rating 0-10:   6/10 on average  4/10 at best  9/10 at worst  Location: R thumb CMC to MP and MCP's  Description: Aching, Deep, Sharp, Electric and Shooting, at times "hot"  Aggravating Factors: Night Time, Lifting, gripping and pinching  Easing Factors: rest and paraffin, Tylenol    Occupation:  Retired QA Officer at [employer]  Working presently: home-maker  Duties: cooking, cleaning, laundry    Functional Limitations/Social History:    Previous functional status includes: Independent with all ADLs.     Current FunctionalStatus   Home/Living environment : lives with their family      Limitation of Functional " "Status as follows:   ADLs/IADLs:     - Feeding: pain difficulty cutting foods/meat    - Bathing: no difficulty    - Dressing/Grooming: pain and moderate to minimal difficulty    - Driving: pain and difficulty with gripping wheel    Objective     Observation/Appearance:  Surgical incision is well healed, Skin shiny/tight at surgical site.  Patient is noted to have moderate hypertrophic scarring in R wrist/thumb;  Minimal edema present and Deformities noted: R thumb "zig-zag" noted with spontaneous pinch pattern; patient is also noted to have 2 other nodules,cysts in her R wrist/hand, with one at the previous excision site and one at the base of her R IF, volar aspect    Edema. Measured in centimeters.   10/5/2020 10/5/2020    Left Right   Wrist Crease 16.6 17.7   Mid palm 20.0 20.2   MCPs 20.0 20.4     Edema. Measured in centimeters.   10/5/2020 10/5/2020    Left Right   Thumb:     Prox. Phalanx 6.3 6.5   IP 6.7 7.2     Hand ROM. Measured in degrees.   10/5/2020    Right   Wrist E/F 62/32   Wrist RD/UD 19/32   Composite fist WFL   Thumb: MP 0/62                IP 0/41       Rad ADD/ABD 40       Pal ADD/ABD 40          Opposition To SF MCP     Sensation: Patient denies numbness & tingling at this time. Light touch, temp & sharp/dull, grossly intact for R forearm/wrist/hand      Strength (Dyanmometer) and Pinch Strength (Pinch Gauge)  Measured in pounds and psi. Average of three trials.   10/5/2020 10/5/2020    Left Right   Rung II 43 17, 23   Key Pinch 12 4   3pt Pinch 7 3   2pt Pinch 5 3         CMS Impairment/Limitation/Restriction for FOTO Wrist Survey    Therapist reviewed FOTO scores for Luz Burk on 10/5/2020.   FOTO documents entered into [employer] - see Media section.    Limitation Score: 57%  Category: Self Care         Treatment     Treatment Time In: 3:10 pm  Treatment Time Out: 3:40 pm  Total Treatment time separate from Evaluation time: 30 minutes    Luz received the following supervised " modalities after being cleared for contradictions for 10 minutes:   -Paraffin R wrist/hand, pre-tx to decrease pain & increase tissue extensibility    Luz received the following direct contact modalities after being cleared for contraindications for 10 minutes:  -Edema mgmt w/ lymphatic drainage technique;  Deep STM, including MFR, CFM & scar mgmt to R forearm/wrist/hand, using hand techniques and IASTM tools to increase blood flow/circulation, improve soft tissue pliability and decrease pain.    Luz received the following manual therapy techniques for 10 minutes:   -performed initial HEP, see attached in Patient Instructions for details      Home Exercise Program/Education:  Issued HEP (see patient instructions in EMR) and educated on modality use for pain management . Exercises were reviewed and Luz was able to demonstrate them prior to the end of the session.   Patient received a written copy of exercises to perform at home. Luz demonstrated good  understanding of the education provided.  Patient was advised to perform these exercises free of pain, and to stop performing them if pain occurs.    Patient/Family Education: role of OT, goals for OT, scheduling/cancellations - patient verbalized understanding. Discussed insurance limitations with patient.    Additional Education provided: basic Joint Protection Principles; pro's & con's of CMC Arthroplasty surgical intervention    Assessment     Luz Burk is a 70 y.o. female referred to outpatient occupational therapy and presents with a medical diagnosis of R CTR & Ganglion Cust Excision, resulting in Decreased ROM, Decreased  strength, Decreased pinch strength, Decreased functional hand use, Increased pain, Joint Stiffness, Scar Adhesions and Diminished/Impaired Coordination and demonstrates limitations as described in the chart below. Following medical record review it is determined that patient will benefit from occupational therapy services in  order to maximize pain free and/or functional use of right wrist/hand. The following goals were discussed with the patient and patient is in agreement with them as to be addressed in the treatment plan. The patient's rehab potential is Good.     Anticipated barriers to occupational therapy: OA, obesity, CAITIE  Patient has no cultural, educational or language barriers to learning provided.    Profile and History Assessment of Occupational Performance Level of Clinical Decision Making Complexity Score   Occupational Profile:   Luz Burk is a 70 y.o. female who lives with their spouse and is currently home-maker. Luz Burk has difficulty with  feeding, grooming and dressing  driving/transportation management, shopping, phone/computer use and housework/household chores  affecting his/her daily functional abilities. His/her main goal for therapy is decrease pain & increase R hand function in ADL/IADL's.     Comorbidities:   OA, CAITIE, Obesity    Medical and Therapy History Review:   Brief               Performance Deficits    Physical:  Joint Mobility  Joint Stability  Skin Integrity/Scar Formation   Strength  Pinch Strength  Fine Motor Coordination  Pain    Cognitive:  No Deficits    Psychosocial:    No Deficits     Clinical Decision Making:  low    Assessment Process:  Problem-Focused Assessments    Modification/Need for Assistance:  Not Necessary    Intervention Selection:  Several Treatment Options       low  Based on PMHX, co morbidities , data from assessments and functional level of assistance required with task and clinical presentation directly impacting function.       The following goals were discussed with the patient and patient is in agreement with them as to be addressed in the treatment plan.     Goals:   Short Term Goals: (in 2 weeks)  1) Patient will be independent in HEP  2) Decrease pain in R wrist/thumb to no more than 4-5/10 worst in ADL/IADL's  3) Increase AROM in R wrist flexion by  5-8 degrees for improved functioning in ADL/IADL's  4) Increase R  strength by 3-5 psi for increased functional use  5) Decrease edema in R wrist/hand by .2 cm     Long Term Goals: (in 6 weeks)  1) Decrease pain in R wrist/hand to no more than 1-2/10 worst in ADL/IADL's  2) Increase AROM of R wrist to WFL for increased functioning in ADL/IADL's  3) Increase strength in R  bu 20% of initial measures for improved functioning in ADL/IADL's  4) Increased functioning in ADL/IADL's, as evidenced by a FOTO impairment rating of no more than 36%  5) Decrease edema in R wrist/hand to trace or none    Plan     Certification Period/Plan of care expiration: 10/5/2020 to 11/16/20.    Outpatient Occupational Therapy 2 times weekly for 6 weeks to include the following interventions: Paraffin, Manual therapy/joint mobilizations, Modalities for pain management, US 3 mhz, Therapeutic exercises/activities., Strengthening, Orthotic Fabrication/Fit/Training, Edema Control, Scar Management and Joint Protection.      Kemar Oneill, OT

## 2020-10-07 ENCOUNTER — TELEPHONE (OUTPATIENT)
Dept: ORTHOPEDICS | Facility: CLINIC | Age: 70
End: 2020-10-07

## 2020-10-08 ENCOUNTER — OFFICE VISIT (OUTPATIENT)
Dept: ORTHOPEDICS | Facility: CLINIC | Age: 70
End: 2020-10-08
Payer: MEDICARE

## 2020-10-08 VITALS
WEIGHT: 212.06 LBS | HEART RATE: 72 BPM | HEIGHT: 64 IN | DIASTOLIC BLOOD PRESSURE: 79 MMHG | SYSTOLIC BLOOD PRESSURE: 145 MMHG | BODY MASS INDEX: 36.2 KG/M2

## 2020-10-08 DIAGNOSIS — Z01.818 PRE-OP TESTING: Primary | ICD-10-CM

## 2020-10-08 DIAGNOSIS — M18.11 ARTHRITIS OF CARPOMETACARPAL (CMC) JOINT OF RIGHT THUMB: Primary | ICD-10-CM

## 2020-10-08 DIAGNOSIS — M19.049 HAND ARTHRITIS: Primary | ICD-10-CM

## 2020-10-08 PROCEDURE — 1101F PR PT FALLS ASSESS DOC 0-1 FALLS W/OUT INJ PAST YR: ICD-10-PCS | Mod: CPTII,S$GLB,, | Performed by: ORTHOPAEDIC SURGERY

## 2020-10-08 PROCEDURE — 1101F PT FALLS ASSESS-DOCD LE1/YR: CPT | Mod: CPTII,S$GLB,, | Performed by: ORTHOPAEDIC SURGERY

## 2020-10-08 PROCEDURE — 1125F AMNT PAIN NOTED PAIN PRSNT: CPT | Mod: S$GLB,,, | Performed by: ORTHOPAEDIC SURGERY

## 2020-10-08 PROCEDURE — 99214 OFFICE O/P EST MOD 30 MIN: CPT | Mod: 24,S$GLB,, | Performed by: ORTHOPAEDIC SURGERY

## 2020-10-08 PROCEDURE — 1159F PR MEDICATION LIST DOCUMENTED IN MEDICAL RECORD: ICD-10-PCS | Mod: S$GLB,,, | Performed by: ORTHOPAEDIC SURGERY

## 2020-10-08 PROCEDURE — 99214 PR OFFICE/OUTPT VISIT, EST, LEVL IV, 30-39 MIN: ICD-10-PCS | Mod: 24,S$GLB,, | Performed by: ORTHOPAEDIC SURGERY

## 2020-10-08 PROCEDURE — 1159F MED LIST DOCD IN RCRD: CPT | Mod: S$GLB,,, | Performed by: ORTHOPAEDIC SURGERY

## 2020-10-08 PROCEDURE — 99999 PR PBB SHADOW E&M-EST. PATIENT-LVL IV: CPT | Mod: PBBFAC,,, | Performed by: ORTHOPAEDIC SURGERY

## 2020-10-08 PROCEDURE — 3008F BODY MASS INDEX DOCD: CPT | Mod: CPTII,S$GLB,, | Performed by: ORTHOPAEDIC SURGERY

## 2020-10-08 PROCEDURE — 3008F PR BODY MASS INDEX (BMI) DOCUMENTED: ICD-10-PCS | Mod: CPTII,S$GLB,, | Performed by: ORTHOPAEDIC SURGERY

## 2020-10-08 PROCEDURE — 1125F PR PAIN SEVERITY QUANTIFIED, PAIN PRESENT: ICD-10-PCS | Mod: S$GLB,,, | Performed by: ORTHOPAEDIC SURGERY

## 2020-10-08 PROCEDURE — 99999 PR PBB SHADOW E&M-EST. PATIENT-LVL IV: ICD-10-PCS | Mod: PBBFAC,,, | Performed by: ORTHOPAEDIC SURGERY

## 2020-10-12 ENCOUNTER — CLINICAL SUPPORT (OUTPATIENT)
Dept: REHABILITATION | Facility: HOSPITAL | Age: 70
End: 2020-10-12
Payer: MEDICARE

## 2020-10-12 DIAGNOSIS — M25.641 DECREASED RANGE OF MOTION OF FINGER OF RIGHT HAND: ICD-10-CM

## 2020-10-12 DIAGNOSIS — M62.81 MUSCLE WEAKNESS: ICD-10-CM

## 2020-10-12 DIAGNOSIS — M79.641 PAIN OF RIGHT HAND: ICD-10-CM

## 2020-10-12 PROCEDURE — 97110 THERAPEUTIC EXERCISES: CPT | Mod: PO

## 2020-10-12 PROCEDURE — 97018 PARAFFIN BATH THERAPY: CPT | Mod: PO

## 2020-10-12 PROCEDURE — 97140 MANUAL THERAPY 1/> REGIONS: CPT | Mod: PO

## 2020-10-12 NOTE — PROGRESS NOTES
"  Occupational Therapy Treatment Note     Date: 10/12/2020  Name: Luz Burk  Clinic Number: [id number]    Therapy Diagnosis:   Encounter Diagnoses   Name Primary?    Pain of right hand     Decreased range of motion of finger of right hand     Muscle weakness      Physician: Hilda Woodruff PA    Physician Orders: Hand pain/OA; S/P CTR and Ganglion cyst excision  Eval and Treat, modalities as needed  12+ visits  Medical Diagnosis:   M19.049 (ICD-10-CM) - Hand arthritis   M67.431 (ICD-10-CM) - Ganglion cyst of volar aspect of right wrist   G56.01 (ICD-10-CM) - Right carpal tunnel syndrome      Surgical Procedure and Date: R CTR & Ganglion Cyst Excision, 07/24/20  Evaluation Date: 10/05/20  Insurance Authorization Period Expiration: 10/19/20  Plan of Care Certification Period: 10/05/20 to 11/16/20  Date of Return to MD: TBD    Visit # / Visits authorized: 2 / 6  Time In: 2:35 pm  Time Out: 3:20 pm  Total Billable Time: 45 minutes    Precautions:  Standard      Subjective     Pt reports: "I decided to get the surgery, so I'm scheduled for Oct 30th".  She admits to using her home paraffin unit everyday and performing scar management afterwards.    she was compliant with home exercise program given last session.   Response to previous treatment: No adverse reactions noted or reported  Functional change: None reported at first treatment after initial evaluation     Pain: 5/10  Location: right wrist/thumb      Objective     Luz received the following supervised modalities after being cleared for contradictions for 10 minutes:   -Paraffin w/ MHP to R wrist/hand, pre-tx to decrease pain & increase tissue extensibility    Luz received the following manual therapy techniques for 12 minutes:   -Edema mgmt w/ lymphatic drainage technique;  Deep STM, including MFR, CFM & scar mgmt to R wrist/hand, using hand techniques and IASTM tools to increase blood flow/circulation, improve soft tissue pliability and decrease " pain.    Lzu received therapeutic exercises for 23 minutes including:  AROM    Wrist E/F, RD/UD 10 reps each   Thumb Rad/Palm Abd/Add 10 reps each   Thumb/pinky slides 10 reps   Thumb Circles-CW/CCW 10 reps each       Isospheres 2 min   9 Peg 2 trials   Digi-flex 2/10 reps, yellow       Home Exercises and Education Provided     Education provided:   - Reviewed HEP; also discussed basic Joint Protection Principles for ADL/IADL's  - Progress towards goals     Written Home Exercises Provided: Patient instructed to cont prior HEP.  Exercises were reviewed and Luz was able to demonstrate them prior to the end of the session.  Luz demonstrated good  understanding of the HEP provided.   .   See EMR under Patient Instructions for exercises provided 10/05/20.        Assessment     Patient noted to have moderate to minimal scar tissue in R wrist at CTR surgical site.  She is noted to have tenderness at the base of the R thumb to MP joint.  Recommend patient to discontinue OT at this time and await for post op orders after R CMC Arthroplasty.  Patient would benefit from continuing HEP, as directed until surgery.     Anticipated barriers to occupational therapy: OA, ganglion cysts reoccurences    Pt's spiritual, cultural and educational needs considered and pt agreeable to plan of care and goals.    Goals:   See D/C Summary for details    Plan     Patient to be Discharged from OT at this time and await for post op R CMC Arthroplasty OT orders.      Kemar Oneill, OT

## 2020-10-12 NOTE — PLAN OF CARE
Outpatient Therapy Discharge Summary     Name: Luz Burk  Clinic Number: [id number]    Therapy Diagnosis:   Encounter Diagnoses   Name Primary?    Pain of right hand     Decreased range of motion of finger of right hand     Muscle weakness      Physician: Hilda Woodruff PA    Physician Orders: Hand pain/OA; S/P CTR and Ganglion cyst excision  Eval and Treat, modalities as needed  12+ visits  Medical Diagnosis:   M19.049 (ICD-10-CM) - Hand arthritis   M67.431 (ICD-10-CM) - Ganglion cyst of volar aspect of right wrist   G56.01 (ICD-10-CM) - Right carpal tunnel syndrome      Surgical Procedure and Date: R CTR & Ganglion Cyst Excision, 07/24/20  Evaluation Date: 10/05/20    Date of Last visit: 10/12/2020   Total Visits Received: 2  Cancelled Visits: 0  No Show Visits: 0    Assessment      No progress has been made towards any of the goals established at Initial Evaluations due to patient only received 1 treatment session after Evaluation, as she is opting for surgical intervention.   The following goals will be discharged as of today due to patient is scheduled to have surgical intervention for this condition patient was referred to OT services.      Goals:   Short Term Goals: (in 2 weeks)  1) Patient will be independent in HEP  2) Decrease pain in R wrist/thumb to no more than 4-5/10 worst in ADL/IADL's  3) Increase AROM in R wrist flexion by 5-8 degrees for improved functioning in ADL/IADL's  4) Increase R  strength by 3-5 psi for increased functional use  5) Decrease edema in R wrist/hand by .2 cm      Long Term Goals: (in 6 weeks)  1) Decrease pain in R wrist/hand to no more than 1-2/10 worst in ADL/IADL's  2) Increase AROM of R wrist to WFL for increased functioning in ADL/IADL's  3) Increase strength in R  bu 20% of initial measures for improved functioning in ADL/IADL's  4) Increased functioning in ADL/IADL's, as evidenced by a FOTO impairment rating of no more than 36%  5) Decrease edema in  R wrist/hand to trace or none      Discharge reason: Other:  Patient is scheduled for surgery    Plan     This patient is discharged from Occupational Therapy    Kemar Oneill, OT

## 2020-10-13 ENCOUNTER — TELEPHONE (OUTPATIENT)
Dept: INTERNAL MEDICINE | Facility: CLINIC | Age: 70
End: 2020-10-13

## 2020-10-13 NOTE — TELEPHONE ENCOUNTER
----- Message from Josephine Mosher sent at 10/13/2020 12:05 PM CDT -----  Contact: [phone]/Self  Type:  Needs Medical Advice    Who Called: Pt  Symptoms (please be specific): UTI    How long has patient had these symptoms:  2 days   Pharmacy name and phone #:  [facility] [place]   Would the patient rather a call back or a response via [facility]? Call back   Best Call Back Number: [phone]  Additional Information:

## 2020-10-13 NOTE — TELEPHONE ENCOUNTER
Spoke with patient and she reports Dysuria. Reports may have had cross contamination from a couple of bout of diarrhea. Request antibiotic be sent to [facility]. Informed will send message to the Dr and call back with recommendations. Patient voices understanding.

## 2020-10-14 ENCOUNTER — PATIENT MESSAGE (OUTPATIENT)
Dept: FAMILY MEDICINE | Facility: CLINIC | Age: 70
End: 2020-10-14

## 2020-10-14 RX ORDER — NITROFURANTOIN (MACROCRYSTALS) 100 MG/1
100 CAPSULE ORAL EVERY 12 HOURS
Qty: 20 CAPSULE | Refills: 0 | Status: SHIPPED | OUTPATIENT
Start: 2020-10-14 | End: 2020-10-24

## 2020-10-19 ENCOUNTER — ANESTHESIA EVENT (OUTPATIENT)
Dept: SURGERY | Facility: HOSPITAL | Age: 70
End: 2020-10-19
Payer: MEDICARE

## 2020-10-19 NOTE — ANESTHESIA PREPROCEDURE EVALUATION
10/19/2020  Luz Burk is a 70 y.o., female   Chart review complete. Patient's medical history reviewed.  OK to proceed at [facility].   10/19/2020  .    Anesthesia Evaluation    I have reviewed the Patient Summary Reports.    I have reviewed the Nursing Notes.    I have reviewed the Medications.     Review of Systems    Patient Active Problem List   Diagnosis    Osteoarthritis of knee    Long term (current) use of anticoagulants    Other specified prophylactic or treatment measure    Obesity, Class II, BMI 35-39.9    Gastric banding status    Hydrocephalus    Ventriculo-peritoneal shunt status    Constipation    Change in bowel habits    Fecal incontinence    Colonic polyp    Muscle weakness    Hamstring tightness of both lower extremities    SI (sacroiliac) pain    Pain aggravated by changing postions    Venous insufficiency of both lower extremities    Diastolic dysfunction    Episodic paroxysmal hemicrania, not intractable    Excessive thirst    Urinary frequency    Dry mouth    GERD (gastroesophageal reflux disease)    Colon cancer screening    Carpal tunnel syndrome on right    Pain of right hand    Decreased range of motion of finger of right hand    CMC arthritis            Physical Exam  General:  Well nourished, Obesity    Airway/Jaw/Neck:  Airway Findings: Mouth Opening: Normal Tongue: Normal  General Airway Assessment: Adult  Mallampati: III  Improves to II with phonation.  TM Distance: Normal, at least 6 cm  Jaw/Neck Findings:  Neck ROM: Normal ROM     Eyes/Ears/Nose:  EYES/EARS/NOSE FINDINGS: Normal   Dental:  Dental Findings: In tact   Chest/Lungs:  Chest/Lungs Findings: Clear to auscultation     Heart/Vascular:  Heart Findings: Rate: Normal  Rhythm: Regular Rhythm  Sounds: Normal  Heart murmur: negative Vascular Findings: Normal    Abdomen:  Abdomen Findings:  Normal    Musculoskeletal:  Musculoskeletal Findings: Normal   Skin:  Skin Findings: Normal    Mental Status:  Mental Status Findings:  Cooperative, Alert and Oriented         Anesthesia Plan  Type of Anesthesia, risks & benefits discussed:  Anesthesia Type:  general, MAC, regional  Patient's Preference:   Intra-op Monitoring Plan: standard ASA monitors  Intra-op Monitoring Plan Comments:   Post Op Pain Control Plan:   Post Op Pain Control Plan Comments:   Induction:   IV  Beta Blocker:  Patient is not currently on a Beta-Blocker (No further documentation required).       Informed Consent: Patient understands risks and agrees with Anesthesia plan.  Questions answered. Anesthesia consent signed with patient.  ASA Score: 2     Day of Surgery Review of History & Physical:    H&P update referred to the surgeon.         Ready For Surgery From Anesthesia Perspective.

## 2020-10-19 NOTE — PROGRESS NOTES
"Ms. Burk is here today for right hand pain.  At last visit:    She is 68 days status post right carpal tunnel release and right volar ganglion cyst excision by Dr. Ayon on 7/24/20.  She underwent right CMC injection at her last visit on 8/31/2020, reports it provided 2-3 weeks of pain improvement.  Her CMC brace is "not helping, and actually a little painful."  She reports that she gets short term relief with use of paraffin wax. She has also noticed pain and swelling in the area where the volar wrist ganglion was removed. She has a new lump in the right palm at the radial aspect of the 2nd metacarpal head.  She has pain at the radial right wrist, base of the thumb, and the PIPs of all fingers on the right hand.  Minimal left hand discomfort.  She does report that her finger numbness and tingling have resolved.    She reports a family history of Lupus in her father; states that her brother was diagnosed with Rheumatoid Arthritis.  She reports being anemic when she tried to donate blood last month.   Today: Pt wants to discuss surgical procedures for CMC pain  PATHOLOGY:   Soft tissue, right wrist (biopsy):  - Consistent with benign ganglion cyst, with focal calcification.    ROS:  Constitutional: no fever or chills  Skin: no rash or suspicious lesions  Musculoskeletal: See HPI.   Neurological: no headaches, lightheadedness, or dizziness.   Psychological/behavioral: no anxiety or depression        Physical exam:    Vitals:    10/08/20 1047   BP: (!) 145/79   Pulse: 72   Weight: 96.2 kg (212 lb 1.3 oz)   Height: 5' 4" (1.626 m)   PainSc:   8     Vital signs are stable, patient is afebrile.  Patient is well dressed and well groomed, no acute distress.  Alert and oriented to person, place, and time.  Incisions are well healed - clean, dry and intact.  Tender to palpation at the radial wrist incision, palpable thickening noted, possible recurrence. She is also tender at the right CMC joint, pain with grind test. " There is no erythema or exudate.  There is no sign of any infection. She is NVI- good sensation and motor function. Good finger ROM.    RADIOLOGY:  Right Wrist XRay, 6/9/2020  FINDINGS:  Three views of the right wrist were obtained.  No evidence of acute fracture or dislocation.  Bony mineralization is normal. Soft tissues are unremarkable.   Mild degenerative changes.  Impression:  No acute fracture or dislocation      Assessment: status post right carpal tunnel release and right volar ganglion cyst excision by Dr. Ayon on 7/24/20    Plan:  There are no diagnoses linked to this encounter.    -Discussed at great length CMC arthroplasty.  I have explained the risks, benefits, and alternatives of the procedure to the patient in great detail. The patient voices understanding and all questions have been answered. The patient agrees with to proceed as planned. Consents were performed in clinic. Pt understands postop course

## 2020-10-27 ENCOUNTER — CLINICAL SUPPORT (OUTPATIENT)
Dept: URGENT CARE | Facility: CLINIC | Age: 70
End: 2020-10-27
Payer: MEDICARE

## 2020-10-27 DIAGNOSIS — Z01.818 PRE-OP TESTING: ICD-10-CM

## 2020-10-27 LAB — SARS-COV-2 RNA RESP QL NAA+PROBE: NOT DETECTED

## 2020-10-27 PROCEDURE — 99211 OFF/OP EST MAY X REQ PHY/QHP: CPT | Mod: S$GLB,,, | Performed by: NURSE PRACTITIONER

## 2020-10-27 PROCEDURE — 99211 PR OFFICE/OUTPT VISIT, EST, LEVL I: ICD-10-PCS | Mod: S$GLB,,, | Performed by: NURSE PRACTITIONER

## 2020-10-27 PROCEDURE — U0003 INFECTIOUS AGENT DETECTION BY NUCLEIC ACID (DNA OR RNA); SEVERE ACUTE RESPIRATORY SYNDROME CORONAVIRUS 2 (SARS-COV-2) (CORONAVIRUS DISEASE [COVID-19]), AMPLIFIED PROBE TECHNIQUE, MAKING USE OF HIGH THROUGHPUT TECHNOLOGIES AS DESCRIBED BY CMS-2020-01-R: HCPCS

## 2020-10-28 DIAGNOSIS — Z98.890 POST-OPERATIVE STATE: Primary | ICD-10-CM

## 2020-10-28 RX ORDER — OXYCODONE AND ACETAMINOPHEN 5; 325 MG/1; MG/1
1 TABLET ORAL
Qty: 42 TABLET | Refills: 0 | Status: SHIPPED | OUTPATIENT
Start: 2020-10-28 | End: 2021-01-29

## 2020-10-29 ENCOUNTER — TELEPHONE (OUTPATIENT)
Dept: ORTHOPEDICS | Facility: CLINIC | Age: 70
End: 2020-10-29

## 2020-10-29 NOTE — TELEPHONE ENCOUNTER
Spoke c pt. Informed pt of 0730 arrival time for 10/30/20 surgery at the [facility]. Reminded pt of NPO status & PO appt. Pt expressed understanding & was thankful.

## 2020-10-30 ENCOUNTER — ANESTHESIA (OUTPATIENT)
Dept: SURGERY | Facility: HOSPITAL | Age: 70
End: 2020-10-30
Payer: MEDICARE

## 2020-10-30 ENCOUNTER — HOSPITAL ENCOUNTER (OUTPATIENT)
Facility: HOSPITAL | Age: 70
Discharge: HOME | End: 2020-10-30
Attending: ORTHOPAEDIC SURGERY | Admitting: ORTHOPAEDIC SURGERY
Payer: MEDICARE

## 2020-10-30 VITALS
SYSTOLIC BLOOD PRESSURE: 140 MMHG | TEMPERATURE: 97 F | WEIGHT: 212 LBS | HEIGHT: 64 IN | BODY MASS INDEX: 36.19 KG/M2 | HEART RATE: 55 BPM | DIASTOLIC BLOOD PRESSURE: 73 MMHG | RESPIRATION RATE: 20 BRPM | OXYGEN SATURATION: 95 %

## 2020-10-30 DIAGNOSIS — M19.049 CMC ARTHRITIS: Primary | ICD-10-CM

## 2020-10-30 PROCEDURE — 63600175 PHARM REV CODE 636 W HCPCS: Performed by: NURSE ANESTHETIST, CERTIFIED REGISTERED

## 2020-10-30 PROCEDURE — D9220A PRA ANESTHESIA: Mod: CRNA,,, | Performed by: NURSE ANESTHETIST, CERTIFIED REGISTERED

## 2020-10-30 PROCEDURE — 94761 N-INVAS EAR/PLS OXIMETRY MLT: CPT

## 2020-10-30 PROCEDURE — 64415 NJX AA&/STRD BRCH PLXS IMG: CPT | Mod: 59,RT,, | Performed by: ANESTHESIOLOGY

## 2020-10-30 PROCEDURE — 76942 ECHO GUIDE FOR BIOPSY: CPT | Mod: 26,,, | Performed by: ANESTHESIOLOGY

## 2020-10-30 PROCEDURE — 99900035 HC TECH TIME PER 15 MIN (STAT)

## 2020-10-30 PROCEDURE — D9220A PRA ANESTHESIA: ICD-10-PCS | Mod: ANES,,, | Performed by: ANESTHESIOLOGY

## 2020-10-30 PROCEDURE — C1713 ANCHOR/SCREW BN/BN,TIS/BN: HCPCS | Performed by: ORTHOPAEDIC SURGERY

## 2020-10-30 PROCEDURE — 27800903 OPTIME MED/SURG SUP & DEVICES OTHER IMPLANTS: Performed by: ORTHOPAEDIC SURGERY

## 2020-10-30 PROCEDURE — 25000003 PHARM REV CODE 250: Performed by: ANESTHESIOLOGY

## 2020-10-30 PROCEDURE — 71000033 HC RECOVERY, INTIAL HOUR: Performed by: ORTHOPAEDIC SURGERY

## 2020-10-30 PROCEDURE — 64415 NJX AA&/STRD BRCH PLXS IMG: CPT | Performed by: STUDENT IN AN ORGANIZED HEALTH CARE EDUCATION/TRAINING PROGRAM

## 2020-10-30 PROCEDURE — 26480 PR TRANSPLANT HAND TENDON: ICD-10-PCS | Mod: 59,51,RT, | Performed by: ORTHOPAEDIC SURGERY

## 2020-10-30 PROCEDURE — D9220A PRA ANESTHESIA: ICD-10-PCS | Mod: CRNA,,, | Performed by: NURSE ANESTHETIST, CERTIFIED REGISTERED

## 2020-10-30 PROCEDURE — 26480 TRANSPLANT HAND TENDON: CPT | Mod: 59,51,RT, | Performed by: ORTHOPAEDIC SURGERY

## 2020-10-30 PROCEDURE — 71000015 HC POSTOP RECOV 1ST HR: Performed by: ORTHOPAEDIC SURGERY

## 2020-10-30 PROCEDURE — 36000708 HC OR TIME LEV III 1ST 15 MIN: Performed by: ORTHOPAEDIC SURGERY

## 2020-10-30 PROCEDURE — C1769 GUIDE WIRE: HCPCS | Performed by: ORTHOPAEDIC SURGERY

## 2020-10-30 PROCEDURE — 25447 ARTHRP NTRCRP/CRP/MTCR NTRPS: CPT | Mod: RT,,, | Performed by: ORTHOPAEDIC SURGERY

## 2020-10-30 PROCEDURE — 25000003 PHARM REV CODE 250: Performed by: ORTHOPAEDIC SURGERY

## 2020-10-30 PROCEDURE — 36000709 HC OR TIME LEV III EA ADD 15 MIN: Performed by: ORTHOPAEDIC SURGERY

## 2020-10-30 PROCEDURE — 37000009 HC ANESTHESIA EA ADD 15 MINS: Performed by: ORTHOPAEDIC SURGERY

## 2020-10-30 PROCEDURE — 94770 HC EXHALED C02 TEST: CPT

## 2020-10-30 PROCEDURE — 26530 PR ARTHROPLASTY MC-P JT,SINGLE: ICD-10-PCS | Mod: 51,F5,, | Performed by: ORTHOPAEDIC SURGERY

## 2020-10-30 PROCEDURE — 37000008 HC ANESTHESIA 1ST 15 MINUTES: Performed by: ORTHOPAEDIC SURGERY

## 2020-10-30 PROCEDURE — 25447 PR REPAIR INTERCARP/CARP-METACARP JT: ICD-10-PCS | Mod: RT,,, | Performed by: ORTHOPAEDIC SURGERY

## 2020-10-30 PROCEDURE — 25000003 PHARM REV CODE 250: Performed by: NURSE ANESTHETIST, CERTIFIED REGISTERED

## 2020-10-30 PROCEDURE — 76942 PR U/S GUIDANCE FOR NEEDLE GUIDANCE: ICD-10-PCS | Mod: 26,,, | Performed by: ANESTHESIOLOGY

## 2020-10-30 PROCEDURE — 64415 PR NERVE BLOCK INJ, ANES/STEROID, BRACHIAL PLEXUS, INCL IMAG GUIDANCE: ICD-10-PCS | Mod: 59,RT,, | Performed by: ANESTHESIOLOGY

## 2020-10-30 PROCEDURE — 25000003 PHARM REV CODE 250: Performed by: STUDENT IN AN ORGANIZED HEALTH CARE EDUCATION/TRAINING PROGRAM

## 2020-10-30 PROCEDURE — 27201423 OPTIME MED/SURG SUP & DEVICES STERILE SUPPLY: Performed by: ORTHOPAEDIC SURGERY

## 2020-10-30 PROCEDURE — 76942 ECHO GUIDE FOR BIOPSY: CPT | Performed by: STUDENT IN AN ORGANIZED HEALTH CARE EDUCATION/TRAINING PROGRAM

## 2020-10-30 PROCEDURE — 26530 REVISE KNUCKLE JOINT: CPT | Mod: 51,F5,, | Performed by: ORTHOPAEDIC SURGERY

## 2020-10-30 PROCEDURE — 63600175 PHARM REV CODE 636 W HCPCS: Performed by: STUDENT IN AN ORGANIZED HEALTH CARE EDUCATION/TRAINING PROGRAM

## 2020-10-30 PROCEDURE — D9220A PRA ANESTHESIA: Mod: ANES,,, | Performed by: ANESTHESIOLOGY

## 2020-10-30 DEVICE — IMPLANTABLE DEVICE: Type: IMPLANTABLE DEVICE | Site: THUMB | Status: FUNCTIONAL

## 2020-10-30 DEVICE — SCREW PEEK TENODESIS 4 X 10: Type: IMPLANTABLE DEVICE | Site: THUMB | Status: FUNCTIONAL

## 2020-10-30 DEVICE — FIBER CORTICAL ENHANCE 2.5CC: Type: IMPLANTABLE DEVICE | Site: THUMB | Status: FUNCTIONAL

## 2020-10-30 DEVICE — SYS CMC LIG RECON IMPLANT: Type: IMPLANTABLE DEVICE | Site: THUMB | Status: FUNCTIONAL

## 2020-10-30 RX ORDER — FENTANYL CITRATE 50 UG/ML
25 INJECTION, SOLUTION INTRAMUSCULAR; INTRAVENOUS EVERY 5 MIN PRN
Status: DISCONTINUED | OUTPATIENT
Start: 2020-10-30 | End: 2020-10-30 | Stop reason: HOSPADM

## 2020-10-30 RX ORDER — CEFAZOLIN SODIUM 1 G/3ML
INJECTION, POWDER, FOR SOLUTION INTRAMUSCULAR; INTRAVENOUS
Status: DISCONTINUED | OUTPATIENT
Start: 2020-10-30 | End: 2020-10-30

## 2020-10-30 RX ORDER — ACETAMINOPHEN 500 MG
1000 TABLET ORAL ONCE
Status: DISPENSED | OUTPATIENT
Start: 2020-10-30

## 2020-10-30 RX ORDER — ACETAMINOPHEN 500 MG
1000 TABLET ORAL
Status: COMPLETED | OUTPATIENT
Start: 2020-10-30 | End: 2020-10-30

## 2020-10-30 RX ORDER — ONDANSETRON 2 MG/ML
INJECTION INTRAMUSCULAR; INTRAVENOUS
Status: DISCONTINUED | OUTPATIENT
Start: 2020-10-30 | End: 2020-10-30

## 2020-10-30 RX ORDER — SODIUM CHLORIDE 9 MG/ML
INJECTION, SOLUTION INTRAVENOUS CONTINUOUS PRN
Status: DISCONTINUED | OUTPATIENT
Start: 2020-10-30 | End: 2020-10-30

## 2020-10-30 RX ORDER — MIDAZOLAM HYDROCHLORIDE 1 MG/ML
0.5 INJECTION INTRAMUSCULAR; INTRAVENOUS
Status: ACTIVE | OUTPATIENT
Start: 2020-10-30

## 2020-10-30 RX ORDER — PROPOFOL 10 MG/ML
VIAL (ML) INTRAVENOUS CONTINUOUS PRN
Status: DISCONTINUED | OUTPATIENT
Start: 2020-10-30 | End: 2020-10-30

## 2020-10-30 RX ORDER — FENTANYL CITRATE 50 UG/ML
25 INJECTION, SOLUTION INTRAMUSCULAR; INTRAVENOUS EVERY 5 MIN PRN
Status: ACTIVE | OUTPATIENT
Start: 2020-10-30

## 2020-10-30 RX ORDER — FENTANYL CITRATE 50 UG/ML
25 INJECTION, SOLUTION INTRAMUSCULAR; INTRAVENOUS EVERY 5 MIN PRN
Status: DISPENSED | OUTPATIENT
Start: 2020-10-30

## 2020-10-30 RX ORDER — DEXMEDETOMIDINE HYDROCHLORIDE 100 UG/ML
INJECTION, SOLUTION INTRAVENOUS
Status: DISCONTINUED | OUTPATIENT
Start: 2020-10-30 | End: 2020-10-30

## 2020-10-30 RX ORDER — OXYCODONE HYDROCHLORIDE 5 MG/1
5 TABLET ORAL
Status: DISCONTINUED | OUTPATIENT
Start: 2020-10-30 | End: 2020-10-30 | Stop reason: HOSPADM

## 2020-10-30 RX ORDER — PROPOFOL 10 MG/ML
VIAL (ML) INTRAVENOUS
Status: DISCONTINUED | OUTPATIENT
Start: 2020-10-30 | End: 2020-10-30

## 2020-10-30 RX ORDER — BACITRACIN ZINC 500 UNIT/G
OINTMENT (GRAM) TOPICAL
Status: DISCONTINUED | OUTPATIENT
Start: 2020-10-30 | End: 2020-10-30 | Stop reason: HOSPADM

## 2020-10-30 RX ORDER — MIDAZOLAM HYDROCHLORIDE 1 MG/ML
0.5 INJECTION INTRAMUSCULAR; INTRAVENOUS
Status: DISPENSED | OUTPATIENT
Start: 2020-10-30

## 2020-10-30 RX ORDER — DEXAMETHASONE SODIUM PHOSPHATE 4 MG/ML
INJECTION, SOLUTION INTRA-ARTICULAR; INTRALESIONAL; INTRAMUSCULAR; INTRAVENOUS; SOFT TISSUE
Status: DISCONTINUED | OUTPATIENT
Start: 2020-10-30 | End: 2020-10-30

## 2020-10-30 RX ORDER — LIDOCAINE HYDROCHLORIDE 10 MG/ML
INJECTION, SOLUTION INTRAVENOUS
Status: DISCONTINUED | OUTPATIENT
Start: 2020-10-30 | End: 2020-10-30

## 2020-10-30 RX ORDER — CELECOXIB 200 MG/1
400 CAPSULE ORAL ONCE
Status: COMPLETED | OUTPATIENT
Start: 2020-10-30 | End: 2020-10-30

## 2020-10-30 RX ORDER — FENTANYL CITRATE 50 UG/ML
INJECTION, SOLUTION INTRAMUSCULAR; INTRAVENOUS
Status: DISCONTINUED | OUTPATIENT
Start: 2020-10-30 | End: 2020-10-30

## 2020-10-30 RX ORDER — CEFAZOLIN SODIUM 1 G/3ML
2 INJECTION, POWDER, FOR SOLUTION INTRAMUSCULAR; INTRAVENOUS
Status: DISCONTINUED | OUTPATIENT
Start: 2020-10-30 | End: 2020-10-30 | Stop reason: HOSPADM

## 2020-10-30 RX ADMIN — DEXMEDETOMIDINE HYDROCHLORIDE 30 MCG: 100 INJECTION, SOLUTION, CONCENTRATE INTRAVENOUS at 10:10

## 2020-10-30 RX ADMIN — SODIUM CHLORIDE: 0.9 INJECTION, SOLUTION INTRAVENOUS at 09:10

## 2020-10-30 RX ADMIN — CELECOXIB 400 MG: 200 CAPSULE ORAL at 08:10

## 2020-10-30 RX ADMIN — ACETAMINOPHEN 1000 MG: 500 TABLET ORAL at 08:10

## 2020-10-30 RX ADMIN — LIDOCAINE HYDROCHLORIDE 100 MG: 10 INJECTION, SOLUTION INTRAVENOUS at 09:10

## 2020-10-30 RX ADMIN — SODIUM CHLORIDE 1000 ML: 0.9 INJECTION, SOLUTION INTRAVENOUS at 08:10

## 2020-10-30 RX ADMIN — FENTANYL CITRATE 100 MCG: 50 INJECTION INTRAMUSCULAR; INTRAVENOUS at 09:10

## 2020-10-30 RX ADMIN — MIDAZOLAM 1 MG: 1 INJECTION INTRAMUSCULAR; INTRAVENOUS at 09:10

## 2020-10-30 RX ADMIN — PROPOFOL 100 MCG/KG/MIN: 10 INJECTION, EMULSION INTRAVENOUS at 09:10

## 2020-10-30 RX ADMIN — FENTANYL CITRATE 100 MCG: 50 INJECTION, SOLUTION INTRAMUSCULAR; INTRAVENOUS at 09:10

## 2020-10-30 RX ADMIN — SODIUM CHLORIDE, SODIUM GLUCONATE, SODIUM ACETATE, POTASSIUM CHLORIDE, MAGNESIUM CHLORIDE, SODIUM PHOSPHATE, DIBASIC, AND POTASSIUM PHOSPHATE: .53; .5; .37; .037; .03; .012; .00082 INJECTION, SOLUTION INTRAVENOUS at 10:10

## 2020-10-30 RX ADMIN — DEXAMETHASONE SODIUM PHOSPHATE 4 MG: 4 INJECTION, SOLUTION INTRAMUSCULAR; INTRAVENOUS at 09:10

## 2020-10-30 RX ADMIN — CEFAZOLIN 2 G: 330 INJECTION, POWDER, FOR SOLUTION INTRAMUSCULAR; INTRAVENOUS at 09:10

## 2020-10-30 RX ADMIN — ONDANSETRON 4 MG: 2 INJECTION, SOLUTION INTRAMUSCULAR; INTRAVENOUS at 09:10

## 2020-10-30 RX ADMIN — PROPOFOL 20 MG: 10 INJECTION, EMULSION INTRAVENOUS at 09:10

## 2020-10-30 NOTE — DISCHARGE INSTRUCTIONS
Discharge Instructions: After Your Surgery  Youve just had surgery. During surgery, you were given medicine called anesthesia to keep you relaxed and free of pain. After surgery, you may have some pain or nausea. This is common. Here are some tips for feeling better and getting well after surgery.     Stay on schedule with your medicine.   Going home  Your healthcare provider will show you how to take care of yourself when you go home. He or she will also answer your questions. Have an adult family member or friend drive you home. For the first 24 hours after your surgery:  · Do not drive or use heavy equipment.  · Do not make important decisions or sign legal papers.  · Do not drink alcohol.  · Have someone stay with you, if needed. He or she can watch for problems and help keep you safe.  Be sure to go to all follow-up visits with your healthcare provider. And rest after your surgery for as long as your healthcare provider tells you to.  Coping with pain  If you have pain after surgery, pain medicine will help you feel better. Take it as told, before pain becomes severe. Also, ask your healthcare provider or pharmacist about other ways to control pain. This might be with heat, ice, or relaxation. And follow any other instructions your surgeon or nurse gives you.  Tips for taking pain medicine  To get the best relief possible, remember these points:  · Pain medicines can upset your stomach. Taking them with a little food may help.  · Most pain relievers taken by mouth need at least 20 to 30 minutes to start to work.  · Taking medicine on a schedule can help you remember to take it. Try to time your medicine so that you can take it before starting an activity. This might be before you get dressed, go for a walk, or sit down for dinner.  · Constipation is a common side effect of pain medicines. Call your healthcare provider before taking any medicines such as laxatives or stool softeners to help ease constipation.  Also ask if you should skip any foods. Drinking lots of fluids and eating foods such as fruits and vegetables that are high in fiber can also help. Remember, do not take laxatives unless your surgeon has prescribed them.  · Drinking alcohol and taking pain medicine can cause dizziness and slow your breathing. It can even be deadly. Do not drink alcohol while taking pain medicine.  · Pain medicine can make you react more slowly to things. Do not drive or run machinery while taking pain medicine.  Your healthcare provider may tell you to take acetaminophen to help ease your pain. Ask him or her how much you are supposed to take each day. Acetaminophen or other pain relievers may interact with your prescription medicines or other over-the-counter (OTC) medicines. Some prescription medicines have acetaminophen and other ingredients. Using both prescription and OTC acetaminophen for pain can cause you to overdose. Read the labels on your OTC medicines with care. This will help you to clearly know the list of ingredients, how much to take, and any warnings. It may also help you not take too much acetaminophen. If you have questions or do not understand the information, ask your pharmacist or healthcare provider to explain it to you before you take the OTC medicine.  Managing nausea  Some people have an upset stomach after surgery. This is often because of anesthesia, pain, or pain medicine, or the stress of surgery. These tips will help you handle nausea and eat healthy foods as you get better. If you were on a special food plan before surgery, ask your healthcare provider if you should follow it while you get better. These tips may help:  · Do not push yourself to eat. Your body will tell you when to eat and how much.  · Start off with clear liquids and soup. They are easier to digest.  · Next try semi-solid foods, such as mashed potatoes, applesauce, and gelatin, as you feel ready.  · Slowly move to solid foods. Dont  eat fatty, rich, or spicy foods at first.  · Do not force yourself to have 3 large meals a day. Instead eat smaller amounts more often.  · Take pain medicines with a small amount of solid food, such as crackers or toast, to avoid nausea.     Call your surgeon if  · You still have pain an hour after taking medicine. The medicine may not be strong enough.  · You feel too sleepy, dizzy, or groggy. The medicine may be too strong.  · You have side effects like nausea, vomiting, or skin changes, such as rash, itching, or hives.       If you have obstructive sleep apnea  You were given anesthesia medicine during surgery to keep you comfortable and free of pain. After surgery, you may have more apnea spells because of this medicine and other medicines you were given. The spells may last longer than usual.   At home:  · Keep using the continuous positive airway pressure (CPAP) device when you sleep. Unless your healthcare provider tells you not to, use it when you sleep, day or night. CPAP is a common device used to treat obstructive sleep apnea.  · Talk with your provider before taking any pain medicine, muscle relaxants, or sedatives. Your provider will tell you about the possible dangers of taking these medicines.  Date Last Reviewed: 12/1/2016 © 2000-2017 [employer]. [address] [place] [address]. All rights reserved. This information is not intended as a substitute for professional medical care. Always follow your healthcare professional's instructions.        Sling    A sling is designed to support your arm in a position of rest. It is used for injuries of the hand, forearm, upper arm, and shoulder.  A shoulder that is immobilized too long can become stiff and lose range of motion. Follow up with your doctor as advised and do not use the sling longer than directed.    Home use:  · Leave the sling in place as long as directed by your doctor. Unless told otherwise, you may remove it when  "bathing, dressing, and when you go to sleep.  · If approved by your health care provider, you can do gentle "pendulum exercises." To do these:  ¨ Remove your sling.  ¨ Stand or sit with your arm vertical and close to your side.  ¨ Relax your shoulder muscles and gently swing the arm forward and back, side to side, and in small circles.  ¨ Do this for about 5 minutes once or twice a day.  There should be only minimal pain with this exercise. If you experience more than minimal discomfort, stop and call your health care provider.  · The sling is adjustable. If it becomes loose, adjust it so that your forearm is horizontal (level with the ground). Your hand should be level with the elbow.  Date Last Reviewed: 9/28/2015  © 6082-1038 [employer], [employer]. [address] [place] [address], [place] [address]. All rights reserved. This information is not intended as a substitute for professional medical care. Always follow your healthcare professional's instructions.        "

## 2020-10-30 NOTE — ANESTHESIA POSTPROCEDURE EVALUATION
Anesthesia Post Evaluation    Patient: Luz Burk    Procedure(s) Performed: Procedure(s) (LRB):  ARTHROPLASTY, FINGER cmc joint right thumb (Right)    Final Anesthesia Type: general    Patient location during evaluation: PACU  Patient participation: Yes- Able to Participate  Level of consciousness: awake and alert and oriented  Post-procedure vital signs: reviewed and stable  Pain management: adequate  Airway patency: patent    PONV status at discharge: No PONV  Anesthetic complications: no      Cardiovascular status: stable  Respiratory status: unassisted  Hydration status: euvolemic  Follow-up not needed.          Vitals Value Taken Time   /73 10/30/20 1217   Temp 36.2 °C (97.1 °F) 10/30/20 1138   Pulse 56 10/30/20 1218   Resp 19 10/30/20 1218   SpO2 95 % 10/30/20 1218   Vitals shown include unvalidated device data.      Event Time   Out of Recovery 12:00:00         Pain/Dawson Score: Pain Rating Prior to Med Admin: 6 (10/30/2020  8:49 AM)  Dawson Score: 10 (10/30/2020 12:00 PM)

## 2020-10-30 NOTE — PLAN OF CARE
Vital signs stable. Alert, oriented and following commands. Denies pain/nausea. Dressing remains CDI. POC reviewed and understanding verbalized.

## 2020-10-30 NOTE — ANESTHESIA PROCEDURE NOTES
Infraclavicular single injection    Patient location during procedure: pre-op   Block not for primary anesthetic.  Reason for block: at surgeon's request and post-op pain management   Post-op Pain Location: Right hand pain  Start time: 10/30/2020 9:16 AM  Timeout: 10/30/2020 9:15 AM   End time: 10/30/2020 9:23 AM    Staffing  Authorizing Provider: Hira Bazan MD  Performing Provider: Quinten Prince MD    Preanesthetic Checklist  Completed: patient identified, site marked, surgical consent, pre-op evaluation, timeout performed, IV checked, risks and benefits discussed and monitors and equipment checked  Peripheral Block  Patient position: sitting  Prep: ChloraPrep  Patient monitoring: heart rate, cardiac monitor, continuous pulse ox, continuous capnometry and frequent blood pressure checks  Block type: infraclavicular  Laterality: right  Injection technique: single shot  Needle  Needle type: Stimuplex   Needle gauge: 21 G  Needle length: 4 in  Needle localization: ultrasound guidance and anatomical landmarks   -ultrasound image captured on disc.  Assessment  Injection assessment: negative aspiration and negative parasthesia  Paresthesia pain: none  Heart rate change: no  Slow fractionated injection: yes  Additional Notes  VSS.  [facility] RN monitoring vitals throughout procedure.  Patient tolerated procedure well.  30cc 0.5% bupiv + epi + additives injected

## 2020-10-30 NOTE — TRANSFER OF CARE
"Anesthesia Transfer of Care Note    Patient: Luz Burk    Procedure(s) Performed: Procedure(s) (LRB):  ARTHROPLASTY, FINGER cmc joint right thumb (Right)    Anesthesia Type: general and regional    Transport from OR: Transported from OR on 6-10 L/min O2 by face mask with adequate spontaneous ventilation. Transported from OR on room air with adequate spontaneous ventilation    Post pain: adequate analgesia    Post assessment: no apparent anesthetic complications and tolerated procedure well    Post vital signs: stable    Level of consciousness: awake and alert    Nausea/Vomiting: no nausea/vomiting    Complications: none    Transfer of care protocol was followed      Last vitals:   Visit Vitals  BP (!) 150/69 (BP Location: Left leg, Patient Position: Lying)   Pulse 64   Temp 36.9 °C (98.4 °F) (Oral)   Resp (!) 28   Ht 5' 4" (1.626 m)   Wt 96.2 kg (212 lb)   LMP  (LMP Unknown)   SpO2 100%   Breastfeeding No   BMI 36.39 kg/m²     "

## 2020-10-30 NOTE — BRIEF OP NOTE
[facility]  Brief Operative Note    Surgery Date: 10/30/2020     Surgeon(s) and Role:     * Sugey Ayon MD - Primary    Assisting Surgeon: None    Pre-op Diagnosis:  Arthritis of carpometacarpal (CMC) joint of right thumb [M18.11]    Post-op Diagnosis:  Post-Op Diagnosis Codes:     * Arthritis of carpometacarpal (CMC) joint of right thumb [M18.11]    Procedure(s) (LRB):  ARTHROPLASTY, FINGER cmc joint right thumb (Right)    Anesthesia: Regional    Description of the findings of the procedure(s): see op note    Estimated Blood Loss: * No values recorded between 10/30/2020 10:00 AM and 10/30/2020 11:36 AM *         Specimens:   Specimen (12h ago, onward)    None            Discharge Note    OUTCOME: Patient tolerated treatment/procedure well without complication and is now ready for discharge.    DISPOSITION: Home or Self Care    FINAL DIAGNOSIS:  Cmc arthritis    FOLLOWUP: In clinic    DISCHARGE INSTRUCTIONS:    Discharge Procedure Orders   Diet Adult Regular     Notify your health care provider if you experience any of the following:  temperature >100.4     Notify your health care provider if you experience any of the following:  persistent nausea and vomiting or diarrhea     Notify your health care provider if you experience any of the following:  severe uncontrolled pain     Notify your health care provider if you experience any of the following:  redness, tenderness, or signs of infection (pain, swelling, redness, odor or green/yellow discharge around incision site)     Notify your health care provider if you experience any of the following:  difficulty breathing or increased cough     Notify your health care provider if you experience any of the following:  severe persistent headache     Notify your health care provider if you experience any of the following:  worsening rash     Notify your health care provider if you experience any of the following:  persistent dizziness,  light-headedness, or visual disturbances     Notify your health care provider if you experience any of the following:  increased confusion or weakness     Notify your health care provider if you experience any of the following:     Leave dressing on - Keep it clean, dry, and intact until clinic visit     Weight bearing restrictions (specify):   Order Comments: GRACE DALAL        Clinical Reference Documents Added to Patient Instructions       Document    AFTER YOUR SURGERY: DISCHARGE INSTRUCTIONS (ENGLISH)    SLING (ENGLISH)

## 2020-10-30 NOTE — PLAN OF CARE
Pre op complete. Pt ready for block. Anesthesia notified. Pt had no labs ordered.Notified Dr Meléndez. States okay to give Celebrex.

## 2020-10-30 NOTE — H&P
"Ms. Burk is here today for right hand pain.  At last visit:    She is 68 days status post right carpal tunnel release and right volar ganglion cyst excision by Dr. Ayon on 7/24/20.  She underwent right CMC injection at her last visit on 8/31/2020, reports it provided 2-3 weeks of pain improvement.  Her CMC brace is "not helping, and actually a little painful."  She reports that she gets short term relief with use of paraffin wax. She has also noticed pain and swelling in the area where the volar wrist ganglion was removed. She has a new lump in the right palm at the radial aspect of the 2nd metacarpal head.  She has pain at the radial right wrist, base of the thumb, and the PIPs of all fingers on the right hand.  Minimal left hand discomfort.  She does report that her finger numbness and tingling have resolved.    Past Medical History:   Diagnosis Date    Cataract     Collagenous colitis     Dx 5/30/16    Hypothyroidism     OA (osteoarthritis)     Obesity     Sleep apnea        Past Surgical History:   Procedure Laterality Date    APPENDECTOMY      CARPAL TUNNEL RELEASE Right 7/24/2020    Procedure: RELEASE, CARPAL TUNNEL, RIGHT;  Surgeon: Sugey Ayon MD;  [facility];  Service: Orthopedics;  Laterality: Right;  Regional & MAC    CATARACT EXTRACTION      CHOLECYSTECTOMY      COLONOSCOPY N/A 5/30/2016    Procedure: COLONOSCOPY;  Surgeon: BINH Souza MD;  Location: [address]);  Service: Endoscopy;  Laterality: N/A;    COLONOSCOPY N/A 6/17/2020    Procedure: COLONOSCOPYSuprep;  Surgeon: Antonio Weller MD;  Location: [facility];  Service: Endoscopy;  Laterality: N/A;    ESOPHAGOGASTRODUODENOSCOPY N/A 6/17/2020    Procedure: EGD (ESOPHAGOGASTRODUODENOSCOPY);  Surgeon: Antonio Weller MD;  Location: [facility];  Service: Endoscopy;  Laterality: N/A;    EXCISION OF GANGLION OF WRIST Right 7/24/2020    Procedure: EXCISION, GANGLION CYST, WRIST, RIGHT volar;  Surgeon: Sugey" "LOWELL Ayon MD;  Location: [facility];  Service: Orthopedics;  Laterality: Right;  Regional & MAC    HERNIA REPAIR      HIATAL HERNIA REPAIR      HYSTERECTOMY      lap band surgery  10/11/2011    Realize C Band (11mL)    LAPAROSCOPIC GASTRIC BANDING      OOPHORECTOMY      TONSILLECTOMY      TOTAL KNEE ARTHROPLASTY  7/23/12    VENTRICULOPERITONEAL SHUNT         Review of patient's allergies indicates:   Allergen Reactions    Omeprazole Diarrhea       Family History   Problem Relation Age of Onset    Alzheimer's disease Mother 84    Prostate cancer Father 70    Lupus Father     Emphysema Father     Breast cancer Sister     Cancer Unknown     Arthritis Unknown     Colon cancer Neg Hx           She reports a family history of Lupus in her father; states that her brother was diagnosed with Rheumatoid Arthritis.  She reports being anemic when she tried to donate blood last month.   Today: Pt wants to discuss surgical procedures for CMC pain  PATHOLOGY:   Soft tissue, right wrist (biopsy):  - Consistent with benign ganglion cyst, with focal calcification.     ROS:  Review of Systems   Constitutional: Negative for chills.   HENT: Negative for hearing loss.    Eyes: Negative for pain.   Respiratory: Negative for cough.    Cardiovascular: Negative for palpitations.   Genitourinary: Negative for dysuria.   Musculoskeletal:        See HPI   Skin: Negative for rash.   Neurological: Negative for tingling.   Endo/Heme/Allergies: Negative for polydipsia.   Psychiatric/Behavioral: Negative for suicidal ideas.                Physical exam:        Vitals:     10/08/20 1047   BP: (!) 145/79   Pulse: 72   Weight: 96.2 kg (212 lb 1.3 oz)   Height: 5' 4" (1.626 m)   PainSc:   8      Vital signs are stable, patient is afebrile.  Patient is well dressed and well groomed, no acute distress.  Alert and oriented to person, place, and time.  Incisions are well healed - clean, dry and intact.  Tender to palpation at the radial " wrist incision, palpable thickening noted, possible recurrence. She is also tender at the right CMC joint, pain with grind test. There is no erythema or exudate.  There is no sign of any infection. She is NVI- good sensation and motor function. Good finger ROM.     RADIOLOGY:  Right Wrist XRay, 6/9/2020  FINDINGS:  Three views of the right wrist were obtained.  No evidence of acute fracture or dislocation.  Bony mineralization is normal. Soft tissues are unremarkable.   Mild degenerative changes.  Impression:  No acute fracture or dislocation        Assessment: status post right carpal tunnel release and right volar ganglion cyst excision by Dr. Ayon on 7/24/20. right CMC arthritis.     Plan:       -Discussed at great length right CMC arthroplasty with right MCP fusion.  I have explained the risks, benefits, and alternatives of the procedure to the patient in great detail. The patient voices understanding and all questions have been answered. The patient agrees with to proceed as planned. Consents were performed in clinic. Pt understands postop course

## 2020-11-01 NOTE — OP NOTE
[facility]  Surgery Department  Operative Note    SUMMARY     Date of Procedure: 10/30/2020     Procedure: Procedure(s) (LRB):  ARTHROPLASTY, FINGER cmc joint right thumb (Right) MCP fusion of right thumb    Surgeon(s) and Role:     * Sugey Ayon MD - Primary    Assisting Surgeon: Sandra PEREZ    Pre-Operative Diagnosis: Arthritis of carpometacarpal (CMC) joint of right thumb [M18.11]    Post-Operative Diagnosis: Post-Op Diagnosis Codes:     * Arthritis of carpometacarpal (CMC) joint of right thumb [M18.11]    Anesthesia: Regional    Technical Procedures Used: surgery    Description of the Findings of the Procedure:  Indication for procedure Ms. Burk is a 70-year-old female who failed conservative treatment of her right thumb pain she has CMC arthritis of her right thumb she also has a history of gamekeeper's injury to her MCP joint had significant pain at her MCP joint after much discussion with the patient elected for not only CMC arthroplasty but also MCP fusion we discussed this in great length we discussed motion postoperatively that MCP would have no motion patient agreed as planned    Procedure in detail the correct site was marked with the patient's participation the holding area patient underwent regional seizure was brought to the operating room placed in supine position underwent MAC anesthesia well-padded nonsterile tourniquet was placed on the right upper extremity right upper extremities prepped draped normal sterile fashion time-out was called with the correct procedure to be indicated IV antibiotics given patient preoperatively incision was marked out 1st over the CMC joint he using a modified [place]os incision as well as a dorsal incision directly over the MCP joint does incisions were marked out the arm was exsanguinated with an Esmarch after time-out was conducted IV antibiotics were given incision over the CMC joint was made extending it for a de Quervain release as  well all the while protecting the radial sensory branches of the nerve gentle retraction was placed to move the sensory branches out of the way the 1st dorsal compartment was released slip of APL was harvested it was tagged with a looped FiberWire stitch the CMC joint was incised using C-arm fluoroscopy the trapezium was removed in completion including osteophytes using the Arthrex system pin was placed to create our 1st osseous tunnel in the 1st metacarpal with oversized in the Reamer and that was then performed on the base in which we have an osseous tunnel created the 1st metacarpal using a tendon Passer the APL tendon that had been harvested as a graft was then slipped through the 1st metacarpal osseous tunnel our attention was then turned to the 2nd metacarpal incision was made based on C-arm fluoroscopy at the base of the 2nd metacarpal using the Arthrex system in careful Homans were placed around the 2nd metacarpal a pin was placed and reaming was conducted over that pin and this was done under C-arm fluoroscopy the APL tendon which had been placed through the osseous tunnel the 1st metacarpal was then slung through the 2nd metacarpal osseous tunnel held in suspension peek screw was placed on the base of the 2nd metacarpal and then a 2nd screw was placed on the 1st metacarpal once that was completed our attention was then turned to the MCP joint incision was made careful dissection to the joint was maintain it was dislocated using the Accu Med system cup and cone reamers were utilized size 14 to remove cartilage rongeur was also used to remove excess cartilage this was then placed in position in using the AcuTrak system a pin was placed dorsally the metacarpal into the proximal phalanx multiple x-rays were taken showing that this was in good position in slight flexion was also measured showed this was appropriate position with good bone bridge using the AcuTrak system it was over reamed across the joint held  in position while an AcuTrak screw was placed over the pin and utilized for compression x-rays were taken to confirm positioning the areas were irrigated copious amounts normal saline cortical fibers were packed into the fusion site of the MCP joint extensor tendon was closed with Vicryl stitch Vicryl nylon closed MCP joint Vicryl Monocryl Dermabond closed the CMC arthroplasty site sterile dressing was applied patient was placed in a well-padded thumb spica splint after tourniquet was deflated brisk cap refill sued    Patient was taken to the up recovery room in stable condition    Postop plan for this patient she is to keep dressing clean dry and intact 2 weeks will place her in a cast fusion to be immobilized until fusion is achieved the patient placed in a brace at 6 weeks postop start therapy    Significant Surgical Tasks Conducted by the Assistant(s), if Applicable: retraction    Complications: No    Estimated Blood Loss (EBL): * No values recorded between 10/30/2020 10:00 AM and 10/30/2020 11:36 AM *           Implants:   Implant Name Type Inv. Item Serial No.  Lot No. LRB No. Used Action   SCREW PEEK TENODESIS 4 X 10 - [id number]  SCREW PEEK TENODESIS 4 X 10  [employer] [id number] Right 1 Implanted   SYS CMC LIG RECON IMPLANT - [id number]  SYS CMC LIG RECON IMPLANT  [employer] [id number] Right 1 Implanted   K-WIRE SNGL TRCR .045 D 6L N/S - [id number]  K-WIRE SNGL TRCR .045 D 6L N/S  [employer]  Right 1 Implanted and Explanted   GUIDEWIRE STAINLES .059 X 5 - [id number]  GUIDEWIRE STAINLES .059 X 5  [employer]  Right 1 Implanted and Explanted   [id number]  [employer]ORT[employer] ENHANCE DEMIN M [id number]  Right 1 Implanted   SCREW 30.0MM MINI ACUTRAK - [id number]  SCREW 30.0MM MINI ACUTRAK  [employer] [id number] Right 1 Implanted       Specimens:   Specimen (12h ago, onward)    None                  Condition: Good    Disposition: PACU - hemodynamically stable.    Attestation: I performed the  procedure.    Discharge Note    SUMMARY     Admit Date: 10/30/2020    Discharge Date and Time: 10/30/2020 12:27 PM    Hospital Course (synopsis of major diagnoses, care, treatment, and services provided during the course of the hospital stay): surgery     Final Diagnosis: Post-Op Diagnosis Codes:     * Arthritis of carpometacarpal (CMC) joint of right thumb [M18.11]    Disposition: Home or Self Care    Follow Up/Patient Instructions:     Medications:  Reconciled Home Medications:      Medication List      CONTINUE taking these medications    acyclovir 400 MG tablet  Commonly known as: ZOVIRAX  Take 3 times a day for 5 days at the first sign of a cold sore.     BIOTIN ORAL  Take by mouth once daily.     calcium carbonate-vitamin D3 600mg (1,000mg) -1,000 unit Tab     CANNABIDIOL (CBD) EXTRACT ORAL     citalopram 20 MG tablet  Commonly known as: CELEXA  Take 1 tablet by mouth once daily     clindamycin 300 MG capsule  Commonly known as: CLEOCIN  Take 1 capsule (300 mg total) by mouth 3 (three) times daily.     cyanocobalamin 1,000 mcg/mL injection  INJECT 1 ML INTRAMUSCULARLY EVERY 30 DAYS.     docusate sodium 100 MG capsule  Commonly known as: COLACE  Take 1 capsule (100 mg total) by mouth 2 (two) times daily.     ergocalciferol 50,000 unit Cap  Commonly known as: ERGOCALCIFEROL  Take 1 capsule (50,000 Units total) by mouth every 7 days.     EUTHYROX 50 MCG tablet  Generic drug: levothyroxine  TAKE 1 TABLET BY MOUTH BEFORE BREAKFAST     fluticasone propionate 50 mcg/actuation nasal spray  Commonly known as: FLONASE  1 spray by Nasal route once daily.     furosemide 20 MG tablet  Commonly known as: LASIX  Take 1 tablet (20 mg total) by mouth once daily.     HYDROcodone-acetaminophen 5-325 mg per tablet  Commonly known as: NORCO  Take 1 tablet by mouth every 4 (four) hours as needed for Pain.     Lactobacillus rhamnosus GG 10 billion cell capsule  Commonly known as: CULTURELLE  Take 1 capsule by mouth once daily.      loratadine 10 mg tablet  Commonly known as: CLARITIN  Take 1 tablet (10 mg total) by mouth once daily.     melatonin 10 mg Tab  Take by mouth.     oxyCODONE-acetaminophen 5-325 mg per tablet  Commonly known as: PERCOCET  Take 1 tablet by mouth every 4 to 6 hours as needed for Pain.     potassium chloride 10 MEQ Tbsr  Commonly known as: KLOR-CON  TAKE TWO TABLETS BY MOUTH ONCE DAILY WITH LASIX     THERA-M ORAL     turmeric 400 mg Cap     TYLENOL ORAL  Take by mouth every 6 (six) hours as needed.     VITAMIN C 100 MG tablet  Generic drug: ascorbic acid (vitamin C)  Take 500 mg by mouth once daily.          Discharge Procedure Orders   Diet Adult Regular     Notify your health care provider if you experience any of the following:  temperature >100.4     Notify your health care provider if you experience any of the following:  persistent nausea and vomiting or diarrhea     Notify your health care provider if you experience any of the following:  severe uncontrolled pain     Notify your health care provider if you experience any of the following:  redness, tenderness, or signs of infection (pain, swelling, redness, odor or green/yellow discharge around incision site)     Notify your health care provider if you experience any of the following:  difficulty breathing or increased cough     Notify your health care provider if you experience any of the following:  severe persistent headache     Notify your health care provider if you experience any of the following:  worsening rash     Notify your health care provider if you experience any of the following:  persistent dizziness, light-headedness, or visual disturbances     Notify your health care provider if you experience any of the following:  increased confusion or weakness     Notify your health care provider if you experience any of the following:     Leave dressing on - Keep it clean, dry, and intact until clinic visit     Weight bearing restrictions (specify):    Order Comments: GRACE DALAL     Follow-up Information     SHWETA Raman On 11/12/2020.    Specialties: Hand Surgery, Orthopedic Surgery  Contact information:  [address] [address] [phone]

## 2020-11-02 ENCOUNTER — TELEPHONE (OUTPATIENT)
Dept: ORTHOPEDICS | Facility: CLINIC | Age: 70
End: 2020-11-02

## 2020-11-02 NOTE — TELEPHONE ENCOUNTER
Spoke c pt. She feels somewhat better but continues to have pain. She spoke to Macey earlier today & has been scheduled to see Dr. Oswald tomorrow. Pt expressed understanding & was thankful.

## 2020-11-02 NOTE — TELEPHONE ENCOUNTER
----- Message from Ofe Gilliam sent at 11/2/2020  7:43 AM CST -----  Contact: RICHELLE SO [[id number]]  Type: Patient Call Back    Who called: RICHELLE SO [[id number]]    What is the request in detail: She states that she is a few days post op and her fingers are red, swollen, and she is in extreme pain.     Can the clinic reply by [facility]? no    Would the patient rather a call back or a response via [facility]? Call back    Best call back number: [phone] (mobile)    Additional Information:

## 2020-11-02 NOTE — TELEPHONE ENCOUNTER
"Spoke c pt. She reports very swollen fingers, a burning sensation at incision sites & "extreme pain". She reports taking percocet q4, icing & keeping her hand elevated. She is not having much relief c percocet.  She is able to move her fingers. Advised pt to continue to ice & elevate. Instructed pt to take ibuprofen as directed on bottle as she is nto taking an NSAID at this time. Also instructed her on how to loosen ace wraps & re-wrap as this may help the swelling/pain. Informed pt that I would reach out to her in a few hours to check her progress. Pt expressed understanding & was thankful.     "

## 2020-11-02 NOTE — TELEPHONE ENCOUNTER
Spoke to patient she followed all the directions per Magalys and is still experiencing pain and swelling.I scheduled the patient an appointment to come in and see  tomorrow she just wants it to be looked at possible re splinted.

## 2020-11-03 ENCOUNTER — OFFICE VISIT (OUTPATIENT)
Dept: ORTHOPEDICS | Facility: CLINIC | Age: 70
End: 2020-11-03
Payer: MEDICARE

## 2020-11-03 VITALS
SYSTOLIC BLOOD PRESSURE: 115 MMHG | HEIGHT: 64 IN | WEIGHT: 212 LBS | DIASTOLIC BLOOD PRESSURE: 72 MMHG | BODY MASS INDEX: 36.19 KG/M2

## 2020-11-03 DIAGNOSIS — Z47.89 ORTHOPEDIC AFTERCARE: Primary | ICD-10-CM

## 2020-11-03 PROCEDURE — 99999 PR PBB SHADOW E&M-EST. PATIENT-LVL III: ICD-10-PCS | Mod: PBBFAC,,, | Performed by: ORTHOPAEDIC SURGERY

## 2020-11-03 PROCEDURE — 99024 POSTOP FOLLOW-UP VISIT: CPT | Mod: S$GLB,,, | Performed by: ORTHOPAEDIC SURGERY

## 2020-11-03 PROCEDURE — 99024 PR POST-OP FOLLOW-UP VISIT: ICD-10-PCS | Mod: S$GLB,,, | Performed by: ORTHOPAEDIC SURGERY

## 2020-11-03 PROCEDURE — 99999 PR PBB SHADOW E&M-EST. PATIENT-LVL III: CPT | Mod: PBBFAC,,, | Performed by: ORTHOPAEDIC SURGERY

## 2020-11-03 NOTE — PROGRESS NOTES
"Ms. Burk is here today for a post-operative visit.  She is 4 days days status post right 1st CMC arthroplasty and 1st MCP fusion by Dr. Ayon on 10/30/20. She reports that she is doing fair. She is concerned with her post-op swelling & also reports a mild burning sensation at the incision sites.  Pain is tolerable now after loosening ace wraps yesterday.  She is taking pain medication as prescribed..  She denies fever, chills, and sweats since the time of the surgery.     Physical exam:    Vitals:    11/03/20 1354   BP: 115/72   Weight: 96.2 kg (212 lb)   Height: 5' 4" (1.626 m)   PainSc:   8     Vital signs are stable, patient is afebrile.  Patient is well dressed and well groomed, no acute distress.  Alert and oriented to person, place, and time.    Ace wraps removed. Splint in good condition, well placed. Good capillary refill of all fingers. Wrinkles present in all 4 fingers visible with mild swelling.  Mild ecchymosis of dorsal hand between 2nd & 3rd MCP.  There is no sign of any infection. She is NVI.     Assessment: 4 days days status post right 1st CMC arthroplasty and 1st MCP fusion     Plan:  Luz was seen today for post-op evaluation, pain and swelling.    Diagnoses and all orders for this visit:    Orthopedic aftercare        - Splint re-wrapped with new ace wraps.   - Encouraged patient to continue ice, elevation, pain medication as prescribed.   - Follow up as previously scheduled for PO appointment 11/12/20.     This note has been scribed in part by Magalys Jiménez MS, OTC, my Sports Medicine Assistant (SMA). This SMA performed & documented a complete history pre-assessment including the history of present illness, which I, Sugey Ayon MD, explored & confirmed personally with the patient. The SMA has scribed portions of this note including my physical exanimation, diagnostic imaging interpretation, procedures performed, my plan of care & diagnosis. I agree that the scribed documentation " is accurate & complete.

## 2020-11-12 ENCOUNTER — DOCUMENTATION ONLY (OUTPATIENT)
Dept: ORTHOPEDICS | Facility: CLINIC | Age: 70
End: 2020-11-12

## 2020-11-12 ENCOUNTER — OFFICE VISIT (OUTPATIENT)
Dept: ORTHOPEDICS | Facility: CLINIC | Age: 70
End: 2020-11-12
Payer: MEDICARE

## 2020-11-12 VITALS
DIASTOLIC BLOOD PRESSURE: 74 MMHG | SYSTOLIC BLOOD PRESSURE: 126 MMHG | HEIGHT: 64 IN | BODY MASS INDEX: 36.19 KG/M2 | WEIGHT: 212 LBS | HEART RATE: 106 BPM

## 2020-11-12 DIAGNOSIS — Z47.89 ORTHOPEDIC AFTERCARE: ICD-10-CM

## 2020-11-12 DIAGNOSIS — M18.11 ARTHRITIS OF CARPOMETACARPAL (CMC) JOINT OF RIGHT THUMB: Primary | ICD-10-CM

## 2020-11-12 PROCEDURE — 99024 PR POST-OP FOLLOW-UP VISIT: ICD-10-PCS | Mod: S$GLB,,, | Performed by: PHYSICIAN ASSISTANT

## 2020-11-12 PROCEDURE — 99024 POSTOP FOLLOW-UP VISIT: CPT | Mod: S$GLB,,, | Performed by: PHYSICIAN ASSISTANT

## 2020-11-12 PROCEDURE — 29075 APPL CST ELBW FNGR SHORT ARM: CPT | Mod: 58,RT,S$GLB, | Performed by: PHYSICIAN ASSISTANT

## 2020-11-12 PROCEDURE — 29075 PR APPLY FOREARM CAST: ICD-10-PCS | Mod: 58,RT,S$GLB, | Performed by: PHYSICIAN ASSISTANT

## 2020-11-12 PROCEDURE — 1101F PT FALLS ASSESS-DOCD LE1/YR: CPT | Mod: CPTII,S$GLB,, | Performed by: PHYSICIAN ASSISTANT

## 2020-11-12 PROCEDURE — 99999 PR PBB SHADOW E&M-EST. PATIENT-LVL III: CPT | Mod: PBBFAC,,, | Performed by: PHYSICIAN ASSISTANT

## 2020-11-12 PROCEDURE — 3008F BODY MASS INDEX DOCD: CPT | Mod: CPTII,S$GLB,, | Performed by: PHYSICIAN ASSISTANT

## 2020-11-12 PROCEDURE — 1125F PR PAIN SEVERITY QUANTIFIED, PAIN PRESENT: ICD-10-PCS | Mod: S$GLB,,, | Performed by: PHYSICIAN ASSISTANT

## 2020-11-12 PROCEDURE — 3288F FALL RISK ASSESSMENT DOCD: CPT | Mod: CPTII,S$GLB,, | Performed by: PHYSICIAN ASSISTANT

## 2020-11-12 PROCEDURE — 1101F PR PT FALLS ASSESS DOC 0-1 FALLS W/OUT INJ PAST YR: ICD-10-PCS | Mod: CPTII,S$GLB,, | Performed by: PHYSICIAN ASSISTANT

## 2020-11-12 PROCEDURE — 3008F PR BODY MASS INDEX (BMI) DOCUMENTED: ICD-10-PCS | Mod: CPTII,S$GLB,, | Performed by: PHYSICIAN ASSISTANT

## 2020-11-12 PROCEDURE — 1125F AMNT PAIN NOTED PAIN PRSNT: CPT | Mod: S$GLB,,, | Performed by: PHYSICIAN ASSISTANT

## 2020-11-12 PROCEDURE — 3288F PR FALLS RISK ASSESSMENT DOCUMENTED: ICD-10-PCS | Mod: CPTII,S$GLB,, | Performed by: PHYSICIAN ASSISTANT

## 2020-11-12 PROCEDURE — 99999 PR PBB SHADOW E&M-EST. PATIENT-LVL III: ICD-10-PCS | Mod: PBBFAC,,, | Performed by: PHYSICIAN ASSISTANT

## 2020-11-12 RX ORDER — SULFAMETHOXAZOLE AND TRIMETHOPRIM 800; 160 MG/1; MG/1
1 TABLET ORAL 2 TIMES DAILY
Qty: 28 TABLET | Refills: 0 | Status: SHIPPED | OUTPATIENT
Start: 2020-11-12 | End: 2020-11-26

## 2020-11-12 NOTE — PROGRESS NOTES
"Ms. Burk is here today for a post-operative visit.  She is 13 days status post Right CMC arthroplasty with MCP fusion and first dorsal compartment release by Dr. Ayon on 10/30/2020. She reports that she is doing well.  Pain is mild-moderate.  She is no longer taking pain medication.  She denies fever, chills, and sweats since the time of the surgery.     Physical exam:    Vitals:    11/12/20 1046   BP: 126/74   Pulse: 106   Weight: 96.2 kg (212 lb)   Height: 5' 4" (1.626 m)   PainSc:   4     Vital signs are stable, patient is afebrile.  Patient is well dressed and well groomed, no acute distress.  Alert and oriented to person, place, and time.  Post op dressing taken down.  Incision is clean, dry, and intact.  There is mild erythema at the MCP incision. No increased warmth of the skin.  No exudate or discharge - erythema appears inflammatory rather than infectious. She is NVI. Suture tails and sutures removed without difficulty.      Assessment: 13 days status post Right CMC arthroplasty with MCP fusion and first dorsal compartment release    Plan:  Luz was seen today for post-op evaluation.    Diagnoses and all orders for this visit:    Arthritis of carpometacarpal (CMC) joint of right thumb  -     Ambulatory referral/consult to Physical/Occupational Therapy  -     X-Ray Hand 3 View Right; Future    Orthopedic aftercare    Other orders  -     sulfamethoxazole-trimethoprim 800-160mg (BACTRIM DS) 800-160 mg Tab; Take 1 tablet by mouth 2 (two) times daily. for 14 days        - PO instruction reviewed and provided to patient  - Bactrim DS prescribed   - Thumb spica fiberglass cast applied with thumb in PO position of function/fusion position  - Orders for OT - coordinate follow up  - Follow up in 4 weeks with XRay  - Call with questions or concerns  - No lifting or weight bearing    Per OP note: Postop plan for this patient she is to keep dressing clean dry and intact 2 weeks will place her in a cast fusion to " be immobilized until fusion is achieved the patient placed in a brace at 6 weeks postop start therapy

## 2020-11-13 ENCOUNTER — PATIENT MESSAGE (OUTPATIENT)
Dept: ADMINISTRATIVE | Facility: OTHER | Age: 70
End: 2020-11-13

## 2020-11-13 ENCOUNTER — PATIENT MESSAGE (OUTPATIENT)
Dept: ORTHOPEDICS | Facility: CLINIC | Age: 70
End: 2020-11-13

## 2020-11-16 ENCOUNTER — TELEPHONE (OUTPATIENT)
Dept: ORTHOPEDICS | Facility: CLINIC | Age: 70
End: 2020-11-16

## 2020-11-25 ENCOUNTER — OFFICE VISIT (OUTPATIENT)
Dept: ORTHOPEDICS | Facility: CLINIC | Age: 70
End: 2020-11-25
Payer: MEDICARE

## 2020-11-25 VITALS
BODY MASS INDEX: 36.19 KG/M2 | DIASTOLIC BLOOD PRESSURE: 72 MMHG | WEIGHT: 212 LBS | HEIGHT: 64 IN | SYSTOLIC BLOOD PRESSURE: 119 MMHG | HEART RATE: 80 BPM

## 2020-11-25 DIAGNOSIS — Z47.89 ORTHOPEDIC AFTERCARE: ICD-10-CM

## 2020-11-25 DIAGNOSIS — M18.11 ARTHRITIS OF CARPOMETACARPAL (CMC) JOINT OF RIGHT THUMB: Primary | ICD-10-CM

## 2020-11-25 PROCEDURE — 3008F BODY MASS INDEX DOCD: CPT | Mod: CPTII,S$GLB,, | Performed by: PHYSICIAN ASSISTANT

## 2020-11-25 PROCEDURE — 1101F PR PT FALLS ASSESS DOC 0-1 FALLS W/OUT INJ PAST YR: ICD-10-PCS | Mod: CPTII,S$GLB,, | Performed by: PHYSICIAN ASSISTANT

## 2020-11-25 PROCEDURE — 99024 POSTOP FOLLOW-UP VISIT: CPT | Mod: S$GLB,,, | Performed by: PHYSICIAN ASSISTANT

## 2020-11-25 PROCEDURE — 1101F PT FALLS ASSESS-DOCD LE1/YR: CPT | Mod: CPTII,S$GLB,, | Performed by: PHYSICIAN ASSISTANT

## 2020-11-25 PROCEDURE — 1125F AMNT PAIN NOTED PAIN PRSNT: CPT | Mod: S$GLB,,, | Performed by: PHYSICIAN ASSISTANT

## 2020-11-25 PROCEDURE — 3288F PR FALLS RISK ASSESSMENT DOCUMENTED: ICD-10-PCS | Mod: CPTII,S$GLB,, | Performed by: PHYSICIAN ASSISTANT

## 2020-11-25 PROCEDURE — 1125F PR PAIN SEVERITY QUANTIFIED, PAIN PRESENT: ICD-10-PCS | Mod: S$GLB,,, | Performed by: PHYSICIAN ASSISTANT

## 2020-11-25 PROCEDURE — 29075 PR APPLY FOREARM CAST: ICD-10-PCS | Mod: 58,RT,S$GLB, | Performed by: PHYSICIAN ASSISTANT

## 2020-11-25 PROCEDURE — 29075 APPL CST ELBW FNGR SHORT ARM: CPT | Mod: 58,RT,S$GLB, | Performed by: PHYSICIAN ASSISTANT

## 2020-11-25 PROCEDURE — 99999 PR PBB SHADOW E&M-EST. PATIENT-LVL III: CPT | Mod: PBBFAC,,, | Performed by: PHYSICIAN ASSISTANT

## 2020-11-25 PROCEDURE — 3008F PR BODY MASS INDEX (BMI) DOCUMENTED: ICD-10-PCS | Mod: CPTII,S$GLB,, | Performed by: PHYSICIAN ASSISTANT

## 2020-11-25 PROCEDURE — 99999 PR PBB SHADOW E&M-EST. PATIENT-LVL III: ICD-10-PCS | Mod: PBBFAC,,, | Performed by: PHYSICIAN ASSISTANT

## 2020-11-25 PROCEDURE — 3288F FALL RISK ASSESSMENT DOCD: CPT | Mod: CPTII,S$GLB,, | Performed by: PHYSICIAN ASSISTANT

## 2020-11-25 PROCEDURE — 99024 PR POST-OP FOLLOW-UP VISIT: ICD-10-PCS | Mod: S$GLB,,, | Performed by: PHYSICIAN ASSISTANT

## 2020-11-25 NOTE — PROGRESS NOTES
"Ms. Burk is here today for a post-operative visit.  She is 26 days status post Right CMC arthroplasty with MCP fusion and first dorsal compartment release by Dr. Ayon on 10/30/2020. She reports that she is doing well.  She presents today for re-evaluation of her MCP incision, she has been taking Bactrim DS.  Pain is mild-moderate, she reports burning pains along the radial side of the hand and thumb.  She denies fever, chills, and sweats since the time of the surgery.     Physical exam:    Vitals:    11/25/20 1605   BP: 119/72   Pulse: 80   Weight: 96.2 kg (212 lb)   Height: 5' 4" (1.626 m)   PainSc:   5     Vital signs are stable, patient is afebrile.  Patient is well dressed and well groomed, no acute distress.  Alert and oriented to person, place, and time.  Cast taken down.  Incisions clean, dry, and intact.  There is very mild erythema at the MCP incision. No increased warmth of the skin.  No exudate or discharge - erythema appears inflammatory rather than infectious. She is NVI. Sensitivity along the radial aspect of the hand and thumb only.     Assessment: 26 days status post Right CMC arthroplasty with MCP fusion and first dorsal compartment release    Plan:  Luz was seen today for post-op evaluation.    Diagnoses and all orders for this visit:    Arthritis of carpometacarpal (CMC) joint of right thumb    Orthopedic aftercare          - Thumb spica fiberglass cast applied with thumb in PO position of function/fusion position  - OT at next visit for custom orthotic  - Follow up in 2 weeks with XRay  - Call with questions or concerns  - No lifting or weight bearing    Per OP note: Postop plan for this patient she is to keep dressing clean dry and intact 2 weeks will place her in a cast fusion to be immobilized until fusion is achieved the patient placed in a brace at 6 weeks postop start therapy      "

## 2020-12-09 ENCOUNTER — TELEPHONE (OUTPATIENT)
Dept: ORTHOPEDICS | Facility: CLINIC | Age: 70
End: 2020-12-09

## 2020-12-09 NOTE — TELEPHONE ENCOUNTER
Spoke with pt informing her of appt and xray 12/10/2020.  Pt was advised to arrive at 1215 pm for cast off.

## 2020-12-10 ENCOUNTER — HOSPITAL ENCOUNTER (OUTPATIENT)
Dept: RADIOLOGY | Facility: OTHER | Age: 70
Discharge: HOME | End: 2020-12-10
Attending: PHYSICIAN ASSISTANT
Payer: MEDICARE

## 2020-12-10 ENCOUNTER — OFFICE VISIT (OUTPATIENT)
Dept: ORTHOPEDICS | Facility: CLINIC | Age: 70
End: 2020-12-10
Payer: MEDICARE

## 2020-12-10 ENCOUNTER — CLINICAL SUPPORT (OUTPATIENT)
Dept: REHABILITATION | Facility: HOSPITAL | Age: 70
End: 2020-12-10
Payer: MEDICARE

## 2020-12-10 VITALS
HEART RATE: 60 BPM | DIASTOLIC BLOOD PRESSURE: 79 MMHG | WEIGHT: 212.06 LBS | SYSTOLIC BLOOD PRESSURE: 138 MMHG | BODY MASS INDEX: 36.2 KG/M2 | HEIGHT: 64 IN

## 2020-12-10 DIAGNOSIS — M18.11 ARTHRITIS OF CARPOMETACARPAL (CMC) JOINT OF RIGHT THUMB: ICD-10-CM

## 2020-12-10 DIAGNOSIS — M18.11 ARTHRITIS OF CARPOMETACARPAL (CMC) JOINT OF RIGHT THUMB: Primary | ICD-10-CM

## 2020-12-10 DIAGNOSIS — M79.644 THUMB PAIN, RIGHT: ICD-10-CM

## 2020-12-10 DIAGNOSIS — R29.898 DECREASED PINCH STRENGTH: ICD-10-CM

## 2020-12-10 DIAGNOSIS — M25.60 RANGE OF MOTION DEFICIT: Primary | ICD-10-CM

## 2020-12-10 PROCEDURE — 3008F BODY MASS INDEX DOCD: CPT | Mod: CPTII,S$GLB,, | Performed by: PHYSICIAN ASSISTANT

## 2020-12-10 PROCEDURE — 99024 PR POST-OP FOLLOW-UP VISIT: ICD-10-PCS | Mod: S$GLB,,, | Performed by: PHYSICIAN ASSISTANT

## 2020-12-10 PROCEDURE — 1101F PT FALLS ASSESS-DOCD LE1/YR: CPT | Mod: CPTII,S$GLB,, | Performed by: PHYSICIAN ASSISTANT

## 2020-12-10 PROCEDURE — 73130 X-RAY EXAM OF HAND: CPT | Mod: TC,FY,RT

## 2020-12-10 PROCEDURE — 97110 THERAPEUTIC EXERCISES: CPT

## 2020-12-10 PROCEDURE — 1125F PR PAIN SEVERITY QUANTIFIED, PAIN PRESENT: ICD-10-PCS | Mod: S$GLB,,, | Performed by: PHYSICIAN ASSISTANT

## 2020-12-10 PROCEDURE — 99999 PR PBB SHADOW E&M-EST. PATIENT-LVL V: CPT | Mod: PBBFAC,,, | Performed by: PHYSICIAN ASSISTANT

## 2020-12-10 PROCEDURE — 97165 OT EVAL LOW COMPLEX 30 MIN: CPT

## 2020-12-10 PROCEDURE — 3288F FALL RISK ASSESSMENT DOCD: CPT | Mod: CPTII,S$GLB,, | Performed by: PHYSICIAN ASSISTANT

## 2020-12-10 PROCEDURE — 1125F AMNT PAIN NOTED PAIN PRSNT: CPT | Mod: S$GLB,,, | Performed by: PHYSICIAN ASSISTANT

## 2020-12-10 PROCEDURE — 1101F PR PT FALLS ASSESS DOC 0-1 FALLS W/OUT INJ PAST YR: ICD-10-PCS | Mod: CPTII,S$GLB,, | Performed by: PHYSICIAN ASSISTANT

## 2020-12-10 PROCEDURE — 99999 PR PBB SHADOW E&M-EST. PATIENT-LVL V: ICD-10-PCS | Mod: PBBFAC,,, | Performed by: PHYSICIAN ASSISTANT

## 2020-12-10 PROCEDURE — 3008F PR BODY MASS INDEX (BMI) DOCUMENTED: ICD-10-PCS | Mod: CPTII,S$GLB,, | Performed by: PHYSICIAN ASSISTANT

## 2020-12-10 PROCEDURE — 73130 X-RAY EXAM OF HAND: CPT | Mod: 26,RT,, | Performed by: RADIOLOGY

## 2020-12-10 PROCEDURE — 3288F PR FALLS RISK ASSESSMENT DOCUMENTED: ICD-10-PCS | Mod: CPTII,S$GLB,, | Performed by: PHYSICIAN ASSISTANT

## 2020-12-10 PROCEDURE — 73130 XR HAND COMPLETE 3 VIEW RIGHT: ICD-10-PCS | Mod: 26,RT,, | Performed by: RADIOLOGY

## 2020-12-10 PROCEDURE — 99024 POSTOP FOLLOW-UP VISIT: CPT | Mod: S$GLB,,, | Performed by: PHYSICIAN ASSISTANT

## 2020-12-10 PROCEDURE — L3808 WHFO, RIGID W/O JOINTS: HCPCS

## 2020-12-10 RX ORDER — INFLUENZA A VIRUS A/MICHIGAN/45/2015 X-275 (H1N1) ANTIGEN (FORMALDEHYDE INACTIVATED), INFLUENZA A VIRUS A/SINGAPORE/INFIMH-16-0019/2016 IVR-186 (H3N2) ANTIGEN (FORMALDEHYDE INACTIVATED), INFLUENZA B VIRUS B/PHUKET/3073/2013 ANTIGEN (FORMALDEHYDE INACTIVATED), AND INFLUENZA B VIRUS B/MARYLAND/15/2016 BX-69A ANTIGEN (FORMALDEHYDE INACTIVATED) 60; 60; 60; 60 UG/.7ML; UG/.7ML; UG/.7ML; UG/.7ML
INJECTION, SUSPENSION INTRAMUSCULAR
COMMUNITY
Start: 2020-10-26 | End: 2021-02-03 | Stop reason: ALTCHOICE

## 2020-12-10 NOTE — PATIENT INSTRUCTIONS
"[facility] - OCCUPATIONAL THERAPY  HOME EXERCISE PROGRAM     Soak hand in hot water or use hot wet compress for 5-10 min before exercises.     Complete the following massages for 5 min, 1-2x/day.                         Complete the following exercises with 10 repetitions each, 3-4x/day.     AROM: Thumb IP Flexion / Extension  Brace thumb below tip joint. Bend joint as far as possible then straighten.    AROM: Thumb Composite Flexion   Bend both joints of thumb as far as possible. Try to touch base of little finger.    AROM: Radial Adduction / Abduction  Place your palm flat on the table. Move thumb out to side. Move back alongside index finger.                                       AROM: Opposition   Touch tip of thumb to nail tip of each finger in turn, making an "O" shape.        Therapist: CARMEN Araya CHT     Copyright © [facility]. All rights reserved.     "

## 2020-12-10 NOTE — PROGRESS NOTES
"Ms. Burk is here today for a post-operative visit.  She is 41 days status post Right CMC arthroplasty with MCP fusion and first dorsal compartment release by Dr. Ayon on 10/30/2020. She reports that she is doing well.  She presents today for re-evaluation of her MCP incision, she took Bactrim DS as prescribed.  Pain is mild-moderate, she reports burning pains along the radial side of the hand and thumb.  She denies fever, chills, and sweats since the time of the surgery.     Physical exam:    Vitals:    12/10/20 1256   BP: 138/79   Pulse: 60   Weight: 96.2 kg (212 lb 1.3 oz)   Height: 5' 4" (1.626 m)   PainSc:   5     Vital signs are stable, patient is afebrile.  Patient is well dressed and well groomed, no acute distress.  Alert and oriented to person, place, and time.  Cast taken down.  Incisions healing well - clean, dry, and intact.  No erythema at the MCP incision. No increased warmth of the skin.  No exudate or discharge. She is NVI. Sensitivity along the radial aspect of the hand and thumb, decreased sensation thumb tip.     Assessment: 41 days status post Right CMC arthroplasty with MCP fusion and first dorsal compartment release    Plan:  Luz was seen today for post-op evaluation.    Diagnoses and all orders for this visit:    Arthritis of carpometacarpal (CMC) joint of right thumb  -     X-Ray Finger 2 View or More Views; Future          - Thumb spica custom orthotic  - OT today, regular OT  - Follow up in 4 weeks with Xray  - Call with questions or concerns  - No lifting or weight bearing    Per OP note: Postop plan for this patient she is to keep dressing clean dry and intact 2 weeks will place her in a cast fusion to be immobilized until fusion is achieved the patient placed in a brace at 6 weeks postop start therapy        "

## 2020-12-11 PROBLEM — M25.60 RANGE OF MOTION DEFICIT: Status: ACTIVE | Noted: 2020-10-05

## 2020-12-11 PROBLEM — R29.898 DECREASED PINCH STRENGTH: Status: ACTIVE | Noted: 2020-12-11

## 2020-12-11 PROBLEM — M79.644 THUMB PAIN, RIGHT: Status: ACTIVE | Noted: 2020-12-11

## 2020-12-11 NOTE — PLAN OF CARE
[facility] Occupational Therapy  Initial Hand Evaluation     Date: 12/10/2020  Name: Luz Burk  Clinic Number: [id number]    Medical Diagnosis: R CMC arthroplasty and MP fusion 10/30/20   Therapy Diagnosis:   Encounter Diagnoses   Name Primary?    Thumb pain, right     Range of motion deficit Yes    Decreased pinch strength      Physician: Hilda Woodruff PA , Dr estefani Granda   Physician Orders: eval and treat, custom  Orthosis to stabilize MP fusion     Surgical Procedure and Date: 10/30/20   ARTHROPLASTY, FINGER cmc joint right thumb (Right) MCP fusion of right thumb     Evaluation Date: 12/10/2020    Plan of Care Certification Period: 2/10/2021  Date of Return to MD: 1/7/2021    Visit # / Visits authorized: 1 / 6  Insurance Authorization Period Expiration: 12/24/20  FOTO - unavailable     Time In:215 pm   Time Out: 3 pm   Total Billable Time: 45  minutes    Precautions:  Standard    Subjective     Involved Side: right   Dominant Side: Right  Date of Onset: 10/30/20   Mechanism of Injury: arthritis   History of Current Condition: Ms. 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    Luz Burk  has a past surgical history that includes Total knee arthroplasty (7/23/12); lap band surgery (10/11/2011); Hernia repair; Ventriculoperitoneal shunt; Hysterectomy; Cholecystectomy; Laparoscopic gastric banding; Colonoscopy (N/A, 5/30/2016);  Appendectomy; Hiatal hernia repair; Oophorectomy; Cataract extraction; Tonsillectomy; Esophagogastroduodenoscopy (N/A, 6/17/2020); Colonoscopy (N/A, 6/17/2020); Carpal tunnel release (Right, 7/24/2020); Excision of ganglion of wrist (Right, 7/24/2020); and Arthroplasty of joint of finger (Right, 10/30/2020).    Luz has a current medication list which includes the following prescription(s): acetaminophen, acyclovir, ascorbic acid (vitamin c), biotin, calcium carbonate-vitamin d3, cannabidiol (cbd), citalopram, clindamycin, cyanocobalamin, docusate sodium, ergocalciferol, euthyrox, fluticasone propionate, fluzone highdose quad 20-21 pf, furosemide, hydrocodone-acetaminophen, lactobacillus rhamnosus gg, loratadine, melatonin, multivit,tx with iron,minerals, oxycodone-acetaminophen, potassium chloride, and turmeric, and the following Facility-Administered Medications: acetaminophen, fentanyl, fentanyl, lidocaine (pf) 10 mg/ml (1%), midazolam, and midazolam.    Review of patient's allergies indicates:   Allergen Reactions    Omeprazole Diarrhea        Patient's Goals for Therapy: regain thumb use     Pain:  Functional Pain Scale Rating 0-10:   0/10 on average  0/10 at best  8/10 at worst  Location: Thumb MP/CMC joint   Description: Aching, Tight and stiff   Aggravating Factors: bending   Easing Factors: brace    Occupation:  Retired   Working presently: not working   Duties: n/a     Functional Limitations/Social History:    Previous functional status includes: Independent with all ADLs.     Current FunctionalStatus   Home/Living environment : lives with their spouse      Limitation of Functional Status as follows:   ADLs/IADLs:     - Feeding: limited use R hand for feeding, cutting     - Bathing/ Hygiene: limited R hand use for bathing,     - Dressing/Grooming: limited use for buttons, zippers, shoes, fixing hair     - Driving: limited use for gripping steering wheel     - writing / typing limited use for writing,  typing     - /pinch limited  and pinch for cooking lifting, holding things, opening bottles/jars.     - work activities: n/a      Leisure: Gardening, exercise class         Objective     Observation/Appearance:  Skin dry and Joint stiffness       Sensation:   Slightly hypersensitive along scar adhesions     Wound/Scar:   6 cm radial wrist and 3 cm dorsal index over MCP, and 2 cm over MP, all healing well, slightly hypersensitive to touch, glue intact in some areas.     Edema. Measured in centimeters.   PWC 18.3 CM   PPC 19.4 CM     Hand ROM. Measured in degrees.     THUMB MP FUSED AT 25*   THUMB IP +10 / 5   Full opposition, painful to 5th digit     Wrist ext/flex 35 / 25   Sup/pron 90 / 75          Manual Muscle Testing   FPL/B 3-/5   EPL/B 3-/5   APB 3-/5   OP 3/5     Special Tests:  NT         Strength (Dyanmometer) and Pinch Strength (Pinch Gauge)  Measured in pounds and psi. Average of three trials.- TBA in 2-4 weeks as able    12/10/2020 12/10/2020    RIGHT LEFT    Rung II NT NT   Key Pinch TBA TBA   3pt Pinch TBA TBA   2pt Pinch TBA TBA        NO FOTO     Treatment     Treatment Time In: 230  Treatment Time Out: 3   Total Treatment time separate from Evaluation time:30     Luz received therapeutic exercises for 15 minutes including:  -Patient has home paraffin bath, encouraged use 1-2 x daily as needed for pain, stiffness and to increase blood flow, circulation, tissue elasticity and for skin rehydration   - scar massage 5 min 2 x daily   - blocking FPL, opposition, thumb ab/ad, wrist flex, ext, RD, UD, sup/pron x 10 reps each 3-4 x daily       Luz received orthotic fabrication/fitting/training x 15 min   - custom fabricated static radial/dorsal based thumb spica type (FO L 3808)  orthosis to protect CMC arthroplasty and MP fusion during healing phase   - instructed in use 24/7  With removal for bathing/hygiene , ok to remove at home if inactive, wear out of house or with activity.   -  padded orthosis for comfort and fit   - will adjust for comfort and fit as needed.       Home Exercise Program/Education:  Issued HEP (see patient instructions in EMR) and educated on  use of hot/cold modality use for pain, stiffness and edema management . Exercises were reviewed and Luz was able to demonstrate them prior to the end of the session.   Pt received a written copy of exercises to perform at home. Luz demonstrated good  understanding of the education provided.  Pt was advised to perform these exercises with minimal pain/discomfort of 3-4 out of 10 at worse, discontinue if pain worsens.    Patient/Family Education: role of OT, goals for OT, scheduling/cancellations - pt verbalized understanding. Discussed insurance limitations with patient.    Additional Education provided: per above    Assessment     Luz Burk is a 70 y.o. year old referred to outpatient occupational therapy and presents with a medical diagnosis of R CMC arthroplasty and MCP fusion , resulting in Decreased ROM, Decreased pinch strength, Decreased muscle strength, Decreased functional hand use, Increased pain, Edema, Joint Stiffness, Scar Adhesions, Diminished/Impaired Sensation and Diminished/Impaired Coordination and demonstrates limitations as described in the chart below.   Following medical record review it is determined that pt will benefit from occupational therapy services in order to maximize pain free and/or functional use of right hand/UE   The following goals were discussed with the patient and patient is in agreement with them as to be addressed in the treatment plan. The patient's rehab potential is Good.     Anticipated barriers to occupational therapy: None   Pt has no cultural, educational or language barriers to learning provided.    Profile and History Assessment of Occupational Performance Level of Clinical Decision Making Complexity Score   Occupational Profile:   Luz Burk is a 70 y.o. female who  lives with their spouse and is currently home-maker . Luz Burk has difficulty with  feeding, bathing, grooming and dressing  driving/transportation management, shopping, phone/computer use, housework/household chores and writing, typing, pinching , cooking, opening containers,   affecting his/her daily functional abilities. His/her main goal for therapy is regain use of R thumb .     Comorbidities:   Cataract, Collagenous colitis, Hypothyroidism, OA (osteoarthritis), Obesity, and Sleep apnea.    Medical and Therapy History Review:   Brief               Performance Deficits    Physical:  Joint Mobility  Joint Stability  Muscle Power/Strength  Muscle Endurance  Skin Integrity/Scar Formation  Edema  Pinch Strength  Fine Motor Coordination  Pain    Cognitive:  No Deficits    Psychosocial:    Habits  Routines     Clinical Decision Making:  low    Assessment Process:  Problem-Focused Assessments    Modification/Need for Assistance:  Not Necessary    Intervention Selection:  Limited Treatment Options       Low   Based on PMHX, co morbidities , data from assessments and functional level of assistance required with task and clinical presentation directly impacting function.       The following goals were discussed with the patient and patient is in agreement with them as to be addressed in the treatment plan.       Goals:   Short Term Goals (4 weeks)   1)  Patient to be IND with HEP and modalities for pain management  2)  Increase ROM 3-10 degrees to increase functional hand use for ADLs/work/leisure activities  3)   Decrease edema .1-.5mm to increase joint mobility /flexibility for functional hand use.   4)  Assess pinch and set goals accordingly       Long Term Goals (8 weeks)   1)  Increase pinch strength 1-4 psi's for writing, opening containers, cooking   2)  Increase ROM 11-20 degrees to increase functional hand use for ADLs/work/leisure activities  3)  Patient to be IND with ADls, household chores and  activities per report by discharge       Plan   Recommend continued Outpatient Occupataional Therapy  1x week for 8 weeks to include the following interventions Paraffin, Manual therapy/joint mobilizations, Modalities for pain management, US 3 mhz, Therapeutic exercises/activities., Strengthening, Edema Control, Scar Management, Wound Care and Electrical Modalities.    Within the Certification Period/Plan of care expiration: 12/10/2020 to 2/10/2021.    I certify the need for these services furnished under the plan of treatment and while under my care.     ____________________________________________________________________  Physician/Referring Practitioner   Date of Signature         Gianna De La Cruz OT , CHT

## 2020-12-30 ENCOUNTER — TELEPHONE (OUTPATIENT)
Dept: FAMILY MEDICINE | Facility: CLINIC | Age: 70
End: 2020-12-30

## 2020-12-30 DIAGNOSIS — I51.89 DIASTOLIC DYSFUNCTION: ICD-10-CM

## 2020-12-30 DIAGNOSIS — Z00.00 WELLNESS EXAMINATION: Primary | ICD-10-CM

## 2020-12-30 DIAGNOSIS — E53.8 B12 DEFICIENCY: ICD-10-CM

## 2020-12-30 DIAGNOSIS — E66.9 OBESITY, CLASS II, BMI 35-39.9: ICD-10-CM

## 2020-12-30 DIAGNOSIS — Z87.891 EX-SMOKER: ICD-10-CM

## 2020-12-30 DIAGNOSIS — E55.9 VITAMIN D DEFICIENCY: ICD-10-CM

## 2020-12-31 ENCOUNTER — CLINICAL SUPPORT (OUTPATIENT)
Dept: REHABILITATION | Facility: HOSPITAL | Age: 70
End: 2020-12-31
Payer: MEDICARE

## 2020-12-31 DIAGNOSIS — R29.898 DECREASED PINCH STRENGTH: ICD-10-CM

## 2020-12-31 DIAGNOSIS — M79.644 THUMB PAIN, RIGHT: ICD-10-CM

## 2020-12-31 PROCEDURE — 97140 MANUAL THERAPY 1/> REGIONS: CPT

## 2020-12-31 PROCEDURE — 97018 PARAFFIN BATH THERAPY: CPT

## 2020-12-31 PROCEDURE — 97110 THERAPEUTIC EXERCISES: CPT

## 2021-01-07 ENCOUNTER — CLINICAL SUPPORT (OUTPATIENT)
Dept: REHABILITATION | Facility: HOSPITAL | Age: 71
End: 2021-01-07
Attending: PHYSICIAN ASSISTANT
Payer: MEDICARE

## 2021-01-07 ENCOUNTER — HOSPITAL ENCOUNTER (OUTPATIENT)
Dept: RADIOLOGY | Facility: OTHER | Age: 71
Discharge: HOME | End: 2021-01-07
Attending: PHYSICIAN ASSISTANT
Payer: MEDICARE

## 2021-01-07 ENCOUNTER — OFFICE VISIT (OUTPATIENT)
Dept: ORTHOPEDICS | Facility: CLINIC | Age: 71
End: 2021-01-07
Payer: MEDICARE

## 2021-01-07 VITALS
HEART RATE: 74 BPM | HEIGHT: 64 IN | SYSTOLIC BLOOD PRESSURE: 124 MMHG | WEIGHT: 212 LBS | DIASTOLIC BLOOD PRESSURE: 82 MMHG | BODY MASS INDEX: 36.19 KG/M2

## 2021-01-07 DIAGNOSIS — R29.898 DECREASED PINCH STRENGTH: ICD-10-CM

## 2021-01-07 DIAGNOSIS — Z47.89 ORTHOPEDIC AFTERCARE: ICD-10-CM

## 2021-01-07 DIAGNOSIS — Z98.1 STATUS POST FINGER JOINT FUSION: Primary | ICD-10-CM

## 2021-01-07 DIAGNOSIS — M18.11 ARTHRITIS OF CARPOMETACARPAL (CMC) JOINT OF RIGHT THUMB: ICD-10-CM

## 2021-01-07 DIAGNOSIS — M79.644 THUMB PAIN, RIGHT: ICD-10-CM

## 2021-01-07 PROCEDURE — 97140 MANUAL THERAPY 1/> REGIONS: CPT

## 2021-01-07 PROCEDURE — 73140 X-RAY EXAM OF FINGER(S): CPT | Mod: TC,FY

## 2021-01-07 PROCEDURE — 99024 PR POST-OP FOLLOW-UP VISIT: ICD-10-PCS | Mod: S$GLB,,, | Performed by: PHYSICIAN ASSISTANT

## 2021-01-07 PROCEDURE — 3288F PR FALLS RISK ASSESSMENT DOCUMENTED: ICD-10-PCS | Mod: CPTII,S$GLB,, | Performed by: PHYSICIAN ASSISTANT

## 2021-01-07 PROCEDURE — 1125F AMNT PAIN NOTED PAIN PRSNT: CPT | Mod: S$GLB,,, | Performed by: PHYSICIAN ASSISTANT

## 2021-01-07 PROCEDURE — 73140 X-RAY EXAM OF FINGER(S): CPT | Mod: 26,RT,, | Performed by: RADIOLOGY

## 2021-01-07 PROCEDURE — 1101F PT FALLS ASSESS-DOCD LE1/YR: CPT | Mod: CPTII,S$GLB,, | Performed by: PHYSICIAN ASSISTANT

## 2021-01-07 PROCEDURE — 97110 THERAPEUTIC EXERCISES: CPT

## 2021-01-07 PROCEDURE — 99024 POSTOP FOLLOW-UP VISIT: CPT | Mod: S$GLB,,, | Performed by: PHYSICIAN ASSISTANT

## 2021-01-07 PROCEDURE — 73140 XR FINGER 2 OR MORE VIEWS: ICD-10-PCS | Mod: 26,RT,, | Performed by: RADIOLOGY

## 2021-01-07 PROCEDURE — 1125F PR PAIN SEVERITY QUANTIFIED, PAIN PRESENT: ICD-10-PCS | Mod: S$GLB,,, | Performed by: PHYSICIAN ASSISTANT

## 2021-01-07 PROCEDURE — 3288F FALL RISK ASSESSMENT DOCD: CPT | Mod: CPTII,S$GLB,, | Performed by: PHYSICIAN ASSISTANT

## 2021-01-07 PROCEDURE — 99999 PR PBB SHADOW E&M-EST. PATIENT-LVL III: ICD-10-PCS | Mod: PBBFAC,,, | Performed by: PHYSICIAN ASSISTANT

## 2021-01-07 PROCEDURE — 1101F PR PT FALLS ASSESS DOC 0-1 FALLS W/OUT INJ PAST YR: ICD-10-PCS | Mod: CPTII,S$GLB,, | Performed by: PHYSICIAN ASSISTANT

## 2021-01-07 PROCEDURE — 97018 PARAFFIN BATH THERAPY: CPT

## 2021-01-07 PROCEDURE — 3008F BODY MASS INDEX DOCD: CPT | Mod: CPTII,S$GLB,, | Performed by: PHYSICIAN ASSISTANT

## 2021-01-07 PROCEDURE — 99999 PR PBB SHADOW E&M-EST. PATIENT-LVL III: CPT | Mod: PBBFAC,,, | Performed by: PHYSICIAN ASSISTANT

## 2021-01-07 PROCEDURE — 3008F PR BODY MASS INDEX (BMI) DOCUMENTED: ICD-10-PCS | Mod: CPTII,S$GLB,, | Performed by: PHYSICIAN ASSISTANT

## 2021-01-08 ENCOUNTER — LAB VISIT (OUTPATIENT)
Dept: LAB | Facility: HOSPITAL | Age: 71
End: 2021-01-08
Attending: FAMILY MEDICINE
Payer: MEDICARE

## 2021-01-08 DIAGNOSIS — Z00.00 WELLNESS EXAMINATION: ICD-10-CM

## 2021-01-08 DIAGNOSIS — Z87.891 EX-SMOKER: ICD-10-CM

## 2021-01-08 DIAGNOSIS — I51.89 DIASTOLIC DYSFUNCTION: ICD-10-CM

## 2021-01-08 DIAGNOSIS — E55.9 VITAMIN D DEFICIENCY: ICD-10-CM

## 2021-01-08 DIAGNOSIS — E53.8 B12 DEFICIENCY: ICD-10-CM

## 2021-01-08 DIAGNOSIS — E66.9 OBESITY, CLASS II, BMI 35-39.9: ICD-10-CM

## 2021-01-08 LAB
25(OH)D3+25(OH)D2 SERPL-MCNC: 28 NG/ML (ref 30–96)
ALBUMIN SERPL BCP-MCNC: 4.3 G/DL (ref 3.5–5.2)
ALP SERPL-CCNC: 58 U/L (ref 38–126)
ALT SERPL W/O P-5'-P-CCNC: 16 U/L (ref 10–44)
ANION GAP SERPL CALC-SCNC: 9 MMOL/L (ref 8–16)
AST SERPL-CCNC: 28 U/L (ref 15–46)
BASOPHILS # BLD AUTO: 0.03 K/UL (ref 0–0.2)
BASOPHILS NFR BLD: 0.6 % (ref 0–1.9)
BILIRUB SERPL-MCNC: 0.6 MG/DL (ref 0.1–1)
CALCIUM SERPL-MCNC: 9.2 MG/DL (ref 8.7–10.5)
CHLORIDE SERPL-SCNC: 100 MMOL/L (ref 95–110)
CHOLEST SERPL-MCNC: 214 MG/DL (ref 120–199)
CHOLEST/HDLC SERPL: 3.4 {RATIO} (ref 2–5)
CO2 SERPL-SCNC: 28 MMOL/L (ref 23–29)
CREAT SERPL-MCNC: 0.69 MG/DL (ref 0.5–1.4)
DIFFERENTIAL METHOD: ABNORMAL
EOSINOPHIL # BLD AUTO: 0.1 K/UL (ref 0–0.5)
EOSINOPHIL NFR BLD: 2.5 % (ref 0–8)
ERYTHROCYTE [DISTWIDTH] IN BLOOD BY AUTOMATED COUNT: 11.9 % (ref 11.5–14.5)
EST. GFR  (AFRICAN AMERICAN): >60 ML/MIN/1.73 M^2
EST. GFR  (NON AFRICAN AMERICAN): >60 ML/MIN/1.73 M^2
GLUCOSE SERPL-MCNC: 98 MG/DL (ref 70–110)
HCT VFR BLD AUTO: 38.1 % (ref 37–48.5)
HDLC SERPL-MCNC: 63 MG/DL (ref 40–75)
HDLC SERPL: 29.4 % (ref 20–50)
HGB BLD-MCNC: 12.4 G/DL (ref 12–16)
IMM GRANULOCYTES # BLD AUTO: 0.01 K/UL (ref 0–0.04)
IMM GRANULOCYTES NFR BLD AUTO: 0.2 % (ref 0–0.5)
LDLC SERPL CALC-MCNC: 133.2 MG/DL (ref 63–159)
LYMPHOCYTES # BLD AUTO: 1.7 K/UL (ref 1–4.8)
LYMPHOCYTES NFR BLD: 35.6 % (ref 18–48)
MCH RBC QN AUTO: 31.4 PG (ref 27–31)
MCHC RBC AUTO-ENTMCNC: 32.5 G/DL (ref 32–36)
MCV RBC AUTO: 97 FL (ref 82–98)
MONOCYTES # BLD AUTO: 0.6 K/UL (ref 0.3–1)
MONOCYTES NFR BLD: 11.6 % (ref 4–15)
NEUTROPHILS # BLD AUTO: 2.4 K/UL (ref 1.8–7.7)
NEUTROPHILS NFR BLD: 49.5 % (ref 38–73)
NONHDLC SERPL-MCNC: 151 MG/DL
NRBC BLD-RTO: 0 /100 WBC
PLATELET # BLD AUTO: 264 K/UL (ref 150–350)
PMV BLD AUTO: 10.2 FL (ref 9.2–12.9)
POTASSIUM SERPL-SCNC: 4.5 MMOL/L (ref 3.5–5.1)
PROT SERPL-MCNC: 7.1 G/DL (ref 6–8.4)
RBC # BLD AUTO: 3.95 M/UL (ref 4–5.4)
SODIUM SERPL-SCNC: 137 MMOL/L (ref 136–145)
TRIGL SERPL-MCNC: 89 MG/DL (ref 30–150)
UUN UR-MCNC: 18 MG/DL (ref 7–17)
WBC # BLD AUTO: 4.81 K/UL (ref 3.9–12.7)

## 2021-01-08 PROCEDURE — 36415 COLL VENOUS BLD VENIPUNCTURE: CPT | Mod: PO

## 2021-01-08 PROCEDURE — 85025 COMPLETE CBC W/AUTO DIFF WBC: CPT | Mod: PO

## 2021-01-08 PROCEDURE — 80053 COMPREHEN METABOLIC PANEL: CPT | Mod: PO

## 2021-01-08 PROCEDURE — 82306 VITAMIN D 25 HYDROXY: CPT | Mod: PO

## 2021-01-08 PROCEDURE — 80061 LIPID PANEL: CPT

## 2021-01-11 ENCOUNTER — OFFICE VISIT (OUTPATIENT)
Dept: FAMILY MEDICINE | Facility: CLINIC | Age: 71
End: 2021-01-11
Payer: MEDICARE

## 2021-01-11 ENCOUNTER — LAB VISIT (OUTPATIENT)
Dept: LAB | Facility: HOSPITAL | Age: 71
End: 2021-01-11
Attending: FAMILY MEDICINE
Payer: MEDICARE

## 2021-01-11 VITALS
OXYGEN SATURATION: 99 % | HEIGHT: 64 IN | SYSTOLIC BLOOD PRESSURE: 138 MMHG | BODY MASS INDEX: 36.87 KG/M2 | WEIGHT: 215.94 LBS | TEMPERATURE: 98 F | HEART RATE: 72 BPM | DIASTOLIC BLOOD PRESSURE: 78 MMHG

## 2021-01-11 DIAGNOSIS — M19.049 CMC ARTHRITIS: ICD-10-CM

## 2021-01-11 DIAGNOSIS — Z00.00 WELLNESS EXAMINATION: Primary | ICD-10-CM

## 2021-01-11 DIAGNOSIS — E03.4 HYPOTHYROIDISM DUE TO ACQUIRED ATROPHY OF THYROID: ICD-10-CM

## 2021-01-11 DIAGNOSIS — R00.2 PALPITATIONS: ICD-10-CM

## 2021-01-11 LAB — TSH SERPL DL<=0.005 MIU/L-ACNC: 0.58 UIU/ML (ref 0.4–4)

## 2021-01-11 PROCEDURE — 1125F AMNT PAIN NOTED PAIN PRSNT: CPT | Mod: S$GLB,,, | Performed by: FAMILY MEDICINE

## 2021-01-11 PROCEDURE — 84443 ASSAY THYROID STIM HORMONE: CPT | Mod: PO

## 2021-01-11 PROCEDURE — 3288F PR FALLS RISK ASSESSMENT DOCUMENTED: ICD-10-PCS | Mod: CPTII,S$GLB,, | Performed by: FAMILY MEDICINE

## 2021-01-11 PROCEDURE — 99214 PR OFFICE/OUTPT VISIT, EST, LEVL IV, 30-39 MIN: ICD-10-PCS | Mod: 25,S$GLB,, | Performed by: FAMILY MEDICINE

## 2021-01-11 PROCEDURE — 3008F PR BODY MASS INDEX (BMI) DOCUMENTED: ICD-10-PCS | Mod: CPTII,S$GLB,, | Performed by: FAMILY MEDICINE

## 2021-01-11 PROCEDURE — G0009 PNEUMOCOCCAL POLYSACCHARIDE VACCINE 23-VALENT =>2YO SQ IM: ICD-10-PCS | Mod: S$GLB,,, | Performed by: FAMILY MEDICINE

## 2021-01-11 PROCEDURE — 1101F PT FALLS ASSESS-DOCD LE1/YR: CPT | Mod: CPTII,S$GLB,, | Performed by: FAMILY MEDICINE

## 2021-01-11 PROCEDURE — 36415 COLL VENOUS BLD VENIPUNCTURE: CPT | Mod: PO

## 2021-01-11 PROCEDURE — G0009 ADMIN PNEUMOCOCCAL VACCINE: HCPCS | Mod: S$GLB,,, | Performed by: FAMILY MEDICINE

## 2021-01-11 PROCEDURE — 3008F BODY MASS INDEX DOCD: CPT | Mod: CPTII,S$GLB,, | Performed by: FAMILY MEDICINE

## 2021-01-11 PROCEDURE — 3288F FALL RISK ASSESSMENT DOCD: CPT | Mod: CPTII,S$GLB,, | Performed by: FAMILY MEDICINE

## 2021-01-11 PROCEDURE — 1125F PR PAIN SEVERITY QUANTIFIED, PAIN PRESENT: ICD-10-PCS | Mod: S$GLB,,, | Performed by: FAMILY MEDICINE

## 2021-01-11 PROCEDURE — 99214 OFFICE O/P EST MOD 30 MIN: CPT | Mod: 25,S$GLB,, | Performed by: FAMILY MEDICINE

## 2021-01-11 PROCEDURE — 90732 PPSV23 VACC 2 YRS+ SUBQ/IM: CPT | Mod: S$GLB,,, | Performed by: FAMILY MEDICINE

## 2021-01-11 PROCEDURE — 1159F PR MEDICATION LIST DOCUMENTED IN MEDICAL RECORD: ICD-10-PCS | Mod: S$GLB,,, | Performed by: FAMILY MEDICINE

## 2021-01-11 PROCEDURE — 1101F PR PT FALLS ASSESS DOC 0-1 FALLS W/OUT INJ PAST YR: ICD-10-PCS | Mod: CPTII,S$GLB,, | Performed by: FAMILY MEDICINE

## 2021-01-11 PROCEDURE — 1159F MED LIST DOCD IN RCRD: CPT | Mod: S$GLB,,, | Performed by: FAMILY MEDICINE

## 2021-01-11 PROCEDURE — 90732 PNEUMOCOCCAL POLYSACCHARIDE VACCINE 23-VALENT =>2YO SQ IM: ICD-10-PCS | Mod: S$GLB,,, | Performed by: FAMILY MEDICINE

## 2021-01-11 RX ORDER — ACETAMINOPHEN 500 MG
5000 TABLET ORAL DAILY
Qty: 90 TABLET | Refills: 3 | Status: SHIPPED | OUTPATIENT
Start: 2021-01-11

## 2021-01-14 ENCOUNTER — CLINICAL SUPPORT (OUTPATIENT)
Dept: REHABILITATION | Facility: HOSPITAL | Age: 71
End: 2021-01-14
Payer: MEDICARE

## 2021-01-14 ENCOUNTER — TELEPHONE (OUTPATIENT)
Dept: FAMILY MEDICINE | Facility: CLINIC | Age: 71
End: 2021-01-14

## 2021-01-14 DIAGNOSIS — R29.898 DECREASED PINCH STRENGTH: ICD-10-CM

## 2021-01-14 DIAGNOSIS — M79.644 THUMB PAIN, RIGHT: ICD-10-CM

## 2021-01-14 DIAGNOSIS — M54.2 NECK PAIN: Primary | ICD-10-CM

## 2021-01-14 DIAGNOSIS — M54.9 DORSALGIA, UNSPECIFIED: ICD-10-CM

## 2021-01-14 PROCEDURE — 97140 MANUAL THERAPY 1/> REGIONS: CPT

## 2021-01-14 PROCEDURE — 97110 THERAPEUTIC EXERCISES: CPT

## 2021-01-14 PROCEDURE — 97018 PARAFFIN BATH THERAPY: CPT | Mod: 59

## 2021-01-19 ENCOUNTER — HOSPITAL ENCOUNTER (OUTPATIENT)
Dept: RADIOLOGY | Facility: HOSPITAL | Age: 71
Discharge: HOME | End: 2021-01-19
Attending: FAMILY MEDICINE
Payer: MEDICARE

## 2021-01-19 DIAGNOSIS — M54.9 DORSALGIA, UNSPECIFIED: ICD-10-CM

## 2021-01-19 DIAGNOSIS — M54.2 NECK PAIN: ICD-10-CM

## 2021-01-19 PROCEDURE — 72100 X-RAY EXAM L-S SPINE 2/3 VWS: CPT | Mod: TC,FY,PO

## 2021-01-19 PROCEDURE — 72040 X-RAY EXAM NECK SPINE 2-3 VW: CPT | Mod: TC,FY,PO

## 2021-01-20 ENCOUNTER — TELEPHONE (OUTPATIENT)
Dept: FAMILY MEDICINE | Facility: CLINIC | Age: 71
End: 2021-01-20

## 2021-01-20 DIAGNOSIS — Z12.31 ENCOUNTER FOR SCREENING MAMMOGRAM FOR MALIGNANT NEOPLASM OF BREAST: Primary | ICD-10-CM

## 2021-01-21 ENCOUNTER — TELEPHONE (OUTPATIENT)
Dept: OPTOMETRY | Facility: CLINIC | Age: 71
End: 2021-01-21

## 2021-01-21 ENCOUNTER — CLINICAL SUPPORT (OUTPATIENT)
Dept: REHABILITATION | Facility: HOSPITAL | Age: 71
End: 2021-01-21
Payer: MEDICARE

## 2021-01-21 DIAGNOSIS — M79.644 THUMB PAIN, RIGHT: ICD-10-CM

## 2021-01-21 DIAGNOSIS — R29.898 DECREASED PINCH STRENGTH: ICD-10-CM

## 2021-01-21 PROCEDURE — 97018 PARAFFIN BATH THERAPY: CPT | Mod: 59

## 2021-01-21 PROCEDURE — 97140 MANUAL THERAPY 1/> REGIONS: CPT

## 2021-01-21 PROCEDURE — 97110 THERAPEUTIC EXERCISES: CPT

## 2021-01-28 ENCOUNTER — PATIENT OUTREACH (OUTPATIENT)
Dept: ADMINISTRATIVE | Facility: OTHER | Age: 71
End: 2021-01-28

## 2021-01-28 ENCOUNTER — CLINICAL SUPPORT (OUTPATIENT)
Dept: REHABILITATION | Facility: HOSPITAL | Age: 71
End: 2021-01-28
Payer: MEDICARE

## 2021-01-28 DIAGNOSIS — R29.898 DECREASED PINCH STRENGTH: ICD-10-CM

## 2021-01-28 DIAGNOSIS — M79.644 THUMB PAIN, RIGHT: ICD-10-CM

## 2021-01-28 PROCEDURE — 97110 THERAPEUTIC EXERCISES: CPT

## 2021-01-28 PROCEDURE — 97018 PARAFFIN BATH THERAPY: CPT | Mod: 59

## 2021-01-28 PROCEDURE — 97140 MANUAL THERAPY 1/> REGIONS: CPT

## 2021-01-29 ENCOUNTER — OFFICE VISIT (OUTPATIENT)
Dept: CARDIOLOGY | Facility: CLINIC | Age: 71
End: 2021-01-29
Payer: MEDICARE

## 2021-01-29 VITALS
SYSTOLIC BLOOD PRESSURE: 113 MMHG | OXYGEN SATURATION: 97 % | HEART RATE: 60 BPM | BODY MASS INDEX: 37.74 KG/M2 | HEIGHT: 63 IN | DIASTOLIC BLOOD PRESSURE: 74 MMHG | WEIGHT: 213 LBS

## 2021-01-29 DIAGNOSIS — R07.2 PRECORDIAL CHEST PAIN: ICD-10-CM

## 2021-01-29 DIAGNOSIS — I87.2 VENOUS INSUFFICIENCY OF BOTH LOWER EXTREMITIES: ICD-10-CM

## 2021-01-29 DIAGNOSIS — E66.9 OBESITY, CLASS II, BMI 35-39.9: ICD-10-CM

## 2021-01-29 DIAGNOSIS — R07.2 PRECORDIAL PAIN: ICD-10-CM

## 2021-01-29 DIAGNOSIS — R00.2 PALPITATIONS: ICD-10-CM

## 2021-01-29 DIAGNOSIS — R00.1 SINUS BRADYCARDIA: ICD-10-CM

## 2021-01-29 PROCEDURE — 99204 PR OFFICE/OUTPT VISIT, NEW, LEVL IV, 45-59 MIN: ICD-10-PCS | Mod: S$GLB,,, | Performed by: INTERNAL MEDICINE

## 2021-01-29 PROCEDURE — 1126F PR PAIN SEVERITY QUANTIFIED, NO PAIN PRESENT: ICD-10-PCS | Mod: S$GLB,,, | Performed by: INTERNAL MEDICINE

## 2021-01-29 PROCEDURE — 99999 PR PBB SHADOW E&M-EST. PATIENT-LVL V: ICD-10-PCS | Mod: PBBFAC,,, | Performed by: INTERNAL MEDICINE

## 2021-01-29 PROCEDURE — 93000 ELECTROCARDIOGRAM COMPLETE: CPT | Mod: S$GLB,,, | Performed by: INTERNAL MEDICINE

## 2021-01-29 PROCEDURE — 99204 OFFICE O/P NEW MOD 45 MIN: CPT | Mod: S$GLB,,, | Performed by: INTERNAL MEDICINE

## 2021-01-29 PROCEDURE — 1101F PR PT FALLS ASSESS DOC 0-1 FALLS W/OUT INJ PAST YR: ICD-10-PCS | Mod: CPTII,S$GLB,, | Performed by: INTERNAL MEDICINE

## 2021-01-29 PROCEDURE — 1159F MED LIST DOCD IN RCRD: CPT | Mod: S$GLB,,, | Performed by: INTERNAL MEDICINE

## 2021-01-29 PROCEDURE — 99999 PR PBB SHADOW E&M-EST. PATIENT-LVL V: CPT | Mod: PBBFAC,,, | Performed by: INTERNAL MEDICINE

## 2021-01-29 PROCEDURE — 1159F PR MEDICATION LIST DOCUMENTED IN MEDICAL RECORD: ICD-10-PCS | Mod: S$GLB,,, | Performed by: INTERNAL MEDICINE

## 2021-01-29 PROCEDURE — 3288F PR FALLS RISK ASSESSMENT DOCUMENTED: ICD-10-PCS | Mod: CPTII,S$GLB,, | Performed by: INTERNAL MEDICINE

## 2021-01-29 PROCEDURE — 3008F BODY MASS INDEX DOCD: CPT | Mod: CPTII,S$GLB,, | Performed by: INTERNAL MEDICINE

## 2021-01-29 PROCEDURE — 3008F PR BODY MASS INDEX (BMI) DOCUMENTED: ICD-10-PCS | Mod: CPTII,S$GLB,, | Performed by: INTERNAL MEDICINE

## 2021-01-29 PROCEDURE — 93000 EKG 12-LEAD: ICD-10-PCS | Mod: S$GLB,,, | Performed by: INTERNAL MEDICINE

## 2021-01-29 PROCEDURE — 1101F PT FALLS ASSESS-DOCD LE1/YR: CPT | Mod: CPTII,S$GLB,, | Performed by: INTERNAL MEDICINE

## 2021-01-29 PROCEDURE — 1126F AMNT PAIN NOTED NONE PRSNT: CPT | Mod: S$GLB,,, | Performed by: INTERNAL MEDICINE

## 2021-01-29 PROCEDURE — 3288F FALL RISK ASSESSMENT DOCD: CPT | Mod: CPTII,S$GLB,, | Performed by: INTERNAL MEDICINE

## 2021-02-01 DIAGNOSIS — R00.2 PALPITATIONS: Primary | ICD-10-CM

## 2021-02-03 ENCOUNTER — OFFICE VISIT (OUTPATIENT)
Dept: BARIATRICS | Facility: CLINIC | Age: 71
End: 2021-02-03
Payer: MEDICARE

## 2021-02-03 VITALS — WEIGHT: 211.88 LBS | BODY MASS INDEX: 37.54 KG/M2 | HEIGHT: 63 IN

## 2021-02-03 DIAGNOSIS — R10.13 EPIGASTRIC ABDOMINAL PAIN: ICD-10-CM

## 2021-02-03 DIAGNOSIS — Z98.84 LAP-BAND SURGERY STATUS: ICD-10-CM

## 2021-02-03 DIAGNOSIS — R14.2 BELCHING: Primary | ICD-10-CM

## 2021-02-03 PROCEDURE — 99214 PR OFFICE/OUTPT VISIT, EST, LEVL IV, 30-39 MIN: ICD-10-PCS | Mod: S$GLB,,, | Performed by: PHYSICIAN ASSISTANT

## 2021-02-03 PROCEDURE — 99214 OFFICE O/P EST MOD 30 MIN: CPT | Mod: S$GLB,,, | Performed by: PHYSICIAN ASSISTANT

## 2021-02-03 PROCEDURE — 3008F PR BODY MASS INDEX (BMI) DOCUMENTED: ICD-10-PCS | Mod: CPTII,S$GLB,, | Performed by: PHYSICIAN ASSISTANT

## 2021-02-03 PROCEDURE — 3008F BODY MASS INDEX DOCD: CPT | Mod: CPTII,S$GLB,, | Performed by: PHYSICIAN ASSISTANT

## 2021-02-03 PROCEDURE — 1159F PR MEDICATION LIST DOCUMENTED IN MEDICAL RECORD: ICD-10-PCS | Mod: S$GLB,,, | Performed by: PHYSICIAN ASSISTANT

## 2021-02-03 PROCEDURE — 1126F PR PAIN SEVERITY QUANTIFIED, NO PAIN PRESENT: ICD-10-PCS | Mod: S$GLB,,, | Performed by: PHYSICIAN ASSISTANT

## 2021-02-03 PROCEDURE — 1126F AMNT PAIN NOTED NONE PRSNT: CPT | Mod: S$GLB,,, | Performed by: PHYSICIAN ASSISTANT

## 2021-02-03 PROCEDURE — 99999 PR PBB SHADOW E&M-EST. PATIENT-LVL III: ICD-10-PCS | Mod: PBBFAC,,, | Performed by: PHYSICIAN ASSISTANT

## 2021-02-03 PROCEDURE — 1159F MED LIST DOCD IN RCRD: CPT | Mod: S$GLB,,, | Performed by: PHYSICIAN ASSISTANT

## 2021-02-03 PROCEDURE — 99999 PR PBB SHADOW E&M-EST. PATIENT-LVL III: CPT | Mod: PBBFAC,,, | Performed by: PHYSICIAN ASSISTANT

## 2021-02-04 ENCOUNTER — CLINICAL SUPPORT (OUTPATIENT)
Dept: REHABILITATION | Facility: HOSPITAL | Age: 71
End: 2021-02-04
Payer: MEDICARE

## 2021-02-04 ENCOUNTER — HOSPITAL ENCOUNTER (OUTPATIENT)
Dept: RADIOLOGY | Facility: OTHER | Age: 71
Discharge: HOME | End: 2021-02-04
Attending: PHYSICIAN ASSISTANT
Payer: MEDICARE

## 2021-02-04 ENCOUNTER — OFFICE VISIT (OUTPATIENT)
Dept: ORTHOPEDICS | Facility: CLINIC | Age: 71
End: 2021-02-04
Payer: MEDICARE

## 2021-02-04 VITALS
SYSTOLIC BLOOD PRESSURE: 117 MMHG | WEIGHT: 211.88 LBS | HEART RATE: 60 BPM | DIASTOLIC BLOOD PRESSURE: 76 MMHG | HEIGHT: 63 IN | BODY MASS INDEX: 37.54 KG/M2

## 2021-02-04 DIAGNOSIS — Z98.1 STATUS POST FINGER JOINT FUSION: ICD-10-CM

## 2021-02-04 DIAGNOSIS — M77.8 WRIST TENDONITIS: Primary | ICD-10-CM

## 2021-02-04 DIAGNOSIS — R29.898 DECREASED PINCH STRENGTH: ICD-10-CM

## 2021-02-04 DIAGNOSIS — M79.644 THUMB PAIN, RIGHT: ICD-10-CM

## 2021-02-04 PROCEDURE — 99999 PR PBB SHADOW E&M-EST. PATIENT-LVL IV: CPT | Mod: PBBFAC,,, | Performed by: ORTHOPAEDIC SURGERY

## 2021-02-04 PROCEDURE — 99214 OFFICE O/P EST MOD 30 MIN: CPT | Mod: 25,S$GLB,, | Performed by: ORTHOPAEDIC SURGERY

## 2021-02-04 PROCEDURE — 99214 PR OFFICE/OUTPT VISIT, EST, LEVL IV, 30-39 MIN: ICD-10-PCS | Mod: 25,S$GLB,, | Performed by: ORTHOPAEDIC SURGERY

## 2021-02-04 PROCEDURE — 73140 XR FINGER 2 OR MORE VIEWS: ICD-10-PCS | Mod: 26,RT,, | Performed by: RADIOLOGY

## 2021-02-04 PROCEDURE — 3008F BODY MASS INDEX DOCD: CPT | Mod: CPTII,S$GLB,, | Performed by: ORTHOPAEDIC SURGERY

## 2021-02-04 PROCEDURE — 73140 X-RAY EXAM OF FINGER(S): CPT | Mod: 26,RT,, | Performed by: RADIOLOGY

## 2021-02-04 PROCEDURE — 73140 X-RAY EXAM OF FINGER(S): CPT | Mod: TC,FY,RT

## 2021-02-04 PROCEDURE — 3008F PR BODY MASS INDEX (BMI) DOCUMENTED: ICD-10-PCS | Mod: CPTII,S$GLB,, | Performed by: ORTHOPAEDIC SURGERY

## 2021-02-04 PROCEDURE — 20550 NJX 1 TENDON SHEATH/LIGAMENT: CPT | Mod: RT,S$GLB,, | Performed by: ORTHOPAEDIC SURGERY

## 2021-02-04 PROCEDURE — 3288F FALL RISK ASSESSMENT DOCD: CPT | Mod: CPTII,S$GLB,, | Performed by: ORTHOPAEDIC SURGERY

## 2021-02-04 PROCEDURE — 97140 MANUAL THERAPY 1/> REGIONS: CPT

## 2021-02-04 PROCEDURE — 1125F PR PAIN SEVERITY QUANTIFIED, PAIN PRESENT: ICD-10-PCS | Mod: S$GLB,,, | Performed by: ORTHOPAEDIC SURGERY

## 2021-02-04 PROCEDURE — 3288F PR FALLS RISK ASSESSMENT DOCUMENTED: ICD-10-PCS | Mod: CPTII,S$GLB,, | Performed by: ORTHOPAEDIC SURGERY

## 2021-02-04 PROCEDURE — 20550 PR INJECT TENDON SHEATH/LIGAMENT: ICD-10-PCS | Mod: RT,S$GLB,, | Performed by: ORTHOPAEDIC SURGERY

## 2021-02-04 PROCEDURE — 1101F PR PT FALLS ASSESS DOC 0-1 FALLS W/OUT INJ PAST YR: ICD-10-PCS | Mod: CPTII,S$GLB,, | Performed by: ORTHOPAEDIC SURGERY

## 2021-02-04 PROCEDURE — 1125F AMNT PAIN NOTED PAIN PRSNT: CPT | Mod: S$GLB,,, | Performed by: ORTHOPAEDIC SURGERY

## 2021-02-04 PROCEDURE — 1159F MED LIST DOCD IN RCRD: CPT | Mod: S$GLB,,, | Performed by: ORTHOPAEDIC SURGERY

## 2021-02-04 PROCEDURE — 97110 THERAPEUTIC EXERCISES: CPT

## 2021-02-04 PROCEDURE — 97018 PARAFFIN BATH THERAPY: CPT | Mod: 59

## 2021-02-04 PROCEDURE — 1159F PR MEDICATION LIST DOCUMENTED IN MEDICAL RECORD: ICD-10-PCS | Mod: S$GLB,,, | Performed by: ORTHOPAEDIC SURGERY

## 2021-02-04 PROCEDURE — 1101F PT FALLS ASSESS-DOCD LE1/YR: CPT | Mod: CPTII,S$GLB,, | Performed by: ORTHOPAEDIC SURGERY

## 2021-02-04 PROCEDURE — 99999 PR PBB SHADOW E&M-EST. PATIENT-LVL IV: ICD-10-PCS | Mod: PBBFAC,,, | Performed by: ORTHOPAEDIC SURGERY

## 2021-02-04 RX ORDER — DEXAMETHASONE SODIUM PHOSPHATE 4 MG/ML
4 INJECTION, SOLUTION INTRA-ARTICULAR; INTRALESIONAL; INTRAMUSCULAR; INTRAVENOUS; SOFT TISSUE
Status: DISCONTINUED | OUTPATIENT
Start: 2021-02-04 | End: 2021-02-04 | Stop reason: HOSPADM

## 2021-02-04 RX ADMIN — DEXAMETHASONE SODIUM PHOSPHATE 4 MG: 4 INJECTION, SOLUTION INTRA-ARTICULAR; INTRALESIONAL; INTRAMUSCULAR; INTRAVENOUS; SOFT TISSUE at 10:02

## 2021-02-17 ENCOUNTER — OFFICE VISIT (OUTPATIENT)
Dept: OPTOMETRY | Facility: CLINIC | Age: 71
End: 2021-02-17
Payer: MEDICARE

## 2021-02-17 ENCOUNTER — HOSPITAL ENCOUNTER (OUTPATIENT)
Dept: RADIOLOGY | Facility: HOSPITAL | Age: 71
Discharge: HOME | End: 2021-02-17
Attending: PHYSICIAN ASSISTANT
Payer: MEDICARE

## 2021-02-17 DIAGNOSIS — Z98.84 LAP-BAND SURGERY STATUS: ICD-10-CM

## 2021-02-17 DIAGNOSIS — Z46.0 ENCOUNTER FOR FITTING OR ADJUSTMENT OF SPECTACLES OR CONTACT LENSES: ICD-10-CM

## 2021-02-17 DIAGNOSIS — R14.2 BELCHING: ICD-10-CM

## 2021-02-17 DIAGNOSIS — Z98.41 S/P BILATERAL CATARACT EXTRACTION: ICD-10-CM

## 2021-02-17 DIAGNOSIS — Z96.1 PSEUDOPHAKIA OF BOTH EYES: ICD-10-CM

## 2021-02-17 DIAGNOSIS — H43.393 VITREOUS FLOATERS OF BOTH EYES: Primary | ICD-10-CM

## 2021-02-17 DIAGNOSIS — R10.13 EPIGASTRIC ABDOMINAL PAIN: ICD-10-CM

## 2021-02-17 DIAGNOSIS — H52.7 REFRACTIVE ERRORS: ICD-10-CM

## 2021-02-17 DIAGNOSIS — Z98.42 S/P BILATERAL CATARACT EXTRACTION: ICD-10-CM

## 2021-02-17 PROCEDURE — 3288F PR FALLS RISK ASSESSMENT DOCUMENTED: ICD-10-PCS | Mod: CPTII,S$GLB,, | Performed by: OPTOMETRIST

## 2021-02-17 PROCEDURE — 25500020 PHARM REV CODE 255: Performed by: PHYSICIAN ASSISTANT

## 2021-02-17 PROCEDURE — 74240 FL UPPER GI: ICD-10-PCS | Mod: 26,,, | Performed by: RADIOLOGY

## 2021-02-17 PROCEDURE — 3288F FALL RISK ASSESSMENT DOCD: CPT | Mod: CPTII,S$GLB,, | Performed by: OPTOMETRIST

## 2021-02-17 PROCEDURE — 92015 PR REFRACTION: ICD-10-PCS | Mod: S$GLB,,, | Performed by: OPTOMETRIST

## 2021-02-17 PROCEDURE — 1101F PT FALLS ASSESS-DOCD LE1/YR: CPT | Mod: CPTII,S$GLB,, | Performed by: OPTOMETRIST

## 2021-02-17 PROCEDURE — 99999 PR PBB SHADOW E&M-EST. PATIENT-LVL IV: CPT | Mod: PBBFAC,,, | Performed by: OPTOMETRIST

## 2021-02-17 PROCEDURE — 92310 PR CONTACT LENS FITTING (NO CHANGE): ICD-10-PCS | Mod: CSM,S$GLB,, | Performed by: OPTOMETRIST

## 2021-02-17 PROCEDURE — 74240 X-RAY XM UPR GI TRC 1CNTRST: CPT | Mod: 26,,, | Performed by: RADIOLOGY

## 2021-02-17 PROCEDURE — 1101F PR PT FALLS ASSESS DOC 0-1 FALLS W/OUT INJ PAST YR: ICD-10-PCS | Mod: CPTII,S$GLB,, | Performed by: OPTOMETRIST

## 2021-02-17 PROCEDURE — 99999 PR PBB SHADOW E&M-EST. PATIENT-LVL IV: ICD-10-PCS | Mod: PBBFAC,,, | Performed by: OPTOMETRIST

## 2021-02-17 PROCEDURE — 92015 DETERMINE REFRACTIVE STATE: CPT | Mod: S$GLB,,, | Performed by: OPTOMETRIST

## 2021-02-17 PROCEDURE — A9698 NON-RAD CONTRAST MATERIALNOC: HCPCS | Performed by: PHYSICIAN ASSISTANT

## 2021-02-17 PROCEDURE — 1126F PR PAIN SEVERITY QUANTIFIED, NO PAIN PRESENT: ICD-10-PCS | Mod: S$GLB,,, | Performed by: OPTOMETRIST

## 2021-02-17 PROCEDURE — 92014 COMPRE OPH EXAM EST PT 1/>: CPT | Mod: S$GLB,,, | Performed by: OPTOMETRIST

## 2021-02-17 PROCEDURE — 74240 X-RAY XM UPR GI TRC 1CNTRST: CPT | Mod: TC,FY

## 2021-02-17 PROCEDURE — 1126F AMNT PAIN NOTED NONE PRSNT: CPT | Mod: S$GLB,,, | Performed by: OPTOMETRIST

## 2021-02-17 PROCEDURE — 92310 CONTACT LENS FITTING OU: CPT | Mod: CSM,S$GLB,, | Performed by: OPTOMETRIST

## 2021-02-17 PROCEDURE — 92014 PR EYE EXAM, EST PATIENT,COMPREHESV: ICD-10-PCS | Mod: S$GLB,,, | Performed by: OPTOMETRIST

## 2021-02-17 RX ADMIN — BARIUM SULFATE 275 ML: 0.6 SUSPENSION ORAL at 09:02

## 2021-02-18 ENCOUNTER — HOSPITAL ENCOUNTER (OUTPATIENT)
Dept: RADIOLOGY | Facility: HOSPITAL | Age: 71
Discharge: HOME | End: 2021-02-18
Attending: INTERNAL MEDICINE
Payer: MEDICARE

## 2021-02-18 ENCOUNTER — TELEPHONE (OUTPATIENT)
Dept: BARIATRICS | Facility: CLINIC | Age: 71
End: 2021-02-18

## 2021-02-18 ENCOUNTER — HOSPITAL ENCOUNTER (OUTPATIENT)
Dept: CARDIOLOGY | Facility: HOSPITAL | Age: 71
Discharge: HOME | End: 2021-02-18
Attending: INTERNAL MEDICINE
Payer: MEDICARE

## 2021-02-18 DIAGNOSIS — R07.2 PRECORDIAL PAIN: ICD-10-CM

## 2021-02-18 LAB
CV STRESS BASE HR: 50 BPM
DIASTOLIC BLOOD PRESSURE: 80 MMHG
OHS CV CPX 85 PERCENT MAX PREDICTED HEART RATE MALE: 123
OHS CV CPX MAX PREDICTED HEART RATE: 145
OHS CV CPX PATIENT IS FEMALE: 1
OHS CV CPX PATIENT IS MALE: 0
OHS CV CPX PEAK DIASTOLIC BLOOD PRESSURE: 75 MMHG
OHS CV CPX PEAK HEAR RATE: 88 BPM
OHS CV CPX PEAK RATE PRESSURE PRODUCT: NORMAL
OHS CV CPX PEAK SYSTOLIC BLOOD PRESSURE: 145 MMHG
OHS CV CPX PERCENT MAX PREDICTED HEART RATE ACHIEVED: 61
OHS CV CPX RATE PRESSURE PRODUCT PRESENTING: 7050
SYSTOLIC BLOOD PRESSURE: 141 MMHG

## 2021-02-18 PROCEDURE — 93016 NUCLEAR STRESS TEST (CUPID ONLY): ICD-10-PCS | Mod: ,,, | Performed by: INTERNAL MEDICINE

## 2021-02-18 PROCEDURE — 93018 NUCLEAR STRESS TEST (CUPID ONLY): ICD-10-PCS | Mod: ,,, | Performed by: INTERNAL MEDICINE

## 2021-02-18 PROCEDURE — 93018 CV STRESS TEST I&R ONLY: CPT | Mod: ,,, | Performed by: INTERNAL MEDICINE

## 2021-02-18 PROCEDURE — 93017 CV STRESS TEST TRACING ONLY: CPT | Mod: PO

## 2021-02-18 PROCEDURE — A9502 TC99M TETROFOSMIN: HCPCS | Mod: PO

## 2021-02-18 PROCEDURE — 93016 CV STRESS TEST SUPVJ ONLY: CPT | Mod: ,,, | Performed by: INTERNAL MEDICINE

## 2021-02-18 PROCEDURE — 78452 HT MUSCLE IMAGE SPECT MULT: CPT | Mod: TC,PO

## 2021-02-18 PROCEDURE — 63600175 PHARM REV CODE 636 W HCPCS: Mod: PO | Performed by: INTERNAL MEDICINE

## 2021-02-18 RX ORDER — REGADENOSON 0.08 MG/ML
0.4 INJECTION, SOLUTION INTRAVENOUS ONCE
Status: COMPLETED | OUTPATIENT
Start: 2021-02-18 | End: 2021-02-18

## 2021-02-18 RX ADMIN — REGADENOSON 0.4 MG: 0.08 INJECTION, SOLUTION INTRAVENOUS at 12:02

## 2021-02-19 ENCOUNTER — CLINICAL SUPPORT (OUTPATIENT)
Dept: REHABILITATION | Facility: HOSPITAL | Age: 71
End: 2021-02-19
Payer: MEDICARE

## 2021-02-19 ENCOUNTER — HOSPITAL ENCOUNTER (OUTPATIENT)
Dept: RADIOLOGY | Facility: OTHER | Age: 71
Discharge: HOME | End: 2021-02-19
Attending: FAMILY MEDICINE
Payer: MEDICARE

## 2021-02-19 DIAGNOSIS — M25.531 RIGHT WRIST PAIN: ICD-10-CM

## 2021-02-19 DIAGNOSIS — M79.644 THUMB PAIN, RIGHT: ICD-10-CM

## 2021-02-19 DIAGNOSIS — R29.898 DECREASED PINCH STRENGTH: ICD-10-CM

## 2021-02-19 DIAGNOSIS — Z12.31 ENCOUNTER FOR SCREENING MAMMOGRAM FOR MALIGNANT NEOPLASM OF BREAST: ICD-10-CM

## 2021-02-19 DIAGNOSIS — M77.8 WRIST TENDONITIS: Primary | ICD-10-CM

## 2021-02-19 PROCEDURE — 97018 PARAFFIN BATH THERAPY: CPT | Mod: 59

## 2021-02-19 PROCEDURE — 77063 MAMMO DIGITAL SCREENING BILAT WITH TOMO: ICD-10-PCS | Mod: 26,,, | Performed by: RADIOLOGY

## 2021-02-19 PROCEDURE — 97110 THERAPEUTIC EXERCISES: CPT

## 2021-02-19 PROCEDURE — 77067 SCR MAMMO BI INCL CAD: CPT | Mod: 26,,, | Performed by: RADIOLOGY

## 2021-02-19 PROCEDURE — 77067 SCR MAMMO BI INCL CAD: CPT | Mod: TC

## 2021-02-19 PROCEDURE — 77067 MAMMO DIGITAL SCREENING BILAT WITH TOMO: ICD-10-PCS | Mod: 26,,, | Performed by: RADIOLOGY

## 2021-02-19 PROCEDURE — 97140 MANUAL THERAPY 1/> REGIONS: CPT

## 2021-02-19 PROCEDURE — 77063 BREAST TOMOSYNTHESIS BI: CPT | Mod: 26,,, | Performed by: RADIOLOGY

## 2021-02-22 ENCOUNTER — OFFICE VISIT (OUTPATIENT)
Dept: CARDIOLOGY | Facility: CLINIC | Age: 71
End: 2021-02-22
Payer: MEDICARE

## 2021-02-22 VITALS
WEIGHT: 217.5 LBS | DIASTOLIC BLOOD PRESSURE: 75 MMHG | OXYGEN SATURATION: 96 % | HEIGHT: 63 IN | SYSTOLIC BLOOD PRESSURE: 116 MMHG | BODY MASS INDEX: 38.54 KG/M2 | HEART RATE: 62 BPM

## 2021-02-22 DIAGNOSIS — R00.2 PALPITATIONS: ICD-10-CM

## 2021-02-22 DIAGNOSIS — R00.1 SINUS BRADYCARDIA: ICD-10-CM

## 2021-02-22 DIAGNOSIS — R07.2 PRECORDIAL CHEST PAIN: ICD-10-CM

## 2021-02-22 DIAGNOSIS — I87.2 VENOUS INSUFFICIENCY OF BOTH LOWER EXTREMITIES: ICD-10-CM

## 2021-02-22 PROCEDURE — 1101F PT FALLS ASSESS-DOCD LE1/YR: CPT | Mod: CPTII,S$GLB,, | Performed by: INTERNAL MEDICINE

## 2021-02-22 PROCEDURE — 99999 PR PBB SHADOW E&M-EST. PATIENT-LVL III: CPT | Mod: PBBFAC,,, | Performed by: INTERNAL MEDICINE

## 2021-02-22 PROCEDURE — 99499 RISK ADDL DX/OHS AUDIT: ICD-10-PCS | Mod: S$GLB,,, | Performed by: INTERNAL MEDICINE

## 2021-02-22 PROCEDURE — 3008F PR BODY MASS INDEX (BMI) DOCUMENTED: ICD-10-PCS | Mod: CPTII,S$GLB,, | Performed by: INTERNAL MEDICINE

## 2021-02-22 PROCEDURE — 99214 OFFICE O/P EST MOD 30 MIN: CPT | Mod: S$GLB,,, | Performed by: INTERNAL MEDICINE

## 2021-02-22 PROCEDURE — 1101F PR PT FALLS ASSESS DOC 0-1 FALLS W/OUT INJ PAST YR: ICD-10-PCS | Mod: CPTII,S$GLB,, | Performed by: INTERNAL MEDICINE

## 2021-02-22 PROCEDURE — 99999 PR PBB SHADOW E&M-EST. PATIENT-LVL III: ICD-10-PCS | Mod: PBBFAC,,, | Performed by: INTERNAL MEDICINE

## 2021-02-22 PROCEDURE — 3288F FALL RISK ASSESSMENT DOCD: CPT | Mod: CPTII,S$GLB,, | Performed by: INTERNAL MEDICINE

## 2021-02-22 PROCEDURE — 1126F PR PAIN SEVERITY QUANTIFIED, NO PAIN PRESENT: ICD-10-PCS | Mod: S$GLB,,, | Performed by: INTERNAL MEDICINE

## 2021-02-22 PROCEDURE — 1126F AMNT PAIN NOTED NONE PRSNT: CPT | Mod: S$GLB,,, | Performed by: INTERNAL MEDICINE

## 2021-02-22 PROCEDURE — 1159F PR MEDICATION LIST DOCUMENTED IN MEDICAL RECORD: ICD-10-PCS | Mod: S$GLB,,, | Performed by: INTERNAL MEDICINE

## 2021-02-22 PROCEDURE — 1159F MED LIST DOCD IN RCRD: CPT | Mod: S$GLB,,, | Performed by: INTERNAL MEDICINE

## 2021-02-22 PROCEDURE — 99214 PR OFFICE/OUTPT VISIT, EST, LEVL IV, 30-39 MIN: ICD-10-PCS | Mod: S$GLB,,, | Performed by: INTERNAL MEDICINE

## 2021-02-22 PROCEDURE — 99499 UNLISTED E&M SERVICE: CPT | Mod: S$GLB,,, | Performed by: INTERNAL MEDICINE

## 2021-02-22 PROCEDURE — 3008F BODY MASS INDEX DOCD: CPT | Mod: CPTII,S$GLB,, | Performed by: INTERNAL MEDICINE

## 2021-02-22 PROCEDURE — 3288F PR FALLS RISK ASSESSMENT DOCUMENTED: ICD-10-PCS | Mod: CPTII,S$GLB,, | Performed by: INTERNAL MEDICINE

## 2021-02-23 ENCOUNTER — OFFICE VISIT (OUTPATIENT)
Dept: BARIATRICS | Facility: CLINIC | Age: 71
End: 2021-02-23
Payer: MEDICARE

## 2021-02-23 VITALS
BODY MASS INDEX: 36.7 KG/M2 | SYSTOLIC BLOOD PRESSURE: 107 MMHG | RESPIRATION RATE: 18 BRPM | DIASTOLIC BLOOD PRESSURE: 66 MMHG | WEIGHT: 214.94 LBS | HEART RATE: 74 BPM | HEIGHT: 64 IN

## 2021-02-23 DIAGNOSIS — Z98.84 GASTRIC BANDING STATUS: ICD-10-CM

## 2021-02-23 DIAGNOSIS — K44.9 HERNIA, HIATAL: ICD-10-CM

## 2021-02-23 DIAGNOSIS — Z79.899 OTHER LONG TERM (CURRENT) DRUG THERAPY: ICD-10-CM

## 2021-02-23 DIAGNOSIS — Z01.818 PREOP TESTING: Primary | ICD-10-CM

## 2021-02-23 PROCEDURE — 1159F MED LIST DOCD IN RCRD: CPT | Mod: S$GLB,,, | Performed by: SURGERY

## 2021-02-23 PROCEDURE — 99214 OFFICE O/P EST MOD 30 MIN: CPT | Mod: S$GLB,,, | Performed by: SURGERY

## 2021-02-23 PROCEDURE — 1159F PR MEDICATION LIST DOCUMENTED IN MEDICAL RECORD: ICD-10-PCS | Mod: S$GLB,,, | Performed by: SURGERY

## 2021-02-23 PROCEDURE — 3288F FALL RISK ASSESSMENT DOCD: CPT | Mod: CPTII,S$GLB,, | Performed by: SURGERY

## 2021-02-23 PROCEDURE — 99999 PR PBB SHADOW E&M-EST. PATIENT-LVL IV: ICD-10-PCS | Mod: PBBFAC,,, | Performed by: SURGERY

## 2021-02-23 PROCEDURE — 99999 PR PBB SHADOW E&M-EST. PATIENT-LVL IV: CPT | Mod: PBBFAC,,, | Performed by: SURGERY

## 2021-02-23 PROCEDURE — 1125F AMNT PAIN NOTED PAIN PRSNT: CPT | Mod: S$GLB,,, | Performed by: SURGERY

## 2021-02-23 PROCEDURE — 1101F PR PT FALLS ASSESS DOC 0-1 FALLS W/OUT INJ PAST YR: ICD-10-PCS | Mod: CPTII,S$GLB,, | Performed by: SURGERY

## 2021-02-23 PROCEDURE — 3288F PR FALLS RISK ASSESSMENT DOCUMENTED: ICD-10-PCS | Mod: CPTII,S$GLB,, | Performed by: SURGERY

## 2021-02-23 PROCEDURE — 3008F BODY MASS INDEX DOCD: CPT | Mod: CPTII,S$GLB,, | Performed by: SURGERY

## 2021-02-23 PROCEDURE — 1125F PR PAIN SEVERITY QUANTIFIED, PAIN PRESENT: ICD-10-PCS | Mod: S$GLB,,, | Performed by: SURGERY

## 2021-02-23 PROCEDURE — 1101F PT FALLS ASSESS-DOCD LE1/YR: CPT | Mod: CPTII,S$GLB,, | Performed by: SURGERY

## 2021-02-23 PROCEDURE — 99214 PR OFFICE/OUTPT VISIT, EST, LEVL IV, 30-39 MIN: ICD-10-PCS | Mod: S$GLB,,, | Performed by: SURGERY

## 2021-02-23 PROCEDURE — 3008F PR BODY MASS INDEX (BMI) DOCUMENTED: ICD-10-PCS | Mod: CPTII,S$GLB,, | Performed by: SURGERY

## 2021-02-23 RX ORDER — FAMOTIDINE 20 MG/1
20 TABLET, FILM COATED ORAL 2 TIMES DAILY
COMMUNITY
End: 2022-04-01

## 2021-02-24 ENCOUNTER — CLINICAL SUPPORT (OUTPATIENT)
Dept: REHABILITATION | Facility: HOSPITAL | Age: 71
End: 2021-02-24
Payer: MEDICARE

## 2021-02-24 ENCOUNTER — OFFICE VISIT (OUTPATIENT)
Dept: OBSTETRICS AND GYNECOLOGY | Facility: CLINIC | Age: 71
End: 2021-02-24
Payer: MEDICARE

## 2021-02-24 VITALS
SYSTOLIC BLOOD PRESSURE: 124 MMHG | BODY MASS INDEX: 36.35 KG/M2 | DIASTOLIC BLOOD PRESSURE: 70 MMHG | WEIGHT: 212.94 LBS | HEIGHT: 64 IN

## 2021-02-24 DIAGNOSIS — Z01.419 WELL WOMAN EXAM WITH ROUTINE GYNECOLOGICAL EXAM: Primary | ICD-10-CM

## 2021-02-24 DIAGNOSIS — R29.898 DECREASED PINCH STRENGTH: ICD-10-CM

## 2021-02-24 DIAGNOSIS — M19.049 CMC ARTHRITIS: ICD-10-CM

## 2021-02-24 DIAGNOSIS — M79.644 THUMB PAIN, RIGHT: ICD-10-CM

## 2021-02-24 PROCEDURE — 97110 THERAPEUTIC EXERCISES: CPT

## 2021-02-24 PROCEDURE — G0101 CA SCREEN;PELVIC/BREAST EXAM: HCPCS | Mod: S$GLB,,, | Performed by: OBSTETRICS & GYNECOLOGY

## 2021-02-24 PROCEDURE — 1126F PR PAIN SEVERITY QUANTIFIED, NO PAIN PRESENT: ICD-10-PCS | Mod: S$GLB,,, | Performed by: OBSTETRICS & GYNECOLOGY

## 2021-02-24 PROCEDURE — 3008F PR BODY MASS INDEX (BMI) DOCUMENTED: ICD-10-PCS | Mod: CPTII,S$GLB,, | Performed by: OBSTETRICS & GYNECOLOGY

## 2021-02-24 PROCEDURE — 99999 PR PBB SHADOW E&M-EST. PATIENT-LVL III: CPT | Mod: PBBFAC,,, | Performed by: OBSTETRICS & GYNECOLOGY

## 2021-02-24 PROCEDURE — 1101F PR PT FALLS ASSESS DOC 0-1 FALLS W/OUT INJ PAST YR: ICD-10-PCS | Mod: CPTII,S$GLB,, | Performed by: OBSTETRICS & GYNECOLOGY

## 2021-02-24 PROCEDURE — 1101F PT FALLS ASSESS-DOCD LE1/YR: CPT | Mod: CPTII,S$GLB,, | Performed by: OBSTETRICS & GYNECOLOGY

## 2021-02-24 PROCEDURE — 99999 PR PBB SHADOW E&M-EST. PATIENT-LVL III: ICD-10-PCS | Mod: PBBFAC,,, | Performed by: OBSTETRICS & GYNECOLOGY

## 2021-02-24 PROCEDURE — 3288F PR FALLS RISK ASSESSMENT DOCUMENTED: ICD-10-PCS | Mod: CPTII,S$GLB,, | Performed by: OBSTETRICS & GYNECOLOGY

## 2021-02-24 PROCEDURE — 3008F BODY MASS INDEX DOCD: CPT | Mod: CPTII,S$GLB,, | Performed by: OBSTETRICS & GYNECOLOGY

## 2021-02-24 PROCEDURE — 1126F AMNT PAIN NOTED NONE PRSNT: CPT | Mod: S$GLB,,, | Performed by: OBSTETRICS & GYNECOLOGY

## 2021-02-24 PROCEDURE — 97140 MANUAL THERAPY 1/> REGIONS: CPT

## 2021-02-24 PROCEDURE — G0101 PR CA SCREEN;PELVIC/BREAST EXAM: ICD-10-PCS | Mod: S$GLB,,, | Performed by: OBSTETRICS & GYNECOLOGY

## 2021-02-24 PROCEDURE — 3288F FALL RISK ASSESSMENT DOCD: CPT | Mod: CPTII,S$GLB,, | Performed by: OBSTETRICS & GYNECOLOGY

## 2021-02-24 PROCEDURE — 97018 PARAFFIN BATH THERAPY: CPT | Mod: 59

## 2021-02-26 ENCOUNTER — HOSPITAL ENCOUNTER (OUTPATIENT)
Dept: RADIOLOGY | Facility: HOSPITAL | Age: 71
Discharge: HOME | End: 2021-02-26
Attending: SURGERY
Payer: MEDICARE

## 2021-02-26 ENCOUNTER — HOSPITAL ENCOUNTER (OUTPATIENT)
Dept: RADIOLOGY | Facility: HOSPITAL | Age: 71
Discharge: HOME | End: 2021-02-26
Attending: PHYSICIAN ASSISTANT
Payer: MEDICARE

## 2021-02-26 DIAGNOSIS — Z98.84 GASTRIC BANDING STATUS: ICD-10-CM

## 2021-02-26 DIAGNOSIS — Z79.899 OTHER LONG TERM (CURRENT) DRUG THERAPY: ICD-10-CM

## 2021-02-26 DIAGNOSIS — M77.8 WRIST TENDONITIS: ICD-10-CM

## 2021-02-26 DIAGNOSIS — Z01.818 PREOP TESTING: ICD-10-CM

## 2021-02-26 DIAGNOSIS — M25.531 RIGHT WRIST PAIN: ICD-10-CM

## 2021-02-26 DIAGNOSIS — K44.9 HERNIA, HIATAL: ICD-10-CM

## 2021-02-26 PROCEDURE — 73221 MRI WRIST WITHOUT CONTRAST RIGHT: ICD-10-PCS | Mod: 26,RT,, | Performed by: RADIOLOGY

## 2021-02-26 PROCEDURE — 71046 X-RAY EXAM CHEST 2 VIEWS: CPT | Mod: 26,,, | Performed by: RADIOLOGY

## 2021-02-26 PROCEDURE — 71046 X-RAY EXAM CHEST 2 VIEWS: CPT | Mod: TC

## 2021-02-26 PROCEDURE — 71046 XR CHEST PA AND LATERAL: ICD-10-PCS | Mod: 26,,, | Performed by: RADIOLOGY

## 2021-02-26 PROCEDURE — 73221 MRI JOINT UPR EXTREM W/O DYE: CPT | Mod: 26,RT,, | Performed by: RADIOLOGY

## 2021-02-26 PROCEDURE — 73221 MRI JOINT UPR EXTREM W/O DYE: CPT | Mod: TC,RT

## 2021-03-02 ENCOUNTER — CLINICAL SUPPORT (OUTPATIENT)
Dept: BARIATRICS | Facility: CLINIC | Age: 71
End: 2021-03-02
Payer: MEDICARE

## 2021-03-02 ENCOUNTER — PATIENT MESSAGE (OUTPATIENT)
Dept: BARIATRICS | Facility: CLINIC | Age: 71
End: 2021-03-02

## 2021-03-02 DIAGNOSIS — E66.01 SEVERE OBESITY WITH BODY MASS INDEX (BMI) OF 35.0 TO 39.9 WITH COMORBIDITY: ICD-10-CM

## 2021-03-02 DIAGNOSIS — Z98.84 GASTRIC BANDING STATUS: ICD-10-CM

## 2021-03-02 DIAGNOSIS — Z71.3 DIETARY COUNSELING AND SURVEILLANCE: ICD-10-CM

## 2021-03-02 PROCEDURE — 97802 MEDICAL NUTRITION INDIV IN: CPT | Mod: 95,,, | Performed by: DIETITIAN, REGISTERED

## 2021-03-02 PROCEDURE — 97802 PR MED NUTR THER, 1ST, INDIV, EA 15 MIN: ICD-10-PCS | Mod: 95,,, | Performed by: DIETITIAN, REGISTERED

## 2021-03-04 ENCOUNTER — CLINICAL SUPPORT (OUTPATIENT)
Dept: REHABILITATION | Facility: HOSPITAL | Age: 71
End: 2021-03-04
Payer: MEDICARE

## 2021-03-04 DIAGNOSIS — M25.60 RANGE OF MOTION DEFICIT: ICD-10-CM

## 2021-03-04 DIAGNOSIS — M79.641 PAIN OF RIGHT HAND: ICD-10-CM

## 2021-03-04 DIAGNOSIS — R29.898 DECREASED PINCH STRENGTH: ICD-10-CM

## 2021-03-04 DIAGNOSIS — M79.644 THUMB PAIN, RIGHT: ICD-10-CM

## 2021-03-04 DIAGNOSIS — M62.81 MUSCLE WEAKNESS: ICD-10-CM

## 2021-03-04 PROCEDURE — 97140 MANUAL THERAPY 1/> REGIONS: CPT

## 2021-03-04 PROCEDURE — 97110 THERAPEUTIC EXERCISES: CPT

## 2021-03-04 PROCEDURE — 97018 PARAFFIN BATH THERAPY: CPT | Mod: 59

## 2021-03-05 ENCOUNTER — TELEPHONE (OUTPATIENT)
Dept: BARIATRICS | Facility: CLINIC | Age: 71
End: 2021-03-05

## 2021-03-05 DIAGNOSIS — K21.9 GASTROESOPHAGEAL REFLUX DISEASE, UNSPECIFIED WHETHER ESOPHAGITIS PRESENT: ICD-10-CM

## 2021-03-05 DIAGNOSIS — M25.59 PAIN IN OTHER SPECIFIED JOINT: ICD-10-CM

## 2021-03-05 DIAGNOSIS — K44.9 HERNIA, HIATAL: ICD-10-CM

## 2021-03-05 DIAGNOSIS — M17.0 OSTEOARTHRITIS OF BOTH KNEES, UNSPECIFIED OSTEOARTHRITIS TYPE: ICD-10-CM

## 2021-03-05 DIAGNOSIS — Z98.84 STATUS POST LAPAROSCOPIC SLEEVE GASTRECTOMY: ICD-10-CM

## 2021-03-05 DIAGNOSIS — M62.81 MUSCLE WEAKNESS: ICD-10-CM

## 2021-03-05 DIAGNOSIS — I51.89 DIASTOLIC DYSFUNCTION: ICD-10-CM

## 2021-03-05 DIAGNOSIS — R10.13 EPIGASTRIC ABDOMINAL PAIN: ICD-10-CM

## 2021-03-05 DIAGNOSIS — K95.09 COMPLICATION OF GASTRIC BAND PROCEDURE: ICD-10-CM

## 2021-03-05 DIAGNOSIS — I87.2 VENOUS INSUFFICIENCY OF BOTH LOWER EXTREMITIES: ICD-10-CM

## 2021-03-05 DIAGNOSIS — M53.3 SI (SACROILIAC) PAIN: ICD-10-CM

## 2021-03-05 DIAGNOSIS — Z79.01 LONG TERM (CURRENT) USE OF ANTICOAGULANTS: ICD-10-CM

## 2021-03-05 DIAGNOSIS — Z01.818 PREOP TESTING: Primary | ICD-10-CM

## 2021-03-05 DIAGNOSIS — Z79.899 OTHER LONG TERM (CURRENT) DRUG THERAPY: ICD-10-CM

## 2021-03-05 DIAGNOSIS — Z98.84 GASTRIC BANDING STATUS: ICD-10-CM

## 2021-03-08 ENCOUNTER — CLINICAL SUPPORT (OUTPATIENT)
Dept: REHABILITATION | Facility: HOSPITAL | Age: 71
End: 2021-03-08
Payer: MEDICARE

## 2021-03-08 DIAGNOSIS — R29.898 DECREASED PINCH STRENGTH: ICD-10-CM

## 2021-03-08 DIAGNOSIS — M79.644 THUMB PAIN, RIGHT: ICD-10-CM

## 2021-03-08 PROCEDURE — 97140 MANUAL THERAPY 1/> REGIONS: CPT

## 2021-03-08 PROCEDURE — 97018 PARAFFIN BATH THERAPY: CPT | Mod: 59

## 2021-03-08 PROCEDURE — 97110 THERAPEUTIC EXERCISES: CPT

## 2021-03-09 ENCOUNTER — OFFICE VISIT (OUTPATIENT)
Dept: ORTHOPEDICS | Facility: CLINIC | Age: 71
End: 2021-03-09
Payer: MEDICARE

## 2021-03-09 VITALS
BODY MASS INDEX: 36.35 KG/M2 | WEIGHT: 212.94 LBS | HEIGHT: 64 IN | SYSTOLIC BLOOD PRESSURE: 118 MMHG | DIASTOLIC BLOOD PRESSURE: 75 MMHG | HEART RATE: 65 BPM

## 2021-03-09 DIAGNOSIS — M72.0 CONTRACTURE OF PALMAR FASCIA (DUPUYTREN'S): ICD-10-CM

## 2021-03-09 DIAGNOSIS — M25.531 CHRONIC PAIN OF RIGHT WRIST: ICD-10-CM

## 2021-03-09 DIAGNOSIS — G89.29 CHRONIC PAIN OF RIGHT WRIST: ICD-10-CM

## 2021-03-09 DIAGNOSIS — M65.9 FCR (FLEXOR CARPI RADIALIS) TENOSYNOVITIS: Primary | ICD-10-CM

## 2021-03-09 DIAGNOSIS — R22.31 PALMAR NODULE, RIGHT: ICD-10-CM

## 2021-03-09 PROCEDURE — 99214 OFFICE O/P EST MOD 30 MIN: CPT | Mod: S$GLB,,, | Performed by: ORTHOPAEDIC SURGERY

## 2021-03-09 PROCEDURE — 1101F PR PT FALLS ASSESS DOC 0-1 FALLS W/OUT INJ PAST YR: ICD-10-PCS | Mod: CPTII,S$GLB,, | Performed by: ORTHOPAEDIC SURGERY

## 2021-03-09 PROCEDURE — 1101F PT FALLS ASSESS-DOCD LE1/YR: CPT | Mod: CPTII,S$GLB,, | Performed by: ORTHOPAEDIC SURGERY

## 2021-03-09 PROCEDURE — 3288F PR FALLS RISK ASSESSMENT DOCUMENTED: ICD-10-PCS | Mod: CPTII,S$GLB,, | Performed by: ORTHOPAEDIC SURGERY

## 2021-03-09 PROCEDURE — 99999 PR PBB SHADOW E&M-EST. PATIENT-LVL III: ICD-10-PCS | Mod: PBBFAC,,, | Performed by: ORTHOPAEDIC SURGERY

## 2021-03-09 PROCEDURE — 99999 PR PBB SHADOW E&M-EST. PATIENT-LVL III: CPT | Mod: PBBFAC,,, | Performed by: ORTHOPAEDIC SURGERY

## 2021-03-09 PROCEDURE — 3008F PR BODY MASS INDEX (BMI) DOCUMENTED: ICD-10-PCS | Mod: CPTII,S$GLB,, | Performed by: ORTHOPAEDIC SURGERY

## 2021-03-09 PROCEDURE — 1125F PR PAIN SEVERITY QUANTIFIED, PAIN PRESENT: ICD-10-PCS | Mod: S$GLB,,, | Performed by: ORTHOPAEDIC SURGERY

## 2021-03-09 PROCEDURE — 99214 PR OFFICE/OUTPT VISIT, EST, LEVL IV, 30-39 MIN: ICD-10-PCS | Mod: S$GLB,,, | Performed by: ORTHOPAEDIC SURGERY

## 2021-03-09 PROCEDURE — 3288F FALL RISK ASSESSMENT DOCD: CPT | Mod: CPTII,S$GLB,, | Performed by: ORTHOPAEDIC SURGERY

## 2021-03-09 PROCEDURE — 1125F AMNT PAIN NOTED PAIN PRSNT: CPT | Mod: S$GLB,,, | Performed by: ORTHOPAEDIC SURGERY

## 2021-03-09 PROCEDURE — 1159F PR MEDICATION LIST DOCUMENTED IN MEDICAL RECORD: ICD-10-PCS | Mod: S$GLB,,, | Performed by: ORTHOPAEDIC SURGERY

## 2021-03-09 PROCEDURE — 3008F BODY MASS INDEX DOCD: CPT | Mod: CPTII,S$GLB,, | Performed by: ORTHOPAEDIC SURGERY

## 2021-03-09 PROCEDURE — 1159F MED LIST DOCD IN RCRD: CPT | Mod: S$GLB,,, | Performed by: ORTHOPAEDIC SURGERY

## 2021-03-12 ENCOUNTER — DOCUMENTATION ONLY (OUTPATIENT)
Dept: BARIATRICS | Facility: CLINIC | Age: 71
End: 2021-03-12

## 2021-03-16 ENCOUNTER — TELEPHONE (OUTPATIENT)
Dept: BARIATRICS | Facility: CLINIC | Age: 71
End: 2021-03-16

## 2021-03-29 ENCOUNTER — TELEPHONE (OUTPATIENT)
Dept: BARIATRICS | Facility: CLINIC | Age: 71
End: 2021-03-29

## 2021-03-30 ENCOUNTER — OFFICE VISIT (OUTPATIENT)
Dept: BARIATRICS | Facility: CLINIC | Age: 71
End: 2021-03-30
Payer: MEDICARE

## 2021-03-30 VITALS
HEIGHT: 64 IN | HEART RATE: 82 BPM | DIASTOLIC BLOOD PRESSURE: 59 MMHG | SYSTOLIC BLOOD PRESSURE: 110 MMHG | WEIGHT: 210.75 LBS | BODY MASS INDEX: 35.98 KG/M2 | OXYGEN SATURATION: 96 %

## 2021-03-30 DIAGNOSIS — Z98.84 LAP-BAND SURGERY STATUS: ICD-10-CM

## 2021-03-30 DIAGNOSIS — E66.9 OBESITY, CLASS II, BMI 35-39.9: Primary | ICD-10-CM

## 2021-03-30 PROCEDURE — 1126F AMNT PAIN NOTED NONE PRSNT: CPT | Mod: S$GLB,,, | Performed by: SURGERY

## 2021-03-30 PROCEDURE — 99999 PR PBB SHADOW E&M-EST. PATIENT-LVL IV: ICD-10-PCS | Mod: PBBFAC,,, | Performed by: SURGERY

## 2021-03-30 PROCEDURE — 1126F PR PAIN SEVERITY QUANTIFIED, NO PAIN PRESENT: ICD-10-PCS | Mod: S$GLB,,, | Performed by: SURGERY

## 2021-03-30 PROCEDURE — 99213 OFFICE O/P EST LOW 20 MIN: CPT | Mod: S$GLB,,, | Performed by: SURGERY

## 2021-03-30 PROCEDURE — 99213 PR OFFICE/OUTPT VISIT, EST, LEVL III, 20-29 MIN: ICD-10-PCS | Mod: S$GLB,,, | Performed by: SURGERY

## 2021-03-30 PROCEDURE — 1101F PR PT FALLS ASSESS DOC 0-1 FALLS W/OUT INJ PAST YR: ICD-10-PCS | Mod: CPTII,S$GLB,, | Performed by: SURGERY

## 2021-03-30 PROCEDURE — 3008F BODY MASS INDEX DOCD: CPT | Mod: CPTII,S$GLB,, | Performed by: SURGERY

## 2021-03-30 PROCEDURE — 99999 PR PBB SHADOW E&M-EST. PATIENT-LVL IV: CPT | Mod: PBBFAC,,, | Performed by: SURGERY

## 2021-03-30 PROCEDURE — 99499 RISK ADDL DX/OHS AUDIT: ICD-10-PCS | Mod: S$GLB,,, | Performed by: SURGERY

## 2021-03-30 PROCEDURE — 99499 UNLISTED E&M SERVICE: CPT | Mod: S$GLB,,, | Performed by: SURGERY

## 2021-03-30 PROCEDURE — 1159F PR MEDICATION LIST DOCUMENTED IN MEDICAL RECORD: ICD-10-PCS | Mod: S$GLB,,, | Performed by: SURGERY

## 2021-03-30 PROCEDURE — 1101F PT FALLS ASSESS-DOCD LE1/YR: CPT | Mod: CPTII,S$GLB,, | Performed by: SURGERY

## 2021-03-30 PROCEDURE — 1159F MED LIST DOCD IN RCRD: CPT | Mod: S$GLB,,, | Performed by: SURGERY

## 2021-03-30 PROCEDURE — 3288F FALL RISK ASSESSMENT DOCD: CPT | Mod: CPTII,S$GLB,, | Performed by: SURGERY

## 2021-03-30 PROCEDURE — 3288F PR FALLS RISK ASSESSMENT DOCUMENTED: ICD-10-PCS | Mod: CPTII,S$GLB,, | Performed by: SURGERY

## 2021-03-30 PROCEDURE — 3008F PR BODY MASS INDEX (BMI) DOCUMENTED: ICD-10-PCS | Mod: CPTII,S$GLB,, | Performed by: SURGERY

## 2021-03-30 RX ORDER — ACETAMINOPHEN 650 MG/20.3ML
500 LIQUID ORAL EVERY 8 HOURS
Status: CANCELLED | OUTPATIENT
Start: 2021-03-30 | End: 2021-03-31

## 2021-03-30 RX ORDER — SODIUM CITRATE AND CITRIC ACID MONOHYDRATE 334; 500 MG/5ML; MG/5ML
30 SOLUTION ORAL ONCE
Status: CANCELLED | OUTPATIENT
Start: 2021-03-30 | End: 2021-03-30

## 2021-03-30 RX ORDER — HYDROMORPHONE HYDROCHLORIDE 1 MG/ML
0.5 INJECTION, SOLUTION INTRAMUSCULAR; INTRAVENOUS; SUBCUTANEOUS
Status: CANCELLED | OUTPATIENT
Start: 2021-03-30

## 2021-03-30 RX ORDER — FAMOTIDINE 10 MG/ML
20 INJECTION INTRAVENOUS ONCE
Status: CANCELLED | OUTPATIENT
Start: 2021-03-30 | End: 2021-03-30

## 2021-03-30 RX ORDER — METOCLOPRAMIDE HYDROCHLORIDE 5 MG/ML
10 INJECTION INTRAMUSCULAR; INTRAVENOUS ONCE
Status: CANCELLED | OUTPATIENT
Start: 2021-03-30 | End: 2021-03-30

## 2021-03-30 RX ORDER — FAMOTIDINE 10 MG/ML
20 INJECTION INTRAVENOUS 2 TIMES DAILY
Status: CANCELLED | OUTPATIENT
Start: 2021-03-30

## 2021-03-30 RX ORDER — HEPARIN SODIUM 5000 [USP'U]/ML
5000 INJECTION, SOLUTION INTRAVENOUS; SUBCUTANEOUS ONCE
Status: CANCELLED | OUTPATIENT
Start: 2021-03-30 | End: 2021-03-30

## 2021-03-30 RX ORDER — PROCHLORPERAZINE EDISYLATE 5 MG/ML
5 INJECTION INTRAMUSCULAR; INTRAVENOUS EVERY 6 HOURS PRN
Status: CANCELLED | OUTPATIENT
Start: 2021-03-30

## 2021-03-30 RX ORDER — DEXTROMETHORPHAN/PSEUDOEPHED 2.5-7.5/.8
40 DROPS ORAL 4 TIMES DAILY PRN
Status: CANCELLED | OUTPATIENT
Start: 2021-03-30

## 2021-03-30 RX ORDER — MUPIROCIN 20 MG/G
OINTMENT TOPICAL
Status: CANCELLED | OUTPATIENT
Start: 2021-03-30

## 2021-03-30 RX ORDER — ONDANSETRON 8 MG/1
8 TABLET, ORALLY DISINTEGRATING ORAL EVERY 6 HOURS PRN
Qty: 30 TABLET | Refills: 0 | Status: SHIPPED | OUTPATIENT
Start: 2021-03-30 | End: 2021-12-08

## 2021-03-30 RX ORDER — SODIUM CHLORIDE, SODIUM LACTATE, POTASSIUM CHLORIDE, CALCIUM CHLORIDE 600; 310; 30; 20 MG/100ML; MG/100ML; MG/100ML; MG/100ML
INJECTION, SOLUTION INTRAVENOUS CONTINUOUS
Status: CANCELLED | OUTPATIENT
Start: 2021-03-30

## 2021-03-30 RX ORDER — ONDANSETRON 2 MG/ML
8 INJECTION INTRAMUSCULAR; INTRAVENOUS EVERY 6 HOURS PRN
Status: CANCELLED | OUTPATIENT
Start: 2021-03-30

## 2021-03-30 RX ORDER — HYDROCODONE BITARTRATE AND ACETAMINOPHEN 7.5; 325 MG/15ML; MG/15ML
15 SOLUTION ORAL EVERY 4 HOURS PRN
Status: CANCELLED | OUTPATIENT
Start: 2021-03-31

## 2021-03-30 RX ORDER — LIDOCAINE HYDROCHLORIDE 10 MG/ML
1 INJECTION, SOLUTION EPIDURAL; INFILTRATION; INTRACAUDAL; PERINEURAL ONCE
Status: CANCELLED | OUTPATIENT
Start: 2021-03-30 | End: 2021-03-30

## 2021-03-30 RX ORDER — SODIUM CHLORIDE 9 MG/ML
INJECTION, SOLUTION INTRAVENOUS CONTINUOUS
Status: CANCELLED | OUTPATIENT
Start: 2021-03-30

## 2021-03-30 RX ORDER — HYDROCODONE BITARTRATE AND ACETAMINOPHEN 7.5; 325 MG/15ML; MG/15ML
15 SOLUTION ORAL 4 TIMES DAILY PRN
Qty: 200 ML | Refills: 0 | Status: SHIPPED | OUTPATIENT
Start: 2021-03-30 | End: 2021-12-08

## 2021-03-30 RX ORDER — ENOXAPARIN SODIUM 100 MG/ML
40 INJECTION SUBCUTANEOUS NIGHTLY
Status: CANCELLED | OUTPATIENT
Start: 2021-03-30

## 2021-03-30 RX ORDER — POLYETHYLENE GLYCOL 3350 17 G/17G
17 POWDER, FOR SOLUTION ORAL DAILY
Qty: 238 G | Refills: 3 | Status: SHIPPED | OUTPATIENT
Start: 2021-03-30 | End: 2021-12-08

## 2021-03-31 ENCOUNTER — TELEPHONE (OUTPATIENT)
Dept: BARIATRICS | Facility: CLINIC | Age: 71
End: 2021-03-31

## 2021-04-04 ENCOUNTER — LAB VISIT (OUTPATIENT)
Dept: URGENT CARE | Facility: CLINIC | Age: 71
DRG: 620 | End: 2021-04-04
Payer: MEDICARE

## 2021-04-04 DIAGNOSIS — Z01.818 PREOP TESTING: ICD-10-CM

## 2021-04-04 PROCEDURE — U0005 INFEC AGEN DETEC AMPLI PROBE: HCPCS | Performed by: SURGERY

## 2021-04-04 PROCEDURE — 99211 OFF/OP EST MAY X REQ PHY/QHP: CPT | Mod: S$GLB,,, | Performed by: PHYSICIAN ASSISTANT

## 2021-04-04 PROCEDURE — U0003 INFECTIOUS AGENT DETECTION BY NUCLEIC ACID (DNA OR RNA); SEVERE ACUTE RESPIRATORY SYNDROME CORONAVIRUS 2 (SARS-COV-2) (CORONAVIRUS DISEASE [COVID-19]), AMPLIFIED PROBE TECHNIQUE, MAKING USE OF HIGH THROUGHPUT TECHNOLOGIES AS DESCRIBED BY CMS-2020-01-R: HCPCS | Performed by: SURGERY

## 2021-04-04 PROCEDURE — 99211 PR OFFICE/OUTPT VISIT, EST, LEVL I: ICD-10-PCS | Mod: S$GLB,,, | Performed by: PHYSICIAN ASSISTANT

## 2021-04-05 LAB — SARS-COV-2 RNA RESP QL NAA+PROBE: NOT DETECTED

## 2021-04-05 NOTE — TELEPHONE ENCOUNTER
HPI     Chief Complaint   Patient presents with   Lia Vaughn Danielle     She is a 28 y.o. female who presents for miscarriage. . She had positive at home pregnancy test last week. Was unsure how far along she was. Paged on call number Friday for vaginal bleeding. Was sent to ER where she was found to have no IUP on transvaginal US and diagnosed with miscarriage. Blood type B+. States bleeding is getting lighter. Changing pad every few hours. States this was a desired pregnancy. Review of Systems   Constitutional: Negative for chills and fever. Gastrointestinal: Negative for abdominal pain. Genitourinary: Positive for vaginal bleeding. Reviewed PmHx, RxHx, FmHx, SocHx, AllgHx and updated and dated in the chart. Physical Exam:  Visit Vitals  /82   Pulse 79   Temp 97.5 °F (36.4 °C) (Temporal)   Resp 18   Ht 5' 2\" (1.575 m)   Wt 113 lb 6.4 oz (51.4 kg)   SpO2 98%   BMI 20.74 kg/m²     Physical Exam  Vitals signs and nursing note reviewed. Constitutional:       General: She is not in acute distress. Appearance: Normal appearance. She is not ill-appearing. Cardiovascular:      Rate and Rhythm: Normal rate and regular rhythm. Heart sounds: No murmur. Pulmonary:      Effort: Pulmonary effort is normal. No respiratory distress. Breath sounds: Normal breath sounds. Abdominal:      General: There is no distension. Palpations: Abdomen is soft. Tenderness: There is no abdominal tenderness. There is no guarding. Neurological:      Mental Status: She is alert. No results found for this or any previous visit (from the past 12 hour(s)). Assessment / Plan     Diagnoses and all orders for this visit:    1. Miscarriage  -     REFERRAL TO OBSTETRICS AND GYNECOLOGY  -     BETA HCG, QT; Future    2. Patient desires pregnancy  -     prenatal multivit-ca-min-fe-fa (Prenatal Vitamin) tab; Take 1 Each by mouth daily. Refer to OB/ GYN for follow up.  Following HCG Patient on Shanda Peyton's schedule for 8/11- conflict in provider schedule.  She will not be available for in person visit; however, can do virtual visit.  Called patient.  She said that she was at another appt, but did say that she would prefer in person visit in September and will call back to reschedule   down.  Recommend PNV if she plans to conceive in future. Given ER precautions. Advised there are support groups and counselors for patients who have have miscarriages if she is interested. I have discussed the diagnosis with the patient and the intended plan as seen in the above orders. The patient has received an after-visit summary and questions were answered concerning future plans. I have discussed medication side effects and warnings with the patient as well.     Chichi Castillo, DO

## 2021-04-06 ENCOUNTER — TELEPHONE (OUTPATIENT)
Dept: BARIATRICS | Facility: CLINIC | Age: 71
End: 2021-04-06

## 2021-04-07 ENCOUNTER — HOSPITAL ENCOUNTER (INPATIENT)
Facility: HOSPITAL | Age: 71
LOS: 2 days | Discharge: HOME | DRG: 620 | End: 2021-04-09
Attending: SURGERY | Admitting: SURGERY
Payer: MEDICARE

## 2021-04-07 ENCOUNTER — ANESTHESIA EVENT (OUTPATIENT)
Dept: SURGERY | Facility: HOSPITAL | Age: 71
DRG: 620 | End: 2021-04-07
Payer: MEDICARE

## 2021-04-07 ENCOUNTER — ANESTHESIA (OUTPATIENT)
Dept: SURGERY | Facility: HOSPITAL | Age: 71
DRG: 620 | End: 2021-04-07
Payer: MEDICARE

## 2021-04-07 DIAGNOSIS — E66.9 OBESITY, CLASS II, BMI 35-39.9: Primary | ICD-10-CM

## 2021-04-07 DIAGNOSIS — K44.9 HERNIA, HIATAL: ICD-10-CM

## 2021-04-07 DIAGNOSIS — Z98.84 LAP-BAND SURGERY STATUS: ICD-10-CM

## 2021-04-07 LAB — POCT GLUCOSE: 81 MG/DL (ref 70–110)

## 2021-04-07 PROCEDURE — D9220A PRA ANESTHESIA: ICD-10-PCS | Mod: CRNA,,, | Performed by: NURSE ANESTHETIST, CERTIFIED REGISTERED

## 2021-04-07 PROCEDURE — D9220A PRA ANESTHESIA: Mod: CRNA,,, | Performed by: NURSE ANESTHETIST, CERTIFIED REGISTERED

## 2021-04-07 PROCEDURE — 64461 PVB THORACIC SINGLE INJ SITE: CPT | Performed by: STUDENT IN AN ORGANIZED HEALTH CARE EDUCATION/TRAINING PROGRAM

## 2021-04-07 PROCEDURE — 71000033 HC RECOVERY, INTIAL HOUR: Performed by: SURGERY

## 2021-04-07 PROCEDURE — 64461 BILATERAL ERECTOR SPINAE PLANE SINGLE INJECTION BLOCK: ICD-10-PCS | Mod: 59,50,, | Performed by: ANESTHESIOLOGY

## 2021-04-07 PROCEDURE — 71000015 HC POSTOP RECOV 1ST HR: Performed by: SURGERY

## 2021-04-07 PROCEDURE — 43775 PR LAP, GAST RESTRICT PROC, LONGITUDINAL GASTRECTOMY: ICD-10-PCS | Mod: ,,, | Performed by: SURGERY

## 2021-04-07 PROCEDURE — 63600175 PHARM REV CODE 636 W HCPCS: Performed by: STUDENT IN AN ORGANIZED HEALTH CARE EDUCATION/TRAINING PROGRAM

## 2021-04-07 PROCEDURE — 88307 PR  SURG PATH,LEVEL V: ICD-10-PCS | Mod: 26,,, | Performed by: PATHOLOGY

## 2021-04-07 PROCEDURE — 37000008 HC ANESTHESIA 1ST 15 MINUTES: Performed by: SURGERY

## 2021-04-07 PROCEDURE — 71000016 HC POSTOP RECOV ADDL HR: Performed by: SURGERY

## 2021-04-07 PROCEDURE — 27200750 HC INSULATED NEEDLE/ STIMUPLEX: Performed by: STUDENT IN AN ORGANIZED HEALTH CARE EDUCATION/TRAINING PROGRAM

## 2021-04-07 PROCEDURE — 27201423 OPTIME MED/SURG SUP & DEVICES STERILE SUPPLY: Performed by: SURGERY

## 2021-04-07 PROCEDURE — C9113 INJ PANTOPRAZOLE SODIUM, VIA: HCPCS | Performed by: STUDENT IN AN ORGANIZED HEALTH CARE EDUCATION/TRAINING PROGRAM

## 2021-04-07 PROCEDURE — 63600175 PHARM REV CODE 636 W HCPCS: Performed by: ANESTHESIOLOGY

## 2021-04-07 PROCEDURE — 88307 TISSUE EXAM BY PATHOLOGIST: CPT | Mod: 26,,, | Performed by: PATHOLOGY

## 2021-04-07 PROCEDURE — 37000009 HC ANESTHESIA EA ADD 15 MINS: Performed by: SURGERY

## 2021-04-07 PROCEDURE — 63600175 PHARM REV CODE 636 W HCPCS: Performed by: NURSE ANESTHETIST, CERTIFIED REGISTERED

## 2021-04-07 PROCEDURE — 99900035 HC TECH TIME PER 15 MIN (STAT)

## 2021-04-07 PROCEDURE — 25000003 PHARM REV CODE 250: Performed by: STUDENT IN AN ORGANIZED HEALTH CARE EDUCATION/TRAINING PROGRAM

## 2021-04-07 PROCEDURE — D9220A PRA ANESTHESIA: Mod: ANES,,, | Performed by: ANESTHESIOLOGY

## 2021-04-07 PROCEDURE — 25000003 PHARM REV CODE 250: Performed by: NURSE ANESTHETIST, CERTIFIED REGISTERED

## 2021-04-07 PROCEDURE — 43774 LAP RMVL GASTR ADJ ALL PARTS: CPT | Mod: 51,,, | Performed by: SURGERY

## 2021-04-07 PROCEDURE — 27100025 HC TUBING, SET FLUID WARMER: Performed by: ANESTHESIOLOGY

## 2021-04-07 PROCEDURE — 64461 PVB THORACIC SINGLE INJ SITE: CPT | Mod: 59,50,, | Performed by: ANESTHESIOLOGY

## 2021-04-07 PROCEDURE — 88342 CHG IMMUNOCYTOCHEMISTRY: ICD-10-PCS | Mod: 26,,, | Performed by: PATHOLOGY

## 2021-04-07 PROCEDURE — 36000711: Performed by: SURGERY

## 2021-04-07 PROCEDURE — C1751 CATH, INF, PER/CENT/MIDLINE: HCPCS | Performed by: ANESTHESIOLOGY

## 2021-04-07 PROCEDURE — 88300 PR  SURG PATH,GROSS,LEVEL I: ICD-10-PCS | Mod: 26,59,, | Performed by: PATHOLOGY

## 2021-04-07 PROCEDURE — 63600175 PHARM REV CODE 636 W HCPCS: Performed by: SURGERY

## 2021-04-07 PROCEDURE — 82962 GLUCOSE BLOOD TEST: CPT | Performed by: SURGERY

## 2021-04-07 PROCEDURE — 88342 IMHCHEM/IMCYTCHM 1ST ANTB: CPT | Mod: 26,,, | Performed by: PATHOLOGY

## 2021-04-07 PROCEDURE — D9220A PRA ANESTHESIA: ICD-10-PCS | Mod: ANES,,, | Performed by: ANESTHESIOLOGY

## 2021-04-07 PROCEDURE — 43774 PR LAP, REMOVE ADJUST GAST RESTRICT DEVICE/PORT: ICD-10-PCS | Mod: 51,,, | Performed by: SURGERY

## 2021-04-07 PROCEDURE — 43775 LAP SLEEVE GASTRECTOMY: CPT | Mod: ,,, | Performed by: SURGERY

## 2021-04-07 PROCEDURE — 88307 TISSUE EXAM BY PATHOLOGIST: CPT | Performed by: PATHOLOGY

## 2021-04-07 PROCEDURE — 88342 IMHCHEM/IMCYTCHM 1ST ANTB: CPT | Performed by: PATHOLOGY

## 2021-04-07 PROCEDURE — 25000003 PHARM REV CODE 250: Performed by: SURGERY

## 2021-04-07 PROCEDURE — 36000710: Performed by: SURGERY

## 2021-04-07 PROCEDURE — 11000001 HC ACUTE MED/SURG PRIVATE ROOM

## 2021-04-07 PROCEDURE — 88300 SURGICAL PATH GROSS: CPT | Mod: 26,59,, | Performed by: PATHOLOGY

## 2021-04-07 PROCEDURE — 94761 N-INVAS EAR/PLS OXIMETRY MLT: CPT

## 2021-04-07 PROCEDURE — 88300 SURGICAL PATH GROSS: CPT | Performed by: PATHOLOGY

## 2021-04-07 RX ORDER — MIDAZOLAM HYDROCHLORIDE 1 MG/ML
0.5 INJECTION INTRAMUSCULAR; INTRAVENOUS
Status: DISCONTINUED | OUTPATIENT
Start: 2021-04-07 | End: 2021-04-07

## 2021-04-07 RX ORDER — PROPOFOL 10 MG/ML
VIAL (ML) INTRAVENOUS
Status: DISCONTINUED | OUTPATIENT
Start: 2021-04-07 | End: 2021-04-07

## 2021-04-07 RX ORDER — NEOSTIGMINE METHYLSULFATE 0.5 MG/ML
INJECTION, SOLUTION INTRAVENOUS
Status: DISCONTINUED | OUTPATIENT
Start: 2021-04-07 | End: 2021-04-07

## 2021-04-07 RX ORDER — ROCURONIUM BROMIDE 10 MG/ML
INJECTION, SOLUTION INTRAVENOUS
Status: DISCONTINUED | OUTPATIENT
Start: 2021-04-07 | End: 2021-04-07

## 2021-04-07 RX ORDER — BUPIVACAINE HYDROCHLORIDE 5 MG/ML
INJECTION, SOLUTION EPIDURAL; INTRACAUDAL
Status: DISCONTINUED | OUTPATIENT
Start: 2021-04-07 | End: 2021-04-07 | Stop reason: HOSPADM

## 2021-04-07 RX ORDER — OXYCODONE HYDROCHLORIDE 5 MG/1
5 TABLET ORAL
Status: DISCONTINUED | OUTPATIENT
Start: 2021-04-07 | End: 2021-04-07

## 2021-04-07 RX ORDER — DEXTROMETHORPHAN/PSEUDOEPHED 2.5-7.5/.8
40 DROPS ORAL 4 TIMES DAILY PRN
Status: DISCONTINUED | OUTPATIENT
Start: 2021-04-07 | End: 2021-04-09 | Stop reason: HOSPADM

## 2021-04-07 RX ORDER — PANTOPRAZOLE SODIUM 40 MG/10ML
40 INJECTION, POWDER, LYOPHILIZED, FOR SOLUTION INTRAVENOUS EVERY 24 HOURS
Status: DISCONTINUED | OUTPATIENT
Start: 2021-04-07 | End: 2021-04-09 | Stop reason: HOSPADM

## 2021-04-07 RX ORDER — PROCHLORPERAZINE EDISYLATE 5 MG/ML
5 INJECTION INTRAMUSCULAR; INTRAVENOUS EVERY 30 MIN PRN
Status: DISCONTINUED | OUTPATIENT
Start: 2021-04-07 | End: 2021-04-07

## 2021-04-07 RX ORDER — LIDOCAINE HYDROCHLORIDE 20 MG/ML
INJECTION, SOLUTION EPIDURAL; INFILTRATION; INTRACAUDAL; PERINEURAL
Status: DISCONTINUED | OUTPATIENT
Start: 2021-04-07 | End: 2021-04-07

## 2021-04-07 RX ORDER — SODIUM CHLORIDE, SODIUM LACTATE, POTASSIUM CHLORIDE, CALCIUM CHLORIDE 600; 310; 30; 20 MG/100ML; MG/100ML; MG/100ML; MG/100ML
INJECTION, SOLUTION INTRAVENOUS CONTINUOUS
Status: DISCONTINUED | OUTPATIENT
Start: 2021-04-07 | End: 2021-04-09 | Stop reason: HOSPADM

## 2021-04-07 RX ORDER — ACETAMINOPHEN 10 MG/ML
INJECTION, SOLUTION INTRAVENOUS
Status: DISCONTINUED | OUTPATIENT
Start: 2021-04-07 | End: 2021-04-07

## 2021-04-07 RX ORDER — SODIUM CITRATE AND CITRIC ACID MONOHYDRATE 334; 500 MG/5ML; MG/5ML
30 SOLUTION ORAL ONCE
Status: COMPLETED | OUTPATIENT
Start: 2021-04-07 | End: 2021-04-07

## 2021-04-07 RX ORDER — MUPIROCIN 20 MG/G
OINTMENT TOPICAL
Status: DISCONTINUED | OUTPATIENT
Start: 2021-04-07 | End: 2021-04-07

## 2021-04-07 RX ORDER — HEPARIN SODIUM 5000 [USP'U]/ML
5000 INJECTION, SOLUTION INTRAVENOUS; SUBCUTANEOUS ONCE
Status: COMPLETED | OUTPATIENT
Start: 2021-04-07 | End: 2021-04-07

## 2021-04-07 RX ORDER — PANTOPRAZOLE SODIUM 40 MG/1
40 FOR SUSPENSION ORAL DAILY
Status: DISCONTINUED | OUTPATIENT
Start: 2021-04-07 | End: 2021-04-07

## 2021-04-07 RX ORDER — HYDROMORPHONE HYDROCHLORIDE 1 MG/ML
0.2 INJECTION, SOLUTION INTRAMUSCULAR; INTRAVENOUS; SUBCUTANEOUS EVERY 5 MIN PRN
Status: DISCONTINUED | OUTPATIENT
Start: 2021-04-07 | End: 2021-04-07

## 2021-04-07 RX ORDER — ONDANSETRON 2 MG/ML
8 INJECTION INTRAMUSCULAR; INTRAVENOUS EVERY 6 HOURS PRN
Status: DISCONTINUED | OUTPATIENT
Start: 2021-04-07 | End: 2021-04-09 | Stop reason: HOSPADM

## 2021-04-07 RX ORDER — EPHEDRINE SULFATE 50 MG/ML
INJECTION, SOLUTION INTRAVENOUS
Status: DISCONTINUED | OUTPATIENT
Start: 2021-04-07 | End: 2021-04-07

## 2021-04-07 RX ORDER — FENTANYL CITRATE 50 UG/ML
INJECTION, SOLUTION INTRAMUSCULAR; INTRAVENOUS
Status: DISCONTINUED | OUTPATIENT
Start: 2021-04-07 | End: 2021-04-07

## 2021-04-07 RX ORDER — FAMOTIDINE 10 MG/ML
20 INJECTION INTRAVENOUS ONCE
Status: COMPLETED | OUTPATIENT
Start: 2021-04-07 | End: 2021-04-07

## 2021-04-07 RX ORDER — CEFAZOLIN SODIUM 1 G/3ML
INJECTION, POWDER, FOR SOLUTION INTRAMUSCULAR; INTRAVENOUS
Status: DISCONTINUED | OUTPATIENT
Start: 2021-04-07 | End: 2021-04-07

## 2021-04-07 RX ORDER — HYDROCODONE BITARTRATE AND ACETAMINOPHEN 7.5; 325 MG/15ML; MG/15ML
15 SOLUTION ORAL EVERY 4 HOURS PRN
Status: DISCONTINUED | OUTPATIENT
Start: 2021-04-07 | End: 2021-04-09 | Stop reason: HOSPADM

## 2021-04-07 RX ORDER — ONDANSETRON 2 MG/ML
INJECTION INTRAMUSCULAR; INTRAVENOUS
Status: DISCONTINUED | OUTPATIENT
Start: 2021-04-07 | End: 2021-04-07

## 2021-04-07 RX ORDER — FENTANYL CITRATE 50 UG/ML
25 INJECTION, SOLUTION INTRAMUSCULAR; INTRAVENOUS EVERY 5 MIN PRN
Status: DISCONTINUED | OUTPATIENT
Start: 2021-04-07 | End: 2021-04-07

## 2021-04-07 RX ORDER — DEXMEDETOMIDINE HYDROCHLORIDE 100 UG/ML
INJECTION, SOLUTION INTRAVENOUS
Status: DISCONTINUED | OUTPATIENT
Start: 2021-04-07 | End: 2021-04-07

## 2021-04-07 RX ORDER — SODIUM CHLORIDE 0.9 % (FLUSH) 0.9 %
3 SYRINGE (ML) INJECTION
Status: CANCELLED | OUTPATIENT
Start: 2021-04-07

## 2021-04-07 RX ORDER — ACETAMINOPHEN 650 MG/20.3ML
500 LIQUID ORAL EVERY 8 HOURS
Status: DISPENSED | OUTPATIENT
Start: 2021-04-07 | End: 2021-04-08

## 2021-04-07 RX ORDER — METOCLOPRAMIDE HYDROCHLORIDE 5 MG/ML
10 INJECTION INTRAMUSCULAR; INTRAVENOUS ONCE
Status: COMPLETED | OUTPATIENT
Start: 2021-04-07 | End: 2021-04-07

## 2021-04-07 RX ORDER — FENTANYL CITRATE 50 UG/ML
25 INJECTION, SOLUTION INTRAMUSCULAR; INTRAVENOUS EVERY 5 MIN PRN
Status: COMPLETED | OUTPATIENT
Start: 2021-04-07 | End: 2021-04-07

## 2021-04-07 RX ORDER — MIDAZOLAM HYDROCHLORIDE 1 MG/ML
INJECTION, SOLUTION INTRAMUSCULAR; INTRAVENOUS
Status: DISCONTINUED | OUTPATIENT
Start: 2021-04-07 | End: 2021-04-07

## 2021-04-07 RX ORDER — SODIUM CHLORIDE 9 MG/ML
INJECTION, SOLUTION INTRAVENOUS CONTINUOUS
Status: DISCONTINUED | OUTPATIENT
Start: 2021-04-07 | End: 2021-04-07

## 2021-04-07 RX ORDER — DEXAMETHASONE SODIUM PHOSPHATE 4 MG/ML
INJECTION, SOLUTION INTRA-ARTICULAR; INTRALESIONAL; INTRAMUSCULAR; INTRAVENOUS; SOFT TISSUE
Status: DISCONTINUED | OUTPATIENT
Start: 2021-04-07 | End: 2021-04-07

## 2021-04-07 RX ORDER — ENOXAPARIN SODIUM 100 MG/ML
40 INJECTION SUBCUTANEOUS NIGHTLY
Status: DISCONTINUED | OUTPATIENT
Start: 2021-04-07 | End: 2021-04-09 | Stop reason: HOSPADM

## 2021-04-07 RX ORDER — PROCHLORPERAZINE EDISYLATE 5 MG/ML
5 INJECTION INTRAMUSCULAR; INTRAVENOUS EVERY 6 HOURS PRN
Status: DISCONTINUED | OUTPATIENT
Start: 2021-04-07 | End: 2021-04-09 | Stop reason: HOSPADM

## 2021-04-07 RX ORDER — ONDANSETRON 2 MG/ML
4 INJECTION INTRAMUSCULAR; INTRAVENOUS DAILY PRN
Status: DISCONTINUED | OUTPATIENT
Start: 2021-04-07 | End: 2021-04-07 | Stop reason: HOSPADM

## 2021-04-07 RX ORDER — LIDOCAINE HYDROCHLORIDE 10 MG/ML
1 INJECTION, SOLUTION EPIDURAL; INFILTRATION; INTRACAUDAL; PERINEURAL ONCE
Status: COMPLETED | OUTPATIENT
Start: 2021-04-07 | End: 2021-04-07

## 2021-04-07 RX ORDER — HYDROMORPHONE HYDROCHLORIDE 1 MG/ML
0.5 INJECTION, SOLUTION INTRAMUSCULAR; INTRAVENOUS; SUBCUTANEOUS
Status: DISCONTINUED | OUTPATIENT
Start: 2021-04-07 | End: 2021-04-09 | Stop reason: HOSPADM

## 2021-04-07 RX ADMIN — GLYCOPYRROLATE 0.6 MCG: 0.2 INJECTION, SOLUTION INTRAMUSCULAR; INTRAVITREAL at 05:04

## 2021-04-07 RX ADMIN — SODIUM CHLORIDE: 0.9 INJECTION, SOLUTION INTRAVENOUS at 01:04

## 2021-04-07 RX ADMIN — SODIUM CITRATE AND CITRIC ACID MONOHYDRATE 30 ML: 500; 334 SOLUTION ORAL at 01:04

## 2021-04-07 RX ADMIN — NEOSTIGMINE METHYLSULFATE 5 MG: 0.5 INJECTION INTRAVENOUS at 05:04

## 2021-04-07 RX ADMIN — EPHEDRINE SULFATE 5 MG: 50 INJECTION INTRAVENOUS at 04:04

## 2021-04-07 RX ADMIN — ROCURONIUM BROMIDE 50 MG: 10 INJECTION, SOLUTION INTRAVENOUS at 03:04

## 2021-04-07 RX ADMIN — LIDOCAINE HYDROCHLORIDE 60 MG: 20 INJECTION, SOLUTION EPIDURAL; INFILTRATION; INTRACAUDAL at 03:04

## 2021-04-07 RX ADMIN — HYDROCODONE BITARTRATE AND ACETAMINOPHEN 15 ML: 7.5; 325 SOLUTION ORAL at 05:04

## 2021-04-07 RX ADMIN — FENTANYL CITRATE 50 MCG: 50 INJECTION, SOLUTION INTRAMUSCULAR; INTRAVENOUS at 01:04

## 2021-04-07 RX ADMIN — ACETAMINOPHEN 1000 MG: 10 INJECTION, SOLUTION INTRAVENOUS at 03:04

## 2021-04-07 RX ADMIN — HEPARIN SODIUM 5000 UNITS: 5000 INJECTION, SOLUTION INTRAVENOUS; SUBCUTANEOUS at 01:04

## 2021-04-07 RX ADMIN — HYDROMORPHONE HYDROCHLORIDE 0.2 MG: 1 INJECTION, SOLUTION INTRAMUSCULAR; INTRAVENOUS; SUBCUTANEOUS at 05:04

## 2021-04-07 RX ADMIN — ONDANSETRON 4 MG: 2 INJECTION INTRAMUSCULAR; INTRAVENOUS at 03:04

## 2021-04-07 RX ADMIN — CEFAZOLIN 2 G: 330 INJECTION, POWDER, FOR SOLUTION INTRAMUSCULAR; INTRAVENOUS at 03:04

## 2021-04-07 RX ADMIN — FENTANYL CITRATE 25 MCG: 50 INJECTION, SOLUTION INTRAMUSCULAR; INTRAVENOUS at 06:04

## 2021-04-07 RX ADMIN — ONDANSETRON 4 MG: 2 INJECTION INTRAMUSCULAR; INTRAVENOUS at 06:04

## 2021-04-07 RX ADMIN — FENTANYL CITRATE 100 MCG: 50 INJECTION INTRAMUSCULAR; INTRAVENOUS at 03:04

## 2021-04-07 RX ADMIN — MIDAZOLAM 1 MG: 1 INJECTION INTRAMUSCULAR; INTRAVENOUS at 01:04

## 2021-04-07 RX ADMIN — LIDOCAINE HYDROCHLORIDE 0.03 MG: 10 INJECTION, SOLUTION EPIDURAL; INFILTRATION; INTRACAUDAL at 01:04

## 2021-04-07 RX ADMIN — ROCURONIUM BROMIDE 15 MG: 10 INJECTION, SOLUTION INTRAVENOUS at 03:04

## 2021-04-07 RX ADMIN — ENOXAPARIN SODIUM 40 MG: 40 INJECTION SUBCUTANEOUS at 09:04

## 2021-04-07 RX ADMIN — HYDROCODONE BITARTRATE AND ACETAMINOPHEN 15 ML: 7.5; 325 SOLUTION ORAL at 09:04

## 2021-04-07 RX ADMIN — EPHEDRINE SULFATE 5 MG: 50 INJECTION INTRAVENOUS at 03:04

## 2021-04-07 RX ADMIN — FAMOTIDINE 20 MG: 10 INJECTION INTRAVENOUS at 01:04

## 2021-04-07 RX ADMIN — DEXMEDETOMIDINE HYDROCHLORIDE 16 MCG: 100 INJECTION, SOLUTION INTRAVENOUS at 04:04

## 2021-04-07 RX ADMIN — SODIUM CHLORIDE, SODIUM LACTATE, POTASSIUM CHLORIDE, AND CALCIUM CHLORIDE: .6; .31; .03; .02 INJECTION, SOLUTION INTRAVENOUS at 05:04

## 2021-04-07 RX ADMIN — PANTOPRAZOLE SODIUM 40 MG: 40 INJECTION, POWDER, FOR SOLUTION INTRAVENOUS at 10:04

## 2021-04-07 RX ADMIN — DEXAMETHASONE SODIUM PHOSPHATE 4 MG: 4 INJECTION INTRA-ARTICULAR; INTRALESIONAL; INTRAMUSCULAR; INTRAVENOUS; SOFT TISSUE at 03:04

## 2021-04-07 RX ADMIN — MIDAZOLAM 1 MG: 1 INJECTION INTRAMUSCULAR; INTRAVENOUS at 03:04

## 2021-04-07 RX ADMIN — METOCLOPRAMIDE 10 MG: 5 INJECTION, SOLUTION INTRAMUSCULAR; INTRAVENOUS at 01:04

## 2021-04-07 RX ADMIN — PROPOFOL 180 MG: 10 INJECTION, EMULSION INTRAVENOUS at 03:04

## 2021-04-08 LAB
ANION GAP SERPL CALC-SCNC: 8 MMOL/L (ref 8–16)
BASOPHILS # BLD AUTO: 0.02 K/UL (ref 0–0.2)
BASOPHILS NFR BLD: 0.3 % (ref 0–1.9)
BUN SERPL-MCNC: 17 MG/DL (ref 8–23)
CALCIUM SERPL-MCNC: 8.3 MG/DL (ref 8.7–10.5)
CHLORIDE SERPL-SCNC: 107 MMOL/L (ref 95–110)
CO2 SERPL-SCNC: 26 MMOL/L (ref 23–29)
CREAT SERPL-MCNC: 0.7 MG/DL (ref 0.5–1.4)
DIFFERENTIAL METHOD: ABNORMAL
EOSINOPHIL # BLD AUTO: 0 K/UL (ref 0–0.5)
EOSINOPHIL NFR BLD: 0 % (ref 0–8)
ERYTHROCYTE [DISTWIDTH] IN BLOOD BY AUTOMATED COUNT: 11.8 % (ref 11.5–14.5)
EST. GFR  (AFRICAN AMERICAN): >60 ML/MIN/1.73 M^2
EST. GFR  (NON AFRICAN AMERICAN): >60 ML/MIN/1.73 M^2
GLUCOSE SERPL-MCNC: 139 MG/DL (ref 70–110)
HCT VFR BLD AUTO: 35.1 % (ref 37–48.5)
HGB BLD-MCNC: 11.6 G/DL (ref 12–16)
IMM GRANULOCYTES # BLD AUTO: 0.03 K/UL (ref 0–0.04)
IMM GRANULOCYTES NFR BLD AUTO: 0.4 % (ref 0–0.5)
LYMPHOCYTES # BLD AUTO: 0.6 K/UL (ref 1–4.8)
LYMPHOCYTES NFR BLD: 8 % (ref 18–48)
MAGNESIUM SERPL-MCNC: 2 MG/DL (ref 1.6–2.6)
MCH RBC QN AUTO: 31.3 PG (ref 27–31)
MCHC RBC AUTO-ENTMCNC: 33 G/DL (ref 32–36)
MCV RBC AUTO: 95 FL (ref 82–98)
MONOCYTES # BLD AUTO: 0.4 K/UL (ref 0.3–1)
MONOCYTES NFR BLD: 5.7 % (ref 4–15)
NEUTROPHILS # BLD AUTO: 6 K/UL (ref 1.8–7.7)
NEUTROPHILS NFR BLD: 85.6 % (ref 38–73)
NRBC BLD-RTO: 0 /100 WBC
PHOSPHATE SERPL-MCNC: 2.7 MG/DL (ref 2.7–4.5)
PLATELET # BLD AUTO: 234 K/UL (ref 150–450)
PMV BLD AUTO: 10.6 FL (ref 9.2–12.9)
POTASSIUM SERPL-SCNC: 4.3 MMOL/L (ref 3.5–5.1)
RBC # BLD AUTO: 3.71 M/UL (ref 4–5.4)
SODIUM SERPL-SCNC: 141 MMOL/L (ref 136–145)
WBC # BLD AUTO: 6.97 K/UL (ref 3.9–12.7)

## 2021-04-08 PROCEDURE — 83735 ASSAY OF MAGNESIUM: CPT | Performed by: SURGERY

## 2021-04-08 PROCEDURE — 85025 COMPLETE CBC W/AUTO DIFF WBC: CPT | Performed by: SURGERY

## 2021-04-08 PROCEDURE — 25000003 PHARM REV CODE 250: Performed by: STUDENT IN AN ORGANIZED HEALTH CARE EDUCATION/TRAINING PROGRAM

## 2021-04-08 PROCEDURE — 63600175 PHARM REV CODE 636 W HCPCS: Performed by: SURGERY

## 2021-04-08 PROCEDURE — 36415 COLL VENOUS BLD VENIPUNCTURE: CPT | Performed by: SURGERY

## 2021-04-08 PROCEDURE — 84100 ASSAY OF PHOSPHORUS: CPT | Performed by: SURGERY

## 2021-04-08 PROCEDURE — 80048 BASIC METABOLIC PNL TOTAL CA: CPT | Performed by: SURGERY

## 2021-04-08 PROCEDURE — 63600175 PHARM REV CODE 636 W HCPCS: Performed by: STUDENT IN AN ORGANIZED HEALTH CARE EDUCATION/TRAINING PROGRAM

## 2021-04-08 PROCEDURE — 25000003 PHARM REV CODE 250: Performed by: SURGERY

## 2021-04-08 PROCEDURE — 11000001 HC ACUTE MED/SURG PRIVATE ROOM

## 2021-04-08 RX ORDER — TALC
6 POWDER (GRAM) TOPICAL NIGHTLY PRN
Status: DISCONTINUED | OUTPATIENT
Start: 2021-04-08 | End: 2021-04-09 | Stop reason: HOSPADM

## 2021-04-08 RX ORDER — CITALOPRAM 10 MG/1
20 TABLET ORAL DAILY
Status: DISCONTINUED | OUTPATIENT
Start: 2021-04-08 | End: 2021-04-09 | Stop reason: HOSPADM

## 2021-04-08 RX ORDER — FAMOTIDINE 40 MG/5ML
20 POWDER, FOR SUSPENSION ORAL 2 TIMES DAILY
Status: DISCONTINUED | OUTPATIENT
Start: 2021-04-08 | End: 2021-04-09 | Stop reason: HOSPADM

## 2021-04-08 RX ORDER — LEVOTHYROXINE SODIUM 50 UG/1
50 TABLET ORAL
Status: DISCONTINUED | OUTPATIENT
Start: 2021-04-09 | End: 2021-04-09 | Stop reason: HOSPADM

## 2021-04-08 RX ADMIN — HYDROCODONE BITARTRATE AND ACETAMINOPHEN 15 ML: 7.5; 325 SOLUTION ORAL at 07:04

## 2021-04-08 RX ADMIN — ACETAMINOPHEN 499.51 MG: 160 SOLUTION ORAL at 02:04

## 2021-04-08 RX ADMIN — ACETAMINOPHEN 499.51 MG: 160 SOLUTION ORAL at 12:04

## 2021-04-08 RX ADMIN — ENOXAPARIN SODIUM 40 MG: 40 INJECTION SUBCUTANEOUS at 09:04

## 2021-04-08 RX ADMIN — HYDROCODONE BITARTRATE AND ACETAMINOPHEN 15 ML: 7.5; 325 SOLUTION ORAL at 09:04

## 2021-04-08 RX ADMIN — HYDROCODONE BITARTRATE AND ACETAMINOPHEN 15 ML: 7.5; 325 SOLUTION ORAL at 02:04

## 2021-04-08 RX ADMIN — SODIUM CHLORIDE, SODIUM LACTATE, POTASSIUM CHLORIDE, AND CALCIUM CHLORIDE: .6; .31; .03; .02 INJECTION, SOLUTION INTRAVENOUS at 11:04

## 2021-04-08 RX ADMIN — CITALOPRAM HYDROBROMIDE 20 MG: 10 TABLET ORAL at 09:04

## 2021-04-08 RX ADMIN — PROCHLORPERAZINE EDISYLATE 5 MG: 5 INJECTION INTRAMUSCULAR; INTRAVENOUS at 09:04

## 2021-04-08 RX ADMIN — FAMOTIDINE 20 MG: 40 POWDER, FOR SUSPENSION ORAL at 09:04

## 2021-04-08 RX ADMIN — MELATONIN TAB 3 MG 6 MG: 3 TAB at 09:04

## 2021-04-08 RX ADMIN — PROCHLORPERAZINE EDISYLATE 5 MG: 5 INJECTION INTRAMUSCULAR; INTRAVENOUS at 12:04

## 2021-04-08 RX ADMIN — FAMOTIDINE 20 MG: 40 POWDER, FOR SUSPENSION ORAL at 11:04

## 2021-04-09 ENCOUNTER — DOCUMENTATION ONLY (OUTPATIENT)
Dept: SURGERY | Facility: HOSPITAL | Age: 71
End: 2021-04-09

## 2021-04-09 VITALS
WEIGHT: 205 LBS | RESPIRATION RATE: 20 BRPM | DIASTOLIC BLOOD PRESSURE: 64 MMHG | SYSTOLIC BLOOD PRESSURE: 112 MMHG | HEART RATE: 59 BPM | TEMPERATURE: 96 F | OXYGEN SATURATION: 97 % | HEIGHT: 64 IN | BODY MASS INDEX: 35 KG/M2

## 2021-04-09 LAB
ANION GAP SERPL CALC-SCNC: 7 MMOL/L (ref 8–16)
BASOPHILS # BLD AUTO: 0.03 K/UL (ref 0–0.2)
BASOPHILS NFR BLD: 0.6 % (ref 0–1.9)
BUN SERPL-MCNC: 9 MG/DL (ref 8–23)
CALCIUM SERPL-MCNC: 8 MG/DL (ref 8.7–10.5)
CHLORIDE SERPL-SCNC: 106 MMOL/L (ref 95–110)
CO2 SERPL-SCNC: 25 MMOL/L (ref 23–29)
CREAT SERPL-MCNC: 0.8 MG/DL (ref 0.5–1.4)
DIFFERENTIAL METHOD: ABNORMAL
EOSINOPHIL # BLD AUTO: 0.3 K/UL (ref 0–0.5)
EOSINOPHIL NFR BLD: 6 % (ref 0–8)
ERYTHROCYTE [DISTWIDTH] IN BLOOD BY AUTOMATED COUNT: 11.9 % (ref 11.5–14.5)
EST. GFR  (AFRICAN AMERICAN): >60 ML/MIN/1.73 M^2
EST. GFR  (NON AFRICAN AMERICAN): >60 ML/MIN/1.73 M^2
GLUCOSE SERPL-MCNC: 89 MG/DL (ref 70–110)
HCT VFR BLD AUTO: 32.8 % (ref 37–48.5)
HGB BLD-MCNC: 10.6 G/DL (ref 12–16)
IMM GRANULOCYTES # BLD AUTO: 0.01 K/UL (ref 0–0.04)
IMM GRANULOCYTES NFR BLD AUTO: 0.2 % (ref 0–0.5)
LYMPHOCYTES # BLD AUTO: 1.7 K/UL (ref 1–4.8)
LYMPHOCYTES NFR BLD: 33.7 % (ref 18–48)
MAGNESIUM SERPL-MCNC: 1.8 MG/DL (ref 1.6–2.6)
MCH RBC QN AUTO: 31.6 PG (ref 27–31)
MCHC RBC AUTO-ENTMCNC: 32.3 G/DL (ref 32–36)
MCV RBC AUTO: 98 FL (ref 82–98)
MONOCYTES # BLD AUTO: 0.5 K/UL (ref 0.3–1)
MONOCYTES NFR BLD: 10.1 % (ref 4–15)
NEUTROPHILS # BLD AUTO: 2.5 K/UL (ref 1.8–7.7)
NEUTROPHILS NFR BLD: 49.4 % (ref 38–73)
NRBC BLD-RTO: 0 /100 WBC
PHOSPHATE SERPL-MCNC: 2.4 MG/DL (ref 2.7–4.5)
PLATELET # BLD AUTO: 203 K/UL (ref 150–450)
PLATELET BLD QL SMEAR: ABNORMAL
PMV BLD AUTO: 10.4 FL (ref 9.2–12.9)
POTASSIUM SERPL-SCNC: 4.3 MMOL/L (ref 3.5–5.1)
RBC # BLD AUTO: 3.35 M/UL (ref 4–5.4)
SODIUM SERPL-SCNC: 138 MMOL/L (ref 136–145)
WBC # BLD AUTO: 4.96 K/UL (ref 3.9–12.7)

## 2021-04-09 PROCEDURE — 36415 COLL VENOUS BLD VENIPUNCTURE: CPT | Performed by: SURGERY

## 2021-04-09 PROCEDURE — 25000003 PHARM REV CODE 250: Performed by: STUDENT IN AN ORGANIZED HEALTH CARE EDUCATION/TRAINING PROGRAM

## 2021-04-09 PROCEDURE — 84100 ASSAY OF PHOSPHORUS: CPT | Performed by: SURGERY

## 2021-04-09 PROCEDURE — 80048 BASIC METABOLIC PNL TOTAL CA: CPT | Performed by: SURGERY

## 2021-04-09 PROCEDURE — 85025 COMPLETE CBC W/AUTO DIFF WBC: CPT | Performed by: SURGERY

## 2021-04-09 PROCEDURE — C9113 INJ PANTOPRAZOLE SODIUM, VIA: HCPCS | Performed by: STUDENT IN AN ORGANIZED HEALTH CARE EDUCATION/TRAINING PROGRAM

## 2021-04-09 PROCEDURE — 63600175 PHARM REV CODE 636 W HCPCS: Performed by: STUDENT IN AN ORGANIZED HEALTH CARE EDUCATION/TRAINING PROGRAM

## 2021-04-09 PROCEDURE — 83735 ASSAY OF MAGNESIUM: CPT | Performed by: SURGERY

## 2021-04-09 RX ORDER — BISACODYL 10 MG
10 SUPPOSITORY, RECTAL RECTAL DAILY PRN
Status: DISCONTINUED | OUTPATIENT
Start: 2021-04-09 | End: 2021-04-09 | Stop reason: HOSPADM

## 2021-04-09 RX ORDER — SODIUM,POTASSIUM PHOSPHATES 280-250MG
2 POWDER IN PACKET (EA) ORAL ONCE
Status: COMPLETED | OUTPATIENT
Start: 2021-04-09 | End: 2021-04-09

## 2021-04-09 RX ADMIN — CITALOPRAM HYDROBROMIDE 20 MG: 10 TABLET ORAL at 09:04

## 2021-04-09 RX ADMIN — PANTOPRAZOLE SODIUM 40 MG: 40 INJECTION, POWDER, FOR SOLUTION INTRAVENOUS at 09:04

## 2021-04-09 RX ADMIN — HYDROCODONE BITARTRATE AND ACETAMINOPHEN 15 ML: 7.5; 325 SOLUTION ORAL at 12:04

## 2021-04-09 RX ADMIN — LEVOTHYROXINE SODIUM 50 MCG: 50 TABLET ORAL at 06:04

## 2021-04-09 RX ADMIN — HYDROCODONE BITARTRATE AND ACETAMINOPHEN 15 ML: 7.5; 325 SOLUTION ORAL at 06:04

## 2021-04-09 RX ADMIN — POTASSIUM & SODIUM PHOSPHATES POWDER PACK 280-160-250 MG 2 PACKET: 280-160-250 PACK at 09:04

## 2021-04-10 ENCOUNTER — PATIENT MESSAGE (OUTPATIENT)
Dept: ADMINISTRATIVE | Facility: OTHER | Age: 71
End: 2021-04-10

## 2021-04-14 ENCOUNTER — PATIENT MESSAGE (OUTPATIENT)
Dept: ADMINISTRATIVE | Facility: OTHER | Age: 71
End: 2021-04-14

## 2021-04-14 ENCOUNTER — TELEPHONE (OUTPATIENT)
Dept: BARIATRICS | Facility: CLINIC | Age: 71
End: 2021-04-14

## 2021-04-14 LAB
FINAL PATHOLOGIC DIAGNOSIS: NORMAL
GROSS: NORMAL
Lab: NORMAL

## 2021-04-20 ENCOUNTER — CLINICAL SUPPORT (OUTPATIENT)
Dept: BARIATRICS | Facility: CLINIC | Age: 71
End: 2021-04-20
Payer: MEDICARE

## 2021-04-20 ENCOUNTER — LAB VISIT (OUTPATIENT)
Dept: LAB | Facility: HOSPITAL | Age: 71
End: 2021-04-20
Attending: SURGERY
Payer: MEDICARE

## 2021-04-20 ENCOUNTER — OFFICE VISIT (OUTPATIENT)
Dept: BARIATRICS | Facility: CLINIC | Age: 71
End: 2021-04-20
Payer: MEDICARE

## 2021-04-20 VITALS
WEIGHT: 201.25 LBS | HEIGHT: 64 IN | DIASTOLIC BLOOD PRESSURE: 65 MMHG | HEART RATE: 73 BPM | BODY MASS INDEX: 34.36 KG/M2 | SYSTOLIC BLOOD PRESSURE: 114 MMHG | OXYGEN SATURATION: 98 %

## 2021-04-20 DIAGNOSIS — Z09 POSTOP CHECK: Primary | ICD-10-CM

## 2021-04-20 DIAGNOSIS — I51.89 DIASTOLIC DYSFUNCTION: ICD-10-CM

## 2021-04-20 DIAGNOSIS — R10.13 EPIGASTRIC ABDOMINAL PAIN: ICD-10-CM

## 2021-04-20 DIAGNOSIS — K95.09 COMPLICATION OF GASTRIC BAND PROCEDURE: ICD-10-CM

## 2021-04-20 DIAGNOSIS — M62.81 MUSCLE WEAKNESS: ICD-10-CM

## 2021-04-20 DIAGNOSIS — Z98.84 GASTRIC BANDING STATUS: ICD-10-CM

## 2021-04-20 DIAGNOSIS — I87.2 VENOUS INSUFFICIENCY OF BOTH LOWER EXTREMITIES: ICD-10-CM

## 2021-04-20 DIAGNOSIS — Z79.899 OTHER LONG TERM (CURRENT) DRUG THERAPY: ICD-10-CM

## 2021-04-20 DIAGNOSIS — Z79.01 LONG TERM (CURRENT) USE OF ANTICOAGULANTS: ICD-10-CM

## 2021-04-20 DIAGNOSIS — K21.9 GASTROESOPHAGEAL REFLUX DISEASE, UNSPECIFIED WHETHER ESOPHAGITIS PRESENT: ICD-10-CM

## 2021-04-20 DIAGNOSIS — K44.9 HERNIA, HIATAL: ICD-10-CM

## 2021-04-20 DIAGNOSIS — M17.0 OSTEOARTHRITIS OF BOTH KNEES, UNSPECIFIED OSTEOARTHRITIS TYPE: ICD-10-CM

## 2021-04-20 DIAGNOSIS — Z98.84 STATUS POST LAPAROSCOPIC SLEEVE GASTRECTOMY: ICD-10-CM

## 2021-04-20 DIAGNOSIS — M53.3 SI (SACROILIAC) PAIN: ICD-10-CM

## 2021-04-20 LAB
ALBUMIN SERPL BCP-MCNC: 3.8 G/DL (ref 3.5–5.2)
ALP SERPL-CCNC: 62 U/L (ref 55–135)
ALT SERPL W/O P-5'-P-CCNC: 14 U/L (ref 10–44)
ANION GAP SERPL CALC-SCNC: 7 MMOL/L (ref 8–16)
AST SERPL-CCNC: 20 U/L (ref 10–40)
BASOPHILS # BLD AUTO: 0.05 K/UL (ref 0–0.2)
BASOPHILS NFR BLD: 1 % (ref 0–1.9)
BILIRUB SERPL-MCNC: 0.3 MG/DL (ref 0.1–1)
BUN SERPL-MCNC: 25 MG/DL (ref 8–23)
CALCIUM SERPL-MCNC: 9.3 MG/DL (ref 8.7–10.5)
CHLORIDE SERPL-SCNC: 103 MMOL/L (ref 95–110)
CO2 SERPL-SCNC: 27 MMOL/L (ref 23–29)
CREAT SERPL-MCNC: 0.8 MG/DL (ref 0.5–1.4)
DIFFERENTIAL METHOD: ABNORMAL
EOSINOPHIL # BLD AUTO: 0.3 K/UL (ref 0–0.5)
EOSINOPHIL NFR BLD: 4.8 % (ref 0–8)
ERYTHROCYTE [DISTWIDTH] IN BLOOD BY AUTOMATED COUNT: 11.6 % (ref 11.5–14.5)
EST. GFR  (AFRICAN AMERICAN): >60 ML/MIN/1.73 M^2
EST. GFR  (NON AFRICAN AMERICAN): >60 ML/MIN/1.73 M^2
GLUCOSE SERPL-MCNC: 91 MG/DL (ref 70–110)
HCT VFR BLD AUTO: 38.3 % (ref 37–48.5)
HGB BLD-MCNC: 12.8 G/DL (ref 12–16)
IMM GRANULOCYTES # BLD AUTO: 0.01 K/UL (ref 0–0.04)
IMM GRANULOCYTES NFR BLD AUTO: 0.2 % (ref 0–0.5)
LYMPHOCYTES # BLD AUTO: 1.7 K/UL (ref 1–4.8)
LYMPHOCYTES NFR BLD: 32.5 % (ref 18–48)
MCH RBC QN AUTO: 31.4 PG (ref 27–31)
MCHC RBC AUTO-ENTMCNC: 33.4 G/DL (ref 32–36)
MCV RBC AUTO: 94 FL (ref 82–98)
MONOCYTES # BLD AUTO: 0.4 K/UL (ref 0.3–1)
MONOCYTES NFR BLD: 8.3 % (ref 4–15)
NEUTROPHILS # BLD AUTO: 2.8 K/UL (ref 1.8–7.7)
NEUTROPHILS NFR BLD: 53.2 % (ref 38–73)
NRBC BLD-RTO: 0 /100 WBC
PLATELET # BLD AUTO: 345 K/UL (ref 150–450)
PMV BLD AUTO: 9.7 FL (ref 9.2–12.9)
POTASSIUM SERPL-SCNC: 4.2 MMOL/L (ref 3.5–5.1)
PROT SERPL-MCNC: 7.2 G/DL (ref 6–8.4)
RBC # BLD AUTO: 4.08 M/UL (ref 4–5.4)
SODIUM SERPL-SCNC: 137 MMOL/L (ref 136–145)
VIT B12 SERPL-MCNC: 1689 PG/ML (ref 210–950)
WBC # BLD AUTO: 5.17 K/UL (ref 3.9–12.7)

## 2021-04-20 PROCEDURE — 3008F PR BODY MASS INDEX (BMI) DOCUMENTED: ICD-10-PCS | Mod: CPTII,S$GLB,, | Performed by: SURGERY

## 2021-04-20 PROCEDURE — 99499 NO LOS: ICD-10-PCS | Mod: S$GLB,,, | Performed by: DIETITIAN, REGISTERED

## 2021-04-20 PROCEDURE — 36415 COLL VENOUS BLD VENIPUNCTURE: CPT | Performed by: SURGERY

## 2021-04-20 PROCEDURE — 1125F PR PAIN SEVERITY QUANTIFIED, PAIN PRESENT: ICD-10-PCS | Mod: S$GLB,,, | Performed by: SURGERY

## 2021-04-20 PROCEDURE — 80053 COMPREHEN METABOLIC PANEL: CPT | Performed by: SURGERY

## 2021-04-20 PROCEDURE — 99499 UNLISTED E&M SERVICE: CPT | Mod: S$GLB,,, | Performed by: DIETITIAN, REGISTERED

## 2021-04-20 PROCEDURE — 85025 COMPLETE CBC W/AUTO DIFF WBC: CPT | Performed by: SURGERY

## 2021-04-20 PROCEDURE — 3288F FALL RISK ASSESSMENT DOCD: CPT | Mod: CPTII,S$GLB,, | Performed by: SURGERY

## 2021-04-20 PROCEDURE — 99499 RISK ADDL DX/OHS AUDIT: ICD-10-PCS | Mod: S$GLB,,, | Performed by: SURGERY

## 2021-04-20 PROCEDURE — 3008F BODY MASS INDEX DOCD: CPT | Mod: CPTII,S$GLB,, | Performed by: SURGERY

## 2021-04-20 PROCEDURE — 1101F PR PT FALLS ASSESS DOC 0-1 FALLS W/OUT INJ PAST YR: ICD-10-PCS | Mod: CPTII,S$GLB,, | Performed by: SURGERY

## 2021-04-20 PROCEDURE — 99024 POSTOP FOLLOW-UP VISIT: CPT | Mod: S$GLB,,, | Performed by: SURGERY

## 2021-04-20 PROCEDURE — 82607 VITAMIN B-12: CPT | Performed by: SURGERY

## 2021-04-20 PROCEDURE — 1101F PT FALLS ASSESS-DOCD LE1/YR: CPT | Mod: CPTII,S$GLB,, | Performed by: SURGERY

## 2021-04-20 PROCEDURE — 99999 PR PBB SHADOW E&M-EST. PATIENT-LVL III: ICD-10-PCS | Mod: PBBFAC,,, | Performed by: SURGERY

## 2021-04-20 PROCEDURE — 99999 PR PBB SHADOW E&M-EST. PATIENT-LVL III: CPT | Mod: PBBFAC,,, | Performed by: SURGERY

## 2021-04-20 PROCEDURE — 1125F AMNT PAIN NOTED PAIN PRSNT: CPT | Mod: S$GLB,,, | Performed by: SURGERY

## 2021-04-20 PROCEDURE — 84425 ASSAY OF VITAMIN B-1: CPT | Performed by: SURGERY

## 2021-04-20 PROCEDURE — 3288F PR FALLS RISK ASSESSMENT DOCUMENTED: ICD-10-PCS | Mod: CPTII,S$GLB,, | Performed by: SURGERY

## 2021-04-20 PROCEDURE — 99499 UNLISTED E&M SERVICE: CPT | Mod: S$GLB,,, | Performed by: SURGERY

## 2021-04-20 PROCEDURE — 99024 PR POST-OP FOLLOW-UP VISIT: ICD-10-PCS | Mod: S$GLB,,, | Performed by: SURGERY

## 2021-04-20 RX ORDER — LANSOPRAZOLE 30 MG/1
30 CAPSULE, DELAYED RELEASE ORAL DAILY
Qty: 30 CAPSULE | Refills: 12 | Status: SHIPPED | OUTPATIENT
Start: 2021-04-20 | End: 2021-12-16

## 2021-04-26 LAB — VIT B1 BLD-MCNC: 51 UG/L (ref 38–122)

## 2021-05-16 NOTE — TELEPHONE ENCOUNTER
----- Message from Mere Winters MA sent at 10/29/2020 11:09 AM CDT -----  PT is requesting to have her pain medication sent to a different pharmacy  Pharm: [facility] [phone]   Additional Info: Pt stated she needs this today and is heading to the pharmacy      Need for prophylactic measure Need for prophylactic measure Need for prophylactic measure Need for prophylactic measure

## 2021-05-17 ENCOUNTER — LAB VISIT (OUTPATIENT)
Dept: LAB | Facility: HOSPITAL | Age: 71
End: 2021-05-17
Attending: SURGERY
Payer: MEDICARE

## 2021-05-17 ENCOUNTER — TELEPHONE (OUTPATIENT)
Dept: BARIATRICS | Facility: CLINIC | Age: 71
End: 2021-05-17

## 2021-05-17 ENCOUNTER — DOCUMENTATION ONLY (OUTPATIENT)
Dept: BARIATRICS | Facility: CLINIC | Age: 71
End: 2021-05-17

## 2021-05-17 DIAGNOSIS — R19.7 DIARRHEA FOLLOWING GASTROINTESTINAL SURGERY: Primary | ICD-10-CM

## 2021-05-17 DIAGNOSIS — I51.89 DIASTOLIC DYSFUNCTION: ICD-10-CM

## 2021-05-17 DIAGNOSIS — Z79.01 LONG TERM (CURRENT) USE OF ANTICOAGULANTS: ICD-10-CM

## 2021-05-17 DIAGNOSIS — Z79.899 OTHER LONG TERM (CURRENT) DRUG THERAPY: ICD-10-CM

## 2021-05-17 DIAGNOSIS — K95.09 COMPLICATION OF GASTRIC BAND PROCEDURE: ICD-10-CM

## 2021-05-17 DIAGNOSIS — M62.81 MUSCLE WEAKNESS: ICD-10-CM

## 2021-05-17 DIAGNOSIS — M17.0 OSTEOARTHRITIS OF BOTH KNEES, UNSPECIFIED OSTEOARTHRITIS TYPE: ICD-10-CM

## 2021-05-17 DIAGNOSIS — R19.7 DIARRHEA, UNSPECIFIED TYPE: Primary | ICD-10-CM

## 2021-05-17 DIAGNOSIS — K44.9 HERNIA, HIATAL: ICD-10-CM

## 2021-05-17 DIAGNOSIS — Z98.890 DIARRHEA FOLLOWING GASTROINTESTINAL SURGERY: Primary | ICD-10-CM

## 2021-05-17 DIAGNOSIS — Z98.84 STATUS POST LAPAROSCOPIC SLEEVE GASTRECTOMY: ICD-10-CM

## 2021-05-17 DIAGNOSIS — Z98.84 GASTRIC BANDING STATUS: ICD-10-CM

## 2021-05-17 DIAGNOSIS — M53.3 SI (SACROILIAC) PAIN: ICD-10-CM

## 2021-05-17 DIAGNOSIS — R10.13 EPIGASTRIC ABDOMINAL PAIN: ICD-10-CM

## 2021-05-17 DIAGNOSIS — K21.9 GASTROESOPHAGEAL REFLUX DISEASE, UNSPECIFIED WHETHER ESOPHAGITIS PRESENT: ICD-10-CM

## 2021-05-17 DIAGNOSIS — I87.2 VENOUS INSUFFICIENCY OF BOTH LOWER EXTREMITIES: ICD-10-CM

## 2021-05-17 LAB
ALBUMIN SERPL BCP-MCNC: 3.9 G/DL (ref 3.5–5.2)
ALP SERPL-CCNC: 127 U/L (ref 55–135)
ALT SERPL W/O P-5'-P-CCNC: 140 U/L (ref 10–44)
ANION GAP SERPL CALC-SCNC: 8 MMOL/L (ref 8–16)
AST SERPL-CCNC: 162 U/L (ref 10–40)
BASOPHILS # BLD AUTO: 0.03 K/UL (ref 0–0.2)
BASOPHILS NFR BLD: 0.6 % (ref 0–1.9)
BILIRUB SERPL-MCNC: 0.3 MG/DL (ref 0.1–1)
BUN SERPL-MCNC: 14 MG/DL (ref 8–23)
CALCIUM SERPL-MCNC: 9.8 MG/DL (ref 8.7–10.5)
CHLORIDE SERPL-SCNC: 104 MMOL/L (ref 95–110)
CO2 SERPL-SCNC: 27 MMOL/L (ref 23–29)
CREAT SERPL-MCNC: 0.8 MG/DL (ref 0.5–1.4)
DIFFERENTIAL METHOD: NORMAL
EOSINOPHIL # BLD AUTO: 0.1 K/UL (ref 0–0.5)
EOSINOPHIL NFR BLD: 1.8 % (ref 0–8)
ERYTHROCYTE [DISTWIDTH] IN BLOOD BY AUTOMATED COUNT: 12.9 % (ref 11.5–14.5)
EST. GFR  (AFRICAN AMERICAN): >60 ML/MIN/1.73 M^2
EST. GFR  (NON AFRICAN AMERICAN): >60 ML/MIN/1.73 M^2
GLUCOSE SERPL-MCNC: 96 MG/DL (ref 70–110)
HCT VFR BLD AUTO: 38.7 % (ref 37–48.5)
HGB BLD-MCNC: 12.7 G/DL (ref 12–16)
IMM GRANULOCYTES # BLD AUTO: 0.02 K/UL (ref 0–0.04)
IMM GRANULOCYTES NFR BLD AUTO: 0.4 % (ref 0–0.5)
LYMPHOCYTES # BLD AUTO: 1.8 K/UL (ref 1–4.8)
LYMPHOCYTES NFR BLD: 35.7 % (ref 18–48)
MCH RBC QN AUTO: 30.8 PG (ref 27–31)
MCHC RBC AUTO-ENTMCNC: 32.8 G/DL (ref 32–36)
MCV RBC AUTO: 94 FL (ref 82–98)
MONOCYTES # BLD AUTO: 0.5 K/UL (ref 0.3–1)
MONOCYTES NFR BLD: 10.4 % (ref 4–15)
NEUTROPHILS # BLD AUTO: 2.6 K/UL (ref 1.8–7.7)
NEUTROPHILS NFR BLD: 51.1 % (ref 38–73)
NRBC BLD-RTO: 0 /100 WBC
PLATELET # BLD AUTO: 293 K/UL (ref 150–450)
PMV BLD AUTO: 10.1 FL (ref 9.2–12.9)
POTASSIUM SERPL-SCNC: 3.8 MMOL/L (ref 3.5–5.1)
PROT SERPL-MCNC: 7.1 G/DL (ref 6–8.4)
RBC # BLD AUTO: 4.12 M/UL (ref 4–5.4)
SODIUM SERPL-SCNC: 139 MMOL/L (ref 136–145)
WBC # BLD AUTO: 5.12 K/UL (ref 3.9–12.7)

## 2021-05-17 PROCEDURE — 36415 COLL VENOUS BLD VENIPUNCTURE: CPT | Performed by: SURGERY

## 2021-05-17 PROCEDURE — 85025 COMPLETE CBC W/AUTO DIFF WBC: CPT | Performed by: SURGERY

## 2021-05-17 PROCEDURE — 80053 COMPREHEN METABOLIC PANEL: CPT | Performed by: SURGERY

## 2021-05-17 RX ORDER — SODIUM CHLORIDE 0.9 % (FLUSH) 0.9 %
10 SYRINGE (ML) INJECTION
OUTPATIENT
Start: 2021-05-17

## 2021-05-18 ENCOUNTER — TELEPHONE (OUTPATIENT)
Dept: BARIATRICS | Facility: CLINIC | Age: 71
End: 2021-05-18

## 2021-05-18 ENCOUNTER — TELEPHONE (OUTPATIENT)
Dept: INFUSION THERAPY | Facility: HOSPITAL | Age: 71
End: 2021-05-18

## 2021-05-18 ENCOUNTER — LAB VISIT (OUTPATIENT)
Dept: LAB | Facility: HOSPITAL | Age: 71
End: 2021-05-18
Attending: PHYSICIAN ASSISTANT
Payer: MEDICARE

## 2021-05-18 DIAGNOSIS — R19.7 DIARRHEA FOLLOWING GASTROINTESTINAL SURGERY: ICD-10-CM

## 2021-05-18 DIAGNOSIS — Z98.890 DIARRHEA FOLLOWING GASTROINTESTINAL SURGERY: ICD-10-CM

## 2021-05-18 DIAGNOSIS — R19.7 DIARRHEA, UNSPECIFIED TYPE: ICD-10-CM

## 2021-05-18 DIAGNOSIS — R74.8 ELEVATED LIVER ENZYMES: Primary | ICD-10-CM

## 2021-05-18 PROCEDURE — 87324 CLOSTRIDIUM AG IA: CPT | Mod: PO | Performed by: PHYSICIAN ASSISTANT

## 2021-05-18 PROCEDURE — 87449 NOS EACH ORGANISM AG IA: CPT | Mod: PO | Performed by: PHYSICIAN ASSISTANT

## 2021-05-19 LAB
C DIFF GDH STL QL: NEGATIVE
C DIFF GDH STL QL: NEGATIVE
C DIFF TOX A+B STL QL IA: NEGATIVE
C DIFF TOX A+B STL QL IA: NEGATIVE

## 2021-05-31 ENCOUNTER — OFFICE VISIT (OUTPATIENT)
Dept: HEPATOLOGY | Facility: CLINIC | Age: 71
End: 2021-05-31
Payer: MEDICARE

## 2021-05-31 ENCOUNTER — LAB VISIT (OUTPATIENT)
Dept: LAB | Facility: HOSPITAL | Age: 71
End: 2021-05-31
Attending: INTERNAL MEDICINE
Payer: MEDICARE

## 2021-05-31 VITALS
HEIGHT: 64 IN | RESPIRATION RATE: 20 BRPM | DIASTOLIC BLOOD PRESSURE: 60 MMHG | BODY MASS INDEX: 32.97 KG/M2 | HEART RATE: 68 BPM | TEMPERATURE: 98 F | OXYGEN SATURATION: 97 % | WEIGHT: 193.13 LBS | SYSTOLIC BLOOD PRESSURE: 109 MMHG

## 2021-05-31 DIAGNOSIS — R16.0 HEPATOMEGALY: ICD-10-CM

## 2021-05-31 DIAGNOSIS — R19.7 DIARRHEA, UNSPECIFIED TYPE: ICD-10-CM

## 2021-05-31 DIAGNOSIS — R17 JAUNDICE: ICD-10-CM

## 2021-05-31 DIAGNOSIS — Z98.890 DIARRHEA FOLLOWING GASTROINTESTINAL SURGERY: ICD-10-CM

## 2021-05-31 DIAGNOSIS — R74.8 ELEVATED LIVER ENZYMES: Primary | ICD-10-CM

## 2021-05-31 DIAGNOSIS — R19.7 DIARRHEA FOLLOWING GASTROINTESTINAL SURGERY: ICD-10-CM

## 2021-05-31 DIAGNOSIS — E03.4 HYPOTHYROIDISM DUE TO ACQUIRED ATROPHY OF THYROID: ICD-10-CM

## 2021-05-31 DIAGNOSIS — R74.8 ELEVATED LIVER ENZYMES: ICD-10-CM

## 2021-05-31 LAB
ALBUMIN SERPL BCP-MCNC: 3.7 G/DL (ref 3.5–5.2)
ALP SERPL-CCNC: 67 U/L (ref 55–135)
ALT SERPL W/O P-5'-P-CCNC: 13 U/L (ref 10–44)
ANION GAP SERPL CALC-SCNC: 10 MMOL/L (ref 8–16)
AST SERPL-CCNC: 19 U/L (ref 10–40)
BASOPHILS # BLD AUTO: 0.05 K/UL (ref 0–0.2)
BASOPHILS NFR BLD: 0.9 % (ref 0–1.9)
BILIRUB SERPL-MCNC: 0.6 MG/DL (ref 0.1–1)
BUN SERPL-MCNC: 13 MG/DL (ref 8–23)
CALCIUM SERPL-MCNC: 9.3 MG/DL (ref 8.7–10.5)
CERULOPLASMIN SERPL-MCNC: 31 MG/DL (ref 15–45)
CHLORIDE SERPL-SCNC: 105 MMOL/L (ref 95–110)
CO2 SERPL-SCNC: 26 MMOL/L (ref 23–29)
CREAT SERPL-MCNC: 0.8 MG/DL (ref 0.5–1.4)
DIFFERENTIAL METHOD: ABNORMAL
EOSINOPHIL # BLD AUTO: 0.2 K/UL (ref 0–0.5)
EOSINOPHIL NFR BLD: 2.8 % (ref 0–8)
ERYTHROCYTE [DISTWIDTH] IN BLOOD BY AUTOMATED COUNT: 13 % (ref 11.5–14.5)
EST. GFR  (AFRICAN AMERICAN): >60 ML/MIN/1.73 M^2
EST. GFR  (NON AFRICAN AMERICAN): >60 ML/MIN/1.73 M^2
GLUCOSE SERPL-MCNC: 103 MG/DL (ref 70–110)
HCT VFR BLD AUTO: 38.9 % (ref 37–48.5)
HGB BLD-MCNC: 12.4 G/DL (ref 12–16)
IGG SERPL-MCNC: 728 MG/DL (ref 650–1600)
IMM GRANULOCYTES # BLD AUTO: 0.04 K/UL (ref 0–0.04)
IMM GRANULOCYTES NFR BLD AUTO: 0.7 % (ref 0–0.5)
INR PPP: 1 (ref 0.8–1.2)
IRON SERPL-MCNC: 114 UG/DL (ref 30–160)
LYMPHOCYTES # BLD AUTO: 1.9 K/UL (ref 1–4.8)
LYMPHOCYTES NFR BLD: 32.6 % (ref 18–48)
MCH RBC QN AUTO: 30.7 PG (ref 27–31)
MCHC RBC AUTO-ENTMCNC: 31.9 G/DL (ref 32–36)
MCV RBC AUTO: 96 FL (ref 82–98)
MONOCYTES # BLD AUTO: 0.5 K/UL (ref 0.3–1)
MONOCYTES NFR BLD: 9.5 % (ref 4–15)
NEUTROPHILS # BLD AUTO: 3.1 K/UL (ref 1.8–7.7)
NEUTROPHILS NFR BLD: 53.5 % (ref 38–73)
NRBC BLD-RTO: 0 /100 WBC
PLATELET # BLD AUTO: 311 K/UL (ref 150–450)
PMV BLD AUTO: 10.3 FL (ref 9.2–12.9)
POTASSIUM SERPL-SCNC: 4 MMOL/L (ref 3.5–5.1)
PROT SERPL-MCNC: 6.8 G/DL (ref 6–8.4)
PROTHROMBIN TIME: 10.9 SEC (ref 9–12.5)
RBC # BLD AUTO: 4.04 M/UL (ref 4–5.4)
SATURATED IRON: 33 % (ref 20–50)
SODIUM SERPL-SCNC: 141 MMOL/L (ref 136–145)
TOTAL IRON BINDING CAPACITY: 345 UG/DL (ref 250–450)
TRANSFERRIN SERPL-MCNC: 233 MG/DL (ref 200–375)
TSH SERPL DL<=0.005 MIU/L-ACNC: 1.32 UIU/ML (ref 0.4–4)
WBC # BLD AUTO: 5.71 K/UL (ref 3.9–12.7)

## 2021-05-31 PROCEDURE — 36415 COLL VENOUS BLD VENIPUNCTURE: CPT | Performed by: INTERNAL MEDICINE

## 2021-05-31 PROCEDURE — 3008F BODY MASS INDEX DOCD: CPT | Mod: CPTII,S$GLB,, | Performed by: INTERNAL MEDICINE

## 2021-05-31 PROCEDURE — 1101F PR PT FALLS ASSESS DOC 0-1 FALLS W/OUT INJ PAST YR: ICD-10-PCS | Mod: CPTII,S$GLB,, | Performed by: INTERNAL MEDICINE

## 2021-05-31 PROCEDURE — 86235 NUCLEAR ANTIGEN ANTIBODY: CPT | Performed by: INTERNAL MEDICINE

## 2021-05-31 PROCEDURE — 82784 ASSAY IGA/IGD/IGG/IGM EACH: CPT | Performed by: INTERNAL MEDICINE

## 2021-05-31 PROCEDURE — 80321 ALCOHOLS BIOMARKERS 1OR 2: CPT | Performed by: INTERNAL MEDICINE

## 2021-05-31 PROCEDURE — 3288F PR FALLS RISK ASSESSMENT DOCUMENTED: ICD-10-PCS | Mod: CPTII,S$GLB,, | Performed by: INTERNAL MEDICINE

## 2021-05-31 PROCEDURE — 99999 PR PBB SHADOW E&M-EST. PATIENT-LVL V: ICD-10-PCS | Mod: PBBFAC,,, | Performed by: INTERNAL MEDICINE

## 2021-05-31 PROCEDURE — 99204 PR OFFICE/OUTPT VISIT, NEW, LEVL IV, 45-59 MIN: ICD-10-PCS | Mod: S$GLB,,, | Performed by: INTERNAL MEDICINE

## 2021-05-31 PROCEDURE — 85610 PROTHROMBIN TIME: CPT | Performed by: INTERNAL MEDICINE

## 2021-05-31 PROCEDURE — 83540 ASSAY OF IRON: CPT | Performed by: INTERNAL MEDICINE

## 2021-05-31 PROCEDURE — 99999 PR PBB SHADOW E&M-EST. PATIENT-LVL V: CPT | Mod: PBBFAC,,, | Performed by: INTERNAL MEDICINE

## 2021-05-31 PROCEDURE — 85025 COMPLETE CBC W/AUTO DIFF WBC: CPT | Performed by: INTERNAL MEDICINE

## 2021-05-31 PROCEDURE — 1126F PR PAIN SEVERITY QUANTIFIED, NO PAIN PRESENT: ICD-10-PCS | Mod: S$GLB,,, | Performed by: INTERNAL MEDICINE

## 2021-05-31 PROCEDURE — 86790 VIRUS ANTIBODY NOS: CPT | Performed by: INTERNAL MEDICINE

## 2021-05-31 PROCEDURE — 82390 ASSAY OF CERULOPLASMIN: CPT | Performed by: INTERNAL MEDICINE

## 2021-05-31 PROCEDURE — 99204 OFFICE O/P NEW MOD 45 MIN: CPT | Mod: S$GLB,,, | Performed by: INTERNAL MEDICINE

## 2021-05-31 PROCEDURE — 1159F MED LIST DOCD IN RCRD: CPT | Mod: S$GLB,,, | Performed by: INTERNAL MEDICINE

## 2021-05-31 PROCEDURE — 1159F PR MEDICATION LIST DOCUMENTED IN MEDICAL RECORD: ICD-10-PCS | Mod: S$GLB,,, | Performed by: INTERNAL MEDICINE

## 2021-05-31 PROCEDURE — 83516 IMMUNOASSAY NONANTIBODY: CPT | Mod: 59 | Performed by: INTERNAL MEDICINE

## 2021-05-31 PROCEDURE — 3288F FALL RISK ASSESSMENT DOCD: CPT | Mod: CPTII,S$GLB,, | Performed by: INTERNAL MEDICINE

## 2021-05-31 PROCEDURE — 1101F PT FALLS ASSESS-DOCD LE1/YR: CPT | Mod: CPTII,S$GLB,, | Performed by: INTERNAL MEDICINE

## 2021-05-31 PROCEDURE — 80053 COMPREHEN METABOLIC PANEL: CPT | Performed by: INTERNAL MEDICINE

## 2021-05-31 PROCEDURE — 80074 ACUTE HEPATITIS PANEL: CPT | Performed by: INTERNAL MEDICINE

## 2021-05-31 PROCEDURE — 86256 FLUORESCENT ANTIBODY TITER: CPT | Mod: 91 | Performed by: INTERNAL MEDICINE

## 2021-05-31 PROCEDURE — 1126F AMNT PAIN NOTED NONE PRSNT: CPT | Mod: S$GLB,,, | Performed by: INTERNAL MEDICINE

## 2021-05-31 PROCEDURE — 86376 MICROSOMAL ANTIBODY EACH: CPT | Performed by: INTERNAL MEDICINE

## 2021-05-31 PROCEDURE — 3008F PR BODY MASS INDEX (BMI) DOCUMENTED: ICD-10-PCS | Mod: CPTII,S$GLB,, | Performed by: INTERNAL MEDICINE

## 2021-05-31 PROCEDURE — 84443 ASSAY THYROID STIM HORMONE: CPT | Performed by: INTERNAL MEDICINE

## 2021-05-31 PROCEDURE — 86665 EPSTEIN-BARR CAPSID VCA: CPT | Performed by: INTERNAL MEDICINE

## 2021-06-01 ENCOUNTER — PATIENT MESSAGE (OUTPATIENT)
Dept: BARIATRICS | Facility: CLINIC | Age: 71
End: 2021-06-01

## 2021-06-01 ENCOUNTER — PATIENT MESSAGE (OUTPATIENT)
Dept: HEPATOLOGY | Facility: CLINIC | Age: 71
End: 2021-06-01

## 2021-06-01 LAB
EBV VCA IGM SER QL IA: NEGATIVE
HAV IGM SERPL QL IA: NEGATIVE
HBV CORE IGM SERPL QL IA: NEGATIVE
HBV CORE IGM SERPL QL IA: NEGATIVE
HBV SURFACE AG SERPL QL IA: NEGATIVE
HCV AB SERPL QL IA: NEGATIVE

## 2021-06-02 ENCOUNTER — PATIENT MESSAGE (OUTPATIENT)
Dept: HEPATOLOGY | Facility: CLINIC | Age: 71
End: 2021-06-02

## 2021-06-03 LAB
GLIADIN PEPTIDE IGA SER-ACNC: 6 UNITS
GLIADIN PEPTIDE IGG SER-ACNC: 2 UNITS
IGA SERPL-MCNC: 319 MG/DL (ref 70–400)
LKM AB SER-ACNC: 1.1 UNITS
MITOCHONDRIA AB TITR SER IF: NORMAL {TITER}
SMOOTH MUSCLE AB TITR SER IF: NORMAL {TITER}
TTG IGA SER-ACNC: 9 UNITS
TTG IGG SER-ACNC: 3 UNITS

## 2021-06-04 LAB — PHOSPHATIDYLETHANOL (PETH): 65 NG/ML

## 2021-06-09 LAB — HEV IGM SER QL: NOT DETECTED

## 2021-06-15 ENCOUNTER — CLINICAL SUPPORT (OUTPATIENT)
Dept: BARIATRICS | Facility: CLINIC | Age: 71
End: 2021-06-15
Payer: MEDICARE

## 2021-06-15 ENCOUNTER — OFFICE VISIT (OUTPATIENT)
Dept: BARIATRICS | Facility: CLINIC | Age: 71
End: 2021-06-15
Payer: MEDICARE

## 2021-06-15 VITALS
SYSTOLIC BLOOD PRESSURE: 116 MMHG | HEART RATE: 72 BPM | DIASTOLIC BLOOD PRESSURE: 60 MMHG | WEIGHT: 190.5 LBS | OXYGEN SATURATION: 98 % | BODY MASS INDEX: 32.7 KG/M2

## 2021-06-15 DIAGNOSIS — E66.9 OBESITY (BMI 30-39.9): ICD-10-CM

## 2021-06-15 DIAGNOSIS — Z71.3 DIETARY COUNSELING AND SURVEILLANCE: ICD-10-CM

## 2021-06-15 DIAGNOSIS — Z98.84 STATUS POST LAPAROSCOPIC SLEEVE GASTRECTOMY: Primary | ICD-10-CM

## 2021-06-15 DIAGNOSIS — Z09 POSTOP CHECK: Primary | ICD-10-CM

## 2021-06-15 PROCEDURE — 1101F PT FALLS ASSESS-DOCD LE1/YR: CPT | Mod: CPTII,S$GLB,, | Performed by: SURGERY

## 2021-06-15 PROCEDURE — 3008F BODY MASS INDEX DOCD: CPT | Mod: CPTII,S$GLB,, | Performed by: SURGERY

## 2021-06-15 PROCEDURE — 3288F FALL RISK ASSESSMENT DOCD: CPT | Mod: CPTII,S$GLB,, | Performed by: SURGERY

## 2021-06-15 PROCEDURE — 99999 PR PBB SHADOW E&M-EST. PATIENT-LVL III: ICD-10-PCS | Mod: PBBFAC,,, | Performed by: SURGERY

## 2021-06-15 PROCEDURE — 99999 PR PBB SHADOW E&M-EST. PATIENT-LVL II: CPT | Mod: PBBFAC,,, | Performed by: DIETITIAN, REGISTERED

## 2021-06-15 PROCEDURE — 99499 NO LOS: ICD-10-PCS | Mod: S$GLB,,, | Performed by: DIETITIAN, REGISTERED

## 2021-06-15 PROCEDURE — 99024 POSTOP FOLLOW-UP VISIT: CPT | Mod: S$GLB,,, | Performed by: SURGERY

## 2021-06-15 PROCEDURE — 3288F PR FALLS RISK ASSESSMENT DOCUMENTED: ICD-10-PCS | Mod: CPTII,S$GLB,, | Performed by: SURGERY

## 2021-06-15 PROCEDURE — 99999 PR PBB SHADOW E&M-EST. PATIENT-LVL II: ICD-10-PCS | Mod: PBBFAC,,, | Performed by: DIETITIAN, REGISTERED

## 2021-06-15 PROCEDURE — 1101F PR PT FALLS ASSESS DOC 0-1 FALLS W/OUT INJ PAST YR: ICD-10-PCS | Mod: CPTII,S$GLB,, | Performed by: SURGERY

## 2021-06-15 PROCEDURE — 99499 UNLISTED E&M SERVICE: CPT | Mod: S$GLB,,, | Performed by: DIETITIAN, REGISTERED

## 2021-06-15 PROCEDURE — 99024 PR POST-OP FOLLOW-UP VISIT: ICD-10-PCS | Mod: S$GLB,,, | Performed by: SURGERY

## 2021-06-15 PROCEDURE — 99999 PR PBB SHADOW E&M-EST. PATIENT-LVL III: CPT | Mod: PBBFAC,,, | Performed by: SURGERY

## 2021-06-15 PROCEDURE — 3008F PR BODY MASS INDEX (BMI) DOCUMENTED: ICD-10-PCS | Mod: CPTII,S$GLB,, | Performed by: SURGERY

## 2021-06-15 RX ORDER — FAMOTIDINE 20 MG/1
20 TABLET, FILM COATED ORAL 2 TIMES DAILY
Qty: 60 TABLET | Refills: 11 | Status: SHIPPED | OUTPATIENT
Start: 2021-06-15 | End: 2022-06-20

## 2021-10-18 ENCOUNTER — PATIENT OUTREACH (OUTPATIENT)
Dept: ADMINISTRATIVE | Facility: OTHER | Age: 71
End: 2021-10-18

## 2021-10-22 ENCOUNTER — OFFICE VISIT (OUTPATIENT)
Dept: BARIATRICS | Facility: CLINIC | Age: 71
End: 2021-10-22
Payer: MEDICARE

## 2021-10-22 ENCOUNTER — LAB VISIT (OUTPATIENT)
Dept: LAB | Facility: HOSPITAL | Age: 71
End: 2021-10-22
Attending: SURGERY
Payer: MEDICARE

## 2021-10-22 VITALS — BODY MASS INDEX: 31.71 KG/M2 | WEIGHT: 184.75 LBS

## 2021-10-22 DIAGNOSIS — Z79.01 LONG TERM (CURRENT) USE OF ANTICOAGULANTS: ICD-10-CM

## 2021-10-22 DIAGNOSIS — M17.0 OSTEOARTHRITIS OF BOTH KNEES, UNSPECIFIED OSTEOARTHRITIS TYPE: ICD-10-CM

## 2021-10-22 DIAGNOSIS — I51.89 DIASTOLIC DYSFUNCTION: ICD-10-CM

## 2021-10-22 DIAGNOSIS — I87.2 VENOUS INSUFFICIENCY OF BOTH LOWER EXTREMITIES: ICD-10-CM

## 2021-10-22 DIAGNOSIS — M62.81 MUSCLE WEAKNESS: ICD-10-CM

## 2021-10-22 DIAGNOSIS — K44.9 HERNIA, HIATAL: ICD-10-CM

## 2021-10-22 DIAGNOSIS — K21.9 GASTROESOPHAGEAL REFLUX DISEASE, UNSPECIFIED WHETHER ESOPHAGITIS PRESENT: ICD-10-CM

## 2021-10-22 DIAGNOSIS — Z79.899 OTHER LONG TERM (CURRENT) DRUG THERAPY: ICD-10-CM

## 2021-10-22 DIAGNOSIS — M25.59 PAIN IN OTHER SPECIFIED JOINT: ICD-10-CM

## 2021-10-22 DIAGNOSIS — Z98.84 GASTRIC BANDING STATUS: ICD-10-CM

## 2021-10-22 DIAGNOSIS — K95.09 COMPLICATION OF GASTRIC BAND PROCEDURE: ICD-10-CM

## 2021-10-22 DIAGNOSIS — R10.13 EPIGASTRIC ABDOMINAL PAIN: ICD-10-CM

## 2021-10-22 DIAGNOSIS — Z98.84 STATUS POST LAPAROSCOPIC SLEEVE GASTRECTOMY: ICD-10-CM

## 2021-10-22 DIAGNOSIS — R63.4 WEIGHT LOSS: Primary | ICD-10-CM

## 2021-10-22 DIAGNOSIS — M53.3 SI (SACROILIAC) PAIN: ICD-10-CM

## 2021-10-22 LAB
25(OH)D3+25(OH)D2 SERPL-MCNC: 80 NG/ML (ref 30–96)
ALBUMIN SERPL BCP-MCNC: 3.8 G/DL (ref 3.5–5.2)
ALP SERPL-CCNC: 65 U/L (ref 55–135)
ALT SERPL W/O P-5'-P-CCNC: 14 U/L (ref 10–44)
ANION GAP SERPL CALC-SCNC: 6 MMOL/L (ref 8–16)
AST SERPL-CCNC: 21 U/L (ref 10–40)
BASOPHILS # BLD AUTO: 0.04 K/UL (ref 0–0.2)
BASOPHILS NFR BLD: 0.9 % (ref 0–1.9)
BILIRUB SERPL-MCNC: 0.6 MG/DL (ref 0.1–1)
BUN SERPL-MCNC: 16 MG/DL (ref 8–23)
CALCIUM SERPL-MCNC: 9.9 MG/DL (ref 8.7–10.5)
CHLORIDE SERPL-SCNC: 103 MMOL/L (ref 95–110)
CHOLEST SERPL-MCNC: 193 MG/DL (ref 120–199)
CHOLEST/HDLC SERPL: 3.2 {RATIO} (ref 2–5)
CO2 SERPL-SCNC: 31 MMOL/L (ref 23–29)
CREAT SERPL-MCNC: 0.7 MG/DL (ref 0.5–1.4)
DIFFERENTIAL METHOD: ABNORMAL
EOSINOPHIL # BLD AUTO: 0.1 K/UL (ref 0–0.5)
EOSINOPHIL NFR BLD: 3.1 % (ref 0–8)
ERYTHROCYTE [DISTWIDTH] IN BLOOD BY AUTOMATED COUNT: 12 % (ref 11.5–14.5)
EST. GFR  (AFRICAN AMERICAN): >60 ML/MIN/1.73 M^2
EST. GFR  (NON AFRICAN AMERICAN): >60 ML/MIN/1.73 M^2
GLUCOSE SERPL-MCNC: 100 MG/DL (ref 70–110)
HCT VFR BLD AUTO: 39.1 % (ref 37–48.5)
HDLC SERPL-MCNC: 60 MG/DL (ref 40–75)
HDLC SERPL: 31.1 % (ref 20–50)
HGB BLD-MCNC: 13.3 G/DL (ref 12–16)
IMM GRANULOCYTES # BLD AUTO: 0.02 K/UL (ref 0–0.04)
IMM GRANULOCYTES NFR BLD AUTO: 0.4 % (ref 0–0.5)
IRON SERPL-MCNC: 126 UG/DL (ref 30–160)
LDLC SERPL CALC-MCNC: 108.6 MG/DL (ref 63–159)
LYMPHOCYTES # BLD AUTO: 1.7 K/UL (ref 1–4.8)
LYMPHOCYTES NFR BLD: 37.1 % (ref 18–48)
MCH RBC QN AUTO: 32.4 PG (ref 27–31)
MCHC RBC AUTO-ENTMCNC: 34 G/DL (ref 32–36)
MCV RBC AUTO: 95 FL (ref 82–98)
MONOCYTES # BLD AUTO: 0.5 K/UL (ref 0.3–1)
MONOCYTES NFR BLD: 10.3 % (ref 4–15)
NEUTROPHILS # BLD AUTO: 2.2 K/UL (ref 1.8–7.7)
NEUTROPHILS NFR BLD: 48.2 % (ref 38–73)
NONHDLC SERPL-MCNC: 133 MG/DL
NRBC BLD-RTO: 0 /100 WBC
PLATELET # BLD AUTO: 277 K/UL (ref 150–450)
PMV BLD AUTO: 9.8 FL (ref 9.2–12.9)
POTASSIUM SERPL-SCNC: 4.4 MMOL/L (ref 3.5–5.1)
PROT SERPL-MCNC: 6.9 G/DL (ref 6–8.4)
RBC # BLD AUTO: 4.1 M/UL (ref 4–5.4)
SATURATED IRON: 33 % (ref 20–50)
SODIUM SERPL-SCNC: 140 MMOL/L (ref 136–145)
TOTAL IRON BINDING CAPACITY: 385 UG/DL (ref 250–450)
TRANSFERRIN SERPL-MCNC: 260 MG/DL (ref 200–375)
TRIGL SERPL-MCNC: 122 MG/DL (ref 30–150)
VIT B12 SERPL-MCNC: 674 PG/ML (ref 210–950)
WBC # BLD AUTO: 4.48 K/UL (ref 3.9–12.7)

## 2021-10-22 PROCEDURE — 1126F AMNT PAIN NOTED NONE PRSNT: CPT | Mod: CPTII,S$GLB,, | Performed by: PHYSICIAN ASSISTANT

## 2021-10-22 PROCEDURE — 82306 VITAMIN D 25 HYDROXY: CPT | Performed by: SURGERY

## 2021-10-22 PROCEDURE — 1160F PR REVIEW ALL MEDS BY PRESCRIBER/CLIN PHARMACIST DOCUMENTED: ICD-10-PCS | Mod: CPTII,S$GLB,, | Performed by: PHYSICIAN ASSISTANT

## 2021-10-22 PROCEDURE — 3008F BODY MASS INDEX DOCD: CPT | Mod: CPTII,S$GLB,, | Performed by: PHYSICIAN ASSISTANT

## 2021-10-22 PROCEDURE — 1101F PT FALLS ASSESS-DOCD LE1/YR: CPT | Mod: CPTII,S$GLB,, | Performed by: PHYSICIAN ASSISTANT

## 2021-10-22 PROCEDURE — 3044F HG A1C LEVEL LT 7.0%: CPT | Mod: CPTII,S$GLB,, | Performed by: PHYSICIAN ASSISTANT

## 2021-10-22 PROCEDURE — 80053 COMPREHEN METABOLIC PANEL: CPT | Performed by: SURGERY

## 2021-10-22 PROCEDURE — 1160F RVW MEDS BY RX/DR IN RCRD: CPT | Mod: CPTII,S$GLB,, | Performed by: PHYSICIAN ASSISTANT

## 2021-10-22 PROCEDURE — 99999 PR PBB SHADOW E&M-EST. PATIENT-LVL III: CPT | Mod: PBBFAC,,, | Performed by: PHYSICIAN ASSISTANT

## 2021-10-22 PROCEDURE — 82607 VITAMIN B-12: CPT | Performed by: SURGERY

## 2021-10-22 PROCEDURE — 80061 LIPID PANEL: CPT | Performed by: SURGERY

## 2021-10-22 PROCEDURE — 99499 RISK ADDL DX/OHS AUDIT: ICD-10-PCS | Mod: S$GLB,,, | Performed by: PHYSICIAN ASSISTANT

## 2021-10-22 PROCEDURE — 99999 PR PBB SHADOW E&M-EST. PATIENT-LVL III: ICD-10-PCS | Mod: PBBFAC,,, | Performed by: PHYSICIAN ASSISTANT

## 2021-10-22 PROCEDURE — 3288F FALL RISK ASSESSMENT DOCD: CPT | Mod: CPTII,S$GLB,, | Performed by: PHYSICIAN ASSISTANT

## 2021-10-22 PROCEDURE — 1101F PR PT FALLS ASSESS DOC 0-1 FALLS W/OUT INJ PAST YR: ICD-10-PCS | Mod: CPTII,S$GLB,, | Performed by: PHYSICIAN ASSISTANT

## 2021-10-22 PROCEDURE — 3008F PR BODY MASS INDEX (BMI) DOCUMENTED: ICD-10-PCS | Mod: CPTII,S$GLB,, | Performed by: PHYSICIAN ASSISTANT

## 2021-10-22 PROCEDURE — 99213 OFFICE O/P EST LOW 20 MIN: CPT | Mod: S$GLB,,, | Performed by: PHYSICIAN ASSISTANT

## 2021-10-22 PROCEDURE — 99499 UNLISTED E&M SERVICE: CPT | Mod: S$GLB,,, | Performed by: PHYSICIAN ASSISTANT

## 2021-10-22 PROCEDURE — 84466 ASSAY OF TRANSFERRIN: CPT | Performed by: SURGERY

## 2021-10-22 PROCEDURE — 99213 PR OFFICE/OUTPT VISIT, EST, LEVL III, 20-29 MIN: ICD-10-PCS | Mod: S$GLB,,, | Performed by: PHYSICIAN ASSISTANT

## 2021-10-22 PROCEDURE — 85025 COMPLETE CBC W/AUTO DIFF WBC: CPT | Performed by: SURGERY

## 2021-10-22 PROCEDURE — 3288F PR FALLS RISK ASSESSMENT DOCUMENTED: ICD-10-PCS | Mod: CPTII,S$GLB,, | Performed by: PHYSICIAN ASSISTANT

## 2021-10-22 PROCEDURE — 3044F PR MOST RECENT HEMOGLOBIN A1C LEVEL <7.0%: ICD-10-PCS | Mod: CPTII,S$GLB,, | Performed by: PHYSICIAN ASSISTANT

## 2021-10-22 PROCEDURE — 1126F PR PAIN SEVERITY QUANTIFIED, NO PAIN PRESENT: ICD-10-PCS | Mod: CPTII,S$GLB,, | Performed by: PHYSICIAN ASSISTANT

## 2021-10-22 PROCEDURE — 1159F MED LIST DOCD IN RCRD: CPT | Mod: CPTII,S$GLB,, | Performed by: PHYSICIAN ASSISTANT

## 2021-10-22 PROCEDURE — 84425 ASSAY OF VITAMIN B-1: CPT | Performed by: SURGERY

## 2021-10-22 PROCEDURE — 1159F PR MEDICATION LIST DOCUMENTED IN MEDICAL RECORD: ICD-10-PCS | Mod: CPTII,S$GLB,, | Performed by: PHYSICIAN ASSISTANT

## 2021-10-22 RX ORDER — ESOMEPRAZOLE MAGNESIUM 40 MG/1
40 CAPSULE, DELAYED RELEASE ORAL
Qty: 60 CAPSULE | Refills: 12 | Status: SHIPPED | OUTPATIENT
Start: 2021-10-22 | End: 2022-07-05

## 2021-10-26 LAB — VIT B1 BLD-MCNC: 71 UG/L (ref 38–122)

## 2021-10-28 ENCOUNTER — TELEPHONE (OUTPATIENT)
Dept: FAMILY MEDICINE | Facility: CLINIC | Age: 71
End: 2021-10-28
Payer: MEDICARE

## 2021-11-01 ENCOUNTER — TELEPHONE (OUTPATIENT)
Dept: FAMILY MEDICINE | Facility: CLINIC | Age: 71
End: 2021-11-01
Payer: MEDICARE

## 2021-11-01 RX ORDER — OFLOXACIN 3 MG/ML
1 SOLUTION/ DROPS OPHTHALMIC 4 TIMES DAILY
Qty: 5 ML | Refills: 0 | Status: SHIPPED | OUTPATIENT
Start: 2021-11-01 | End: 2021-12-16

## 2021-11-10 ENCOUNTER — TELEPHONE (OUTPATIENT)
Dept: FAMILY MEDICINE | Facility: CLINIC | Age: 71
End: 2021-11-10
Payer: MEDICARE

## 2021-11-30 ENCOUNTER — TELEPHONE (OUTPATIENT)
Dept: FAMILY MEDICINE | Facility: CLINIC | Age: 71
End: 2021-11-30
Payer: MEDICARE

## 2021-11-30 ENCOUNTER — CLINICAL SUPPORT (OUTPATIENT)
Dept: FAMILY MEDICINE | Facility: CLINIC | Age: 71
End: 2021-11-30
Payer: MEDICARE

## 2021-11-30 DIAGNOSIS — R31.9 HEMATURIA, UNSPECIFIED TYPE: Primary | ICD-10-CM

## 2021-11-30 DIAGNOSIS — R31.9 HEMATURIA, UNSPECIFIED TYPE: ICD-10-CM

## 2021-11-30 LAB
BACTERIA #/AREA URNS AUTO: ABNORMAL /HPF
BILIRUB UR QL STRIP: NEGATIVE
CLARITY UR REFRACT.AUTO: ABNORMAL
COLOR UR AUTO: YELLOW
GLUCOSE UR QL STRIP: NEGATIVE
HGB UR QL STRIP: ABNORMAL
HYALINE CASTS UR QL AUTO: 0 /LPF
KETONES UR QL STRIP: ABNORMAL
LEUKOCYTE ESTERASE UR QL STRIP: ABNORMAL
MICROSCOPIC COMMENT: ABNORMAL
NITRITE UR QL STRIP: NEGATIVE
PH UR STRIP: 6 [PH] (ref 5–8)
PROT UR QL STRIP: ABNORMAL
RBC #/AREA URNS AUTO: >100 /HPF (ref 0–4)
SP GR UR STRIP: 1.02 (ref 1–1.03)
URN SPEC COLLECT METH UR: ABNORMAL
WBC #/AREA URNS AUTO: >100 /HPF (ref 0–5)

## 2021-11-30 PROCEDURE — 87086 URINE CULTURE/COLONY COUNT: CPT | Performed by: FAMILY MEDICINE

## 2021-11-30 PROCEDURE — 81001 URINALYSIS AUTO W/SCOPE: CPT | Performed by: FAMILY MEDICINE

## 2021-11-30 RX ORDER — SULFAMETHOXAZOLE AND TRIMETHOPRIM 800; 160 MG/1; MG/1
1 TABLET ORAL 2 TIMES DAILY
Qty: 20 TABLET | Refills: 0 | Status: SHIPPED | OUTPATIENT
Start: 2021-11-30 | End: 2021-12-10

## 2021-12-02 LAB
BACTERIA UR CULT: NORMAL
BACTERIA UR CULT: NORMAL

## 2021-12-16 ENCOUNTER — OFFICE VISIT (OUTPATIENT)
Dept: INTERNAL MEDICINE | Facility: CLINIC | Age: 71
End: 2021-12-16
Payer: MEDICARE

## 2021-12-16 VITALS
HEART RATE: 67 BPM | WEIGHT: 182.19 LBS | HEIGHT: 64 IN | SYSTOLIC BLOOD PRESSURE: 114 MMHG | TEMPERATURE: 97 F | BODY MASS INDEX: 31.1 KG/M2 | OXYGEN SATURATION: 97 % | DIASTOLIC BLOOD PRESSURE: 72 MMHG

## 2021-12-16 DIAGNOSIS — I51.89 DIASTOLIC DYSFUNCTION: ICD-10-CM

## 2021-12-16 DIAGNOSIS — G91.9 HYDROCEPHALUS, UNSPECIFIED TYPE: ICD-10-CM

## 2021-12-16 DIAGNOSIS — E03.4 HYPOTHYROIDISM DUE TO ACQUIRED ATROPHY OF THYROID: ICD-10-CM

## 2021-12-16 DIAGNOSIS — I87.2 VENOUS INSUFFICIENCY OF BOTH LOWER EXTREMITIES: ICD-10-CM

## 2021-12-16 DIAGNOSIS — F41.9 ANXIETY: ICD-10-CM

## 2021-12-16 DIAGNOSIS — K21.9 GASTROESOPHAGEAL REFLUX DISEASE WITHOUT ESOPHAGITIS: ICD-10-CM

## 2021-12-16 DIAGNOSIS — Z00.00 ENCOUNTER FOR PREVENTIVE HEALTH EXAMINATION: Primary | ICD-10-CM

## 2021-12-16 DIAGNOSIS — B00.1 RECURRENT HERPES LABIALIS: ICD-10-CM

## 2021-12-16 DIAGNOSIS — D69.2 SENILE PURPURA: ICD-10-CM

## 2021-12-16 PROBLEM — R35.0 URINARY FREQUENCY: Status: RESOLVED | Noted: 2018-01-30 | Resolved: 2021-12-16

## 2021-12-16 PROBLEM — R68.2 DRY MOUTH: Status: RESOLVED | Noted: 2018-01-30 | Resolved: 2021-12-16

## 2021-12-16 PROBLEM — R00.2 PALPITATIONS: Status: RESOLVED | Noted: 2021-01-29 | Resolved: 2021-12-16

## 2021-12-16 PROBLEM — R63.1 EXCESSIVE THIRST: Status: RESOLVED | Noted: 2018-01-30 | Resolved: 2021-12-16

## 2021-12-16 PROBLEM — M79.644 THUMB PAIN, RIGHT: Status: RESOLVED | Noted: 2020-12-11 | Resolved: 2021-12-16

## 2021-12-16 PROCEDURE — 99999 PR PBB SHADOW E&M-EST. PATIENT-LVL IV: ICD-10-PCS | Mod: PBBFAC,,, | Performed by: NURSE PRACTITIONER

## 2021-12-16 PROCEDURE — G0438 PR WELCOME MEDICARE ANNUAL WELLNESS INITIAL VISIT: ICD-10-PCS | Mod: S$GLB,,, | Performed by: NURSE PRACTITIONER

## 2021-12-16 PROCEDURE — 99499 RISK ADDL DX/OHS AUDIT: ICD-10-PCS | Mod: S$GLB,,, | Performed by: NURSE PRACTITIONER

## 2021-12-16 PROCEDURE — 99999 PR PBB SHADOW E&M-EST. PATIENT-LVL IV: CPT | Mod: PBBFAC,,, | Performed by: NURSE PRACTITIONER

## 2021-12-16 PROCEDURE — G0438 PPPS, INITIAL VISIT: HCPCS | Mod: S$GLB,,, | Performed by: NURSE PRACTITIONER

## 2021-12-16 PROCEDURE — 99499 UNLISTED E&M SERVICE: CPT | Mod: S$GLB,,, | Performed by: NURSE PRACTITIONER

## 2021-12-16 RX ORDER — LANOLIN ALCOHOL/MO/W.PET/CERES
1 CREAM (GRAM) TOPICAL DAILY
COMMUNITY

## 2021-12-16 RX ORDER — VALACYCLOVIR HYDROCHLORIDE 1 G/1
2000 TABLET, FILM COATED ORAL EVERY 12 HOURS PRN
Qty: 30 TABLET | Refills: 2 | Status: SHIPPED | OUTPATIENT
Start: 2021-12-16 | End: 2023-11-13

## 2021-12-17 ENCOUNTER — TELEPHONE (OUTPATIENT)
Dept: FAMILY MEDICINE | Facility: CLINIC | Age: 71
End: 2021-12-17
Payer: MEDICARE

## 2021-12-20 ENCOUNTER — TELEPHONE (OUTPATIENT)
Dept: FAMILY MEDICINE | Facility: CLINIC | Age: 71
End: 2021-12-20
Payer: MEDICARE

## 2021-12-28 ENCOUNTER — TELEPHONE (OUTPATIENT)
Dept: FAMILY MEDICINE | Facility: CLINIC | Age: 71
End: 2021-12-28
Payer: MEDICARE

## 2022-01-25 ENCOUNTER — TELEPHONE (OUTPATIENT)
Dept: FAMILY MEDICINE | Facility: CLINIC | Age: 72
End: 2022-01-25
Payer: MEDICARE

## 2022-01-25 RX ORDER — OFLOXACIN 3 MG/ML
1 SOLUTION/ DROPS OPHTHALMIC 4 TIMES DAILY
Qty: 5 ML | Refills: 0 | Status: SHIPPED | OUTPATIENT
Start: 2022-01-25 | End: 2022-07-05

## 2022-01-28 ENCOUNTER — OFFICE VISIT (OUTPATIENT)
Dept: BARIATRICS | Facility: CLINIC | Age: 72
End: 2022-01-28
Payer: MEDICARE

## 2022-01-28 VITALS
DIASTOLIC BLOOD PRESSURE: 62 MMHG | SYSTOLIC BLOOD PRESSURE: 122 MMHG | BODY MASS INDEX: 31.22 KG/M2 | OXYGEN SATURATION: 97 % | HEART RATE: 67 BPM | WEIGHT: 181.88 LBS

## 2022-01-28 DIAGNOSIS — K21.9 GASTROESOPHAGEAL REFLUX DISEASE, UNSPECIFIED WHETHER ESOPHAGITIS PRESENT: ICD-10-CM

## 2022-01-28 DIAGNOSIS — R63.4 WEIGHT LOSS: Primary | ICD-10-CM

## 2022-01-28 DIAGNOSIS — Z98.84 STATUS POST LAPAROSCOPIC SLEEVE GASTRECTOMY: ICD-10-CM

## 2022-01-28 PROCEDURE — 3074F PR MOST RECENT SYSTOLIC BLOOD PRESSURE < 130 MM HG: ICD-10-PCS | Mod: CPTII,S$GLB,, | Performed by: PHYSICIAN ASSISTANT

## 2022-01-28 PROCEDURE — 1101F PT FALLS ASSESS-DOCD LE1/YR: CPT | Mod: CPTII,S$GLB,, | Performed by: PHYSICIAN ASSISTANT

## 2022-01-28 PROCEDURE — 99999 PR PBB SHADOW E&M-EST. PATIENT-LVL V: CPT | Mod: PBBFAC,,, | Performed by: PHYSICIAN ASSISTANT

## 2022-01-28 PROCEDURE — 1101F PR PT FALLS ASSESS DOC 0-1 FALLS W/OUT INJ PAST YR: ICD-10-PCS | Mod: CPTII,S$GLB,, | Performed by: PHYSICIAN ASSISTANT

## 2022-01-28 PROCEDURE — 99213 OFFICE O/P EST LOW 20 MIN: CPT | Mod: S$GLB,,, | Performed by: PHYSICIAN ASSISTANT

## 2022-01-28 PROCEDURE — 99999 PR PBB SHADOW E&M-EST. PATIENT-LVL V: ICD-10-PCS | Mod: PBBFAC,,, | Performed by: PHYSICIAN ASSISTANT

## 2022-01-28 PROCEDURE — 1126F AMNT PAIN NOTED NONE PRSNT: CPT | Mod: CPTII,S$GLB,, | Performed by: PHYSICIAN ASSISTANT

## 2022-01-28 PROCEDURE — 99213 PR OFFICE/OUTPT VISIT, EST, LEVL III, 20-29 MIN: ICD-10-PCS | Mod: S$GLB,,, | Performed by: PHYSICIAN ASSISTANT

## 2022-01-28 PROCEDURE — 3008F BODY MASS INDEX DOCD: CPT | Mod: CPTII,S$GLB,, | Performed by: PHYSICIAN ASSISTANT

## 2022-01-28 PROCEDURE — 3008F PR BODY MASS INDEX (BMI) DOCUMENTED: ICD-10-PCS | Mod: CPTII,S$GLB,, | Performed by: PHYSICIAN ASSISTANT

## 2022-01-28 PROCEDURE — 1160F PR REVIEW ALL MEDS BY PRESCRIBER/CLIN PHARMACIST DOCUMENTED: ICD-10-PCS | Mod: CPTII,S$GLB,, | Performed by: PHYSICIAN ASSISTANT

## 2022-01-28 PROCEDURE — 1126F PR PAIN SEVERITY QUANTIFIED, NO PAIN PRESENT: ICD-10-PCS | Mod: CPTII,S$GLB,, | Performed by: PHYSICIAN ASSISTANT

## 2022-01-28 PROCEDURE — 3078F DIAST BP <80 MM HG: CPT | Mod: CPTII,S$GLB,, | Performed by: PHYSICIAN ASSISTANT

## 2022-01-28 PROCEDURE — 3074F SYST BP LT 130 MM HG: CPT | Mod: CPTII,S$GLB,, | Performed by: PHYSICIAN ASSISTANT

## 2022-01-28 PROCEDURE — 1159F PR MEDICATION LIST DOCUMENTED IN MEDICAL RECORD: ICD-10-PCS | Mod: CPTII,S$GLB,, | Performed by: PHYSICIAN ASSISTANT

## 2022-01-28 PROCEDURE — 3078F PR MOST RECENT DIASTOLIC BLOOD PRESSURE < 80 MM HG: ICD-10-PCS | Mod: CPTII,S$GLB,, | Performed by: PHYSICIAN ASSISTANT

## 2022-01-28 PROCEDURE — 1160F RVW MEDS BY RX/DR IN RCRD: CPT | Mod: CPTII,S$GLB,, | Performed by: PHYSICIAN ASSISTANT

## 2022-01-28 PROCEDURE — 3288F FALL RISK ASSESSMENT DOCD: CPT | Mod: CPTII,S$GLB,, | Performed by: PHYSICIAN ASSISTANT

## 2022-01-28 PROCEDURE — 3288F PR FALLS RISK ASSESSMENT DOCUMENTED: ICD-10-PCS | Mod: CPTII,S$GLB,, | Performed by: PHYSICIAN ASSISTANT

## 2022-01-28 PROCEDURE — 1159F MED LIST DOCD IN RCRD: CPT | Mod: CPTII,S$GLB,, | Performed by: PHYSICIAN ASSISTANT

## 2022-01-28 RX ORDER — INFLUENZA A VIRUS A/VICTORIA/2570/2019 IVR-215 (H1N1) ANTIGEN (FORMALDEHYDE INACTIVATED), INFLUENZA A VIRUS A/TASMANIA/503/2020 IVR-221 (H3N2) ANTIGEN (FORMALDEHYDE INACTIVATED), INFLUENZA B VIRUS B/PHUKET/3073/2013 ANTIGEN (FORMALDEHYDE INACTIVATED), AND INFLUENZA B VIRUS B/WASHINGTON/02/2019 ANTIGEN (FORMALDEHYDE INACTIVATED) 60; 60; 60; 60 UG/.7ML; UG/.7ML; UG/.7ML; UG/.7ML
INJECTION, SUSPENSION INTRAMUSCULAR
COMMUNITY
Start: 2021-12-05 | End: 2022-07-05

## 2022-01-28 NOTE — PATIENT INSTRUCTIONS
Meal Ideas for Regular Bariatric Diet  *Recipes and products available at [email or web address]      Breakfast: (15-20g protein)    - Egg white omelet: 2 egg whites or ½ cup Egg Beaters. (Optional proteins: cheese, shrimp, black beans, chicken, sliced turkey) (Optional veggies: tomatoes, salsa, spinach, mushrooms, onions, green peppers, or small slice avocado)     - Egg and sausage: 1 egg or ¼ cup Egg Beaters (any variety), with 1 johnson or 2 links of Turkey sausage or Veggie breakfast sausage ([employer] or [employer])    - Crust-less breakfast quiche: To make a glass pie dish, mix 4oz part skim Ricotta, 1 cup skim milk, and 2 eggs as your base. Add protein: shredded cheese, sliced lean ham or turkey, turkey gay/sausage. Add veggies: tomato, onion, green onion, mushroom, green pepper, spinach, etc.    - Yogurt parfait: Mix 1 - 6oz container Dannon Light N Fit vanilla yogurt, with ¼ cup crushed unsalted nuts    - Cottage cheese and fruit: ½ cup part-skim cottage cheese or ricotta cheese topped with fresh fruit or sugar free preserves     - Ludivina Burton's Vanilla Egg custard* (add 2 Tbsp instant coffee granules to make Cappuccino Custard*)    - Hi-Protein café latte (skim milk, decaf coffee, 1 scoop protein powder). Optional to add Sugar free syrup or extract flavoring.    - Breakfast Lox: spread fat free cream cheese on slices of smoked salmon. Serve over scrambled or egg over easy (sauteed with nonstick cookspray) OR on a cucumber slice    - Eggwhich: Scramble or cook 1 large egg over easy using nonstick cookspray. Place between 2 slices of Northern Irish gay and low fat cheese.     Lunch: (20-30g protein)    - ½ cup Black bean soup (Homemade or Progresso), with ¼ cup shredded low-fat cheese. Top with chopped tomato or fresh salsa.     - Lean deli turkey breast and low-fat sliced cheese, mustard or light franco to moisten, rolled up together, or wrapped in a [place] lettuce leaf    - Chicken salad made from dinner  leftovers, moisten with low-fat salad dressing or light franco. Also try leftover salmon, shrimp, tuna or boiled eggs. Serve ½ cup over dark green salad    - Fat-free canned refried beans, topped with ¼ cup shredded low-fat cheese. Top with chopped tomato or fresh salsa.     - Greek salad: Top mixed greens with 1-2oz grilled chicken, tomatoes, red onions, 2-3 kalamata olives, and sprinkle lightly with feta cheese. Spritz with Balsamic vinegar to taste.     - Crust-less lunch quiche: To make a glass pie dish, mix 4oz part skim Ricotta, 1 cup skim milk, and 2 eggs as your base. Add protein: shredded cheese, sliced lean ham or turkey, shrimp, chicken. Add veggies: tomato, onion, green onion, mushroom, green pepper, spinach, artichoke, broccoli, etc.    - Pizza bake: spread a  filipe jamison mushroom with tomato sauce, low-fat shredded mozzarella and turkey pepperoni or [place] gay. Add any veggies. Roast for 10-15 minutes, until cheese melted.     - Cucumber crab bites: Spread ¼ cup crab dip (lump crabmeat + light cream cheese and green onions) over sliced cucumber.     - Chicken with light spinach and artichoke dip*: Puree in : 6oz cooked and drained spinach, 2 cloves garlic, 1 can cannelloni beans, ½ cup chopped green onions, 1 can drained artichoke hearts (not marinated in oil), lemon juice and basil. Mix in 2oz chopped up chicken.    Supper: (20-30g protein)    - Serve grilled fish over dark green salad tossed with low-fat dressing, served with grilled asparagus guerrier     - Rotisserie chicken salad: served with sliced strawberries, walnuts, fat-free feta cheese crumbles and 1 tbsp Roys Own Light Raspberry [place] Vinaigrette    - Shrimp cocktail: Dip cold boiled shrimp in homemade low-sugar cocktail sauce (1/2 cup Eze One Carb ketchup, 2 tbsp horseradish, 1/4 tsp hot sauce, 1 tsp Worcestershire sauce, 1 tbsp freshly-squeezed lemon juice). Serve with dark green salad, walnuts, and crumbled blue  cheese drizzled with olive oil and Balsamic vinegar    - Tuna Melt: Spread tuna salad onto 2 thick slices of tomato. Top with low-fat cheese and broil until cheese is melted. May also be made with chicken salad of shrimp salad. [place] with different types of cheeses.    - Chicken or beef fajitas (no tortilla, rice, beans, chips). Top meat and veggies w/ fresh salsa, fat free sour cream.     - Homemade low-fat Chili using extra lean ground beef or ground turkey. Top with shredded cheese and salsa as desired. May add dollop fat-free sour cream if desired    - Chicken parmesan: Top chicken breast w/ low sugar marinara sauce, mozzarella cheese and bake until chicken reaches 165*.  Serve w/ spaghetti SQUASH or [place]ovan cut green beans    - Dinner Omelet with shrimp or chicken and onion, green peppers and chives.    - No noodle lasagna: Use sliced zucchini or eggplant in place of noodles.  Layer with part skim ricotta cheese and low sugar meat sauce (use very lean ground beef or ground turkey).    - Mexican chicken bake: Bake chunks of chicken breast or thigh with taco seasoning, Pace brand enchilada sauce, green onions and low-fat cheese. Serve with ¼ cup black beans or fat free refried beans topped with chopped tomatoes or salsa.    - Caden frozen meatballs, simmered in Classico Marinara sauce. Different flavors of salsa or spaghetti sauce create different dishes! Sprinkle with parmesan cheese. Serve with grilled or steamed veggies, or a dark green salad.    - Simmer boneless skinless chicken thigh chunks in Classico Marinara sauce or roasted salsa until tender with chopped onion, bell pepper, garlic, mushrooms, spinach, etc.     - Hamburger or veggie burger, without the bun, dressed the way you like. Served with grilled or steamed veggies.    - Eggplant parmesan: Bake slices of eggplant at 350 degrees for 15 minutes. Layer tomato sauce, sliced eggplant and low-fat mozzarella cheese in a baking dish and cover with  foil. Bake 30-40 more minutes or until bubbly. Uncover and bake at 400 degrees for about 15 more minutes, or until top is slightly crisp.    - Fish tacos: grilled/baked white fish, wrapped in Antwan lettuce leaf, topped with salsa, shredded low-fat cheese, and light coleslaw.    - Chicken nika: Sprinkle chicken w/ 1 tsp of hidden valley ranch dip mix. Then grill chicken and top with black beans, salsa and 1 tsp fat free sour cream.     - Cauliflower pizza crust: Use cauliflower as crust (see recipe on pinterest, no flour!). Top w/ low fat cheese, turkey pepperoni and veggies and bake again    - chicken or turkey crust pizza: use ground chicken or turkey instead of cauliflower, spread in [place] and bake at 350 for about 20-30 minutes(may want to add garlic, black pepper, oregano and other herbs to ground meat mixture).  Remove and top w/ low fat cheese, turkey pepperoni and veggies and bake again for another 10 minutes or until cheese is browned.     Snacks: (100-200 calories; >5g protein)    - 1 low-fat cheese stick with 8 cherry tomatoes or 1 serving fresh fruit  - 4 thin slices fat-free turkey breast and 1 slice low-fat cheese  - 4 thin slices fat-free honey ham with wedge of melon  - 6-8 edamame pods (equivalent to about 1/4 cup edamame without pods).   - 1/4 cup unsalted nuts with ½ cup fruit  - 6-oz container Dannon Light n Fit vanilla yogurt, topped with 1oz unsalted nuts         - apple, celery or baby carrots spread with 2 Tbsp PB2  - apple slices with 1 oz slice low-fat cheese  - Apple slices dipped in 2 Tbsp of PB2  - celery, cucumber, bell pepper or baby carrots dipped in ¼ cup hummus bean spread or light spinach and artichoke dip (*recipe in lunch section)  - celery, cucumber, baby carrots dipped in high protein greek yogurt (Mix 16 oz plain greek yogurt + 1 packet of hidden valley ranch dip mix)  - Yo Links Beef Steak - 14g protein! (similar to beef jerky)  - 2 wedges Laughing Cow - Light Herb  & Garlic Cheese with sliced cucumber or green bell pepper  - 1/2 cup low-fat cottage cheese with ¼ cup fruit or ¼ cup salsa  - RTD Protein drinks: Atkins, Low Carb Slim Fast, EAS light, Muscle Milk Light, etc.  - Homemade Protein drinks: GNC Soy95, Isopure, Nectar, UNJURY, Whey Gourmet, etc. Mix 1 scoop powder with 8oz skim/1% milk or light soymilk.  - Protein bars: Atkins, EAS, Pure Protein, Think Thin, Detour, etc. Must have 0-4 grams sugar - Read the label.    Takeout Options: No more than twice/week  Deli - Salads (no pasta or rice), meats, cheeses. Roasted chicken. Lox (salmon)    Mexican - Platters which don't include tortillas, chips, or rice. Go easy on the beans. Example: Fajitas without the tortillas. Ask the  not to bring chips to the table if they are too tempting.    Greek - Meat or fish and vegetable, but no bread or rice. Including hummus, baba ganoush, etc, is OK. Most sit-down Greek restaurants can provide you with cucumber slices for dipping instead of dominic bread.    Fast Food (Avoid as much as possible) - Salads (no croutons and limit salad dressing to 2 tbsp), grilled chicken sandwich without the bun and ask for no franco. Dayas low fat chili or Taco Bell pintos and cheese.    BBQ - The meats are fine if you ask for sauces on the side, but most of the traditional side dishes are loaded with carbs. Joseph slaw, baked beans and BBQ sauce are typically made with sugar.    Chinese - Nothing deep-fried, no rice or noodles. Many Chinese sauces have starch and sugar in them, so you'll have to use your judgement. If you find that these sauces trigger cravings, or cause Dumping, you can ask for the sauce to be made without sugar or just use soy sauce.

## 2022-01-28 NOTE — PROGRESS NOTES
BARIATRIC POST-OPERATIVE VISIT:    HPI:  Luz Burk is a 71 y.o. year old female presents for 6 month post op visit following s/p sleeve.      Pt states that she has been having a hard time. States that her  passed on 1/6/2022 and since then she has been off on her eating.   Otherwise doing fine. Just getting through day by day.       Denies: nausea, vomiting, abdominal pain, changes in bowel movement pattern, fever, chills, dysphagia, chest pain, and shortness of breath.    Review of Systems   Constitutional: Negative for fatigue and fever.   HENT: Negative for tinnitus and trouble swallowing.    Eyes: Negative for visual disturbance.   Respiratory: Negative for cough and shortness of breath.    Cardiovascular: Negative for chest pain, palpitations and leg swelling.   Gastrointestinal: Negative for abdominal pain, constipation, diarrhea, nausea and vomiting.   Genitourinary: Negative for decreased urine volume.   Musculoskeletal: Negative for myalgias.   Psychiatric/Behavioral: Negative for dysphoric mood, self-injury and suicidal ideas. The patient is not nervous/anxious.        EXERCISE & VITAMINS:  See Bariatric Assessment  Adherent to vitamin regimen   MEDICATIONS/ALLERGIES:  Have been reviewed.    DIET:  See Dietician note from today for a more detailed assessment.      Regular diet but smaller portions   Tuna fish   Stew with sausage     Physical Exam  Constitutional:       Appearance: She is obese.   HENT:      Head: Normocephalic and atraumatic.   Eyes:      Extraocular Movements: Extraocular movements intact.      Conjunctiva/sclera: Conjunctivae normal.      Pupils: Pupils are equal, round, and reactive to light.   Cardiovascular:      Rate and Rhythm: Normal rate and regular rhythm.      Pulses: Normal pulses.      Heart sounds: Normal heart sounds.   Pulmonary:      Effort: Pulmonary effort is normal.      Breath sounds: Normal breath sounds. No wheezing.   Abdominal:      General: Bowel  sounds are normal.      Palpations: Abdomen is soft.      Tenderness: There is no abdominal tenderness.   Musculoskeletal:         General: Normal range of motion.      Cervical back: Normal range of motion and neck supple.   Skin:     General: Skin is warm.      Capillary Refill: Capillary refill takes less than 2 seconds.   Neurological:      General: No focal deficit present.      Mental Status: She is alert and oriented to person, place, and time.   Psychiatric:         Mood and Affect: Mood normal.         Behavior: Behavior normal.         Thought Content: Thought content normal.         Judgment: Judgment normal.         ASSESSMENT:  - Morbid obesity s/p sleeve gastrectomy  - Co-morbidities: GERD  - Good Weight loss, 29#'s and 39% EWL  - No formal Exercise routine  - Good Diet  - Good Vitamin regimen    PLAN:  - Miralax daily for constipation  - Emphasized the importance of regular exercise and adherence to bariatric diet to achieve maximum weight loss.  - Encouraged patient to start/continue regular exercise.  - Follow-up with dietician to advance diet.  - Continue daily vitamins and medications.  - RTC per post op schedule  - Call the office for any issues.  - Check labs today.

## 2022-02-11 ENCOUNTER — TELEPHONE (OUTPATIENT)
Dept: OPHTHALMOLOGY | Facility: CLINIC | Age: 72
End: 2022-02-11
Payer: MEDICARE

## 2022-02-11 NOTE — TELEPHONE ENCOUNTER
----- Message from Vivien Ramirez sent at 2/11/2022  8:55 AM CST -----  Regarding: urgent appt  Patient is looking to get an appointment today because she says that her left eye is red and she sees white bumps in the corner of her eye.      Luz  @  [phone]

## 2022-02-14 ENCOUNTER — DOCUMENTATION ONLY (OUTPATIENT)
Dept: BARIATRICS | Facility: CLINIC | Age: 72
End: 2022-02-14
Payer: MEDICARE

## 2022-02-14 NOTE — PROGRESS NOTES
Bariatric dashboard status updated to Post-op.  Pt s/p Band removal to Sleeve 4/7/2021.  Follow up current, recall placed for 1 year follow up.

## 2022-02-16 ENCOUNTER — TELEPHONE (OUTPATIENT)
Dept: BARIATRICS | Facility: CLINIC | Age: 72
End: 2022-02-16
Payer: MEDICARE

## 2022-02-16 DIAGNOSIS — Z98.890 OTHER SPECIFIED POSTPROCEDURAL STATES: ICD-10-CM

## 2022-02-16 DIAGNOSIS — M19.049 CMC ARTHRITIS: ICD-10-CM

## 2022-02-16 DIAGNOSIS — K21.9 GASTROESOPHAGEAL REFLUX DISEASE, UNSPECIFIED WHETHER ESOPHAGITIS PRESENT: ICD-10-CM

## 2022-02-16 DIAGNOSIS — K91.2 POSTSURGICAL MALABSORPTION, NOT ELSEWHERE CLASSIFIED: ICD-10-CM

## 2022-02-16 DIAGNOSIS — I51.89 DIASTOLIC DYSFUNCTION: ICD-10-CM

## 2022-02-16 DIAGNOSIS — Z79.899 OTHER LONG TERM (CURRENT) DRUG THERAPY: ICD-10-CM

## 2022-02-16 DIAGNOSIS — I87.2 VENOUS INSUFFICIENCY OF BOTH LOWER EXTREMITIES: ICD-10-CM

## 2022-02-16 DIAGNOSIS — M17.0 OSTEOARTHRITIS OF BOTH KNEES, UNSPECIFIED OSTEOARTHRITIS TYPE: ICD-10-CM

## 2022-02-16 DIAGNOSIS — M62.81 MUSCLE WEAKNESS: ICD-10-CM

## 2022-02-16 DIAGNOSIS — Z98.84 STATUS POST LAPAROSCOPIC SLEEVE GASTRECTOMY: Primary | ICD-10-CM

## 2022-02-16 NOTE — TELEPHONE ENCOUNTER
Called patient.  Scheduled annual follow up appt and labs (orders entered).  Pt aware of date/time/location of appts

## 2022-03-07 ENCOUNTER — PATIENT MESSAGE (OUTPATIENT)
Dept: BARIATRICS | Facility: CLINIC | Age: 72
End: 2022-03-07
Payer: MEDICARE

## 2022-03-10 NOTE — TELEPHONE ENCOUNTER
Care Due:                  Date            Visit Type   Department     Provider  --------------------------------------------------------------------------------                                EP -                              PRIMARY      [facility]  Last Visit: 01-      CARE ([facility])   MEDICINE       Hilario Slaughter  Next Visit: None Scheduled  None         None Found                                                            Last  Test          Frequency    Reason                     Performed    Due Date  --------------------------------------------------------------------------------    Office Visit  12 months..  citalopram, levothyroxine  01- 01-    TSH.........  12 months..  levothyroxine............  05- 05-    Powered by [email or web address] by [employer]. Reference number: [id number].   3/10/2022 1:49:34 PM CST

## 2022-03-11 RX ORDER — LEVOTHYROXINE SODIUM 50 UG/1
50 TABLET ORAL
Qty: 90 TABLET | Refills: 3 | Status: SHIPPED | OUTPATIENT
Start: 2022-03-11 | End: 2023-02-03

## 2022-03-14 ENCOUNTER — TELEPHONE (OUTPATIENT)
Dept: FAMILY MEDICINE | Facility: CLINIC | Age: 72
End: 2022-03-14
Payer: MEDICARE

## 2022-03-14 DIAGNOSIS — Z12.31 ENCOUNTER FOR SCREENING MAMMOGRAM FOR BREAST CANCER: Primary | ICD-10-CM

## 2022-03-14 NOTE — TELEPHONE ENCOUNTER
----- Message from Josephine Mosher sent at 3/14/2022 12:07 PM CDT -----  Contact: [phone]/ Self  Type:  Mammogram    Caller is requesting to schedule their annual mammogram appointment.  Order is not listed in EPIC.  Please enter order and contact patient to schedule.  Name of Caller:Pt  Where would they like the mammogram performed?[facility]   Would the patient rather a call back or a response via [facility]? Call back   Best Call Back Number:[phone]  Additional Information: n/a

## 2022-03-21 ENCOUNTER — TELEPHONE (OUTPATIENT)
Dept: DERMATOLOGY | Facility: CLINIC | Age: 72
End: 2022-03-21
Payer: MEDICARE

## 2022-03-21 NOTE — TELEPHONE ENCOUNTER
----- Message from Arlyn Guzman sent at 3/21/2022  2:39 PM CDT -----  Contact: patient/  [phone]  CCTIMESENSITIVE         Name of Caller:     patient   Reason for Visit/Symptoms:    spots on arm patient states she's afraid it's cancer  Best Contact Number or Confirm if Mychart Preferred:  [phone]  Preferred Date/Time of Appointment: ASAP  Interested in Virtual Visit: no  Additional Information:none

## 2022-03-24 ENCOUNTER — TELEPHONE (OUTPATIENT)
Dept: OPHTHALMOLOGY | Facility: CLINIC | Age: 72
End: 2022-03-24
Payer: MEDICARE

## 2022-03-24 NOTE — TELEPHONE ENCOUNTER
----- Message from Lizzy Armenta sent at 3/24/2022  8:25 AM CDT -----  Contact: PT @ [phone]  Patient is calling stating that she has an eye infection in the OS. She stated that it started yesterday and is worse this morning. She said that it is ozzing and would like to be seen as soon as possible. Please contact patient at [phone].

## 2022-04-01 ENCOUNTER — OFFICE VISIT (OUTPATIENT)
Dept: DERMATOLOGY | Facility: CLINIC | Age: 72
End: 2022-04-01
Payer: MEDICARE

## 2022-04-01 DIAGNOSIS — Z12.83 SKIN CANCER SCREENING: Primary | ICD-10-CM

## 2022-04-01 DIAGNOSIS — L57.0 AK (ACTINIC KERATOSIS): ICD-10-CM

## 2022-04-01 DIAGNOSIS — L82.1 SK (SEBORRHEIC KERATOSIS): ICD-10-CM

## 2022-04-01 DIAGNOSIS — L81.4 LENTIGINES: ICD-10-CM

## 2022-04-01 PROCEDURE — 99999 PR PBB SHADOW E&M-EST. PATIENT-LVL III: ICD-10-PCS | Mod: PBBFAC,,, | Performed by: DERMATOLOGY

## 2022-04-01 PROCEDURE — 1101F PR PT FALLS ASSESS DOC 0-1 FALLS W/OUT INJ PAST YR: ICD-10-PCS | Mod: CPTII,S$GLB,, | Performed by: DERMATOLOGY

## 2022-04-01 PROCEDURE — 1160F PR REVIEW ALL MEDS BY PRESCRIBER/CLIN PHARMACIST DOCUMENTED: ICD-10-PCS | Mod: CPTII,S$GLB,, | Performed by: DERMATOLOGY

## 2022-04-01 PROCEDURE — 17000 DESTRUCT PREMALG LESION: CPT | Mod: S$GLB,,, | Performed by: DERMATOLOGY

## 2022-04-01 PROCEDURE — 99203 OFFICE O/P NEW LOW 30 MIN: CPT | Mod: 25,S$GLB,, | Performed by: DERMATOLOGY

## 2022-04-01 PROCEDURE — 1160F RVW MEDS BY RX/DR IN RCRD: CPT | Mod: CPTII,S$GLB,, | Performed by: DERMATOLOGY

## 2022-04-01 PROCEDURE — 1159F MED LIST DOCD IN RCRD: CPT | Mod: CPTII,S$GLB,, | Performed by: DERMATOLOGY

## 2022-04-01 PROCEDURE — 17003 DESTRUCT PREMALG LES 2-14: CPT | Mod: S$GLB,,, | Performed by: DERMATOLOGY

## 2022-04-01 PROCEDURE — 1126F PR PAIN SEVERITY QUANTIFIED, NO PAIN PRESENT: ICD-10-PCS | Mod: CPTII,S$GLB,, | Performed by: DERMATOLOGY

## 2022-04-01 PROCEDURE — 99999 PR PBB SHADOW E&M-EST. PATIENT-LVL III: CPT | Mod: PBBFAC,,, | Performed by: DERMATOLOGY

## 2022-04-01 PROCEDURE — 17003 DESTRUCTION, PREMALIGNANT LESIONS; SECOND THROUGH 14 LESIONS: ICD-10-PCS | Mod: S$GLB,,, | Performed by: DERMATOLOGY

## 2022-04-01 PROCEDURE — 3288F PR FALLS RISK ASSESSMENT DOCUMENTED: ICD-10-PCS | Mod: CPTII,S$GLB,, | Performed by: DERMATOLOGY

## 2022-04-01 PROCEDURE — 1101F PT FALLS ASSESS-DOCD LE1/YR: CPT | Mod: CPTII,S$GLB,, | Performed by: DERMATOLOGY

## 2022-04-01 PROCEDURE — 3288F FALL RISK ASSESSMENT DOCD: CPT | Mod: CPTII,S$GLB,, | Performed by: DERMATOLOGY

## 2022-04-01 PROCEDURE — 1126F AMNT PAIN NOTED NONE PRSNT: CPT | Mod: CPTII,S$GLB,, | Performed by: DERMATOLOGY

## 2022-04-01 PROCEDURE — 1159F PR MEDICATION LIST DOCUMENTED IN MEDICAL RECORD: ICD-10-PCS | Mod: CPTII,S$GLB,, | Performed by: DERMATOLOGY

## 2022-04-01 PROCEDURE — 99203 PR OFFICE/OUTPT VISIT, NEW, LEVL III, 30-44 MIN: ICD-10-PCS | Mod: 25,S$GLB,, | Performed by: DERMATOLOGY

## 2022-04-01 PROCEDURE — 17000 PR DESTRUCTION(LASER SURGERY,CRYOSURGERY,CHEMOSURGERY),PREMALIGNANT LESIONS,FIRST LESION: ICD-10-PCS | Mod: S$GLB,,, | Performed by: DERMATOLOGY

## 2022-04-01 NOTE — PATIENT INSTRUCTIONS
Sun Protection      The [facility] would like to remind you of the importance of sun protection all year round and particularly during the summer when the suns rays are the strongest. It has been proven that both acute and chronic sun exposure damages our cells and leads to skin cancer. Beyond skin cancer, the sun causes 90% of the symptoms of premature skin aging, including wrinkles, lentigines (brown spots), and thin, easily bruised skin. Proper sun protection can help prevent these unwanted conditions.    Many patients report that they dont go in the sun. It has been shown that the average person receives 18 hours of incidental sun exposure per week during activities such as walking through parking lots, driving, or sitting next to windows. This accumulates to several bad sunburns per year!    In choosing sunscreen, you want one that protects against both UVA and UVB rays (broad spectrum). It is recommended that you use one of SPF 30 or higher. It is important to apply the sunscreen about 20 minutes prior to sun exposure. Most sunscreens are chemical sunscreens and a reaction must take place in the skin so that they are effective. If they are applied and then you are immediately exposed to the sun or start sweating, this reaction has not had time to take place and you are therefore unprotected. Sunscreen needs to be reapplied every 2 hours if you are participating in water sports or sweating. We recommend Elta MD or CeraVe sunscreens for daily use; however there are many options and it is most important for you to find one that you will use on a consistent basis.    If you have sensitive skin, you may do best with a sunscreen that contains only physical blockers in the active ingredient section. The only physical blockers available in the [place] currently are titanium dioxide or zinc oxide. These are typically thicker and harder to apply, however they afford very good protection.  Neutrogena Sensitive Skin, Blue Lizard Sensitive Skin (pink top) or Neutrogena Pure and Free are popular ones.     Aside from sunscreen, clothes with UV protection (UPF), wide brimmed hats, and sunglasses are other means of sun protection that we recommend.      Based on a recent study (6/2021) and out of an abundance of caution, we are recommending that you AVOID the following sunscreens as they may contain the carcinogen, benzene:    Spray and gel sunscreens  Any [employer] or Wal[employer]reens brands as well as Max Block and TopCare brands   Neutrogena Ultra Sheer Dry-touch Water Resistant Sunscreen LOTION SPF 70   Neutrogena Sheer Zinc Dry-touch Face Sunscreen LOTION SPF 50   5.   Aveeno Baby Continuous Protection Sensitive Skin Sunscreen LOTION - Broad Spectrum SPF 50    Please note that Benzene is not an ingredient or the degradation product of any ingredient in any sunscreen. This study suggested that the findings are a result of contamination in the manufacturing process. At this point, we don't know how effectively Benzene gets through the skin, if it gets absorbed systemically, and what effects it may have.     We do know that ultraviolet radiation is a well-established carcinogen. Please use daily sun protection/avoidance and use of at least SPF 30, broad-spectrum sunscreen not listed above.                       [facility] - DERMATOLOGY [address]  [place] [address]  Dept: [phone]  Dept Fax: [phone]                                                                                SEBORRHEIC KERATOSES        What causes seborrheic keratoses?    Seborrheic keratoses are harmless, common skin growths that first appear during adult life.  As time goes by, more growths appear.  Some persons have a very large number of them.  Seborrheic keratoses appear on both covered and uncovered parts of the body; they are not caused by sunlight.  The tendency to develop seborrheic  keratoses is inherited.    Seborrheic keratoses are harmless and never become malignant.  They begin as slightly raised, light brown spots.  Gradually they thicken and take on a rough wartlike surface.  They slowly darken and may turn black.  These color changes are harmless.  Seborrheic keratoses are superficial and look as if they were stuck on the skin.  Persons who have had several seborrheic keratoses can usually recognize this type of benign growth.  However, if you are concerned or unsure about any growth, consult me.    Treatment    Seborrheic keratoses can easily be removed in the office.  The only reason for removing a seborrheic keratosis is your wish to get rid of it.     CRYOSURGERY      Your doctor has used a method called cryosurgery to treat your skin condition. Cryosurgery refers to the use of very cold substances to treat a variety of skin conditions such as warts, pre-skin cancers, molluscum contagiosum, sun spots, and several benign growths. The substance we use in cryosurgery is liquid nitrogen and is so cold (-195 degrees Celsius) that is burns when administered.     Following treatment in the office, the skin may immediately burn and become red. You may find the area around the lesion is affected as well. It is sometimes necessary to treat not only the lesion, but a small area of the surrounding normal skin to achieve a good response.     A blister, and even a blood filled blister, may form after treatment.   This is a normal response. If the blister is painful, it is acceptable to sterilize a needle and with rubbing alcohol and gently pop the blister. It is important that you gently wash the area with soap and warm water as the blister fluid may contain wart virus if a wart was treated. Do no remove the roof of the blister.     The area treated can take anywhere from 1-3 weeks to heal. Healing time depends on the kind skin lesion treated, the location, and how aggressively the lesion was  treated. It is recommended that the areas treated are covered with Vaseline or bacitracin ointment and a band-aid. If a band-aid is not practical, just ointment applied several times per day will do. Keeping these areas moist will speed the healing time.    Treatment with liquid nitrogen can leave a scar. In dark skin, it may be a light or dark scar, in light skin it may be a white or pink scar. These will generally fade with time, but may never go away completely.     If you have any concerns after your treatment, please feel free to call the office.       [facility] [place] [address]/ [phone] FAX/ [email or web address]

## 2022-04-01 NOTE — PROGRESS NOTES
"  Subjective:       Patient ID:  Luz Burk is a 71 y.o. female who presents for   Chief Complaint   Patient presents with    Skin Check     UBSE      HPI  Patient is here today for a "mole" check.   Pt has a history of  moderate sun exposure in the past.   Pt recalls several blistering sunburns in the past- Yes   Pt has history of tanning bed use- Yes   Pt has  had moles removed in the past- Yes   Pt has history of melanoma in first degree relatives-No    Pt is currently doing a study for covid and the nurse suggested she get the spots on her left arm checked.   Pt concerned about lesion on the left arm x 2 months. States that the spot itches. Treats with otc cortisone.   Also concerned about a spot on the back x 2 days. Pt stated she scratched the spot and saw blood on her hand.  Denies any other concerns on skin today.     Review of Systems   Constitutional: Negative for fever, chills, weight loss, weight gain, fatigue, night sweats and malaise.   Skin: Positive for activity-related sunscreen use and recent sunburn. Negative for daily sunscreen use.   Hematologic/Lymphatic: Bruises/bleeds easily.        Objective:    Physical Exam   Constitutional: She appears well-developed and well-nourished. No distress.   Neurological: She is alert and oriented to person, place, and time. She is not disoriented.   Psychiatric: She has a normal mood and affect.   Skin:   Areas Examined (abnormalities noted in diagram):   Head / Face Inspection Performed  Neck Inspection Performed  Chest / Axilla Inspection Performed  Abdomen Inspection Performed  Back Inspection Performed  RUE Inspected  LUE Inspection Performed                   Diagram Legend     Erythematous scaling macule/papule c/w actinic keratosis       Vascular papule c/w angioma      Pigmented verrucoid papule/plaque c/w seborrheic keratosis      Yellow umbilicated papule c/w sebaceous hyperplasia      Irregularly shaped tan macule c/w lentigo     1-2 mm " smooth white papules consistent with Milia      Movable subcutaneous cyst with punctum c/w epidermal inclusion cyst      Subcutaneous movable cyst c/w pilar cyst      Firm pink to brown papule c/w dermatofibroma      Pedunculated fleshy papule(s) c/w skin tag(s)      Evenly pigmented macule c/w junctional nevus     Mildly variegated pigmented, slightly irregular-bordered macule c/w mildly atypical nevus      Flesh colored to evenly pigmented papule c/w intradermal nevus       Pink pearly papule/plaque c/w basal cell carcinoma      Erythematous hyperkeratotic cursted plaque c/w SCC      Surgical scar with no sign of skin cancer recurrence      Open and closed comedones      Inflammatory papules and pustules      Verrucoid papule consistent consistent with wart     Erythematous eczematous patches and plaques     Dystrophic onycholytic nail with subungual debris c/w onychomycosis     Umbilicated papule    Erythematous-base heme-crusted tan verrucoid plaque consistent with inflamed seborrheic keratosis     Erythematous Silvery Scaling Plaque c/w Psoriasis     See annotation      Assessment / Plan:        Skin cancer screening  Upper body skin examination performed today including at least 6 points as noted in physical examination. No lesions suspicious for malignancy noted.  Patient instructed in importance of daily broad spectrum sunscreen use with spf at least 30. Sun avoidance and topical protection/protective clothing discussed.    AK (actinic keratosis)  Cryosurgery Procedure Note    Verbal consent from the patient is obtained including, but not limited to, risk of hypopigmentation/hyperpigmentation, scar, recurrence of lesion. The patient is aware of the precancerous quality and need for treatment of these lesions. Liquid nitrogen cryosurgery is applied to the 2 actinic keratoses, as detailed in the physical exam, to produce a freeze injury. The patient is aware that blisters may form and is instructed on wound  care with gentle cleansing and use of vaseline ointment to keep moist until healed. The patient is supplied a handout on cryosurgery and is instructed to call if lesions do not completely resolve.    Lentigines  These are benign sun spots which should be monitored for changes. Patient instructed in importance of daily broad spectrum sunscreen use with spf at least 30. Sun avoidance and topical protection/protective clothing discussed.    SK (seborrheic keratosis)  These are benign inherited growths without a malignant potential. Reassurance given to patient. No treatment is necessary.   Treatment of benign, asymptomatic lesions may be considered cosmetic.  Warned about risk of hypo- or hyperpigmentation with treatment and risk of recurrence.      Follow up in about 6 months (around 10/1/2022) for skin check or sooner for any concerns.

## 2022-04-05 ENCOUNTER — LAB VISIT (OUTPATIENT)
Dept: LAB | Facility: HOSPITAL | Age: 72
End: 2022-04-05
Attending: PHYSICIAN ASSISTANT
Payer: MEDICARE

## 2022-04-05 DIAGNOSIS — K21.9 GASTROESOPHAGEAL REFLUX DISEASE, UNSPECIFIED WHETHER ESOPHAGITIS PRESENT: ICD-10-CM

## 2022-04-05 DIAGNOSIS — K91.2 POSTSURGICAL MALABSORPTION, NOT ELSEWHERE CLASSIFIED: ICD-10-CM

## 2022-04-05 DIAGNOSIS — I51.89 DIASTOLIC DYSFUNCTION: ICD-10-CM

## 2022-04-05 DIAGNOSIS — M62.81 MUSCLE WEAKNESS: ICD-10-CM

## 2022-04-05 DIAGNOSIS — M17.0 OSTEOARTHRITIS OF BOTH KNEES, UNSPECIFIED OSTEOARTHRITIS TYPE: ICD-10-CM

## 2022-04-05 DIAGNOSIS — I87.2 VENOUS INSUFFICIENCY OF BOTH LOWER EXTREMITIES: ICD-10-CM

## 2022-04-05 DIAGNOSIS — Z79.899 OTHER LONG TERM (CURRENT) DRUG THERAPY: ICD-10-CM

## 2022-04-05 DIAGNOSIS — Z98.84 STATUS POST LAPAROSCOPIC SLEEVE GASTRECTOMY: ICD-10-CM

## 2022-04-05 DIAGNOSIS — Z98.890 OTHER SPECIFIED POSTPROCEDURAL STATES: ICD-10-CM

## 2022-04-05 DIAGNOSIS — M19.049 CMC ARTHRITIS: ICD-10-CM

## 2022-04-05 LAB
ALBUMIN SERPL BCP-MCNC: 3.9 G/DL (ref 3.5–5.2)
ALP SERPL-CCNC: 58 U/L (ref 38–126)
ALT SERPL W/O P-5'-P-CCNC: 11 U/L (ref 10–44)
ANION GAP SERPL CALC-SCNC: 6 MMOL/L (ref 8–16)
AST SERPL-CCNC: 23 U/L (ref 15–46)
BASOPHILS # BLD AUTO: 0.04 K/UL (ref 0–0.2)
BASOPHILS NFR BLD: 1 % (ref 0–1.9)
BILIRUB SERPL-MCNC: 0.6 MG/DL (ref 0.1–1)
CALCIUM SERPL-MCNC: 9.2 MG/DL (ref 8.7–10.5)
CHLORIDE SERPL-SCNC: 105 MMOL/L (ref 95–110)
CHOLEST SERPL-MCNC: 159 MG/DL (ref 120–199)
CHOLEST/HDLC SERPL: 2.8 {RATIO} (ref 2–5)
CO2 SERPL-SCNC: 29 MMOL/L (ref 23–29)
CREAT SERPL-MCNC: 0.6 MG/DL (ref 0.5–1.4)
DIFFERENTIAL METHOD: ABNORMAL
EOSINOPHIL # BLD AUTO: 0.3 K/UL (ref 0–0.5)
EOSINOPHIL NFR BLD: 6.9 % (ref 0–8)
ERYTHROCYTE [DISTWIDTH] IN BLOOD BY AUTOMATED COUNT: 11.8 % (ref 11.5–14.5)
EST. GFR  (AFRICAN AMERICAN): >60 ML/MIN/1.73 M^2
EST. GFR  (NON AFRICAN AMERICAN): >60 ML/MIN/1.73 M^2
GLUCOSE SERPL-MCNC: 88 MG/DL (ref 70–110)
HCT VFR BLD AUTO: 39.4 % (ref 37–48.5)
HDLC SERPL-MCNC: 57 MG/DL (ref 40–75)
HDLC SERPL: 35.8 % (ref 20–50)
HGB BLD-MCNC: 13.1 G/DL (ref 12–16)
IMM GRANULOCYTES # BLD AUTO: 0.01 K/UL (ref 0–0.04)
IMM GRANULOCYTES NFR BLD AUTO: 0.2 % (ref 0–0.5)
IRON SERPL-MCNC: 112 UG/DL (ref 30–160)
LDLC SERPL CALC-MCNC: 80.8 MG/DL (ref 63–159)
LYMPHOCYTES # BLD AUTO: 1.4 K/UL (ref 1–4.8)
LYMPHOCYTES NFR BLD: 32.6 % (ref 18–48)
MCH RBC QN AUTO: 32.9 PG (ref 27–31)
MCHC RBC AUTO-ENTMCNC: 33.2 G/DL (ref 32–36)
MCV RBC AUTO: 99 FL (ref 82–98)
MONOCYTES # BLD AUTO: 0.3 K/UL (ref 0.3–1)
MONOCYTES NFR BLD: 7.9 % (ref 4–15)
NEUTROPHILS # BLD AUTO: 2.2 K/UL (ref 1.8–7.7)
NEUTROPHILS NFR BLD: 51.4 % (ref 38–73)
NONHDLC SERPL-MCNC: 102 MG/DL
NRBC BLD-RTO: 0 /100 WBC
PLATELET # BLD AUTO: 252 K/UL (ref 150–450)
PMV BLD AUTO: 10.2 FL (ref 9.2–12.9)
POTASSIUM SERPL-SCNC: 4.3 MMOL/L (ref 3.5–5.1)
PROT SERPL-MCNC: 6.6 G/DL (ref 6–8.4)
RBC # BLD AUTO: 3.98 M/UL (ref 4–5.4)
SATURATED IRON: 35 % (ref 20–50)
SODIUM SERPL-SCNC: 140 MMOL/L (ref 136–145)
TOTAL IRON BINDING CAPACITY: 318 UG/DL (ref 250–450)
TRANSFERRIN SERPL-MCNC: 215 MG/DL (ref 200–375)
TRIGL SERPL-MCNC: 106 MG/DL (ref 30–150)
UUN UR-MCNC: 11 MG/DL (ref 7–17)
VIT B12 SERPL-MCNC: 700 PG/ML (ref 210–950)
WBC # BLD AUTO: 4.2 K/UL (ref 3.9–12.7)

## 2022-04-05 PROCEDURE — 85025 COMPLETE CBC W/AUTO DIFF WBC: CPT | Mod: PO | Performed by: PHYSICIAN ASSISTANT

## 2022-04-05 PROCEDURE — 84466 ASSAY OF TRANSFERRIN: CPT | Mod: PO | Performed by: PHYSICIAN ASSISTANT

## 2022-04-05 PROCEDURE — 80061 LIPID PANEL: CPT | Performed by: PHYSICIAN ASSISTANT

## 2022-04-05 PROCEDURE — 80053 COMPREHEN METABOLIC PANEL: CPT | Mod: PO | Performed by: PHYSICIAN ASSISTANT

## 2022-04-05 PROCEDURE — 82607 VITAMIN B-12: CPT | Mod: PO | Performed by: PHYSICIAN ASSISTANT

## 2022-04-05 PROCEDURE — 84425 ASSAY OF VITAMIN B-1: CPT | Mod: PO | Performed by: PHYSICIAN ASSISTANT

## 2022-04-06 ENCOUNTER — TELEPHONE (OUTPATIENT)
Dept: HEPATOLOGY | Facility: CLINIC | Age: 72
End: 2022-04-06
Payer: MEDICARE

## 2022-04-06 ENCOUNTER — OFFICE VISIT (OUTPATIENT)
Dept: BARIATRICS | Facility: CLINIC | Age: 72
End: 2022-04-06
Payer: MEDICARE

## 2022-04-06 ENCOUNTER — HOSPITAL ENCOUNTER (OUTPATIENT)
Dept: RADIOLOGY | Facility: HOSPITAL | Age: 72
Discharge: HOME | End: 2022-04-06
Attending: FAMILY MEDICINE
Payer: MEDICARE

## 2022-04-06 ENCOUNTER — HOSPITAL ENCOUNTER (OUTPATIENT)
Dept: RADIOLOGY | Facility: HOSPITAL | Age: 72
Discharge: HOME | End: 2022-04-06
Attending: INTERNAL MEDICINE
Payer: MEDICARE

## 2022-04-06 VITALS — BODY MASS INDEX: 29.18 KG/M2 | WEIGHT: 170 LBS

## 2022-04-06 VITALS
DIASTOLIC BLOOD PRESSURE: 60 MMHG | HEIGHT: 64 IN | OXYGEN SATURATION: 99 % | HEART RATE: 61 BPM | SYSTOLIC BLOOD PRESSURE: 105 MMHG | WEIGHT: 174.19 LBS | BODY MASS INDEX: 29.74 KG/M2

## 2022-04-06 DIAGNOSIS — L98.7 EXCESS SKIN OF ARM: ICD-10-CM

## 2022-04-06 DIAGNOSIS — Z12.31 ENCOUNTER FOR SCREENING MAMMOGRAM FOR BREAST CANCER: ICD-10-CM

## 2022-04-06 DIAGNOSIS — L98.7 EXCESS SKIN OF ABDOMEN: Primary | ICD-10-CM

## 2022-04-06 DIAGNOSIS — R63.4 WEIGHT LOSS: ICD-10-CM

## 2022-04-06 DIAGNOSIS — R16.0 HEPATOMEGALY: ICD-10-CM

## 2022-04-06 PROCEDURE — 1101F PT FALLS ASSESS-DOCD LE1/YR: CPT | Mod: CPTII,S$GLB,, | Performed by: PHYSICIAN ASSISTANT

## 2022-04-06 PROCEDURE — 3288F PR FALLS RISK ASSESSMENT DOCUMENTED: ICD-10-PCS | Mod: CPTII,S$GLB,, | Performed by: PHYSICIAN ASSISTANT

## 2022-04-06 PROCEDURE — 1159F PR MEDICATION LIST DOCUMENTED IN MEDICAL RECORD: ICD-10-PCS | Mod: CPTII,S$GLB,, | Performed by: PHYSICIAN ASSISTANT

## 2022-04-06 PROCEDURE — 99213 PR OFFICE/OUTPT VISIT, EST, LEVL III, 20-29 MIN: ICD-10-PCS | Mod: S$GLB,,, | Performed by: PHYSICIAN ASSISTANT

## 2022-04-06 PROCEDURE — 77063 MAMMO DIGITAL SCREENING BILAT WITH TOMO: ICD-10-PCS | Mod: 26,,, | Performed by: INTERNAL MEDICINE

## 2022-04-06 PROCEDURE — 99499 UNLISTED E&M SERVICE: CPT | Mod: S$GLB,,, | Performed by: PHYSICIAN ASSISTANT

## 2022-04-06 PROCEDURE — 77063 BREAST TOMOSYNTHESIS BI: CPT | Mod: TC

## 2022-04-06 PROCEDURE — 1101F PR PT FALLS ASSESS DOC 0-1 FALLS W/OUT INJ PAST YR: ICD-10-PCS | Mod: CPTII,S$GLB,, | Performed by: PHYSICIAN ASSISTANT

## 2022-04-06 PROCEDURE — 1160F RVW MEDS BY RX/DR IN RCRD: CPT | Mod: CPTII,S$GLB,, | Performed by: PHYSICIAN ASSISTANT

## 2022-04-06 PROCEDURE — 77067 SCR MAMMO BI INCL CAD: CPT | Mod: 26,,, | Performed by: INTERNAL MEDICINE

## 2022-04-06 PROCEDURE — 3078F PR MOST RECENT DIASTOLIC BLOOD PRESSURE < 80 MM HG: ICD-10-PCS | Mod: CPTII,S$GLB,, | Performed by: PHYSICIAN ASSISTANT

## 2022-04-06 PROCEDURE — 1126F AMNT PAIN NOTED NONE PRSNT: CPT | Mod: CPTII,S$GLB,, | Performed by: PHYSICIAN ASSISTANT

## 2022-04-06 PROCEDURE — 3074F SYST BP LT 130 MM HG: CPT | Mod: CPTII,S$GLB,, | Performed by: PHYSICIAN ASSISTANT

## 2022-04-06 PROCEDURE — 99499 RISK ADDL DX/OHS AUDIT: ICD-10-PCS | Mod: S$GLB,,, | Performed by: PHYSICIAN ASSISTANT

## 2022-04-06 PROCEDURE — 3288F FALL RISK ASSESSMENT DOCD: CPT | Mod: CPTII,S$GLB,, | Performed by: PHYSICIAN ASSISTANT

## 2022-04-06 PROCEDURE — 3074F PR MOST RECENT SYSTOLIC BLOOD PRESSURE < 130 MM HG: ICD-10-PCS | Mod: CPTII,S$GLB,, | Performed by: PHYSICIAN ASSISTANT

## 2022-04-06 PROCEDURE — 99999 PR PBB SHADOW E&M-EST. PATIENT-LVL V: ICD-10-PCS | Mod: PBBFAC,,, | Performed by: PHYSICIAN ASSISTANT

## 2022-04-06 PROCEDURE — 1160F PR REVIEW ALL MEDS BY PRESCRIBER/CLIN PHARMACIST DOCUMENTED: ICD-10-PCS | Mod: CPTII,S$GLB,, | Performed by: PHYSICIAN ASSISTANT

## 2022-04-06 PROCEDURE — 1126F PR PAIN SEVERITY QUANTIFIED, NO PAIN PRESENT: ICD-10-PCS | Mod: CPTII,S$GLB,, | Performed by: PHYSICIAN ASSISTANT

## 2022-04-06 PROCEDURE — 1159F MED LIST DOCD IN RCRD: CPT | Mod: CPTII,S$GLB,, | Performed by: PHYSICIAN ASSISTANT

## 2022-04-06 PROCEDURE — 77067 MAMMO DIGITAL SCREENING BILAT WITH TOMO: ICD-10-PCS | Mod: 26,,, | Performed by: INTERNAL MEDICINE

## 2022-04-06 PROCEDURE — 76705 ECHO EXAM OF ABDOMEN: CPT | Mod: TC,PO

## 2022-04-06 PROCEDURE — 3008F PR BODY MASS INDEX (BMI) DOCUMENTED: ICD-10-PCS | Mod: CPTII,S$GLB,, | Performed by: PHYSICIAN ASSISTANT

## 2022-04-06 PROCEDURE — 3008F BODY MASS INDEX DOCD: CPT | Mod: CPTII,S$GLB,, | Performed by: PHYSICIAN ASSISTANT

## 2022-04-06 PROCEDURE — 77063 BREAST TOMOSYNTHESIS BI: CPT | Mod: 26,,, | Performed by: INTERNAL MEDICINE

## 2022-04-06 PROCEDURE — 99213 OFFICE O/P EST LOW 20 MIN: CPT | Mod: S$GLB,,, | Performed by: PHYSICIAN ASSISTANT

## 2022-04-06 PROCEDURE — 3078F DIAST BP <80 MM HG: CPT | Mod: CPTII,S$GLB,, | Performed by: PHYSICIAN ASSISTANT

## 2022-04-06 PROCEDURE — 99999 PR PBB SHADOW E&M-EST. PATIENT-LVL V: CPT | Mod: PBBFAC,,, | Performed by: PHYSICIAN ASSISTANT

## 2022-04-06 NOTE — PROGRESS NOTES
BARIATRIC VISIT:    HPI:  Luz Burk is a 71 y.o. year old female presents for 1 year post op visit following s/p sleeve.    Pt states that her heartburn/ indigestion is controlled with famotidine 20 mg twice daily.  She states that she is getting better at watching for her full signals: runny nose/ eyes watering.      Otherwise doing fine. Just getting through day by day.     , Alejandro, passed away Jan 6, 2022 d/t chronic CHF and COPD. Passed away peacefully in his sleep.  Donated body to science, no formal service yet.     Lots of traveling: cruises, [employer], [employer], [employer]/ [employer], 23 day cruise to [place]/ [place]/ [place].    As she is walking out of the door, she mentions in passing that she started smoking since Alejandro passed away and is drinking 1-2 cocktails at night to help her sleep.  She plans to quit smoking during Easter when her son comes in town.     Denies: nausea, vomiting, abdominal pain, changes in bowel movement pattern, fever, chills, dysphagia, chest pain, and shortness of breath.    Review of Systems   Constitutional: Negative for fatigue and fever.   HENT: Negative for tinnitus and trouble swallowing.    Eyes: Negative for visual disturbance.   Respiratory: Negative for cough and shortness of breath.    Cardiovascular: Negative for chest pain, palpitations and leg swelling.   Gastrointestinal: Negative for abdominal pain, constipation, diarrhea, nausea and vomiting.   Genitourinary: Negative for decreased urine volume.   Musculoskeletal: Negative for myalgias.   Psychiatric/Behavioral: Negative for dysphoric mood, self-injury and suicidal ideas. The patient is not nervous/anxious.        EXERCISE:  Cleaning house, moving boxes.  Looking forward to water aerobics starting in the summer. Will restart senior classes soon.   VITAMINS: Caltrate, B12 shot, B6, MVI (theragram M), Biotin, Melatonin.     MEDICATIONS/ALLERGIES:  Have been reviewed.    DIET:  See Dietician note from today  for a more detailed assessment.  Regular diet, just smaller portions. She states that she avoids most carbohydrates and desserts.  She eats at her brother's house.  She watches her portions.      Physical Exam  Constitutional:       Appearance: She is obese.   HENT:      Head: Normocephalic and atraumatic.   Eyes:      Extraocular Movements: Extraocular movements intact.      Conjunctiva/sclera: Conjunctivae normal.      Pupils: Pupils are equal, round, and reactive to light.   Cardiovascular:      Rate and Rhythm: Normal rate and regular rhythm.      Pulses: Normal pulses.      Heart sounds: Normal heart sounds.   Pulmonary:      Effort: Pulmonary effort is normal.      Breath sounds: Normal breath sounds. No wheezing.   Abdominal:      General: Bowel sounds are normal.      Palpations: Abdomen is soft.      Tenderness: There is no abdominal tenderness.   Musculoskeletal:         General: Normal range of motion.      Cervical back: Normal range of motion and neck supple.   Skin:     General: Skin is warm.      Capillary Refill: Capillary refill takes less than 2 seconds.   Neurological:      General: No focal deficit present.      Mental Status: She is alert and oriented to person, place, and time.   Psychiatric:         Mood and Affect: Mood normal.         Behavior: Behavior normal.         Thought Content: Thought content normal.         Judgment: Judgment normal.       ASSESSMENT:  - Morbid obesity s/p band removal to sleeve gastrectomy on 4/7/2021.  - Co-morbidities: GERD  - Good Weight loss, 36#'s and 48% EWL  - No formal Exercise routine, good daily activity.  - Good Diet  - Good Vitamin regimen  - Tobacco use  - Alcohol use    PLAN:  - Referral for plastic surgery: panniculectomy and brachioplasty.  - Strongly recommend she d/c nightly alcohol.  - Recommend that she follow through on her plan to quit smoking when her son visits during [place].  She plans to quit cold turkey.  Warned of nicotine withdrawal  symptoms and advised to use patches/ gum to help ease any symptoms she has.   - Miralax daily for constipation  - Emphasized the importance of regular exercise and adherence to bariatric diet to achieve maximum weight loss.  - Encouraged patient to start/continue regular exercise.  - Follow-up with dietician to reinforce diet.  - Continue daily vitamins and medications.  If thiamin is low, she will need to add a separate B1 supplement, her MVI only has 3mg of thiamine.   - RTC per post op schedule  - Call the office for any issues.  - Check labs today.    30 minute visit, over 50% of time spent counseling patient face to face on diet, exercise, and weight loss.

## 2022-04-06 NOTE — TELEPHONE ENCOUNTER
----- Message from Butch Blackwell MD sent at 4/6/2022  8:47 AM CDT -----  Results reviewed. No concerns

## 2022-04-06 NOTE — PATIENT INSTRUCTIONS
For plastic surgery, here is a list of plastic surgeons we recommend:    1) Dr. Anthony Bojorquez, [facility], [phone]    2) Dr. Meléndez & Elsy Venegas, [phone]    3) Dr. Eliu Granda, [phone]  4) Dr Jarad Son, [phone]

## 2022-04-07 ENCOUNTER — PATIENT MESSAGE (OUTPATIENT)
Dept: BARIATRICS | Facility: CLINIC | Age: 72
End: 2022-04-07
Payer: MEDICARE

## 2022-04-11 LAB — VIT B1 BLD-MCNC: 62 UG/L (ref 38–122)

## 2022-04-12 ENCOUNTER — PATIENT MESSAGE (OUTPATIENT)
Dept: BARIATRICS | Facility: CLINIC | Age: 72
End: 2022-04-12
Payer: MEDICARE

## 2022-05-05 ENCOUNTER — PATIENT MESSAGE (OUTPATIENT)
Dept: BARIATRICS | Facility: CLINIC | Age: 72
End: 2022-05-05
Payer: MEDICARE

## 2022-05-10 ENCOUNTER — PATIENT MESSAGE (OUTPATIENT)
Dept: BARIATRICS | Facility: CLINIC | Age: 72
End: 2022-05-10
Payer: MEDICARE

## 2022-05-17 NOTE — TELEPHONE ENCOUNTER
Refill Routing Note   Medication(s) are not appropriate for processing by [facility] for the following reason(s):      - Patient has not been seen in over 15 months by PCP  - Patient has been seen in the ED/Hospital since the last PCP visit    [facility] action(s):  Defer          Medication reconciliation completed: No     Appointments  past 12m or future 3m with PCP    Date Provider   Last Visit   1/11/2021 Hilario Slaughter MD   Next Visit   Visit date not found Hilario Slaughter MD   ED visits in past 90 days: 0        Note composed:7:15 AM 05/17/2022

## 2022-05-17 NOTE — TELEPHONE ENCOUNTER
Care Due:                  Date            Visit Type   Department     Provider  --------------------------------------------------------------------------------                                EP -                              PRIMARY      [facility] FAMILY  Last Visit: 01-      CARE ([facility])   MEDICINE       Hilario Slaughter  Next Visit: None Scheduled  None         None Found                                                            Last  Test          Frequency    Reason                     Performed    Due Date  --------------------------------------------------------------------------------    Office Visit  12 months..  citalopram, levothyroxine  01- 01-    TSH.........  12 months..  levothyroxine............  05- 05-    [facility] Embedded Care Gaps. Reference number: [id number]. 5/17/2022   7:09:45 AM CDT

## 2022-05-19 RX ORDER — CITALOPRAM 20 MG/1
TABLET, FILM COATED ORAL
Qty: 90 TABLET | Refills: 3 | Status: SHIPPED | OUTPATIENT
Start: 2022-05-19 | End: 2023-05-10

## 2022-05-22 ENCOUNTER — PATIENT MESSAGE (OUTPATIENT)
Dept: OPTOMETRY | Facility: CLINIC | Age: 72
End: 2022-05-22
Payer: MEDICARE

## 2022-05-23 ENCOUNTER — TELEPHONE (OUTPATIENT)
Dept: OPHTHALMOLOGY | Facility: CLINIC | Age: 72
End: 2022-05-23
Payer: MEDICARE

## 2022-05-25 ENCOUNTER — OFFICE VISIT (OUTPATIENT)
Dept: OPTOMETRY | Facility: CLINIC | Age: 72
End: 2022-05-25
Payer: MEDICARE

## 2022-05-25 DIAGNOSIS — Z96.1 PSEUDOPHAKIA OF BOTH EYES: ICD-10-CM

## 2022-05-25 DIAGNOSIS — Z98.42 S/P BILATERAL CATARACT EXTRACTION: ICD-10-CM

## 2022-05-25 DIAGNOSIS — H01.119 CONTACT DERMATITIS OF EYELID, UNSPECIFIED LATERALITY: Primary | ICD-10-CM

## 2022-05-25 DIAGNOSIS — Z98.41 S/P BILATERAL CATARACT EXTRACTION: ICD-10-CM

## 2022-05-25 PROCEDURE — 92012 INTRM OPH EXAM EST PATIENT: CPT | Mod: S$GLB,,, | Performed by: OPTOMETRIST

## 2022-05-25 PROCEDURE — 1159F MED LIST DOCD IN RCRD: CPT | Mod: CPTII,S$GLB,, | Performed by: OPTOMETRIST

## 2022-05-25 PROCEDURE — 92012 PR EYE EXAM, EST PATIENT,INTERMED: ICD-10-PCS | Mod: S$GLB,,, | Performed by: OPTOMETRIST

## 2022-05-25 PROCEDURE — 1159F PR MEDICATION LIST DOCUMENTED IN MEDICAL RECORD: ICD-10-PCS | Mod: CPTII,S$GLB,, | Performed by: OPTOMETRIST

## 2022-05-25 PROCEDURE — 99999 PR PBB SHADOW E&M-EST. PATIENT-LVL III: ICD-10-PCS | Mod: PBBFAC,,, | Performed by: OPTOMETRIST

## 2022-05-25 PROCEDURE — 99999 PR PBB SHADOW E&M-EST. PATIENT-LVL III: CPT | Mod: PBBFAC,,, | Performed by: OPTOMETRIST

## 2022-05-25 NOTE — PATIENT INSTRUCTIONS
S/P bilateral cataract surgery, with posterior chamber intraocular lens.    Ms. Burk presents with mild periocular dermatitis bilaterally, with associated disomfort.   Lid scrub pads twice per day, preferably with lid  scrub pads.  Apply erythromycin ophthalmic ointment to lid margins and periocular tissue once per day, in the evening, following removal of the contact lens from the right eye.  Sample issued.  Call/email with report on symptoms in ten to fourteen days

## 2022-05-29 NOTE — PROGRESS NOTES
"HPI     eye problems       Additional comments: Both eyes itchy, red, tearing - worse in the in the   left eye.  Using Flonase as needed, and oral anti-allergy medication.  Has been using Pataday once per day, and artificial tears in both eyes as   needed throughout the day.              Comments     Patient's age: 71 y.o. WM   Originally from [place], [place].   Occupation: Retired   Approximate date of last eye examination:  02/17/2021  Name of last eye doctor seen: Dr. Yañez   City/State: [place]   Wears glasses? No  Wears CL in OD only (for near)   Present glasses are: Bifocal, SV Distance, SV Reading?  SV OTC when   without CL in OD   Approximate age of present glasses:  N/a   Got new glasses following last exam, or subsequently?:  No   Wears CLs?:   OD Air Optix Aqua 8.6  14.2  +2.00  (near)            If yes:               Type of CL worn:  SCL's OD only for reading, to achieve   monovision effect               Wears full-time or part-time:   Full time                Sleeps with contact lenses:  No                CL Solution used:  Generic                How often replace CLs:  "When it starts bothering my eye, or   if I drop it" - "about once a month"              Any problem with VA with CLs?  No                 Headaches?  No   Eye pain/discomfort?  bilateral itching, redness, and tearing                                                                                   Flashes?  No    Floaters?  No   Diplopia/Double vision?  No   Patient's Ocular History:          Any eye surgeries? S/p phaco OU          Any eye injury?  No          Any treatment for eye disease?  No   Family history of eye disease?  No   Significant patient medical history:         1. Diabetes?  No      If yes, IDDM or NIDDM?  N/a   2. HBP?  No               3. Other (describe):   shunt      ! OTC eyedrops currently using:  AT's and Pataday once per day.    ! Prescription eye meds currently using:  No    ! Any history of allergy/adverse " "reaction to any eye meds used   previously?  No      ! Any history of allergy/adverse reaction to eyedrops used during prior   eye exam(s)? No      ! Any history of allergy/adverse reaction to Novacaine or similar meds?   No    ! Any history of allergy/adverse reaction to Epinephrine or similar meds?   No    ! Patient okay with use of anesthetic eyedrops to check eye pressure?    Yes          ! Patient okay with use of eyedrops to dilate pupils today?  Yes       PD =   63/59   Desired reading distance =  15.5"                         Last edited by Graeme Yañez, OD on 5/25/2022 12:57 PM. (History)            Assessment /Plan     For exam results, see Encounter Report.    1. Contact dermatitis of eyelid, unspecified laterality     2. S/P bilateral cataract extraction     3. Pseudophakia of both eyes                  S/P bilateral cataract surgery, with posterior chamber intraocular lens.    Ms. Burk presents with mild periocular dermatitis bilaterally, with associated disomfort.   Lid scrub pads twice per day, preferably with lid  scrub pads.  Apply erythromycin ophthalmic ointment to lid margins and periocular tissue once per day, in the evening, following removal of the contact lens from the right eye.  Sample issued.  Call/email with report on symptoms in ten to fourteen days       "

## 2022-06-09 ENCOUNTER — PATIENT MESSAGE (OUTPATIENT)
Dept: OPTOMETRY | Facility: CLINIC | Age: 72
End: 2022-06-09
Payer: MEDICARE

## 2022-06-09 ENCOUNTER — TELEPHONE (OUTPATIENT)
Dept: OPHTHALMOLOGY | Facility: CLINIC | Age: 72
End: 2022-06-09
Payer: MEDICARE

## 2022-06-10 ENCOUNTER — PATIENT MESSAGE (OUTPATIENT)
Dept: BARIATRICS | Facility: CLINIC | Age: 72
End: 2022-06-10
Payer: MEDICARE

## 2022-06-13 ENCOUNTER — TELEPHONE (OUTPATIENT)
Dept: OPHTHALMOLOGY | Facility: CLINIC | Age: 72
End: 2022-06-13
Payer: MEDICARE

## 2022-06-14 ENCOUNTER — PATIENT MESSAGE (OUTPATIENT)
Dept: BARIATRICS | Facility: CLINIC | Age: 72
End: 2022-06-14
Payer: MEDICARE

## 2022-06-14 ENCOUNTER — OFFICE VISIT (OUTPATIENT)
Dept: OPTOMETRY | Facility: CLINIC | Age: 72
End: 2022-06-14
Payer: MEDICARE

## 2022-06-14 DIAGNOSIS — Z96.1 PSEUDOPHAKIA OF BOTH EYES: ICD-10-CM

## 2022-06-14 DIAGNOSIS — H10.13 ALLERGIC CONJUNCTIVITIS OF BOTH EYES: Primary | ICD-10-CM

## 2022-06-14 DIAGNOSIS — Z98.41 S/P BILATERAL CATARACT EXTRACTION: ICD-10-CM

## 2022-06-14 DIAGNOSIS — Z98.42 S/P BILATERAL CATARACT EXTRACTION: ICD-10-CM

## 2022-06-14 PROCEDURE — 99999 PR PBB SHADOW E&M-EST. PATIENT-LVL II: ICD-10-PCS | Mod: PBBFAC,,, | Performed by: OPTOMETRIST

## 2022-06-14 PROCEDURE — 1159F MED LIST DOCD IN RCRD: CPT | Mod: CPTII,S$GLB,, | Performed by: OPTOMETRIST

## 2022-06-14 PROCEDURE — 99499 NO LOS: ICD-10-PCS | Mod: S$GLB,,, | Performed by: OPTOMETRIST

## 2022-06-14 PROCEDURE — 99499 UNLISTED E&M SERVICE: CPT | Mod: S$GLB,,, | Performed by: OPTOMETRIST

## 2022-06-14 PROCEDURE — 99999 PR PBB SHADOW E&M-EST. PATIENT-LVL II: CPT | Mod: PBBFAC,,, | Performed by: OPTOMETRIST

## 2022-06-14 PROCEDURE — 1159F PR MEDICATION LIST DOCUMENTED IN MEDICAL RECORD: ICD-10-PCS | Mod: CPTII,S$GLB,, | Performed by: OPTOMETRIST

## 2022-06-14 NOTE — PATIENT INSTRUCTIONS
Mrs. Burk returns today with report of persistent symptoms of bilateral ocular discomfort/itching in both eyes, suggestive of allergy-related symptoms.  Mrs. Burk has been using Pataday ophthalmic solution, and shehas been using Erythromycin ophthalmic ointment in both eyes as advised.  However symptoms persist.  Discontinue erythromycin ophthalmic ointment in both eyes  Issued sample of FML drops for use in both eyes two times per day, but 15- 20 minutes before insertion SCL in the right eye in the morning , and following removal of the contact lens in the evening.  Okay to continue to use Pataday once per day.  Refresh Contacts or Refresh Plus rewetting drops in both eyes as needed throughout the day.  Return for recheck on 06/20/2022 if symptoms not satisfactorily relieved.  Once symptoms satisfactorily relieved, reduce usage of FML drops to once per day, in the evening for several days and then discontinue

## 2022-06-15 NOTE — PROGRESS NOTES
HPI     follow-up visit      Additional comments: Ms. Burk in today for follow-up on symptoms noted   on previous visit.  Has been using Erythromycin ophthalmic ointment in both eyes, but with   little relief.  Reports of itching in both eyes, with mucoid discharge.  No periocular   dermatitis./ecxema.  Wears SCL in the right eye only.   + history of allergies.   Has been using Pataday ophthalmic solution in   both eyes once per day           Last edited by Graeme Yañez, OD on 6/14/2022  7:13 AM. (History)            Assessment /Plan     For exam results, see Encounter Report.    1. Allergic conjunctivitis of both eyes     2. S/P bilateral cataract extraction     3. Pseudophakia of both eyes                  Mrs. Burk returns today with report of persistent symptoms of bilateral ocular discomfort/itching in both eyes, suggestive of allergy-related symptoms.  Mrs. Burk has been using Pataday ophthalmic solution, and shehas been using Erythromycin ophthalmic ointment in both eyes as advised.  However symptoms persist.  Discontinue erythromycin ophthalmic ointment in both eyes  Issued sample of FML drops for use in both eyes two times per day, but 15- 20 minutes before insertion SCL in the right eye in the morning , and following removal of the contact lens in the evening.  Okay to continue to use Pataday once per day.  Refresh Contacts or Refresh Plus rewetting drops in both eyes as needed throughout the day.  Return for recheck on 06/20/2022 if symptoms not satisfactorily relieved.  Once symptoms satisfactorily relieved, reduce usage of FML drops to once per day, in the evening for several days and then discontinue

## 2022-07-05 ENCOUNTER — PATIENT MESSAGE (OUTPATIENT)
Dept: BARIATRICS | Facility: CLINIC | Age: 72
End: 2022-07-05
Payer: MEDICARE

## 2022-07-05 PROBLEM — E66.9 OBESITY: Status: RESOLVED | Noted: 2021-04-07 | Resolved: 2022-07-05

## 2022-07-05 NOTE — PROGRESS NOTES
"  Luz Burk presented for a  Medicare AWV and comprehensive Health Risk Assessment today. The following components were reviewed and updated:    · Medical history  · Family History  · Social history  · Allergies and Current Medications  · Health Risk Assessment  · Health Maintenance  · Care Team         ** See Completed Assessments for Annual Wellness Visit within the encounter summary.**         The following assessments were completed:  · Living Situation  · CAGE  · Depression Screening  · Timed Get Up and Go  · Whisper Test  · Cognitive Function Screening  · Nutrition Screening  · ADL Screening  · PAQ Screening        Vitals:    07/11/22 1046   BP: 106/70   Pulse: 63   SpO2: 98%   Weight: 73.6 kg (162 lb 5.9 oz)   Height: 5' 4" (1.626 m)     Body mass index is 27.87 kg/m².  Physical Exam  Vitals and nursing note reviewed.   Constitutional:       General: She is not in acute distress.     Appearance: Normal appearance. She is not ill-appearing.   HENT:      Head: Normocephalic and atraumatic.   Eyes:      General: No scleral icterus.        Right eye: No discharge.         Left eye: No discharge.      Extraocular Movements: Extraocular movements intact.      Conjunctiva/sclera: Conjunctivae normal.   Cardiovascular:      Rate and Rhythm: Normal rate and regular rhythm.      Heart sounds: Normal heart sounds. No murmur heard.  Pulmonary:      Effort: Pulmonary effort is normal. No respiratory distress.      Breath sounds: Normal breath sounds. No wheezing, rhonchi or rales.   Musculoskeletal:      Cervical back: Normal range of motion.      Right lower leg: No edema.      Left lower leg: No edema.      Right foot: Deformity present.   Skin:     General: Skin is warm and dry.      Findings: Bruising (bilat arms ) present. No rash.   Neurological:      Mental Status: She is alert and oriented to person, place, and time.   Psychiatric:         Mood and Affect: Mood normal.         Behavior: Behavior normal. Behavior " is cooperative.         Cognition and Memory: Cognition and memory normal.               Diagnoses and health risks identified today and associated recommendations/orders:    1. Encounter for preventive health examination  - Chart reviewed. Problem list updated. Discussed current medical diagnosis, current medications, medical/surgical/family/social history; updated provider list; documented vital signs; identified any cognitive impairment; and updated risk factor list. Addressed any outstanding health maintenance. Provided patient with personalized health advice. Continue to follow up with PCP and any specialists.     2. Major depressive disorder, recurrent, mild  Chronic; stable on current treatment plan; follow up with PCP  - taking celexa     3. Senile purpura  Chronic; stable on current treatment plan; follow up with PCP    4. Peripheral vascular disease, unspecified  Chronic; stable on current treatment plan; follow up with PCP    5. Diastolic dysfunction  Chronic; stable on current treatment plan; follow up with PCP  - recommend low sodium diet     6. Venous insufficiency of both lower extremities  Chronic; stable on current treatment plan; follow up with PCP      7. Hypothyroidism due to acquired atrophy of thyroid  Chronic; stable on current treatment plan; follow up with PCP  - taking synthroid     8. Gastroesophageal reflux disease, unspecified whether esophagitis present  Chronic; stable on current treatment plan; follow up with PCP  - taking pepcid     9. Anxiety  Chronic; stable on current treatment plan; follow up with PCP  - taking celexa     10. Recurrent herpes labialis  Chronic; stable on current treatment plan; follow up with PCP  - taking valtrex PRN    11. Post-menopausal  .- due for bone density, ordered today   - DXA Bone Density Spine And Hip; Future    12. Personal history of tobacco use  - 50pack/yr history, quit for 20 years, recently started smoking again in January 2022 after   passed away. Plans to quit by August 2022  - education provided today   - CT Chest Lung Screening Low Dose; Future  - Ambulatory referral/consult to Smoking Cessation Program; Future    13. Right foot pain  - foot pain right foot with visible deformity, referral placed  - Ambulatory referral/consult to Podiatry; Future    14. BMI 27.0-27.9,adult  - Recommendation for healthy diet and increasing exercise as tolerated with goal of 150min/week . Recommend weight loss        Provided Luz with a 5-10 year written screening schedule and personal prevention plan. Recommendations were developed using the USPSTF age appropriate recommendations. Education, counseling, and referrals were provided as needed. After Visit Summary printed and given to patient which includes a list of additional screenings\tests needed.    Follow up in about 1 year (around 7/11/2023) for your next annual wellness visit.    Marisela Rose, FNP-C   Advance Care Planning         I offered to discuss advanced care planning, including how to pick a person who would make decisions for you if you were unable to make them for yourself, called a health care power of , and what kind of decisions you might make such as use of life sustaining treatments such as ventilators and tube feeding when faced with a life limiting illness recorded on a living will that they will need to know. (How you want to be cared for as you near the end of your natural life)     X  Patient has advanced directives written and agrees to provide copies to the institution.

## 2022-07-11 ENCOUNTER — OFFICE VISIT (OUTPATIENT)
Dept: INTERNAL MEDICINE | Facility: CLINIC | Age: 72
End: 2022-07-11
Payer: MEDICARE

## 2022-07-11 VITALS
HEIGHT: 64 IN | OXYGEN SATURATION: 98 % | BODY MASS INDEX: 27.72 KG/M2 | WEIGHT: 162.38 LBS | DIASTOLIC BLOOD PRESSURE: 70 MMHG | HEART RATE: 63 BPM | SYSTOLIC BLOOD PRESSURE: 106 MMHG

## 2022-07-11 DIAGNOSIS — I87.2 VENOUS INSUFFICIENCY OF BOTH LOWER EXTREMITIES: ICD-10-CM

## 2022-07-11 DIAGNOSIS — Z00.00 ENCOUNTER FOR PREVENTIVE HEALTH EXAMINATION: Primary | ICD-10-CM

## 2022-07-11 DIAGNOSIS — B00.1 RECURRENT HERPES LABIALIS: ICD-10-CM

## 2022-07-11 DIAGNOSIS — Z78.0 POST-MENOPAUSAL: ICD-10-CM

## 2022-07-11 DIAGNOSIS — I51.89 DIASTOLIC DYSFUNCTION: ICD-10-CM

## 2022-07-11 DIAGNOSIS — I73.9 PERIPHERAL VASCULAR DISEASE, UNSPECIFIED: ICD-10-CM

## 2022-07-11 DIAGNOSIS — F33.0 MAJOR DEPRESSIVE DISORDER, RECURRENT, MILD: ICD-10-CM

## 2022-07-11 DIAGNOSIS — M79.671 RIGHT FOOT PAIN: ICD-10-CM

## 2022-07-11 DIAGNOSIS — F41.9 ANXIETY: ICD-10-CM

## 2022-07-11 DIAGNOSIS — E03.4 HYPOTHYROIDISM DUE TO ACQUIRED ATROPHY OF THYROID: ICD-10-CM

## 2022-07-11 DIAGNOSIS — K21.9 GASTROESOPHAGEAL REFLUX DISEASE, UNSPECIFIED WHETHER ESOPHAGITIS PRESENT: Chronic | ICD-10-CM

## 2022-07-11 DIAGNOSIS — D69.2 SENILE PURPURA: ICD-10-CM

## 2022-07-11 DIAGNOSIS — Z87.891 PERSONAL HISTORY OF TOBACCO USE: ICD-10-CM

## 2022-07-11 PROCEDURE — 1159F PR MEDICATION LIST DOCUMENTED IN MEDICAL RECORD: ICD-10-PCS | Mod: CPTII,S$GLB,, | Performed by: NURSE PRACTITIONER

## 2022-07-11 PROCEDURE — 1159F MED LIST DOCD IN RCRD: CPT | Mod: CPTII,S$GLB,, | Performed by: NURSE PRACTITIONER

## 2022-07-11 PROCEDURE — 1126F PR PAIN SEVERITY QUANTIFIED, NO PAIN PRESENT: ICD-10-PCS | Mod: CPTII,S$GLB,, | Performed by: NURSE PRACTITIONER

## 2022-07-11 PROCEDURE — 3288F FALL RISK ASSESSMENT DOCD: CPT | Mod: CPTII,S$GLB,, | Performed by: NURSE PRACTITIONER

## 2022-07-11 PROCEDURE — 1170F PR FUNCTIONAL STATUS ASSESSED: ICD-10-PCS | Mod: CPTII,S$GLB,, | Performed by: NURSE PRACTITIONER

## 2022-07-11 PROCEDURE — 99999 PR PBB SHADOW E&M-EST. PATIENT-LVL V: ICD-10-PCS | Mod: PBBFAC,,, | Performed by: NURSE PRACTITIONER

## 2022-07-11 PROCEDURE — 1126F AMNT PAIN NOTED NONE PRSNT: CPT | Mod: CPTII,S$GLB,, | Performed by: NURSE PRACTITIONER

## 2022-07-11 PROCEDURE — G0439 PPPS, SUBSEQ VISIT: HCPCS | Mod: S$GLB,,, | Performed by: NURSE PRACTITIONER

## 2022-07-11 PROCEDURE — 99999 PR PBB SHADOW E&M-EST. PATIENT-LVL V: CPT | Mod: PBBFAC,,, | Performed by: NURSE PRACTITIONER

## 2022-07-11 PROCEDURE — 1160F RVW MEDS BY RX/DR IN RCRD: CPT | Mod: CPTII,S$GLB,, | Performed by: NURSE PRACTITIONER

## 2022-07-11 PROCEDURE — G0439 PR MEDICARE ANNUAL WELLNESS SUBSEQUENT VISIT: ICD-10-PCS | Mod: S$GLB,,, | Performed by: NURSE PRACTITIONER

## 2022-07-11 PROCEDURE — 99499 UNLISTED E&M SERVICE: CPT | Mod: S$GLB,,, | Performed by: NURSE PRACTITIONER

## 2022-07-11 PROCEDURE — 1160F PR REVIEW ALL MEDS BY PRESCRIBER/CLIN PHARMACIST DOCUMENTED: ICD-10-PCS | Mod: CPTII,S$GLB,, | Performed by: NURSE PRACTITIONER

## 2022-07-11 PROCEDURE — 99499 RISK ADDL DX/OHS AUDIT: ICD-10-PCS | Mod: S$GLB,,, | Performed by: NURSE PRACTITIONER

## 2022-07-11 PROCEDURE — 1101F PR PT FALLS ASSESS DOC 0-1 FALLS W/OUT INJ PAST YR: ICD-10-PCS | Mod: CPTII,S$GLB,, | Performed by: NURSE PRACTITIONER

## 2022-07-11 PROCEDURE — 3288F PR FALLS RISK ASSESSMENT DOCUMENTED: ICD-10-PCS | Mod: CPTII,S$GLB,, | Performed by: NURSE PRACTITIONER

## 2022-07-11 PROCEDURE — 3008F BODY MASS INDEX DOCD: CPT | Mod: CPTII,S$GLB,, | Performed by: NURSE PRACTITIONER

## 2022-07-11 PROCEDURE — 1170F FXNL STATUS ASSESSED: CPT | Mod: CPTII,S$GLB,, | Performed by: NURSE PRACTITIONER

## 2022-07-11 PROCEDURE — 1101F PT FALLS ASSESS-DOCD LE1/YR: CPT | Mod: CPTII,S$GLB,, | Performed by: NURSE PRACTITIONER

## 2022-07-11 PROCEDURE — 3008F PR BODY MASS INDEX (BMI) DOCUMENTED: ICD-10-PCS | Mod: CPTII,S$GLB,, | Performed by: NURSE PRACTITIONER

## 2022-07-11 NOTE — Clinical Note
I saw your patient today for medicare welllness visit. I see she is due for annual wellness visit with you. Her last office visit was on 1/2021. She is requesting refill of lasix and potassium. I did NOT refill these RX since Rx was from 2019 and no evidence of fluid on exam.  I asked her to follow up with you. Can you please have your office reach out to her to schedule an appt with you. Please refill lasix and KCL if you feel she should remain on these medications. I also wanted to let you know that she has started smoking again in January after quitting for 20+years. I placed an order for smoking cessation, but she plans to quit by August. Thank you so much.

## 2022-07-11 NOTE — PATIENT INSTRUCTIONS
Counseling and Referral of Other Preventative  (Italic type indicates deductible and co-insurance are waived)    Patient Name: Luz Burk  Today's Date: 7/11/2022    Health Maintenance       Date Due Completion Date    LDCT Lung Screen Never done ---    DEXA Scan 03/15/2022 3/15/2019    Override on 1/1/2016: Done (dexa at dr davis -)    Influenza Vaccine (1) 09/01/2022 12/5/2021    Override on 11/13/2019: Done    Mammogram 04/06/2023 4/6/2022    Override on 8/4/2014: Done    Colorectal Cancer Screening 06/17/2025 6/17/2020    Lipid Panel 04/05/2027 4/5/2022    TETANUS VACCINE 02/22/2028 2/22/2018        Orders Placed This Encounter   Procedures    DXA Bone Density Spine And Hip    CT Chest Lung Screening Low Dose     The following information is provided to all patients.  This information is to help you find resources for any of the problems found today that may be affecting your health:                Living healthy guide: www.[facility].[place].gov      Understanding Diabetes: www.diabetes.org      Eating healthy: www.cdc.gov/healthyweight      [facility][note 3] home safety checklist: www.cdc.gov/steadi/patient.html      Agency on Aging: www.goea.[place].gov      Alcoholics anonymous (AA): www.aa.org      Physical Activity: www.mary.nih.gov/tg1edej      Tobacco use: www.quitwithusla.org

## 2022-07-13 ENCOUNTER — PATIENT MESSAGE (OUTPATIENT)
Dept: OPTOMETRY | Facility: CLINIC | Age: 72
End: 2022-07-13
Payer: MEDICARE

## 2022-07-18 ENCOUNTER — HOSPITAL ENCOUNTER (OUTPATIENT)
Dept: RADIOLOGY | Facility: HOSPITAL | Age: 72
Discharge: HOME | End: 2022-07-18
Attending: NURSE PRACTITIONER
Payer: MEDICARE

## 2022-07-18 ENCOUNTER — OFFICE VISIT (OUTPATIENT)
Dept: OPHTHALMOLOGY | Facility: CLINIC | Age: 72
End: 2022-07-18
Payer: MEDICARE

## 2022-07-18 ENCOUNTER — TELEPHONE (OUTPATIENT)
Dept: FAMILY MEDICINE | Facility: CLINIC | Age: 72
End: 2022-07-18
Payer: MEDICARE

## 2022-07-18 DIAGNOSIS — Z87.891 PERSONAL HISTORY OF TOBACCO USE: ICD-10-CM

## 2022-07-18 DIAGNOSIS — R91.1 SOLITARY PULMONARY NODULE: ICD-10-CM

## 2022-07-18 DIAGNOSIS — H11.9 CONJUNCTIVAL LESION: ICD-10-CM

## 2022-07-18 DIAGNOSIS — H10.433 CHRONIC FOLLICULAR CONJUNCTIVITIS OF BOTH EYES: Primary | ICD-10-CM

## 2022-07-18 DIAGNOSIS — Z78.0 POST-MENOPAUSAL: ICD-10-CM

## 2022-07-18 PROCEDURE — 65430 CORNEAL SMEAR: CPT | Mod: LT,S$GLB,, | Performed by: OPHTHALMOLOGY

## 2022-07-18 PROCEDURE — 65430 PR CORNEAL SMEAR: ICD-10-PCS | Mod: LT,S$GLB,, | Performed by: OPHTHALMOLOGY

## 2022-07-18 PROCEDURE — 99204 OFFICE O/P NEW MOD 45 MIN: CPT | Mod: 25,S$GLB,, | Performed by: OPHTHALMOLOGY

## 2022-07-18 PROCEDURE — 77080 DXA BONE DENSITY AXIAL: CPT | Mod: TC,PO

## 2022-07-18 PROCEDURE — 87070 CULTURE OTHR SPECIMN AEROBIC: CPT | Performed by: OPHTHALMOLOGY

## 2022-07-18 PROCEDURE — 92285 EXTERNAL OCULAR PHOTOGRAPHY: CPT | Mod: S$GLB,,, | Performed by: OPHTHALMOLOGY

## 2022-07-18 PROCEDURE — 71271 CT THORAX LUNG CANCER SCR C-: CPT | Mod: TC,PO

## 2022-07-18 PROCEDURE — 99204 PR OFFICE/OUTPT VISIT, NEW, LEVL IV, 45-59 MIN: ICD-10-PCS | Mod: 25,S$GLB,, | Performed by: OPHTHALMOLOGY

## 2022-07-18 PROCEDURE — 92285 SLIT LAMP PHOTOGRAPHY - OU - BOTH EYES: ICD-10-PCS | Mod: S$GLB,,, | Performed by: OPHTHALMOLOGY

## 2022-07-19 ENCOUNTER — PATIENT MESSAGE (OUTPATIENT)
Dept: BARIATRICS | Facility: CLINIC | Age: 72
End: 2022-07-19
Payer: MEDICARE

## 2022-07-20 NOTE — PROGRESS NOTES
HPI     Otilio pt    allergic conjunctivitis    Pt has been suffering from very swollen, red, and discharge OU everyday   for past 3-4 months with no improvements of any treatment with eye drops   or ointments.     No gtts at this time    Last edited by Nelly Cabrera MD on 7/20/2022  9:03 AM. (History)            Assessment /Plan     For exam results, see Encounter Report.    Chronic follicular conjunctivitis of both eyes  -     Aerobic culture      Will cx today to r/out underlying infection.  If no growth - okay to start course of steroids    PROCEDURE: conj cornea -    INDICATION: determine infectious etiology to ulceration    Anesthesia: topical drops    PROCEDURE IN DETAIL: After informed consent was discussed and obtained, the cornea was anesthetized with proparacaine.  Eswab was used to swab the inferior fornix.  Patient tolerated the procedure well    C/o: none    EBL: none    Presumptively start vigamox TID OU x 1 wk, will call pt next week with cx results.    Pigmented patch of tarsal conj LLL  - plan for bx, will discuss next appt, photos taken today

## 2022-07-22 ENCOUNTER — TELEPHONE (OUTPATIENT)
Dept: OPHTHALMOLOGY | Facility: CLINIC | Age: 72
End: 2022-07-22
Payer: MEDICARE

## 2022-07-22 ENCOUNTER — PATIENT MESSAGE (OUTPATIENT)
Dept: OPHTHALMOLOGY | Facility: CLINIC | Age: 72
End: 2022-07-22
Payer: MEDICARE

## 2022-07-22 DIAGNOSIS — R52 PAIN: Primary | ICD-10-CM

## 2022-07-22 LAB — BACTERIA SPEC AEROBE CULT: NO GROWTH

## 2022-07-22 NOTE — TELEPHONE ENCOUNTER
----- Message from Nelly Cabrera MD sent at 7/22/2022 12:05 PM CDT -----  Pls call pt let her know cx results negative - no growth. How is she doing on vigamox? If NI - can start course of steroids. JIN

## 2022-07-22 NOTE — TELEPHONE ENCOUNTER
Let pt know culture is negative.    Called back but no answer. teresa would like to stop vigamox and start pred TID OS.  Follow up in 2 weeks.   Sent portal message

## 2022-07-25 ENCOUNTER — TELEPHONE (OUTPATIENT)
Dept: ORTHOPEDICS | Facility: CLINIC | Age: 72
End: 2022-07-25
Payer: MEDICARE

## 2022-07-27 ENCOUNTER — HOSPITAL ENCOUNTER (OUTPATIENT)
Dept: RADIOLOGY | Facility: OTHER | Age: 72
Discharge: HOME | End: 2022-07-27
Attending: PHYSICIAN ASSISTANT
Payer: MEDICARE

## 2022-07-27 ENCOUNTER — OFFICE VISIT (OUTPATIENT)
Dept: BARIATRICS | Facility: CLINIC | Age: 72
End: 2022-07-27
Payer: MEDICARE

## 2022-07-27 ENCOUNTER — OFFICE VISIT (OUTPATIENT)
Dept: ORTHOPEDICS | Facility: CLINIC | Age: 72
End: 2022-07-27
Payer: MEDICARE

## 2022-07-27 VITALS
HEART RATE: 70 BPM | HEIGHT: 64 IN | SYSTOLIC BLOOD PRESSURE: 112 MMHG | WEIGHT: 162 LBS | BODY MASS INDEX: 27.66 KG/M2 | DIASTOLIC BLOOD PRESSURE: 74 MMHG

## 2022-07-27 VITALS
HEART RATE: 52 BPM | BODY MASS INDEX: 27.81 KG/M2 | TEMPERATURE: 98 F | SYSTOLIC BLOOD PRESSURE: 104 MMHG | WEIGHT: 162.06 LBS | DIASTOLIC BLOOD PRESSURE: 58 MMHG | OXYGEN SATURATION: 98 %

## 2022-07-27 DIAGNOSIS — R20.0 FINGER NUMBNESS: Primary | ICD-10-CM

## 2022-07-27 DIAGNOSIS — Z98.84 STATUS POST LAPAROSCOPIC SLEEVE GASTRECTOMY: Primary | ICD-10-CM

## 2022-07-27 DIAGNOSIS — R52 PAIN: ICD-10-CM

## 2022-07-27 PROCEDURE — 1160F PR REVIEW ALL MEDS BY PRESCRIBER/CLIN PHARMACIST DOCUMENTED: ICD-10-PCS | Mod: CPTII,S$GLB,, | Performed by: PHYSICIAN ASSISTANT

## 2022-07-27 PROCEDURE — 1159F MED LIST DOCD IN RCRD: CPT | Mod: CPTII,S$GLB,, | Performed by: PHYSICIAN ASSISTANT

## 2022-07-27 PROCEDURE — 99999 PR PBB SHADOW E&M-EST. PATIENT-LVL V: ICD-10-PCS | Mod: PBBFAC,,, | Performed by: NURSE PRACTITIONER

## 2022-07-27 PROCEDURE — 73140 X-RAY EXAM OF FINGER(S): CPT | Mod: 26,RT,, | Performed by: RADIOLOGY

## 2022-07-27 PROCEDURE — 3078F PR MOST RECENT DIASTOLIC BLOOD PRESSURE < 80 MM HG: ICD-10-PCS | Mod: CPTII,S$GLB,, | Performed by: NURSE PRACTITIONER

## 2022-07-27 PROCEDURE — 3288F PR FALLS RISK ASSESSMENT DOCUMENTED: ICD-10-PCS | Mod: CPTII,S$GLB,, | Performed by: NURSE PRACTITIONER

## 2022-07-27 PROCEDURE — 99214 OFFICE O/P EST MOD 30 MIN: CPT | Mod: S$GLB,,, | Performed by: NURSE PRACTITIONER

## 2022-07-27 PROCEDURE — 3008F PR BODY MASS INDEX (BMI) DOCUMENTED: ICD-10-PCS | Mod: CPTII,S$GLB,, | Performed by: NURSE PRACTITIONER

## 2022-07-27 PROCEDURE — 3288F FALL RISK ASSESSMENT DOCD: CPT | Mod: CPTII,S$GLB,, | Performed by: NURSE PRACTITIONER

## 2022-07-27 PROCEDURE — 3288F FALL RISK ASSESSMENT DOCD: CPT | Mod: CPTII,S$GLB,, | Performed by: PHYSICIAN ASSISTANT

## 2022-07-27 PROCEDURE — 99214 PR OFFICE/OUTPT VISIT, EST, LEVL IV, 30-39 MIN: ICD-10-PCS | Mod: S$GLB,,, | Performed by: NURSE PRACTITIONER

## 2022-07-27 PROCEDURE — 99999 PR PBB SHADOW E&M-EST. PATIENT-LVL IV: ICD-10-PCS | Mod: PBBFAC,,, | Performed by: PHYSICIAN ASSISTANT

## 2022-07-27 PROCEDURE — 1101F PR PT FALLS ASSESS DOC 0-1 FALLS W/OUT INJ PAST YR: ICD-10-PCS | Mod: CPTII,S$GLB,, | Performed by: NURSE PRACTITIONER

## 2022-07-27 PROCEDURE — 1160F RVW MEDS BY RX/DR IN RCRD: CPT | Mod: CPTII,S$GLB,, | Performed by: PHYSICIAN ASSISTANT

## 2022-07-27 PROCEDURE — 1125F AMNT PAIN NOTED PAIN PRSNT: CPT | Mod: CPTII,S$GLB,, | Performed by: NURSE PRACTITIONER

## 2022-07-27 PROCEDURE — 3074F PR MOST RECENT SYSTOLIC BLOOD PRESSURE < 130 MM HG: ICD-10-PCS | Mod: CPTII,S$GLB,, | Performed by: NURSE PRACTITIONER

## 2022-07-27 PROCEDURE — 99214 OFFICE O/P EST MOD 30 MIN: CPT | Mod: S$GLB,,, | Performed by: PHYSICIAN ASSISTANT

## 2022-07-27 PROCEDURE — 1125F PR PAIN SEVERITY QUANTIFIED, PAIN PRESENT: ICD-10-PCS | Mod: CPTII,S$GLB,, | Performed by: NURSE PRACTITIONER

## 2022-07-27 PROCEDURE — 3078F DIAST BP <80 MM HG: CPT | Mod: CPTII,S$GLB,, | Performed by: NURSE PRACTITIONER

## 2022-07-27 PROCEDURE — 3078F DIAST BP <80 MM HG: CPT | Mod: CPTII,S$GLB,, | Performed by: PHYSICIAN ASSISTANT

## 2022-07-27 PROCEDURE — 1101F PT FALLS ASSESS-DOCD LE1/YR: CPT | Mod: CPTII,S$GLB,, | Performed by: NURSE PRACTITIONER

## 2022-07-27 PROCEDURE — 99999 PR PBB SHADOW E&M-EST. PATIENT-LVL IV: CPT | Mod: PBBFAC,,, | Performed by: PHYSICIAN ASSISTANT

## 2022-07-27 PROCEDURE — 1125F PR PAIN SEVERITY QUANTIFIED, PAIN PRESENT: ICD-10-PCS | Mod: CPTII,S$GLB,, | Performed by: PHYSICIAN ASSISTANT

## 2022-07-27 PROCEDURE — 1160F PR REVIEW ALL MEDS BY PRESCRIBER/CLIN PHARMACIST DOCUMENTED: ICD-10-PCS | Mod: CPTII,S$GLB,, | Performed by: NURSE PRACTITIONER

## 2022-07-27 PROCEDURE — 99214 PR OFFICE/OUTPT VISIT, EST, LEVL IV, 30-39 MIN: ICD-10-PCS | Mod: S$GLB,,, | Performed by: PHYSICIAN ASSISTANT

## 2022-07-27 PROCEDURE — 1101F PT FALLS ASSESS-DOCD LE1/YR: CPT | Mod: CPTII,S$GLB,, | Performed by: PHYSICIAN ASSISTANT

## 2022-07-27 PROCEDURE — 1125F AMNT PAIN NOTED PAIN PRSNT: CPT | Mod: CPTII,S$GLB,, | Performed by: PHYSICIAN ASSISTANT

## 2022-07-27 PROCEDURE — 1159F PR MEDICATION LIST DOCUMENTED IN MEDICAL RECORD: ICD-10-PCS | Mod: CPTII,S$GLB,, | Performed by: NURSE PRACTITIONER

## 2022-07-27 PROCEDURE — 73140 XR FINGER 2 OR MORE VIEWS RIGHT: ICD-10-PCS | Mod: 26,RT,, | Performed by: RADIOLOGY

## 2022-07-27 PROCEDURE — 3008F PR BODY MASS INDEX (BMI) DOCUMENTED: ICD-10-PCS | Mod: CPTII,S$GLB,, | Performed by: PHYSICIAN ASSISTANT

## 2022-07-27 PROCEDURE — 73140 X-RAY EXAM OF FINGER(S): CPT | Mod: TC,FY,RT

## 2022-07-27 PROCEDURE — 1160F RVW MEDS BY RX/DR IN RCRD: CPT | Mod: CPTII,S$GLB,, | Performed by: NURSE PRACTITIONER

## 2022-07-27 PROCEDURE — 3074F PR MOST RECENT SYSTOLIC BLOOD PRESSURE < 130 MM HG: ICD-10-PCS | Mod: CPTII,S$GLB,, | Performed by: PHYSICIAN ASSISTANT

## 2022-07-27 PROCEDURE — 3074F SYST BP LT 130 MM HG: CPT | Mod: CPTII,S$GLB,, | Performed by: NURSE PRACTITIONER

## 2022-07-27 PROCEDURE — 3008F BODY MASS INDEX DOCD: CPT | Mod: CPTII,S$GLB,, | Performed by: NURSE PRACTITIONER

## 2022-07-27 PROCEDURE — 99999 PR PBB SHADOW E&M-EST. PATIENT-LVL V: CPT | Mod: PBBFAC,,, | Performed by: NURSE PRACTITIONER

## 2022-07-27 PROCEDURE — 1159F MED LIST DOCD IN RCRD: CPT | Mod: CPTII,S$GLB,, | Performed by: NURSE PRACTITIONER

## 2022-07-27 PROCEDURE — 3074F SYST BP LT 130 MM HG: CPT | Mod: CPTII,S$GLB,, | Performed by: PHYSICIAN ASSISTANT

## 2022-07-27 PROCEDURE — 3078F PR MOST RECENT DIASTOLIC BLOOD PRESSURE < 80 MM HG: ICD-10-PCS | Mod: CPTII,S$GLB,, | Performed by: PHYSICIAN ASSISTANT

## 2022-07-27 PROCEDURE — 3008F BODY MASS INDEX DOCD: CPT | Mod: CPTII,S$GLB,, | Performed by: PHYSICIAN ASSISTANT

## 2022-07-27 PROCEDURE — 1101F PR PT FALLS ASSESS DOC 0-1 FALLS W/OUT INJ PAST YR: ICD-10-PCS | Mod: CPTII,S$GLB,, | Performed by: PHYSICIAN ASSISTANT

## 2022-07-27 PROCEDURE — 1159F PR MEDICATION LIST DOCUMENTED IN MEDICAL RECORD: ICD-10-PCS | Mod: CPTII,S$GLB,, | Performed by: PHYSICIAN ASSISTANT

## 2022-07-27 PROCEDURE — 3288F PR FALLS RISK ASSESSMENT DOCUMENTED: ICD-10-PCS | Mod: CPTII,S$GLB,, | Performed by: PHYSICIAN ASSISTANT

## 2022-07-27 RX ORDER — PREDNISOLONE ACETATE 10 MG/ML
1 SUSPENSION/ DROPS OPHTHALMIC 3 TIMES DAILY
COMMUNITY
Start: 2022-07-22 | End: 2022-10-12 | Stop reason: SDUPTHER

## 2022-07-27 NOTE — PROGRESS NOTES
BARIATRIC VISIT:    HPI:  Luz uBrk is a 71 y.o. year old female presents upper abd pain right and left for many weeks following s/p sleeve.    Pt states that her heartburn/ indigestion is now not controlled with famotidine 20 mg twice daily.     Smoking still   Vomiting with reflux   Belching frequently     Denies: changes in bowel movement pattern, fever, chills, dysphagia, chest pain, and shortness of breath.    Review of Systems   Constitutional: Negative for fatigue and fever.   HENT: Negative for tinnitus and trouble swallowing.    Eyes: Negative for visual disturbance.   Respiratory: Negative for cough and shortness of breath.    Cardiovascular: Negative for chest pain, palpitations and leg swelling.   Gastrointestinal: Positive for abdominal pain, diarrhea and nausea. Negative for constipation and vomiting.   Genitourinary: Negative for decreased urine volume.   Musculoskeletal: Negative for myalgias.   Psychiatric/Behavioral: Negative for dysphoric mood, self-injury and suicidal ideas. The patient is not nervous/anxious.        EXERCISE:  Cleaning house, moving boxes.  Looking forward to water aerobics starting in the summer. Will restart senior classes soon.   VITAMINS: Caltrate, B12 shot, B6, MVI (theragram M), Biotin, Melatonin.     MEDICATIONS/ALLERGIES:  Have been reviewed.    DIET: Regular Bariatric diet    Physical Exam  Constitutional:       Appearance: She is obese.   HENT:      Head: Normocephalic and atraumatic.   Eyes:      Extraocular Movements: Extraocular movements intact.      Conjunctiva/sclera: Conjunctivae normal.      Pupils: Pupils are equal, round, and reactive to light.   Cardiovascular:      Rate and Rhythm: Normal rate and regular rhythm.      Pulses: Normal pulses.      Heart sounds: Normal heart sounds.   Pulmonary:      Effort: Pulmonary effort is normal.      Breath sounds: Normal breath sounds. No wheezing.   Abdominal:      General: Bowel sounds are normal.       Palpations: Abdomen is soft.      Tenderness: There is no abdominal tenderness.   Musculoskeletal:         General: Normal range of motion.      Cervical back: Normal range of motion and neck supple.   Skin:     General: Skin is warm.      Capillary Refill: Capillary refill takes less than 2 seconds.   Neurological:      General: No focal deficit present.      Mental Status: She is alert and oriented to person, place, and time.   Psychiatric:         Mood and Affect: Mood normal.         Behavior: Behavior normal.         Thought Content: Thought content normal.         Judgment: Judgment normal.       ASSESSMENT:  - Morbid obesity s/p band removal to sleeve gastrectomy on 4/7/2021.  - Co-morbidities: GERD  - Tobacco use  - Alcohol use     PLAN:  - Strongly recommend she d/c nightly alcohol.  - Smoking Cessation starting tomorrow  - Call the office for any issues.  - Check labs today.  - Increase Pepcid 40mg BID   - CT abd to eval for hernia       30 minute visit, over 50% of time spent counseling patient face to face on diet, exercise, and weight loss.

## 2022-07-27 NOTE — PROGRESS NOTES
"Subjective:      Patient ID: Luz Burk is a 71 y.o. female.    Chief Complaint: Pain of the Right Hand      HPI  Luz Burk is a  71 y.o. female presenting today for follow-up of right wrist pain.  She was last seen by Dr. Ayon in 3/2021, no improvement in pain with steroid injection 2/2021.  She reports intermittent tingling in the fingers of the right hand.  She also reports occasional "cramping in the right hand were fingers seem to cross while she is driving.  She is status post Right CMC arthroplasty with MCP fusion and first dorsal compartment release by Dr. Ayon on 10/30/2020.  History of right carpal tunnel release and right volar ganglion cyst excision by Dr. Ayon 7/24/2020.      Review of patient's allergies indicates:   Allergen Reactions    Omeprazole Diarrhea    Prevacid [lansoprazole] Diarrhea         Current Outpatient Medications   Medication Sig Dispense Refill    ascorbic acid, vitamin C, (VITAMIN C) 100 MG tablet Take 100 mg by mouth once daily.      BIOTIN ORAL Take by mouth once daily.      calcium citrate-vitamin D3 315-200 mg (CITRACAL+D) 315 mg-5 mcg (200 unit) per tablet Take 1 tablet by mouth 2 (two) times daily.      cholecalciferol, vitamin D3, (VITAMIN D3) 125 mcg (5,000 unit) Tab Take 1 tablet (5,000 Units total) by mouth once daily. 90 tablet 3    citalopram (CELEXA) 20 MG tablet Take 1 tablet by mouth once daily 90 tablet 3    cyanocobalamin 1,000 mcg/mL injection INJECT 1 ML EVERY MONTH 10 mL 5    famotidine (PEPCID) 20 MG tablet Take 1 tablet by mouth twice daily 60 tablet 0    fluticasone (FLONASE) 50 mcg/actuation nasal spray 1 spray by Nasal route once daily.       furosemide (LASIX) 20 MG tablet Take 1 tablet (20 mg total) by mouth once daily. 30 tablet 11    levothyroxine (SYNTHROID) 50 MCG tablet Take 1 tablet (50 mcg total) by mouth before breakfast. 90 tablet 3    melatonin 10 mg Tab Take by mouth.      multivitamin capsule " Take 1 capsule by mouth once daily.      potassium chloride (KLOR-CON) 10 MEQ TbSR TAKE TWO TABLETS BY MOUTH ONCE DAILY WITH LASIX 60 tablet 5    prednisoLONE acetate (PRED FORTE) 1 % DrpS Place 1 drop into the left eye 3 (three) times daily.      loratadine (CLARITIN) 10 mg tablet Take 1 tablet (10 mg total) by mouth once daily. 90 tablet 3    valACYclovir (VALTREX) 1000 MG tablet Take 2 tablets (2,000 mg total) by mouth every 12 (twelve) hours as needed (herpes outbreak). 2 tablets as soon as possible after symptom onset, then 2 tablets 12 hours later (4 total) 30 tablet 2     No current facility-administered medications for this visit.     Facility-Administered Medications Ordered in Other Visits   Medication Dose Route Frequency Provider Last Rate Last Admin    acetaminophen tablet 1,000 mg  1,000 mg Oral Once Sugey Edouard MD        fentaNYL injection 25 mcg  25 mcg Intravenous Q5 Min PRN Sugey Edouard MD        fentaNYL injection 25 mcg  25 mcg Intravenous Q5 Min PRN Quinten Chu MD   100 mcg at 10/30/20 0920    lidocaine (PF) 10 mg/ml (1%) injection 10 mg  1 mL Intradermal Once Rachele Gonzalez MD        midazolam (VERSED) 1 mg/mL injection 0.5 mg  0.5 mg Intravenous PRN Sguey Edouard MD        midazolam (VERSED) 1 mg/mL injection 0.5 mg  0.5 mg Intravenous PRN Quinten Chu MD   1 mg at 10/30/20 0920       Past Medical History:   Diagnosis Date    Cataract     Collagenous colitis     Dx 5/30/16    Dry mouth 1/30/2018    Excessive thirst 1/30/2018    Fecal incontinence 5/25/2016    Gastric banding status 8/20/2012    Hydrocephalus 1/15/2013    Hypothyroidism     LAP-BAND surgery status 8/6/2015    Muscle weakness 9/4/2016    OA (osteoarthritis)     Obesity     Other specified prophylactic or treatment measure 7/27/2012    Pain aggravated by changing postions 9/4/2016    Palpitations 1/29/2021    SI (sacroiliac) pain 9/4/2016    Sleep  apnea     Thumb pain, right 12/11/2020    Urinary frequency 1/30/2018       Past Surgical History:   Procedure Laterality Date    APPENDECTOMY      ARTHROPLASTY OF JOINT OF FINGER Right 10/30/2020    Procedure: ARTHROPLASTY, FINGER cmc joint right thumb;  Surgeon: Sugey Ayon MD;  Location: [facility];  Service: Orthopedics;  Laterality: Right;  MAC/Regional    CARPAL TUNNEL RELEASE Right 7/24/2020    Procedure: RELEASE, CARPAL TUNNEL, RIGHT;  Surgeon: Sugey Ayon MD;  [facility];  Service: Orthopedics;  Laterality: Right;  Regional & MAC    CATARACT EXTRACTION      CHOLECYSTECTOMY      COLONOSCOPY N/A 5/30/2016    Procedure: COLONOSCOPY;  Surgeon: BINH Souza MD;  Location: [facility] (4TH FLR);  Service: Endoscopy;  Laterality: N/A;    COLONOSCOPY N/A 6/17/2020    Procedure: COLONOSCOPYSuprep;  Surgeon: Antonio Weller MD;  Location: [facility];  Service: Endoscopy;  Laterality: N/A;    ESOPHAGOGASTRODUODENOSCOPY N/A 6/17/2020    Procedure: EGD (ESOPHAGOGASTRODUODENOSCOPY);  Surgeon: Antonio Weller MD;  Location: [facility];  Service: Endoscopy;  Laterality: N/A;    EXCISION OF GANGLION OF WRIST Right 7/24/2020    Procedure: EXCISION, GANGLION CYST, WRIST, RIGHT volar;  Surgeon: Sugey Ayon MD;  Location: [facility];  Service: Orthopedics;  Laterality: Right;  Regional & MAC    HERNIA REPAIR      HIATAL HERNIA REPAIR      HYSTERECTOMY      lap band surgery  10/11/2011    Realize C Band (11mL)    LAPAROSCOPIC GASTRIC BANDING      LAPAROSCOPIC REMOVAL OF GASTRIC BAND N/A 4/7/2021    Procedure: REMOVAL-GASTRIC BAND-LAPAROSCOPIC-;  Surgeon: Oswaldo Mayfield MD;  Location: [facility] 2ND FLR;  Service: General;  Laterality: N/A;    LAPAROSCOPIC SLEEVE GASTRECTOMY N/A 4/7/2021    Procedure: GASTRECTOMY-SLEEVE-LAPAROSCOPIC;  Surgeon: Oswaldo Mayfield MD;  Location: [facility] 2ND FLR;  Service: General;  Laterality: N/A;    OOPHORECTOMY      TONSILLECTOMY    "   TOTAL KNEE ARTHROPLASTY  7/23/12    VENTRICULOPERITONEAL SHUNT           Review of Systems:  ROS:  Constitutional: no fever or chills  Skin: no rash or suspicious lesions  Musculoskeletal: See HPI.   Neurological: no headaches, lightheadedness, or dizziness. No numbness or tingling  Psychological/behavioral: no anxiety or depression      OBJECTIVE:     PHYSICAL EXAM:  Height: 5' 4" (162.6 cm) Weight: 73.5 kg (162 lb)  Vitals:    07/27/22 0816   BP: 112/74   Pulse: 70   Weight: 73.5 kg (162 lb)   Height: 5' 4" (1.626 m)   PainSc:   6     Vitals reviewed.  Constitutional: NAD. Patient appears well-developed and well-nourished.   HENT:   Head: Normocephalic and atraumatic.   Neck: Normal range of motion.   Cardiovascular: Normal rate.    Pulmonary/Chest: Effort normal. No respiratory distress.   Neurological: Patient is awake, alert, oriented.   Psychiatric: Patient has a normal mood and affect. Behavior is normal. Judgment and thought content normal.  Musculoskeletal:  Well-healed surgical scars right hand dorsally, radially, and volarly.  Mild edema noted volarly over the radial aspect of the wrist, mildly tender to palpation here.  Dupuytren's nodules noted in the palm.  Thickening in the 1st webspace, reports tenderness dorsally and volarly in the 1st webspace.  Good wrist range of motion, she reports "tightness with hyperextension and hyperflexion.  Good finger range of motion, some motion limited on the right compared to left, MCP fusion noted.  Neurovascularly intact-good sensation and motor function, good capillary refill, 2+ radial pulses.  Negative Tinel's bilaterally at the carpal tunnel.  Positive Durkan's on the right, negative on the left.    RADIOGRAPHS:  Right thumb XRay, 7/27/2022  FINDINGS:  A single surgical screw fuses the 1st MCP joint.  Hardware is intact.  Progressive bony fusion at the operative site.  I see no acute fracture.  Degenerative change at the interphalangeal joint.  Prior 1st " CMC arthroplasty.     Impression:  Postoperative changes with no acute process seen.    Comments: I have personally reviewed the imaging and I agree with the above radiologist's report.    MRI R Wrist 02/26/21  Postsurgical change from 1st CMC joint arthroplasty, noting resection of the trapezium. There is prominent marrow edema signal in the distal scaphoid (series 7, image 8-9) and probably at the base the 1st metacarpal, noting artifact from surgical changes limits assessment.  There is marked increased signal and thickening at the distal flexor carpi radialis tendon at the level of the scaphoid (series 8, image 17), suggesting tendinosis/partial tearing.  The flexor pollicis longus appears intact.  No fractures identified.  There is mild increased signal in the abductor pollicis musculature, probable mild strain (series 5, image 23-24). No fatty atrophy.  Arthrodesis at the 1st MCP joint noted, partially imaged.     There is mild carpal carpal degenerative changes, noting mild subchondral cystic change.  There is increased signal in the peripheral TFCC, at the ulna styloid and foveal attachment, suggesting a sprain, may have a chronic component.  There is a small central TFCC tear (series 8, image 10).     The Scapholunate and lunate triquetral ligaments are intact.     The dorsal and ventral tendons demonstrate normal size, signal and location.  There is mild increased signal in the ECU, just past the ulnar groove (series 5, image 10-11), suggesting mild tendinosis.  The dorsal and ventral tendons otherwise demonstrate normal size, signal, and location.  No tenosynovitis.  The hook of the hamate is intact.     The median and ulnar nerves are grossly unremarkable.     No marrow replacement process.     Impression:  Postsurgical change from 1st CMC joint arthroplasty, as above. Small central tear and peripheral TFCC sprain, may have a chronic component.    ASSESSMENT/PLAN:   Luz was seen today for  pain.    Diagnoses and all orders for this visit:    Finger numbness  -     EMG W/ ULTRASOUND AND NERVE CONDUCTION TEST 2 Extremities; Future           - We talked at length about the anatomy and pathophysiology of   Encounter Diagnosis   Name Primary?    Finger numbness Yes       - discussed patient's symptoms and physical exam findings, x-rays reviewed with the patient.  Discussed slight changes x-ray from to 2021 to present, slight subluxation of the 1st metacarpal.  - discussed patient's symptoms of finger tingling and numbness, discussed repeating EMG.  EMG ordered and scheduled  - discussed use of Voltaren gel, discussed use of moist heat for symptom management  - nerve glide handout provided, discussed carpal tunnel bracing  - follow-up with Dr. Ayon to further discuss surgical treatment options  - call with questions or concerns    Disclaimer: This note has been generated using voice-recognition software. There may be typographical errors that have been missed during proof-reading.

## 2022-07-27 NOTE — PATIENT INSTRUCTIONS
Meal Ideas for Regular Bariatric Diet  *Recipes and products available at [email or web address].com      Breakfast: (15-20g protein)    - Egg white omelet: 2 egg whites or ½ cup Egg Beaters. (Optional proteins: cheese, shrimp, black beans, chicken, sliced turkey) (Optional veggies: tomatoes, salsa, spinach, mushrooms, onions, green peppers, or small slice avocado)     - Egg and sausage: 1 egg or ¼ cup Egg Beaters (any variety), with 1 johnson or 2 links of Turkey sausage or Veggie breakfast sausage (ZenMate or Gotta'go Personal Care Device)    - Crust-less breakfast quiche: To make a glass pie dish, mix 4oz part skim Ricotta, 1 cup skim milk, and 2 eggs as your base. Add protein: shredded cheese, sliced lean ham or turkey, turkey gay/sausage. Add veggies: tomato, onion, green onion, mushroom, green pepper, spinach, etc.    - Yogurt parfait: Mix 1 - 6oz container Dannon Light N Fit vanilla yogurt, with ¼ cup crushed unsalted nuts    - Cottage cheese and fruit: ½ cup part-skim cottage cheese or ricotta cheese topped with fresh fruit or sugar free preserves     - Ludivina Burton's Vanilla Egg custard* (add 2 Tbsp instant coffee granules to make Cappuccino Custard*)    - Hi-Protein café latte (skim milk, decaf coffee, 1 scoop protein powder). Optional to add Sugar free syrup or extract flavoring.    - Breakfast Lox: spread fat free cream cheese on slices of smoked salmon. Serve over scrambled or egg over easy (sauteed with nonstick cookspray) OR on a cucumber slice    - Eggwhich: Scramble or cook 1 large egg over easy using nonstick cookspray. Place between 2 slices of German gay and low fat cheese.     Lunch: (20-30g protein)    - ½ cup Black bean soup (Homemade or Progresso), with ¼ cup shredded low-fat cheese. Top with chopped tomato or fresh salsa.     - Lean deli turkey breast and low-fat sliced cheese, mustard or light franco to moisten, rolled up together, or wrapped in a Antwan lettuce leaf    - Chicken salad made from dinner  leftovers, moisten with low-fat salad dressing or light franco. Also try leftover salmon, shrimp, tuna or boiled eggs. Serve ½ cup over dark green salad    - Fat-free canned refried beans, topped with ¼ cup shredded low-fat cheese. Top with chopped tomato or fresh salsa.     - Greek salad: Top mixed greens with 1-2oz grilled chicken, tomatoes, red onions, 2-3 kalamata olives, and sprinkle lightly with feta cheese. Spritz with Balsamic vinegar to taste.     - Crust-less lunch quiche: To make a glass pie dish, mix 4oz part skim Ricotta, 1 cup skim milk, and 2 eggs as your base. Add protein: shredded cheese, sliced lean ham or turkey, shrimp, chicken. Add veggies: tomato, onion, green onion, mushroom, green pepper, spinach, artichoke, broccoli, etc.    - Pizza bake: spread a  filipe jamison mushroom with tomato sauce, low-fat shredded mozzarella and turkey pepperoni or Gay gya. Add any veggies. Roast for 10-15 minutes, until cheese melted.     - Cucumber crab bites: Spread ¼ cup crab dip (lump crabmeat + light cream cheese and green onions) over sliced cucumber.     - Chicken with light spinach and artichoke dip*: Puree in : 6oz cooked and drained spinach, 2 cloves garlic, 1 can cannelloni beans, ½ cup chopped green onions, 1 can drained artichoke hearts (not marinated in oil), lemon juice and basil. Mix in 2oz chopped up chicken.    Supper: (20-30g protein)    - Serve grilled fish over dark green salad tossed with low-fat dressing, served with grilled asparagus guerrier     - Rotisserie chicken salad: served with sliced strawberries, walnuts, fat-free feta cheese crumbles and 1 tbsp Roys Own Light Raspberry [place] Vinaigrette    - Shrimp cocktail: Dip cold boiled shrimp in homemade low-sugar cocktail sauce (1/2 cup Eze One Carb ketchup, 2 tbsp horseradish, 1/4 tsp hot sauce, 1 tsp Worcestershire sauce, 1 tbsp freshly-squeezed lemon juice). Serve with dark green salad, walnuts, and crumbled blue  cheese drizzled with olive oil and Balsamic vinegar    - Tuna Melt: Spread tuna salad onto 2 thick slices of tomato. Top with low-fat cheese and broil until cheese is melted. May also be made with chicken salad of shrimp salad. [place] with different types of cheeses.    - Chicken or beef fajitas (no tortilla, rice, beans, chips). Top meat and veggies w/ fresh salsa, fat free sour cream.     - Homemade low-fat Chili using extra lean ground beef or ground turkey. Top with shredded cheese and salsa as desired. May add dollop fat-free sour cream if desired    - Chicken parmesan: Top chicken breast w/ low sugar marinara sauce, mozzarella cheese and bake until chicken reaches 165*.  Serve w/ spaghetti SQUASH or Kazakh cut green beans    - Dinner Omelet with shrimp or chicken and onion, green peppers and chives.    - No noodle lasagna: Use sliced zucchini or eggplant in place of noodles.  Layer with part skim ricotta cheese and low sugar meat sauce (use very lean ground beef or ground turkey).    - Mexican chicken bake: Bake chunks of chicken breast or thigh with taco seasoning, Pace brand enchilada sauce, green onions and low-fat cheese. Serve with ¼ cup black beans or fat free refried beans topped with chopped tomatoes or salsa.    - Caden frozen meatballs, simmered in Classico Marinara sauce. Different flavors of salsa or spaghetti sauce create different dishes! Sprinkle with parmesan cheese. Serve with grilled or steamed veggies, or a dark green salad.    - Simmer boneless skinless chicken thigh chunks in Classico Marinara sauce or roasted salsa until tender with chopped onion, bell pepper, garlic, mushrooms, spinach, etc.     - Hamburger or veggie burger, without the bun, dressed the way you like. Served with grilled or steamed veggies.    - Eggplant parmesan: Bake slices of eggplant at 350 degrees for 15 minutes. Layer tomato sauce, sliced eggplant and low-fat mozzarella cheese in a baking dish and cover with  foil. Bake 30-40 more minutes or until bubbly. Uncover and bake at 400 degrees for about 15 more minutes, or until top is slightly crisp.    - Fish tacos: grilled/baked white fish, wrapped in Antwan lettuce leaf, topped with salsa, shredded low-fat cheese, and light coleslaw.    - Chicken nika: Sprinkle chicken w/ 1 tsp of hidden valley ranch dip mix. Then grill chicken and top with black beans, salsa and 1 tsp fat free sour cream.     - Cauliflower pizza crust: Use cauliflower as crust (see recipe on pinterest, no flour!). Top w/ low fat cheese, turkey pepperoni and veggies and bake again    - chicken or turkey crust pizza: use ground chicken or turkey instead of cauliflower, spread in [place] and bake at 350 for about 20-30 minutes(may want to add garlic, black pepper, oregano and other herbs to ground meat mixture).  Remove and top w/ low fat cheese, turkey pepperoni and veggies and bake again for another 10 minutes or until cheese is browned.     Snacks: (100-200 calories; >5g protein)    - 1 low-fat cheese stick with 8 cherry tomatoes or 1 serving fresh fruit  - 4 thin slices fat-free turkey breast and 1 slice low-fat cheese  - 4 thin slices fat-free honey ham with wedge of melon  - 6-8 edamame pods (equivalent to about 1/4 cup edamame without pods).   - 1/4 cup unsalted nuts with ½ cup fruit  - 6-oz container Dannon Light n Fit vanilla yogurt, topped with 1oz unsalted nuts         - apple, celery or baby carrots spread with 2 Tbsp PB2  - apple slices with 1 oz slice low-fat cheese  - Apple slices dipped in 2 Tbsp of PB2  - celery, cucumber, bell pepper or baby carrots dipped in ¼ cup hummus bean spread or light spinach and artichoke dip (*recipe in lunch section)  - celery, cucumber, baby carrots dipped in high protein greek yogurt (Mix 16 oz plain greek yogurt + 1 packet of hidden valley ranch dip mix)  - Yo Links Beef Steak - 14g protein! (similar to beef jerky)  - 2 wedges Laughing Cow - Light Herb  & Garlic Cheese with sliced cucumber or green bell pepper  - 1/2 cup low-fat cottage cheese with ¼ cup fruit or ¼ cup salsa  - RTD Protein drinks: Atkins, Low Carb Slim Fast, EAS light, Muscle Milk Light, etc.  - Homemade Protein drinks: GNC Soy95, Isopure, Nectar, UNJURY, Whey Gourmet, etc. Mix 1 scoop powder with 8oz skim/1% milk or light soymilk.  - Protein bars: Atkins, EAS, Pure Protein, Think Thin, Detour, etc. Must have 0-4 grams sugar - Read the label.    Takeout Options: No more than twice/week  Deli - Salads (no pasta or rice), meats, cheeses. Roasted chicken. Lox (salmon)    Mexican - Platters which don't include tortillas, chips, or rice. Go easy on the beans. Example: Fajitas without the tortillas. Ask the  not to bring chips to the table if they are too tempting.    Greek - Meat or fish and vegetable, but no bread or rice. Including hummus, baba ganoush, etc, is OK. Most sit-down Greek restaurants can provide you with cucumber slices for dipping instead of dominic bread.    Fast Food (Avoid as much as possible) - Salads (no croutons and limit salad dressing to 2 tbsp), grilled chicken sandwich without the bun and ask for no franco. Dayas low fat chili or Taco Bell pintos and cheese.    BBQ - The meats are fine if you ask for sauces on the side, but most of the traditional side dishes are loaded with carbs. Joseph slaw, baked beans and BBQ sauce are typically made with sugar.    Chinese - Nothing deep-fried, no rice or noodles. Many Chinese sauces have starch and sugar in them, so you'll have to use your judgement. If you find that these sauces trigger cravings, or cause Dumping, you can ask for the sauce to be made without sugar or just use soy sauce.

## 2022-07-28 ENCOUNTER — TELEPHONE (OUTPATIENT)
Dept: FAMILY MEDICINE | Facility: CLINIC | Age: 72
End: 2022-07-28
Payer: MEDICARE

## 2022-07-28 ENCOUNTER — PATIENT MESSAGE (OUTPATIENT)
Dept: GASTROENTEROLOGY | Facility: CLINIC | Age: 72
End: 2022-07-28
Payer: MEDICARE

## 2022-07-28 ENCOUNTER — HOSPITAL ENCOUNTER (OUTPATIENT)
Dept: RADIOLOGY | Facility: HOSPITAL | Age: 72
Discharge: HOME | End: 2022-07-28
Attending: NURSE PRACTITIONER
Payer: MEDICARE

## 2022-07-28 ENCOUNTER — PATIENT MESSAGE (OUTPATIENT)
Dept: BARIATRICS | Facility: CLINIC | Age: 72
End: 2022-07-28
Payer: MEDICARE

## 2022-07-28 ENCOUNTER — CLINICAL SUPPORT (OUTPATIENT)
Dept: SMOKING CESSATION | Facility: CLINIC | Age: 72
End: 2022-07-28
Payer: COMMERCIAL

## 2022-07-28 DIAGNOSIS — F17.200 NICOTINE DEPENDENCE: Primary | ICD-10-CM

## 2022-07-28 DIAGNOSIS — Z98.84 STATUS POST LAPAROSCOPIC SLEEVE GASTRECTOMY: ICD-10-CM

## 2022-07-28 DIAGNOSIS — K43.9 HERNIA OF ABDOMINAL WALL: ICD-10-CM

## 2022-07-28 DIAGNOSIS — K44.9 PARAESOPHAGEAL HERNIA: Primary | ICD-10-CM

## 2022-07-28 PROCEDURE — 99404 PREV MED CNSL INDIV APPRX 60: CPT | Mod: S$GLB,,, | Performed by: GENERAL PRACTICE

## 2022-07-28 PROCEDURE — 25500020 PHARM REV CODE 255: Mod: PO | Performed by: NURSE PRACTITIONER

## 2022-07-28 PROCEDURE — 74150 CT ABDOMEN W/O CONTRAST: CPT | Mod: TC,PO

## 2022-07-28 PROCEDURE — 99404 PR PREVENT COUNSEL,INDIV,60 MIN: ICD-10-PCS | Mod: S$GLB,,, | Performed by: GENERAL PRACTICE

## 2022-07-28 RX ORDER — MICONAZOLE NITRATE 2 %
2 CREAM (GRAM) TOPICAL
Qty: 100 EACH | Refills: 0 | Status: SHIPPED | OUTPATIENT
Start: 2022-07-28 | End: 2023-05-26

## 2022-07-28 RX ORDER — IBUPROFEN 200 MG
1 TABLET ORAL DAILY
Qty: 28 PATCH | Refills: 0 | Status: SHIPPED | OUTPATIENT
Start: 2022-07-28 | End: 2022-08-18 | Stop reason: SDUPTHER

## 2022-07-28 RX ADMIN — IOHEXOL 15 ML: 300 INJECTION, SOLUTION INTRAVENOUS at 12:07

## 2022-07-28 NOTE — TELEPHONE ENCOUNTER
This is the pt that you stated needs to be a new pt.  Will you follow up please?    Thanks,  Latoya

## 2022-07-28 NOTE — TELEPHONE ENCOUNTER
Pt states that there were notes in her chart that she wanted someone to check on - she saw that when visiting with Duc.      DOS - 09/06/2019.

## 2022-07-29 ENCOUNTER — TELEPHONE (OUTPATIENT)
Dept: SURGERY | Facility: CLINIC | Age: 72
End: 2022-07-29
Payer: MEDICARE

## 2022-07-29 NOTE — TELEPHONE ENCOUNTER
Called and spoke to Patient per request from Bariatrics.  Patient is requesting an earlier Clinic appointment.  Stated that she is having pain and wants to be seen sooner.  Appointment changed to 8-9.  Appointment slip mailed.  Stated that she knows the location of the Office.   She did not have any questions at present.

## 2022-08-01 ENCOUNTER — PATIENT MESSAGE (OUTPATIENT)
Dept: OPTOMETRY | Facility: CLINIC | Age: 72
End: 2022-08-01
Payer: MEDICARE

## 2022-08-02 ENCOUNTER — PATIENT MESSAGE (OUTPATIENT)
Dept: BARIATRICS | Facility: CLINIC | Age: 72
End: 2022-08-02
Payer: MEDICARE

## 2022-08-02 ENCOUNTER — OFFICE VISIT (OUTPATIENT)
Dept: OPHTHALMOLOGY | Facility: CLINIC | Age: 72
End: 2022-08-02
Payer: MEDICARE

## 2022-08-02 DIAGNOSIS — H11.9 CONJUNCTIVAL LESION: Primary | ICD-10-CM

## 2022-08-02 DIAGNOSIS — H10.433 CHRONIC FOLLICULAR CONJUNCTIVITIS OF BOTH EYES: ICD-10-CM

## 2022-08-02 PROCEDURE — 99999 PR PBB SHADOW E&M-EST. PATIENT-LVL III: ICD-10-PCS | Mod: PBBFAC,,, | Performed by: OPHTHALMOLOGY

## 2022-08-02 PROCEDURE — 99999 PR PBB SHADOW E&M-EST. PATIENT-LVL III: CPT | Mod: PBBFAC,,, | Performed by: OPHTHALMOLOGY

## 2022-08-02 PROCEDURE — 99214 PR OFFICE/OUTPT VISIT, EST, LEVL IV, 30-39 MIN: ICD-10-PCS | Mod: S$GLB,,, | Performed by: OPHTHALMOLOGY

## 2022-08-02 PROCEDURE — 99214 OFFICE O/P EST MOD 30 MIN: CPT | Mod: S$GLB,,, | Performed by: OPHTHALMOLOGY

## 2022-08-02 NOTE — Clinical Note
Magalys - hospitals call and schedulel -- i'll pencil her in for nov date - nov 23rd if she wants the day before thanksgiving?  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

## 2022-08-04 ENCOUNTER — PATIENT MESSAGE (OUTPATIENT)
Dept: BARIATRICS | Facility: CLINIC | Age: 72
End: 2022-08-04
Payer: MEDICARE

## 2022-08-04 ENCOUNTER — CLINICAL SUPPORT (OUTPATIENT)
Dept: SMOKING CESSATION | Facility: CLINIC | Age: 72
End: 2022-08-04
Payer: COMMERCIAL

## 2022-08-04 DIAGNOSIS — F17.200 NICOTINE DEPENDENCE: Primary | ICD-10-CM

## 2022-08-04 PROCEDURE — 99407 PR TOBACCO USE CESSATION INTENSIVE >10 MINUTES: ICD-10-PCS | Mod: S$GLB,,, | Performed by: GENERAL PRACTICE

## 2022-08-04 PROCEDURE — 99407 BEHAV CHNG SMOKING > 10 MIN: CPT | Mod: S$GLB,,, | Performed by: GENERAL PRACTICE

## 2022-08-04 PROCEDURE — 99999 PR PBB SHADOW E&M-EST. PATIENT-LVL I: CPT | Mod: PBBFAC,,,

## 2022-08-04 PROCEDURE — 99999 PR PBB SHADOW E&M-EST. PATIENT-LVL I: ICD-10-PCS | Mod: PBBFAC,,,

## 2022-08-05 ENCOUNTER — PATIENT MESSAGE (OUTPATIENT)
Dept: OPHTHALMOLOGY | Facility: CLINIC | Age: 72
End: 2022-08-05
Payer: MEDICARE

## 2022-08-05 NOTE — PROGRESS NOTES
HPI     Otilio pt    Chronic Follicular Conj. OU    Meds: Pred TID OS     Pt states her eye is doing much better. It  has taken several months but   better.      Last edited by Magalys Welch MA on 8/2/2022  1:37 PM. (History)            Assessment /Plan     For exam results, see Encounter Report.    Conjunctival lesion    Chronic follicular conjunctivitis of both eyes   - improved, okay to taper off of steroids.      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

## 2022-08-09 ENCOUNTER — OFFICE VISIT (OUTPATIENT)
Dept: SURGERY | Facility: CLINIC | Age: 72
End: 2022-08-09
Payer: MEDICARE

## 2022-08-09 VITALS
DIASTOLIC BLOOD PRESSURE: 69 MMHG | HEART RATE: 60 BPM | SYSTOLIC BLOOD PRESSURE: 139 MMHG | HEIGHT: 64 IN | WEIGHT: 158.38 LBS | BODY MASS INDEX: 27.04 KG/M2

## 2022-08-09 DIAGNOSIS — K44.9 HERNIA, HIATAL: ICD-10-CM

## 2022-08-09 DIAGNOSIS — K44.9 PARAESOPHAGEAL HERNIA: ICD-10-CM

## 2022-08-09 DIAGNOSIS — Z98.84 STATUS POST LAPAROSCOPIC SLEEVE GASTRECTOMY: ICD-10-CM

## 2022-08-09 DIAGNOSIS — K43.9 HERNIA OF ABDOMINAL WALL: ICD-10-CM

## 2022-08-09 DIAGNOSIS — R11.10 VOMITING, INTRACTABILITY OF VOMITING NOT SPECIFIED, PRESENCE OF NAUSEA NOT SPECIFIED, UNSPECIFIED VOMITING TYPE: Primary | ICD-10-CM

## 2022-08-09 PROCEDURE — 3008F PR BODY MASS INDEX (BMI) DOCUMENTED: ICD-10-PCS | Mod: CPTII,S$GLB,, | Performed by: SURGERY

## 2022-08-09 PROCEDURE — 1125F AMNT PAIN NOTED PAIN PRSNT: CPT | Mod: CPTII,S$GLB,, | Performed by: SURGERY

## 2022-08-09 PROCEDURE — 3288F FALL RISK ASSESSMENT DOCD: CPT | Mod: CPTII,S$GLB,, | Performed by: SURGERY

## 2022-08-09 PROCEDURE — 1125F PR PAIN SEVERITY QUANTIFIED, PAIN PRESENT: ICD-10-PCS | Mod: CPTII,S$GLB,, | Performed by: SURGERY

## 2022-08-09 PROCEDURE — 3008F BODY MASS INDEX DOCD: CPT | Mod: CPTII,S$GLB,, | Performed by: SURGERY

## 2022-08-09 PROCEDURE — 99999 PR PBB SHADOW E&M-EST. PATIENT-LVL IV: ICD-10-PCS | Mod: PBBFAC,,, | Performed by: SURGERY

## 2022-08-09 PROCEDURE — 1160F PR REVIEW ALL MEDS BY PRESCRIBER/CLIN PHARMACIST DOCUMENTED: ICD-10-PCS | Mod: CPTII,S$GLB,, | Performed by: SURGERY

## 2022-08-09 PROCEDURE — 3078F DIAST BP <80 MM HG: CPT | Mod: CPTII,S$GLB,, | Performed by: SURGERY

## 2022-08-09 PROCEDURE — 3288F PR FALLS RISK ASSESSMENT DOCUMENTED: ICD-10-PCS | Mod: CPTII,S$GLB,, | Performed by: SURGERY

## 2022-08-09 PROCEDURE — 1101F PR PT FALLS ASSESS DOC 0-1 FALLS W/OUT INJ PAST YR: ICD-10-PCS | Mod: CPTII,S$GLB,, | Performed by: SURGERY

## 2022-08-09 PROCEDURE — 1159F MED LIST DOCD IN RCRD: CPT | Mod: CPTII,S$GLB,, | Performed by: SURGERY

## 2022-08-09 PROCEDURE — 99999 PR PBB SHADOW E&M-EST. PATIENT-LVL IV: CPT | Mod: PBBFAC,,, | Performed by: SURGERY

## 2022-08-09 PROCEDURE — 1159F PR MEDICATION LIST DOCUMENTED IN MEDICAL RECORD: ICD-10-PCS | Mod: CPTII,S$GLB,, | Performed by: SURGERY

## 2022-08-09 PROCEDURE — 3078F PR MOST RECENT DIASTOLIC BLOOD PRESSURE < 80 MM HG: ICD-10-PCS | Mod: CPTII,S$GLB,, | Performed by: SURGERY

## 2022-08-09 PROCEDURE — 1101F PT FALLS ASSESS-DOCD LE1/YR: CPT | Mod: CPTII,S$GLB,, | Performed by: SURGERY

## 2022-08-09 PROCEDURE — 99214 PR OFFICE/OUTPT VISIT, EST, LEVL IV, 30-39 MIN: ICD-10-PCS | Mod: S$GLB,,, | Performed by: SURGERY

## 2022-08-09 PROCEDURE — 3075F SYST BP GE 130 - 139MM HG: CPT | Mod: CPTII,S$GLB,, | Performed by: SURGERY

## 2022-08-09 PROCEDURE — 99214 OFFICE O/P EST MOD 30 MIN: CPT | Mod: S$GLB,,, | Performed by: SURGERY

## 2022-08-09 PROCEDURE — 3075F PR MOST RECENT SYSTOLIC BLOOD PRESS GE 130-139MM HG: ICD-10-PCS | Mod: CPTII,S$GLB,, | Performed by: SURGERY

## 2022-08-09 PROCEDURE — 1160F RVW MEDS BY RX/DR IN RCRD: CPT | Mod: CPTII,S$GLB,, | Performed by: SURGERY

## 2022-08-09 RX ORDER — PANTOPRAZOLE SODIUM 40 MG/1
40 TABLET, DELAYED RELEASE ORAL
Qty: 60 TABLET | Refills: 12 | Status: SHIPPED | OUTPATIENT
Start: 2022-08-09 | End: 2023-08-07 | Stop reason: SDUPTHER

## 2022-08-09 NOTE — LETTER
[facility]  [address]  [place] [address]  Phone: [phone]               August 9, 2022      Patient: Luz Burk   MR Number: [id number]   YOB: 1950   Date of Visit: 8/9/2022     María Henry, LORE  [address] [facility] [address]    Dear Dr. Henry:    Thank you for referring Luz Burk to me for evaluation. Attached you will find relevant portions of my assessment and plan of care.    If you have questions, please do not hesitate to call me. I look forward to following Luz Burk along with you.    Sincerely,          Oswaldo Mayfield MD        CC  MD Antonieta Rossiosure

## 2022-08-09 NOTE — PROGRESS NOTES
I have seen the patient, reviewed the Resident's history and physical, assessment and plan. I have personally interviewed and examined the patient at bedside and: agree with the findings.     S/p band to sleeve 21.  She is complaining of gerd.  She is taking famotidine bid (10mg only) and has regurgitation and vomiting.  She has lost weight due to the sleeve.    PE incarcerated epigastric hernia about 4fb below the xyphoid    Cbc, cmp reviewed, basically ok  Ct  reviewed, films viewed, ruq hernia-likely epigastric, possible gerd and paraesophageal hernia  Egd  (prior to band removal) reviewed, grade a esophagitis, du forest grade 3  Stress test  reviewed, ok    Gerd symptoms with vomiting, rule out gastroparesis.  Incarcerated epigastric hernia.  Obtain ges and egd.  Start ppi bid (may worsen diarrhea).  She is going to a  today.

## 2022-08-09 NOTE — PROGRESS NOTES
GENERAL SURGERY CLINIC NOTE    CC:  Paraesophageal Hernia    HPI:  Luz Burk is a 71 y.o. female with Hx of gastric band removal to sleeve gastrectomy on 4/2021 who presents to clinic for evaluation of paraesophageal hernia w/ associated symptoms (see symptoms below). She has had symptoms of GERD for several years but they have since worsened in the last 2 months. She has been on famotidine 20 mg BID and has since gone up to 40 mg with little improvement. Her last EGD in 2020 demonstrated Grade A esophagitis and a non bleeding duodenal ulcer. CT abd on 7/2022 demonstrated small paraesophageal hernia.    Aspirin: No. Anticoagulants: No  Smoking: Yes .  DMII: No     GERD Questionnaire:  PPI - yes  Typical heartburn - no  Regurgitation - yes  Dysphagia solids - no  Dysphagia liquids - no   Hoarseness - no  Sore throat - no  Cough - yes  Asthma - no  Chest pain - no  Water brash - yes  Globus - yes   Nausea - no   Vomiting - yes      ROS:   Review of Systems   Constitutional: Negative.    HENT: Negative.    Eyes: Negative.    Respiratory: Negative.    Cardiovascular: Negative.    Gastrointestinal: Positive for abdominal pain and vomiting. Negative for blood in stool, constipation, diarrhea, heartburn and nausea.   Genitourinary: Negative.    Musculoskeletal: Negative.    Skin: Negative.    Neurological: Negative.    Endo/Heme/Allergies: Negative.    Psychiatric/Behavioral: Negative.         Past Medical History:   Diagnosis Date    Cataract     Collagenous colitis     Dx 5/30/16    Dry mouth 1/30/2018    Excessive thirst 1/30/2018    Fecal incontinence 5/25/2016    Gastric banding status 8/20/2012    Hydrocephalus 1/15/2013    Hypothyroidism     LAP-BAND surgery status 8/6/2015    Muscle weakness 9/4/2016    OA (osteoarthritis)     Obesity     Other specified prophylactic or treatment measure 7/27/2012    Pain aggravated by changing postions 9/4/2016    Palpitations 1/29/2021    SI (sacroiliac)  pain 9/4/2016    Sleep apnea     Thumb pain, right 12/11/2020    Urinary frequency 1/30/2018       Past Surgical History:   Procedure Laterality Date    APPENDECTOMY      ARTHROPLASTY OF JOINT OF FINGER Right 10/30/2020    Procedure: ARTHROPLASTY, FINGER cmc joint right thumb;  Surgeon: Sugey Ayon MD;  Location: [facility];  Service: Orthopedics;  Laterality: Right;  MAC/Regional    CARPAL TUNNEL RELEASE Right 7/24/2020    Procedure: RELEASE, CARPAL TUNNEL, RIGHT;  Surgeon: Sugey Ayon MD;  Location: [facility];  Service: Orthopedics;  Laterality: Right;  Regional & MAC    CATARACT EXTRACTION      CHOLECYSTECTOMY      COLONOSCOPY N/A 5/30/2016    Procedure: COLONOSCOPY;  Surgeon: BINH Souza MD;  Location: [facility] (4TH FLR);  Service: Endoscopy;  Laterality: N/A;    COLONOSCOPY N/A 6/17/2020    Procedure: COLONOSCOPYSuprep;  Surgeon: Antonio Weller MD;  Location: [facility];  Service: Endoscopy;  Laterality: N/A;    ESOPHAGOGASTRODUODENOSCOPY N/A 6/17/2020    Procedure: EGD (ESOPHAGOGASTRODUODENOSCOPY);  Surgeon: Antonio Weller MD;  Location: [facility];  Service: Endoscopy;  Laterality: N/A;    EXCISION OF GANGLION OF WRIST Right 7/24/2020    Procedure: EXCISION, GANGLION CYST, WRIST, RIGHT volar;  Surgeon: Sugey Ayon MD;  Location: [facility];  Service: Orthopedics;  Laterality: Right;  Regional & MAC    HERNIA REPAIR      HIATAL HERNIA REPAIR      HYSTERECTOMY      lap band surgery  10/11/2011    Realize C Band (11mL)    LAPAROSCOPIC GASTRIC BANDING      LAPAROSCOPIC REMOVAL OF GASTRIC BAND N/A 4/7/2021    Procedure: REMOVAL-GASTRIC BAND-LAPAROSCOPIC-;  Surgeon: Oswaldo Mayfield MD;  Location: [facility] 2ND FLR;  Service: General;  Laterality: N/A;    LAPAROSCOPIC SLEEVE GASTRECTOMY N/A 4/7/2021    Procedure: GASTRECTOMY-SLEEVE-LAPAROSCOPIC;  Surgeon: Oswaldo Mayfield MD;  Location: [facility] 2ND FLR;  Service: General;  Laterality: N/A;    OOPHORECTOMY       TONSILLECTOMY      TOTAL KNEE ARTHROPLASTY  7/23/12    VENTRICULOPERITONEAL SHUNT         Current Outpatient Medications on File Prior to Visit   Medication Sig Dispense Refill    ascorbic acid, vitamin C, (VITAMIN C) 100 MG tablet Take 100 mg by mouth once daily.      BIOTIN ORAL Take by mouth once daily.      calcium citrate-vitamin D3 315-200 mg (CITRACAL+D) 315 mg-5 mcg (200 unit) per tablet Take 1 tablet by mouth 2 (two) times daily.      cholecalciferol, vitamin D3, (VITAMIN D3) 125 mcg (5,000 unit) Tab Take 1 tablet (5,000 Units total) by mouth once daily. 90 tablet 3    citalopram (CELEXA) 20 MG tablet Take 1 tablet by mouth once daily 90 tablet 3    cyanocobalamin 1,000 mcg/mL injection INJECT 1 ML EVERY MONTH 10 mL 5    famotidine (PEPCID) 20 MG tablet Take 1 tablet by mouth twice daily 60 tablet 0    fluticasone (FLONASE) 50 mcg/actuation nasal spray 1 spray by Nasal route once daily.       furosemide (LASIX) 20 MG tablet Take 1 tablet (20 mg total) by mouth once daily. 30 tablet 11    levothyroxine (SYNTHROID) 50 MCG tablet Take 1 tablet (50 mcg total) by mouth before breakfast. 90 tablet 3    melatonin 10 mg Tab Take by mouth.      multivitamin capsule Take 1 capsule by mouth once daily.      nicotine (NICODERM CQ) 21 mg/24 hr Place 1 patch onto the skin once daily. 28 patch 0    nicotine, polacrilex, (NICORETTE) 2 mg Gum Take 1 each (2 mg total) by mouth as needed (Use in place of cigarettes). 100 each 0    potassium chloride (KLOR-CON) 10 MEQ TbSR TAKE TWO TABLETS BY MOUTH ONCE DAILY WITH LASIX 60 tablet 5    prednisoLONE acetate (PRED FORTE) 1 % DrpS Place 1 drop into the left eye 3 (three) times daily.      loratadine (CLARITIN) 10 mg tablet Take 1 tablet (10 mg total) by mouth once daily. 90 tablet 3    valACYclovir (VALTREX) 1000 MG tablet Take 2 tablets (2,000 mg total) by mouth every 12 (twelve) hours as needed (herpes outbreak). 2 tablets as soon as possible after  symptom onset, then 2 tablets 12 hours later (4 total) 30 tablet 2     Current Facility-Administered Medications on File Prior to Visit   Medication Dose Route Frequency Provider Last Rate Last Admin    acetaminophen tablet 1,000 mg  1,000 mg Oral Once Sugey Edouard MD        fentaNYL injection 25 mcg  25 mcg Intravenous Q5 Min PRN Sugey Edouard MD        fentaNYL injection 25 mcg  25 mcg Intravenous Q5 Min PRN Quinten Chu MD   100 mcg at 10/30/20 0920    lidocaine (PF) 10 mg/ml (1%) injection 10 mg  1 mL Intradermal Once Rachele Gonzalez MD        midazolam (VERSED) 1 mg/mL injection 0.5 mg  0.5 mg Intravenous PRN Sugey Edouard MD        midazolam (VERSED) 1 mg/mL injection 0.5 mg  0.5 mg Intravenous PRN Quinten Chu MD   1 mg at 10/30/20 0920       Social History     Socioeconomic History    Marital status:    Tobacco Use    Smoking status: Current Every Day Smoker     Packs/day: 2.00     Years: 25.00     Pack years: 50.00     Types: Cigarettes    Smokeless tobacco: Never Used    Tobacco comment: quit in 2002 recently started smoking again in 1/2022   Substance and Sexual Activity    Alcohol use: Yes     Alcohol/week: 14.0 standard drinks     Types: 14 Glasses of wine per week     Comment: 2 glasses wine daily    Drug use: No    Sexual activity: Not Currently     Partners: Male     Social Determinants of Health     Financial Resource Strain: Low Risk     Difficulty of Paying Living Expenses: Not hard at all   Food Insecurity: No Food Insecurity    Worried About Running Out of Food in the Last Year: Never true    Ran Out of Food in the Last Year: Never true   Transportation Needs: No Transportation Needs    Lack of Transportation (Medical): No    Lack of Transportation (Non-Medical): No   Physical Activity: Sufficiently Active    Days of Exercise per Week: 7 days    Minutes of Exercise per Session: 150+ min   Stress: No Stress Concern  Present    Feeling of Stress : Not at all   Social Connections: Moderately Isolated    Frequency of Communication with Friends and Family: More than three times a week    Frequency of Social Gatherings with Friends and Family: More than three times a week    Attends Oriental orthodox Services: Never    Active Member of Clubs or Organizations: Yes    Attends Club or Organization Meetings: More than 4 times per year    Marital Status:    Housing Stability: Low Risk     Unable to Pay for Housing in the Last Year: No    Number of Places Lived in the Last Year: 1    Unstable Housing in the Last Year: No       Review of patient's allergies indicates:   Allergen Reactions    Omeprazole Diarrhea    Prevacid [lansoprazole] Diarrhea         PHYSICAL EXAM:  Vitals:    08/09/22 1137   BP: 139/69   Pulse: 60       Physical Exam  Vitals and nursing note reviewed.   Constitutional:       Appearance: Normal appearance. She is normal weight.   HENT:      Head: Normocephalic.      Nose: Nose normal.      Mouth/Throat:      Mouth: Mucous membranes are moist.      Pharynx: Oropharynx is clear.   Cardiovascular:      Rate and Rhythm: Normal rate and regular rhythm.      Pulses: Normal pulses.      Heart sounds: Normal heart sounds.   Pulmonary:      Effort: Pulmonary effort is normal.      Breath sounds: Normal breath sounds.   Abdominal:      General: Abdomen is flat. Bowel sounds are normal. There is no distension.      Palpations: Abdomen is soft.      Tenderness: There is no abdominal tenderness. There is no guarding or rebound.      Hernia: No hernia is present.   Musculoskeletal:         General: Normal range of motion.      Cervical back: Normal range of motion.   Skin:     General: Skin is warm.   Neurological:      General: No focal deficit present.      Mental Status: She is alert and oriented to person, place, and time.   Psychiatric:         Mood and Affect: Mood normal.         Behavior: Behavior normal.          ASSESSMENT/PLAN:  Luz Burk is a 71 y.o. female s/p band removal to sleeve gastrectomy on 4/2021 who presents with small paraesophageal hernia with symptoms of GERD and Gastroparesis.     To better evaluate her issue we will order an EGD and UGI.    We will follow with results.    Dean Christina MD  [facility] PGY-I  Pager: [phone]

## 2022-08-11 ENCOUNTER — TELEPHONE (OUTPATIENT)
Dept: ENDOSCOPY | Facility: HOSPITAL | Age: 72
End: 2022-08-11
Payer: MEDICARE

## 2022-08-11 NOTE — TELEPHONE ENCOUNTER
Spoke to Luz Burk EGD scheduled 09/02/2022 with Dr. Weller.   NO BT  Has Covid Vaccines  Instructions sent to [facility]

## 2022-08-15 NOTE — TELEPHONE ENCOUNTER
I spoke to Duc - we could not find anything indicating cancer in her chart.  I see she had an ultrasound of her breast, but it was normal.

## 2022-08-18 ENCOUNTER — CLINICAL SUPPORT (OUTPATIENT)
Dept: SMOKING CESSATION | Facility: CLINIC | Age: 72
End: 2022-08-18
Payer: COMMERCIAL

## 2022-08-18 DIAGNOSIS — F17.200 NICOTINE DEPENDENCE: Primary | ICD-10-CM

## 2022-08-18 PROCEDURE — 90853 GROUP PSYCHOTHERAPY: CPT | Mod: S$GLB,,, | Performed by: GENERAL PRACTICE

## 2022-08-18 PROCEDURE — 99999 PR PBB SHADOW E&M-EST. PATIENT-LVL II: ICD-10-PCS | Mod: PBBFAC,,,

## 2022-08-18 PROCEDURE — 99999 PR PBB SHADOW E&M-EST. PATIENT-LVL II: CPT | Mod: PBBFAC,,,

## 2022-08-18 PROCEDURE — 90853 PR GROUP PSYCHOTHERAPY: ICD-10-PCS | Mod: S$GLB,,, | Performed by: GENERAL PRACTICE

## 2022-08-18 RX ORDER — IBUPROFEN 200 MG
1 TABLET ORAL DAILY
Qty: 28 PATCH | Refills: 0 | Status: SHIPPED | OUTPATIENT
Start: 2022-08-18 | End: 2023-05-26

## 2022-08-18 NOTE — PROGRESS NOTES
Site: [facility]  Date:  8/18/2022  Clinical Status of Patient: Outpatient   Length of Service and Code: 60 minutes - [id number]   Number in Attendance: 2  Group Activities/Focus of Group:  orientation, client introductions, completion of TCRS (Tobacco Cessation Rating Scale) learned addiction model, cues/triggers, personal reasons for quitting, medications, goals, quit date.     Target symptoms:  withdrawal and medication side effects             The following were rated moderate (3) to severe (4) on TCRS:       Moderate 3: none     Severe 4:  none  Patient's Response to Intervention: Patient states that she is not really aware of how may cigarettes that she is smoking at this time. However, patient states that she did start on nicotine patches and gum. Patient reports no negative side effects at this time. Patient advised to keep a diary of cigarettes lit and trigger/cues. Patient states that her  passed away at the start of the year which started the spiral of cigarettes.  Progress Toward Goals and Other Mental Status Changes: The patient denies any abnormal behavioral or mental changes at this time.     Diagnosis: Z72.0  Plan: The patient will continue with group therapy sessions and medication regimen prescribed with management by physician or Cessation Clinic Provider. Patient will inform Smoking Clinic Cessation Counselor of symptoms as rated high on TCRS.    Return to Clinic: 2 weeks

## 2022-08-29 ENCOUNTER — OFFICE VISIT (OUTPATIENT)
Dept: PODIATRY | Facility: CLINIC | Age: 72
End: 2022-08-29
Payer: MEDICARE

## 2022-08-29 ENCOUNTER — HOSPITAL ENCOUNTER (OUTPATIENT)
Dept: RADIOLOGY | Facility: HOSPITAL | Age: 72
Discharge: HOME | End: 2022-08-29
Attending: STUDENT IN AN ORGANIZED HEALTH CARE EDUCATION/TRAINING PROGRAM
Payer: MEDICARE

## 2022-08-29 VITALS — BODY MASS INDEX: 27.19 KG/M2 | HEIGHT: 64 IN

## 2022-08-29 DIAGNOSIS — M21.621 TAILOR'S BUNION OF RIGHT FOOT: ICD-10-CM

## 2022-08-29 DIAGNOSIS — M79.671 RIGHT FOOT PAIN: ICD-10-CM

## 2022-08-29 DIAGNOSIS — M21.621 TAILOR'S BUNION OF RIGHT FOOT: Primary | ICD-10-CM

## 2022-08-29 DIAGNOSIS — B07.0 PLANTAR WART OF LEFT FOOT: ICD-10-CM

## 2022-08-29 PROCEDURE — 3008F BODY MASS INDEX DOCD: CPT | Mod: CPTII,S$GLB,, | Performed by: STUDENT IN AN ORGANIZED HEALTH CARE EDUCATION/TRAINING PROGRAM

## 2022-08-29 PROCEDURE — 99203 OFFICE O/P NEW LOW 30 MIN: CPT | Mod: S$GLB,,, | Performed by: STUDENT IN AN ORGANIZED HEALTH CARE EDUCATION/TRAINING PROGRAM

## 2022-08-29 PROCEDURE — 99999 PR PBB SHADOW E&M-EST. PATIENT-LVL IV: CPT | Mod: PBBFAC,,, | Performed by: STUDENT IN AN ORGANIZED HEALTH CARE EDUCATION/TRAINING PROGRAM

## 2022-08-29 PROCEDURE — 99203 PR OFFICE/OUTPT VISIT, NEW, LEVL III, 30-44 MIN: ICD-10-PCS | Mod: S$GLB,,, | Performed by: STUDENT IN AN ORGANIZED HEALTH CARE EDUCATION/TRAINING PROGRAM

## 2022-08-29 PROCEDURE — 73630 X-RAY EXAM OF FOOT: CPT | Mod: TC,FY,PO,RT

## 2022-08-29 PROCEDURE — 1159F MED LIST DOCD IN RCRD: CPT | Mod: CPTII,S$GLB,, | Performed by: STUDENT IN AN ORGANIZED HEALTH CARE EDUCATION/TRAINING PROGRAM

## 2022-08-29 PROCEDURE — 1125F PR PAIN SEVERITY QUANTIFIED, PAIN PRESENT: ICD-10-PCS | Mod: CPTII,S$GLB,, | Performed by: STUDENT IN AN ORGANIZED HEALTH CARE EDUCATION/TRAINING PROGRAM

## 2022-08-29 PROCEDURE — 99999 PR PBB SHADOW E&M-EST. PATIENT-LVL IV: ICD-10-PCS | Mod: PBBFAC,,, | Performed by: STUDENT IN AN ORGANIZED HEALTH CARE EDUCATION/TRAINING PROGRAM

## 2022-08-29 PROCEDURE — 3008F PR BODY MASS INDEX (BMI) DOCUMENTED: ICD-10-PCS | Mod: CPTII,S$GLB,, | Performed by: STUDENT IN AN ORGANIZED HEALTH CARE EDUCATION/TRAINING PROGRAM

## 2022-08-29 PROCEDURE — 1125F AMNT PAIN NOTED PAIN PRSNT: CPT | Mod: CPTII,S$GLB,, | Performed by: STUDENT IN AN ORGANIZED HEALTH CARE EDUCATION/TRAINING PROGRAM

## 2022-08-29 PROCEDURE — 1159F PR MEDICATION LIST DOCUMENTED IN MEDICAL RECORD: ICD-10-PCS | Mod: CPTII,S$GLB,, | Performed by: STUDENT IN AN ORGANIZED HEALTH CARE EDUCATION/TRAINING PROGRAM

## 2022-08-29 NOTE — PROGRESS NOTES
Subjective:      Patient ID: Luz Burk is a 72 y.o. female.    Chief Complaint: Foot Problem (Bilateral lumps around the 5th toe, ) and Toe Pain (Bilateral 5th toe)    Luz is a 72 y.o. female who presents to the podiatry clinic  with complaint of  right foot pain. Onset of the symptoms was several months ago. Precipitating event: none known. Current symptoms include:  pain in certain shoes . Aggravating factors: any weight bearing. Symptoms have gradually worsened. Patient has had no prior foot problems. Evaluation to date: none. Treatment to date: none. Patients rates pain  moderate  on pain scale. States she also has a history of warts and states noted new spot to her left foot about a week ago. No other pedal complaints.     Review of Systems   Constitutional: Negative for chills, decreased appetite, diaphoresis and fever.   HENT:  Negative for congestion and hearing loss.    Cardiovascular:  Negative for chest pain, claudication, leg swelling and syncope.   Respiratory:  Negative for cough and shortness of breath.    Skin:  Positive for suspicious lesions. Negative for color change, dry skin, flushing, itching, nail changes, poor wound healing and rash.   Musculoskeletal:  Negative for arthritis, back pain, joint pain and joint swelling.   Gastrointestinal:  Negative for nausea and vomiting.   Neurological:  Negative for focal weakness, numbness, paresthesias and weakness.   Psychiatric/Behavioral:  Negative for altered mental status. The patient is not nervous/anxious.          Objective:      Physical Exam  Constitutional:       General: She is not in acute distress.     Appearance: She is well-developed. She is not diaphoretic.   Cardiovascular:      Comments: Dorsalis pedis and posterior tibial pulses are within normal limits. Skin temperature is within normal limits. Toes are cool to touch and feet are warm proximally. Hair growth is within normal limits. Skin is normotrophic and without  hyperpigmentation. Telangiectasias bilaterally.  Musculoskeletal:      Comments: Adequate joint range of motion without pain, limitation, nor crepitation to bilateral feet and ankle joints. Muscle strength is 5/5 in all groups bilaterally.    Tailors bunion to right 5th metatarsophalangeal joint. Mild tenderness to dorsolateral bony exostosis        Lymphadenopathy:      Comments: Negative lymphangitic streaking    Skin:     General: Skin is warm and dry.      Findings: No lesion.      Comments: Skin is warm and dry, no acute signs of infection noted. No open wounds, macerations or hyperkeratotic lesions, bilaterally.     Toenails are well trimmed and of normal morphology, bilaterally.     Plantar wart to left plantar lateral aspect of 5th metatarsal head, measures 0.5x0.5cmx superficial with spongy appearance.    Neurological:      Mental Status: She is alert and oriented to person, place, and time.      Sensory: No sensory deficit.      Motor: No abnormal muscle tone.      Comments: Light touch within normal limits.     Psychiatric:         Behavior: Behavior normal.         Thought Content: Thought content normal.         Judgment: Judgment normal.           Assessment:       Encounter Diagnoses   Name Primary?    Right foot pain     Tailor's bunion of right foot Yes    Plantar wart of left foot          Plan:       Luz was seen today for foot problem and toe pain.    Diagnoses and all orders for this visit:    Nathaniel's bunion of right foot  -     X-Ray Foot Complete Right; Future    Right foot pain  -     Ambulatory referral/consult to Podiatry  -     X-Ray Foot Complete Right; Future    Plantar wart of left foot    I counseled the patient on her conditions, their implications and medical management.  Xray ordered, will call with results.   Discussed surgical vs conservative treatment options for nathaniel's bunion, patient relates she would like to avoid surgery for now.  MP AQUINON  Recommend supportive tennis  shoes with wide toe box or open toed shoes to reduce pressure to bunion deformity. Discussed importance of stretching calf to reduce pressure to forefoot.   Recommend OTC Compound W for plantar wart treatment  RTC PRN

## 2022-08-30 ENCOUNTER — HOSPITAL ENCOUNTER (OUTPATIENT)
Dept: RADIOLOGY | Facility: HOSPITAL | Age: 72
Discharge: HOME | End: 2022-08-30
Attending: SURGERY
Payer: MEDICARE

## 2022-08-30 DIAGNOSIS — K44.9 PARAESOPHAGEAL HERNIA: ICD-10-CM

## 2022-08-30 DIAGNOSIS — R11.10 VOMITING, INTRACTABILITY OF VOMITING NOT SPECIFIED, PRESENCE OF NAUSEA NOT SPECIFIED, UNSPECIFIED VOMITING TYPE: ICD-10-CM

## 2022-08-30 PROCEDURE — A9541 TC99M SULFUR COLLOID: HCPCS

## 2022-08-30 PROCEDURE — 78264 GASTRIC EMPTYING IMG STUDY: CPT | Mod: 26,,, | Performed by: STUDENT IN AN ORGANIZED HEALTH CARE EDUCATION/TRAINING PROGRAM

## 2022-08-30 PROCEDURE — 78264 NM GASTRIC EMPTYING: ICD-10-PCS | Mod: 26,,, | Performed by: STUDENT IN AN ORGANIZED HEALTH CARE EDUCATION/TRAINING PROGRAM

## 2022-08-31 ENCOUNTER — TELEPHONE (OUTPATIENT)
Dept: ENDOSCOPY | Facility: HOSPITAL | Age: 72
End: 2022-08-31
Payer: MEDICARE

## 2022-08-31 NOTE — TELEPHONE ENCOUNTER
Spoke with patient about arrival time @ 0915.   Covid test = Boosted    NPO status reviewed: Patient must have nothing to eat after midnight.      Medications: Do not take Insulin or oral diabetic medications the day of the procedure.  Take as prescribed: heart, seizure and blood pressure medication in the morning with a sip of water (less than an ounce).  Take any breathing medications and bring inhalers to hospital with you Leave all valuables and jewelry at home.     Wear comfortable clothes to procedure to change into hospital gown You cannot drive for 24 hours after your procedure because you will receive sedation for your procedure to make you comfortable.  A ride must be provided at discharge.

## 2022-09-01 ENCOUNTER — PATIENT MESSAGE (OUTPATIENT)
Dept: SURGERY | Facility: CLINIC | Age: 72
End: 2022-09-01
Payer: MEDICARE

## 2022-09-02 ENCOUNTER — ANESTHESIA (OUTPATIENT)
Dept: ENDOSCOPY | Facility: HOSPITAL | Age: 72
End: 2022-09-02
Payer: MEDICARE

## 2022-09-02 ENCOUNTER — ANESTHESIA EVENT (OUTPATIENT)
Dept: ENDOSCOPY | Facility: HOSPITAL | Age: 72
End: 2022-09-02
Payer: MEDICARE

## 2022-09-02 ENCOUNTER — HOSPITAL ENCOUNTER (OUTPATIENT)
Facility: HOSPITAL | Age: 72
Discharge: HOME | End: 2022-09-02
Attending: INTERNAL MEDICINE | Admitting: INTERNAL MEDICINE
Payer: MEDICARE

## 2022-09-02 VITALS
DIASTOLIC BLOOD PRESSURE: 66 MMHG | HEART RATE: 75 BPM | SYSTOLIC BLOOD PRESSURE: 101 MMHG | OXYGEN SATURATION: 100 % | TEMPERATURE: 98 F | WEIGHT: 158 LBS | HEIGHT: 64 IN | RESPIRATION RATE: 18 BRPM | BODY MASS INDEX: 26.98 KG/M2

## 2022-09-02 DIAGNOSIS — K21.9 GERD (GASTROESOPHAGEAL REFLUX DISEASE): ICD-10-CM

## 2022-09-02 PROCEDURE — 63600175 PHARM REV CODE 636 W HCPCS: Performed by: NURSE ANESTHETIST, CERTIFIED REGISTERED

## 2022-09-02 PROCEDURE — 25000003 PHARM REV CODE 250: Performed by: INTERNAL MEDICINE

## 2022-09-02 PROCEDURE — 43235 PR EGD, FLEX, DIAGNOSTIC: ICD-10-PCS | Mod: ,,, | Performed by: INTERNAL MEDICINE

## 2022-09-02 PROCEDURE — 43235 EGD DIAGNOSTIC BRUSH WASH: CPT | Performed by: INTERNAL MEDICINE

## 2022-09-02 PROCEDURE — 25000003 PHARM REV CODE 250: Performed by: NURSE ANESTHETIST, CERTIFIED REGISTERED

## 2022-09-02 PROCEDURE — 37000009 HC ANESTHESIA EA ADD 15 MINS: Performed by: INTERNAL MEDICINE

## 2022-09-02 PROCEDURE — 43235 EGD DIAGNOSTIC BRUSH WASH: CPT | Mod: ,,, | Performed by: INTERNAL MEDICINE

## 2022-09-02 PROCEDURE — 37000008 HC ANESTHESIA 1ST 15 MINUTES: Performed by: INTERNAL MEDICINE

## 2022-09-02 RX ORDER — PROPOFOL 10 MG/ML
VIAL (ML) INTRAVENOUS
Status: DISCONTINUED | OUTPATIENT
Start: 2022-09-02 | End: 2022-09-02

## 2022-09-02 RX ORDER — PROPOFOL 10 MG/ML
VIAL (ML) INTRAVENOUS CONTINUOUS PRN
Status: DISCONTINUED | OUTPATIENT
Start: 2022-09-02 | End: 2022-09-02

## 2022-09-02 RX ORDER — LIDOCAINE HYDROCHLORIDE 20 MG/ML
INJECTION INTRAVENOUS
Status: DISCONTINUED | OUTPATIENT
Start: 2022-09-02 | End: 2022-09-02

## 2022-09-02 RX ORDER — SODIUM CHLORIDE 0.9 % (FLUSH) 0.9 %
10 SYRINGE (ML) INJECTION
Status: DISCONTINUED | OUTPATIENT
Start: 2022-09-02 | End: 2022-09-02 | Stop reason: HOSPADM

## 2022-09-02 RX ORDER — SODIUM CHLORIDE 9 MG/ML
INJECTION, SOLUTION INTRAVENOUS CONTINUOUS
Status: DISCONTINUED | OUTPATIENT
Start: 2022-09-02 | End: 2022-09-02 | Stop reason: HOSPADM

## 2022-09-02 RX ADMIN — PROPOFOL 80 MG: 10 INJECTION, EMULSION INTRAVENOUS at 11:09

## 2022-09-02 RX ADMIN — PROPOFOL 150 MCG/KG/MIN: 10 INJECTION, EMULSION INTRAVENOUS at 11:09

## 2022-09-02 RX ADMIN — LIDOCAINE HYDROCHLORIDE 100 MG: 20 INJECTION, SOLUTION INTRAVENOUS at 11:09

## 2022-09-02 RX ADMIN — SODIUM CHLORIDE: 0.9 INJECTION, SOLUTION INTRAVENOUS at 10:09

## 2022-09-02 NOTE — PLAN OF CARE
Pt is ready for procedure. VSS. NPO status maintained. Bed in lowest position, call light within reach, and wheels locked.

## 2022-09-02 NOTE — ANESTHESIA PREPROCEDURE EVALUATION
09/02/2022  Luz Burk is a 72 y.o., female for EGD under MAC    Past Medical History:   Diagnosis Date    Cataract     Collagenous colitis     Dx 5/30/16    Dry mouth 1/30/2018    Excessive thirst 1/30/2018    Fecal incontinence 5/25/2016    Gastric banding status 8/20/2012    Hydrocephalus 1/15/2013    Hypothyroidism     LAP-BAND surgery status 8/6/2015    Muscle weakness 9/4/2016    OA (osteoarthritis)     Obesity     Other specified prophylactic or treatment measure 7/27/2012    Pain aggravated by changing postions 9/4/2016    Palpitations 1/29/2021    SI (sacroiliac) pain 9/4/2016    Sleep apnea     Thumb pain, right 12/11/2020    Urinary frequency 1/30/2018     Past Surgical History:   Procedure Laterality Date    APPENDECTOMY      ARTHROPLASTY OF JOINT OF FINGER Right 10/30/2020    Procedure: ARTHROPLASTY, FINGER cmc joint right thumb;  Surgeon: Sugey Ayon MD;  Location: [facility];  Service: Orthopedics;  Laterality: Right;  MAC/Regional    CARPAL TUNNEL RELEASE Right 7/24/2020    Procedure: RELEASE, CARPAL TUNNEL, RIGHT;  Surgeon: Sugey Ayon MD;  Location: [facility] OR;  Service: Orthopedics;  Laterality: Right;  Regional & MAC    CATARACT EXTRACTION      CHOLECYSTECTOMY      COLONOSCOPY N/A 5/30/2016    Procedure: COLONOSCOPY;  Surgeon: BINH Souza MD;  Location: [address]);  Service: Endoscopy;  Laterality: N/A;    COLONOSCOPY N/A 6/17/2020    Procedure: COLONOSCOPYSuprep;  Surgeon: Antonio Weller MD;  Location: [facility];  Service: Endoscopy;  Laterality: N/A;    ESOPHAGOGASTRODUODENOSCOPY N/A 6/17/2020    Procedure: EGD (ESOPHAGOGASTRODUODENOSCOPY);  Surgeon: Anotnio Weller MD;  Location: [facility];  Service: Endoscopy;  Laterality: N/A;    EXCISION OF GANGLION OF WRIST Right 7/24/2020    Procedure: EXCISION, GANGLION CYST, WRIST,  RIGHT volar;  Surgeon: Sugey Ayon MD;  Location: [facility];  Service: Orthopedics;  Laterality: Right;  Regional & MAC    HERNIA REPAIR      HIATAL HERNIA REPAIR      HYSTERECTOMY      lap band surgery  10/11/2011    Realize C Band (11mL)    LAPAROSCOPIC GASTRIC BANDING      LAPAROSCOPIC REMOVAL OF GASTRIC BAND N/A 4/7/2021    Procedure: REMOVAL-GASTRIC BAND-LAPAROSCOPIC-;  Surgeon: Oswaldo Mayfield MD;  Location: [facility] OR [address] [place] [address];  Service: General;  Laterality: N/A;    LAPAROSCOPIC SLEEVE GASTRECTOMY N/A 4/7/2021    Procedure: GASTRECTOMY-SLEEVE-LAPAROSCOPIC;  Surgeon: Oswaldo Mayfield MD;  Location: [facility] OR [address] [place] [address];  Service: General;  Laterality: N/A;    OOPHORECTOMY      TONSILLECTOMY      TOTAL KNEE ARTHROPLASTY  7/23/12    VENTRICULOPERITONEAL SHUNT             Pre-op Assessment    I have reviewed the Patient Summary Reports.    I have reviewed the NPO Status.   I have reviewed the Medications.     Review of Systems  Social:  Smoker, Alcohol Use        Physical Exam  General: Well nourished    Airway:  Mallampati: II           Anesthesia Plan  Type of Anesthesia, risks & benefits discussed:    Anesthesia Type: MAC  Intra-op Monitoring Plan: Standard ASA Monitors  Informed Consent: Informed consent signed with the Patient and all parties understand the risks and agree with anesthesia plan.  All questions answered.   ASA Score: 3    Ready For Surgery From Anesthesia Perspective.     .

## 2022-09-02 NOTE — PROVATION PATIENT INSTRUCTIONS
Discharge Summary/Instructions after an Endoscopic Procedure  Patient Name: Luz Burk  Patient MRN: [id number]  Patient YOB: 1950 Friday, September 2, 2022  Antonio Weller MD  Dear patient,  As a result of recent federal legislation (The Federal Cures Act), you may   receive lab or pathology results from your procedure in your [facility]   account before your physician is able to contact you. Your physician or   their representative will relay the results to you with their   recommendations at their soonest availability.  Thank you,  Your health is very important to us during the Covid Crisis. Following your   procedure today, you will receive a daily text for 2 weeks asking about   signs or symptoms of Covid 19.  Please respond to this text when you   receive it so we can follow up and keep you as safe as possible.   RESTRICTIONS:  During your procedure today, you received medications for sedation.  These   medications may affect your judgment, balance and coordination.  Therefore,   for 24 hours, you have the following restrictions:   - DO NOT drive a car, operate machinery, make legal/financial decisions,   sign important papers or drink alcohol.    ACTIVITY:  Today: no heavy lifting, straining or running due to procedural   sedation/anesthesia.  The following day: return to full activity including work.  DIET:  Eat and drink normally unless instructed otherwise.     TREATMENT FOR COMMON SIDE EFFECTS:  - Mild abdominal pain, nausea, belching, bloating or excessive gas:  rest,   eat lightly and use a heating pad.  - Sore Throat: treat with throat lozenges and/or gargle with warm salt   water.  - Because air was used during the procedure, expelling large amounts of air   from your rectum or belching is normal.  - If a bowel prep was taken, you may not have a bowel movement for 1-3 days.    This is normal.  SYMPTOMS TO WATCH FOR AND REPORT TO YOUR PHYSICIAN:  1. Abdominal pain or bloating, other than  gas cramps.  2. Chest pain.  3. Back pain.  4. Signs of infection such as: chills or fever occurring within 24 hours   after the procedure.  5. Rectal bleeding, which would show as bright red, maroon, or black stools.   (A tablespoon of blood from the rectum is not serious, especially if   hemorrhoids are present.)  6. Vomiting.  7. Weakness or dizziness.  GO DIRECTLY TO THE NEAREST EMERGENCY ROOM IF YOU HAVE ANY OF THE FOLLOWING:      Difficulty breathing              Chills and/or fever over 101 F   Persistent vomiting and/or vomiting blood   Severe abdominal pain   Severe chest pain   Black, tarry stools   Bleeding- more than one tablespoon   Any other symptom or condition that you feel may need urgent attention  Your doctor recommends these additional instructions:  If any biopsies were taken, your doctors clinic will contact you in 1 to 2   weeks with any results.  - Discharge patient to home.   - Patient has a contact number available for emergencies.  The signs and   symptoms of potential delayed complications were discussed with the   patient.  Return to normal activities tomorrow.  Written discharge   instructions were provided to the patient.   - Resume previous diet.   - Continue present medications.   - Return to referring physician as previously scheduled.   - Suggest upper gi series to evalate the mid portion of the gastric lumen,   may be just anatomical twisting vs. intermittent volvulus.  For questions, problems or results please call your physician - Antonio Weller MD.  EMERGENCY PHONE NUMBER: [phone],  LAB RESULTS: [phone]  IF A COMPLICATION OR EMERGENCY SITUATION ARISES AND YOU ARE UNABLE TO REACH   YOUR PHYSICIAN - GO DIRECTLY TO THE EMERGENCY ROOM.  Antonio Weller MD  9/2/2022 11:53:57 AM  This report has been verified and signed electronically.  Dear patient,  As a result of recent federal legislation (The Federal Cures Act), you may   receive lab or pathology results from  your procedure in your [facility]   account before your physician is able to contact you. Your physician or   their representative will relay the results to you with their   recommendations at their soonest availability.  Thank you,  PROVATION

## 2022-09-02 NOTE — TRANSFER OF CARE
"Anesthesia Transfer of Care Note    Patient: Luz Burk    Procedure(s) Performed: Procedure(s) (LRB):  EGD (ESOPHAGOGASTRODUODENOSCOPY (N/A)    Patient location: GI    Anesthesia Type: MAC    Transport from OR: Transported from OR on room air with adequate spontaneous ventilation    Post pain: adequate analgesia    Post assessment: no apparent anesthetic complications    Post vital signs: stable    Level of consciousness: awake, alert and oriented    Nausea/Vomiting: no nausea/vomiting    Transfer of care protocol was followed      Last vitals:   Visit Vitals  /64 (BP Location: Left arm, Patient Position: Lying)   Pulse (!) 56   Temp 36.3 °C (97.3 °F) (Temporal)   Resp 16   Ht 5' 4" (1.626 m)   Wt 71.7 kg (158 lb)   LMP  (LMP Unknown)   SpO2 99%   Breastfeeding No   BMI 27.12 kg/m²     "

## 2022-09-02 NOTE — ANESTHESIA POSTPROCEDURE EVALUATION
Anesthesia Post Evaluation    Patient: Luz Burk    Procedure(s) Performed: Procedure(s) (LRB):  EGD (ESOPHAGOGASTRODUODENOSCOPY (N/A)    Final Anesthesia Type: MAC      Patient location during evaluation: GI PACU  Patient participation: Yes- Able to Participate  Level of consciousness: awake and alert and oriented  Post-procedure vital signs: reviewed and stable  Pain management: adequate  Airway patency: patent    PONV status at discharge: No PONV  Anesthetic complications: no      Cardiovascular status: blood pressure returned to baseline  Respiratory status: unassisted, spontaneous ventilation and room air  Hydration status: euvolemic  Follow-up not needed.          Vitals Value Taken Time   /65 09/02/22 1153   Temp 36.4 °C (97.5 °F) 09/02/22 1153   Pulse 73 09/02/22 1153   Resp 27 09/02/22 1153   SpO2 99 % 09/02/22 1153         No case tracking events are documented in the log.      Pain/Dawson Score: Dawson Score: 10 (9/2/2022 11:51 AM)

## 2022-09-07 ENCOUNTER — PATIENT MESSAGE (OUTPATIENT)
Dept: BARIATRICS | Facility: CLINIC | Age: 72
End: 2022-09-07
Payer: MEDICARE

## 2022-09-16 ENCOUNTER — TELEPHONE (OUTPATIENT)
Dept: BARIATRICS | Facility: CLINIC | Age: 72
End: 2022-09-16
Payer: MEDICARE

## 2022-09-16 DIAGNOSIS — K44.9 PARAESOPHAGEAL HERNIA: Primary | ICD-10-CM

## 2022-09-16 NOTE — TELEPHONE ENCOUNTER
----- Message from Oswaldo Mayfield MD sent at 9/6/2022  4:05 PM CDT -----  Please set up timed ugi with barium tablet.

## 2022-09-21 ENCOUNTER — TELEPHONE (OUTPATIENT)
Dept: SURGERY | Facility: CLINIC | Age: 72
End: 2022-09-21
Payer: MEDICARE

## 2022-09-21 NOTE — TELEPHONE ENCOUNTER
Called and left a phone message.  Explained that I was calling to get some available dates that she would be able to have a Timed Barium Swallow Study done at [facility].  Left the Office phone # and also suggested that she can message us via the Patient Portal.

## 2022-10-03 ENCOUNTER — TELEPHONE (OUTPATIENT)
Dept: OPHTHALMOLOGY | Facility: CLINIC | Age: 72
End: 2022-10-03
Payer: MEDICARE

## 2022-10-03 DIAGNOSIS — H11.9 CONJUNCTIVAL LESION: Primary | ICD-10-CM

## 2022-10-05 ENCOUNTER — CLINICAL SUPPORT (OUTPATIENT)
Dept: SMOKING CESSATION | Facility: CLINIC | Age: 72
End: 2022-10-05
Payer: COMMERCIAL

## 2022-10-05 DIAGNOSIS — F17.200 NICOTINE DEPENDENCE: Primary | ICD-10-CM

## 2022-10-05 PROCEDURE — 99999 PR PBB SHADOW E&M-EST. PATIENT-LVL I: ICD-10-PCS | Mod: PBBFAC,,,

## 2022-10-05 PROCEDURE — 99999 PR PBB SHADOW E&M-EST. PATIENT-LVL I: CPT | Mod: PBBFAC,,,

## 2022-10-05 PROCEDURE — 99404 PR PREVENT COUNSEL,INDIV,60 MIN: ICD-10-PCS | Mod: S$GLB,,, | Performed by: GENERAL PRACTICE

## 2022-10-05 PROCEDURE — 99404 PREV MED CNSL INDIV APPRX 60: CPT | Mod: S$GLB,,, | Performed by: GENERAL PRACTICE

## 2022-10-05 NOTE — PROGRESS NOTES
Individual Follow-Up Form    10/5/2022    Quit Date: NA    Clinical Status of Patient: Outpatient    Continuing Medication: yes  Nicotine gum    Other Medications: Patches     Target Symptoms: Withdrawal and medication side effects. The following were rated moderate (3) to severe (4) on TCRS:  Moderate (3): none  Severe (4): none    Comments: Patient seen in clinic with friend who normally accompanies her. Both are here for smoking cessation. Both are back from a 30 day cruise that they have been on. Patient states that she smoked a lot while on cruise. States that she found a group of smokers and they just smoked. Patient states that smoking was allowed in various places, so it was not hard for her  to not smoke. Patient states since getting back, she is getting back to getting serious about smoking. Patient states that she did not use NRT's while away, but is starting tomorrow. Patient advised on use of NRT's since it has been a while from last follow up. Patient also states that she met a gentleman on the cruise that share the same recent life situation as her with losing a spouse due to illness after caring for them for many years. Patient states that he is not a smoker and she noticed that she smoked less around him while on the cruise. Patient is looking forward to being tobacco free. Completion of TCRS (Tobacco Cessation Rating Scale). Patient advised on triggers/cues, strategies, goals and behavior change. Patient instructed to keep a diary along with other techniques to help with smoking cessation. Patient is ordered on 21 mg Nicotine Patch QD and 2 mg Nicotine Gum as needed in place of cigarettes. The patient denies any abnormal behavioral or mental changes at this time.      Diagnosis: F17.210    Next Visit: 1 week

## 2022-10-06 ENCOUNTER — PATIENT MESSAGE (OUTPATIENT)
Dept: BARIATRICS | Facility: CLINIC | Age: 72
End: 2022-10-06
Payer: MEDICARE

## 2022-10-06 ENCOUNTER — PROCEDURE VISIT (OUTPATIENT)
Dept: NEUROLOGY | Facility: CLINIC | Age: 72
End: 2022-10-06
Payer: MEDICARE

## 2022-10-06 ENCOUNTER — TELEPHONE (OUTPATIENT)
Dept: ORTHOPEDICS | Facility: CLINIC | Age: 72
End: 2022-10-06
Payer: MEDICARE

## 2022-10-06 DIAGNOSIS — R20.0 FINGER NUMBNESS: ICD-10-CM

## 2022-10-06 PROCEDURE — 95886 PR EMG COMPLETE, W/ NERVE CONDUCTION STUDIES, 5+ MUSCLES: ICD-10-PCS | Mod: S$GLB,,, | Performed by: PSYCHIATRY & NEUROLOGY

## 2022-10-06 PROCEDURE — 95886 MUSC TEST DONE W/N TEST COMP: CPT | Mod: S$GLB,,, | Performed by: PSYCHIATRY & NEUROLOGY

## 2022-10-06 PROCEDURE — 95913 NRV CNDJ TEST 13/> STUDIES: CPT | Mod: S$GLB,,, | Performed by: PSYCHIATRY & NEUROLOGY

## 2022-10-06 PROCEDURE — 95913 PR NERVE CONDUCTION STUDY; 13 OR MORE STUDIES: ICD-10-PCS | Mod: S$GLB,,, | Performed by: PSYCHIATRY & NEUROLOGY

## 2022-10-11 ENCOUNTER — OFFICE VISIT (OUTPATIENT)
Dept: ORTHOPEDICS | Facility: CLINIC | Age: 72
End: 2022-10-11
Payer: MEDICARE

## 2022-10-11 VITALS
BODY MASS INDEX: 26.98 KG/M2 | HEIGHT: 64 IN | SYSTOLIC BLOOD PRESSURE: 116 MMHG | HEART RATE: 62 BPM | DIASTOLIC BLOOD PRESSURE: 75 MMHG | WEIGHT: 158 LBS

## 2022-10-11 DIAGNOSIS — G56.03 BILATERAL CARPAL TUNNEL SYNDROME: Primary | ICD-10-CM

## 2022-10-11 DIAGNOSIS — R52 PAIN: ICD-10-CM

## 2022-10-11 PROCEDURE — 3008F BODY MASS INDEX DOCD: CPT | Mod: CPTII,S$GLB,, | Performed by: ORTHOPAEDIC SURGERY

## 2022-10-11 PROCEDURE — 99214 PR OFFICE/OUTPT VISIT, EST, LEVL IV, 30-39 MIN: ICD-10-PCS | Mod: S$GLB,,, | Performed by: ORTHOPAEDIC SURGERY

## 2022-10-11 PROCEDURE — 1101F PT FALLS ASSESS-DOCD LE1/YR: CPT | Mod: CPTII,S$GLB,, | Performed by: ORTHOPAEDIC SURGERY

## 2022-10-11 PROCEDURE — 3288F FALL RISK ASSESSMENT DOCD: CPT | Mod: CPTII,S$GLB,, | Performed by: ORTHOPAEDIC SURGERY

## 2022-10-11 PROCEDURE — 1101F PR PT FALLS ASSESS DOC 0-1 FALLS W/OUT INJ PAST YR: ICD-10-PCS | Mod: CPTII,S$GLB,, | Performed by: ORTHOPAEDIC SURGERY

## 2022-10-11 PROCEDURE — 3288F PR FALLS RISK ASSESSMENT DOCUMENTED: ICD-10-PCS | Mod: CPTII,S$GLB,, | Performed by: ORTHOPAEDIC SURGERY

## 2022-10-11 PROCEDURE — 3078F DIAST BP <80 MM HG: CPT | Mod: CPTII,S$GLB,, | Performed by: ORTHOPAEDIC SURGERY

## 2022-10-11 PROCEDURE — 3008F PR BODY MASS INDEX (BMI) DOCUMENTED: ICD-10-PCS | Mod: CPTII,S$GLB,, | Performed by: ORTHOPAEDIC SURGERY

## 2022-10-11 PROCEDURE — 1159F PR MEDICATION LIST DOCUMENTED IN MEDICAL RECORD: ICD-10-PCS | Mod: CPTII,S$GLB,, | Performed by: ORTHOPAEDIC SURGERY

## 2022-10-11 PROCEDURE — 99999 PR PBB SHADOW E&M-EST. PATIENT-LVL III: CPT | Mod: PBBFAC,,, | Performed by: ORTHOPAEDIC SURGERY

## 2022-10-11 PROCEDURE — 3074F PR MOST RECENT SYSTOLIC BLOOD PRESSURE < 130 MM HG: ICD-10-PCS | Mod: CPTII,S$GLB,, | Performed by: ORTHOPAEDIC SURGERY

## 2022-10-11 PROCEDURE — 99999 PR PBB SHADOW E&M-EST. PATIENT-LVL III: ICD-10-PCS | Mod: PBBFAC,,, | Performed by: ORTHOPAEDIC SURGERY

## 2022-10-11 PROCEDURE — 1159F MED LIST DOCD IN RCRD: CPT | Mod: CPTII,S$GLB,, | Performed by: ORTHOPAEDIC SURGERY

## 2022-10-11 PROCEDURE — 3074F SYST BP LT 130 MM HG: CPT | Mod: CPTII,S$GLB,, | Performed by: ORTHOPAEDIC SURGERY

## 2022-10-11 PROCEDURE — 3078F PR MOST RECENT DIASTOLIC BLOOD PRESSURE < 80 MM HG: ICD-10-PCS | Mod: CPTII,S$GLB,, | Performed by: ORTHOPAEDIC SURGERY

## 2022-10-11 PROCEDURE — 99214 OFFICE O/P EST MOD 30 MIN: CPT | Mod: S$GLB,,, | Performed by: ORTHOPAEDIC SURGERY

## 2022-10-11 NOTE — PROGRESS NOTES
I have personally taken the history and examined this patient. I agree with the resident's note as stated above.        72F with recurrent right carpal tunnel syndrome. Hx right open carpal tunnel release 2020.  Her EMG prior to her 1st surgery was mild to moderate on the right, and new EMG is moderate to severe.  Discussed with patient her diagnosis and treatment options both conservative and surgical.  At this time patient would like to proceed with surgery for revision right carpal tunnel release.  Consent signed in clinic today.      Nerve testing does show worsening of carpal tunnel patient also feels like it has gotten worse we discussed revision carpal tunnel in great length with the patient consents were signed today

## 2022-10-11 NOTE — PROGRESS NOTES
"Subjective:      Patient ID: Luz Burk is a 72 y.o. female.    Chief Complaint: No chief complaint on file.      HPI  Luz Burk is a  72 y.o. female presenting today for follow-up of right wrist pain.  She was last seen by Dr. Ayon in 3/2021, no improvement in pain with steroid injection 2/2021.  She reports intermittent tingling in the fingers of the right hand.  She also reports occasional "cramping in the right hand were fingers seem to cross while she is driving.  She is status post Right CMC arthroplasty with MCP fusion and first dorsal compartment release by Dr. Ayon on 10/30/2020.  History of right carpal tunnel release and right volar ganglion cyst excision by Dr. Ayon 7/24/2020.    Interval history 10/11/2022.  Patient returns to clinic for follow-up of EMG bilateral upper extremities.  Her EMG showed moderate to severe carpal tunnel on her right side.  She continues to have pain in the median nerve distribution which is worse while driving worse at night.  Her hand is also cramping frequently.  Reports persistent numbness in her right thumb.  History of right carpal tunnel release 07/24/2020 by Dr. Ayon.      Review of patient's allergies indicates:   Allergen Reactions    Omeprazole Diarrhea    Prevacid [lansoprazole] Diarrhea         Current Outpatient Medications   Medication Sig Dispense Refill    ascorbic acid, vitamin C, (VITAMIN C) 100 MG tablet Take 100 mg by mouth once daily.      BIOTIN ORAL Take by mouth once daily.      calcium citrate-vitamin D3 315-200 mg (CITRACAL+D) 315 mg-5 mcg (200 unit) per tablet Take 1 tablet by mouth 2 (two) times daily.      cholecalciferol, vitamin D3, (VITAMIN D3) 125 mcg (5,000 unit) Tab Take 1 tablet (5,000 Units total) by mouth once daily. 90 tablet 3    citalopram (CELEXA) 20 MG tablet Take 1 tablet by mouth once daily 90 tablet 3    cyanocobalamin 1,000 mcg/mL injection INJECT 1 ML EVERY MONTH 10 mL 5    famotidine " (PEPCID) 20 MG tablet Take 1 tablet by mouth twice daily 60 tablet 0    fluticasone (FLONASE) 50 mcg/actuation nasal spray 1 spray by Nasal route once daily.       furosemide (LASIX) 20 MG tablet Take 1 tablet (20 mg total) by mouth once daily. 30 tablet 11    levothyroxine (SYNTHROID) 50 MCG tablet Take 1 tablet (50 mcg total) by mouth before breakfast. 90 tablet 3    melatonin 10 mg Tab Take by mouth.      multivitamin capsule Take 1 capsule by mouth once daily.      nicotine (NICODERM CQ) 21 mg/24 hr Place 1 patch onto the skin once daily. 28 patch 0    nicotine, polacrilex, (NICORETTE) 2 mg Gum Take 1 each (2 mg total) by mouth as needed (Use in place of cigarettes). 100 each 0    pantoprazole (PROTONIX) 40 MG tablet Take 1 tablet (40 mg total) by mouth 2 (two) times daily before meals. 60 tablet 12    potassium chloride (KLOR-CON) 10 MEQ TbSR TAKE TWO TABLETS BY MOUTH ONCE DAILY WITH LASIX 60 tablet 5    prednisoLONE acetate (PRED FORTE) 1 % DrpS Place 1 drop into the left eye 3 (three) times daily.      loratadine (CLARITIN) 10 mg tablet Take 1 tablet (10 mg total) by mouth once daily. 90 tablet 3    valACYclovir (VALTREX) 1000 MG tablet Take 2 tablets (2,000 mg total) by mouth every 12 (twelve) hours as needed (herpes outbreak). 2 tablets as soon as possible after symptom onset, then 2 tablets 12 hours later (4 total) 30 tablet 2     No current facility-administered medications for this visit.     Facility-Administered Medications Ordered in Other Visits   Medication Dose Route Frequency Provider Last Rate Last Admin    acetaminophen tablet 1,000 mg  1,000 mg Oral Once Sugey Edouard MD        fentaNYL injection 25 mcg  25 mcg Intravenous Q5 Min PRN Sugey Edouard MD        fentaNYL injection 25 mcg  25 mcg Intravenous Q5 Min PRN Quinten Chu MD   100 mcg at 10/30/20 0920    lidocaine (PF) 10 mg/ml (1%) injection 10 mg  1 mL Intradermal Once Rachele Gonzalez MD        midazolam  (VERSED) 1 mg/mL injection 0.5 mg  0.5 mg Intravenous PRN Sugey Edouard MD        midazolam (VERSED) 1 mg/mL injection 0.5 mg  0.5 mg Intravenous PRN Quinten Chu MD   1 mg at 10/30/20 0920       Past Medical History:   Diagnosis Date    Cataract     Collagenous colitis     Dx 5/30/16    Dry mouth 1/30/2018    Excessive thirst 1/30/2018    Fecal incontinence 5/25/2016    Gastric banding status 8/20/2012    Hydrocephalus 1/15/2013    Hypothyroidism     LAP-BAND surgery status 8/6/2015    Muscle weakness 9/4/2016    OA (osteoarthritis)     Obesity     Other specified prophylactic or treatment measure 7/27/2012    Pain aggravated by changing postions 9/4/2016    Palpitations 1/29/2021    SI (sacroiliac) pain 9/4/2016    Sleep apnea     Thumb pain, right 12/11/2020    Urinary frequency 1/30/2018       Past Surgical History:   Procedure Laterality Date    APPENDECTOMY      ARTHROPLASTY OF JOINT OF FINGER Right 10/30/2020    Procedure: ARTHROPLASTY, FINGER cmc joint right thumb;  Surgeon: Sugey Ayon MD;  Location: [facility];  Service: Orthopedics;  Laterality: Right;  MAC/Regional    CARPAL TUNNEL RELEASE Right 7/24/2020    Procedure: RELEASE, CARPAL TUNNEL, RIGHT;  Surgeon: Sugey Ayon MD;  Location: [facility];  Service: Orthopedics;  Laterality: Right;  Regional & MAC    CATARACT EXTRACTION      CHOLECYSTECTOMY      COLONOSCOPY N/A 5/30/2016    Procedure: COLONOSCOPY;  Surgeon: BINH Souza MD;  Location: [address]);  Service: Endoscopy;  Laterality: N/A;    COLONOSCOPY N/A 6/17/2020    Procedure: COLONOSCOPYSuprep;  Surgeon: Antonio Weller MD;  Location: [facility] ENDO;  Service: Endoscopy;  Laterality: N/A;    ESOPHAGOGASTRODUODENOSCOPY N/A 6/17/2020    Procedure: EGD (ESOPHAGOGASTRODUODENOSCOPY);  Surgeon: Antonio Weller MD;  Location: [facility] ENDO;  Service: Endoscopy;  Laterality: N/A;    ESOPHAGOGASTRODUODENOSCOPY N/A 9/2/2022    Procedure: EGD  "(ESOPHAGOGASTRODUODENOSCOPY;  Surgeon: Antonio Weller MD;  Location: [facility] ENDO;  Service: Endoscopy;  Laterality: N/A;    EXCISION OF GANGLION OF WRIST Right 7/24/2020    Procedure: EXCISION, GANGLION CYST, WRIST, RIGHT volar;  Surgeon: Sugey Ayon MD;  Location: [facility] OR;  Service: Orthopedics;  Laterality: Right;  Regional & MAC    HERNIA REPAIR      HIATAL HERNIA REPAIR      HYSTERECTOMY      lap band surgery  10/11/2011    Realize C Band (11mL)    LAPAROSCOPIC GASTRIC BANDING      LAPAROSCOPIC REMOVAL OF GASTRIC BAND N/A 4/7/2021    Procedure: REMOVAL-GASTRIC BAND-LAPAROSCOPIC-;  Surgeon: Oswaldo Mayfield MD;  Location: [facility] OR [address] [place] [address];  Service: General;  Laterality: N/A;    LAPAROSCOPIC SLEEVE GASTRECTOMY N/A 4/7/2021    Procedure: GASTRECTOMY-SLEEVE-LAPAROSCOPIC;  Surgeon: Oswaldo Mayfield MD;  [facility];  Service: General;  Laterality: N/A;    OOPHORECTOMY      TONSILLECTOMY      TOTAL KNEE ARTHROPLASTY  7/23/12    VENTRICULOPERITONEAL SHUNT           Review of Systems:  ROS:  Constitutional: no fever or chills  Skin: no rash or suspicious lesions  Musculoskeletal: See HPI.   Neurological: no headaches, lightheadedness, or dizziness. No numbness or tingling  Psychological/behavioral: no anxiety or depression      OBJECTIVE:     PHYSICAL EXAM:  Height: 5' 4" (162.6 cm) Weight: 71.7 kg (158 lb)  Vitals:    10/11/22 1052   BP: 116/75   Pulse: 62   Weight: 71.7 kg (158 lb)   Height: 5' 4" (1.626 m)     Vitals reviewed.  Constitutional: NAD. Patient appears well-developed and well-nourished.   HENT:   Head: Normocephalic and atraumatic.   Neck: Normal range of motion.   Cardiovascular: Normal rate.    Pulmonary/Chest: Effort normal. No respiratory distress.   Neurological: Patient is awake, alert, oriented.   Psychiatric: Patient has a normal mood and affect. Behavior is normal. Judgment and thought content normal.      Musculoskeletal:      Right hand/wrist  Well-healed " surgical scars right hand dorsally, radially, and volarly.  Dupuytren's nodules noted in the palm.  MCP fusion noted.  Atrophy of her thenar muscles in her intrinsic hand muscles.  Positive median nerve compression test and tinel over the right carpal tunnel.  Negative Tinel's at the elbow cubital tunnel. Decresaed sensation to light touch in thumb tip, otherwise intact sensation and motor all distributions - median/ain/radial/pin/ulnar.  good capillary refill, 2+ radial pulses.      Left hand/wrist  Thenar and intrinsic hand muscle atrophy.  Negative median nerve compression test.  Positive Tinel over the carpal tunnel.  Intact sensation and motor all distributions-median/ain/radial/pin/ulnar.  Good capillary refill 2+ radial pulse      RADIOGRAPHS:  Right thumb XRay, 7/27/2022  FINDINGS:  A single surgical screw fuses the 1st MCP joint.  Hardware is intact.  Progressive bony fusion at the operative site.  I see no acute fracture.  Degenerative change at the interphalangeal joint.  Prior 1st CMC arthroplasty.     Impression:  Postoperative changes with no acute process seen.    Comments: I have personally reviewed the imaging and I agree with the above radiologist's report.    MRI R Wrist 02/26/21  Postsurgical change from 1st CMC joint arthroplasty, noting resection of the trapezium. There is prominent marrow edema signal in the distal scaphoid (series 7, image 8-9) and probably at the base the 1st metacarpal, noting artifact from surgical changes limits assessment.  There is marked increased signal and thickening at the distal flexor carpi radialis tendon at the level of the scaphoid (series 8, image 17), suggesting tendinosis/partial tearing.  The flexor pollicis longus appears intact.  No fractures identified.  There is mild increased signal in the abductor pollicis musculature, probable mild strain (series 5, image 23-24). No fatty atrophy.  Arthrodesis at the 1st MCP joint noted, partially imaged.     There  is mild carpal carpal degenerative changes, noting mild subchondral cystic change.  There is increased signal in the peripheral TFCC, at the ulna styloid and foveal attachment, suggesting a sprain, may have a chronic component.  There is a small central TFCC tear (series 8, image 10).     The Scapholunate and lunate triquetral ligaments are intact.     The dorsal and ventral tendons demonstrate normal size, signal and location.  There is mild increased signal in the ECU, just past the ulnar groove (series 5, image 10-11), suggesting mild tendinosis.  The dorsal and ventral tendons otherwise demonstrate normal size, signal, and location.  No tenosynovitis.  The hook of the hamate is intact.     The median and ulnar nerves are grossly unremarkable.     No marrow replacement process.     Impression:  Postsurgical change from 1st CMC joint arthroplasty, as above. Small central tear and peripheral TFCC sprain, may have a chronic component.    EMG 10/6/22:  Right moderate to severe carpal tunnel, left mild-to-moderate carpal tunnel    ASSESSMENT/PLAN:   Diagnoses and all orders for this visit:    Bilateral carpal tunnel syndrome           - We talked at length about the anatomy and pathophysiology of   Encounter Diagnosis   Name Primary?    Bilateral carpal tunnel syndrome Yes       72F with recurrent right carpal tunnel syndrome. Hx right open carpal tunnel release 2020.  Her EMG prior to her 1st surgery was mild to moderate on the right, and new EMG is moderate to severe.  Discussed with patient her diagnosis and treatment options both conservative and surgical.  At this time patient would like to proceed with surgery for revision right carpal tunnel release.  Consent signed in clinic today.

## 2022-10-11 NOTE — H&P (VIEW-ONLY)
"Subjective:      Patient ID: Luz Burk is a 72 y.o. female.    Chief Complaint: No chief complaint on file.      HPI  Luz Burk is a  72 y.o. female presenting today for follow-up of right wrist pain.  She was last seen by Dr. Ayon in 3/2021, no improvement in pain with steroid injection 2/2021.  She reports intermittent tingling in the fingers of the right hand.  She also reports occasional "cramping in the right hand were fingers seem to cross while she is driving.  She is status post Right CMC arthroplasty with MCP fusion and first dorsal compartment release by Dr. Ayon on 10/30/2020.  History of right carpal tunnel release and right volar ganglion cyst excision by Dr. Ayon 7/24/2020.    Interval history 10/11/2022.  Patient returns to clinic for follow-up of EMG bilateral upper extremities.  Her EMG showed moderate to severe carpal tunnel on her right side.  She continues to have pain in the median nerve distribution which is worse while driving worse at night.  Her hand is also cramping frequently.  Reports persistent numbness in her right thumb.  History of right carpal tunnel release 07/24/2020 by Dr. Ayon.      Review of patient's allergies indicates:   Allergen Reactions    Omeprazole Diarrhea    Prevacid [lansoprazole] Diarrhea         Current Outpatient Medications   Medication Sig Dispense Refill    ascorbic acid, vitamin C, (VITAMIN C) 100 MG tablet Take 100 mg by mouth once daily.      BIOTIN ORAL Take by mouth once daily.      calcium citrate-vitamin D3 315-200 mg (CITRACAL+D) 315 mg-5 mcg (200 unit) per tablet Take 1 tablet by mouth 2 (two) times daily.      cholecalciferol, vitamin D3, (VITAMIN D3) 125 mcg (5,000 unit) Tab Take 1 tablet (5,000 Units total) by mouth once daily. 90 tablet 3    citalopram (CELEXA) 20 MG tablet Take 1 tablet by mouth once daily 90 tablet 3    cyanocobalamin 1,000 mcg/mL injection INJECT 1 ML EVERY MONTH 10 mL 5    famotidine " (PEPCID) 20 MG tablet Take 1 tablet by mouth twice daily 60 tablet 0    fluticasone (FLONASE) 50 mcg/actuation nasal spray 1 spray by Nasal route once daily.       furosemide (LASIX) 20 MG tablet Take 1 tablet (20 mg total) by mouth once daily. 30 tablet 11    levothyroxine (SYNTHROID) 50 MCG tablet Take 1 tablet (50 mcg total) by mouth before breakfast. 90 tablet 3    melatonin 10 mg Tab Take by mouth.      multivitamin capsule Take 1 capsule by mouth once daily.      nicotine (NICODERM CQ) 21 mg/24 hr Place 1 patch onto the skin once daily. 28 patch 0    nicotine, polacrilex, (NICORETTE) 2 mg Gum Take 1 each (2 mg total) by mouth as needed (Use in place of cigarettes). 100 each 0    pantoprazole (PROTONIX) 40 MG tablet Take 1 tablet (40 mg total) by mouth 2 (two) times daily before meals. 60 tablet 12    potassium chloride (KLOR-CON) 10 MEQ TbSR TAKE TWO TABLETS BY MOUTH ONCE DAILY WITH LASIX 60 tablet 5    prednisoLONE acetate (PRED FORTE) 1 % DrpS Place 1 drop into the left eye 3 (three) times daily.      loratadine (CLARITIN) 10 mg tablet Take 1 tablet (10 mg total) by mouth once daily. 90 tablet 3    valACYclovir (VALTREX) 1000 MG tablet Take 2 tablets (2,000 mg total) by mouth every 12 (twelve) hours as needed (herpes outbreak). 2 tablets as soon as possible after symptom onset, then 2 tablets 12 hours later (4 total) 30 tablet 2     No current facility-administered medications for this visit.     Facility-Administered Medications Ordered in Other Visits   Medication Dose Route Frequency Provider Last Rate Last Admin    acetaminophen tablet 1,000 mg  1,000 mg Oral Once Sugey Edouard MD        fentaNYL injection 25 mcg  25 mcg Intravenous Q5 Min PRN Sugey Edouard MD        fentaNYL injection 25 mcg  25 mcg Intravenous Q5 Min PRN Quinten Chu MD   100 mcg at 10/30/20 0920    lidocaine (PF) 10 mg/ml (1%) injection 10 mg  1 mL Intradermal Once Rachele Gonzalez MD        midazolam  (VERSED) 1 mg/mL injection 0.5 mg  0.5 mg Intravenous PRN Sugey Edouard MD        midazolam (VERSED) 1 mg/mL injection 0.5 mg  0.5 mg Intravenous PRN Quinten Chu MD   1 mg at 10/30/20 0920       Past Medical History:   Diagnosis Date    Cataract     Collagenous colitis     Dx 5/30/16    Dry mouth 1/30/2018    Excessive thirst 1/30/2018    Fecal incontinence 5/25/2016    Gastric banding status 8/20/2012    Hydrocephalus 1/15/2013    Hypothyroidism     LAP-BAND surgery status 8/6/2015    Muscle weakness 9/4/2016    OA (osteoarthritis)     Obesity     Other specified prophylactic or treatment measure 7/27/2012    Pain aggravated by changing postions 9/4/2016    Palpitations 1/29/2021    SI (sacroiliac) pain 9/4/2016    Sleep apnea     Thumb pain, right 12/11/2020    Urinary frequency 1/30/2018       Past Surgical History:   Procedure Laterality Date    APPENDECTOMY      ARTHROPLASTY OF JOINT OF FINGER Right 10/30/2020    Procedure: ARTHROPLASTY, FINGER cmc joint right thumb;  Surgeon: Sugey Ayon MD;  Location: [facility];  Service: Orthopedics;  Laterality: Right;  MAC/Regional    CARPAL TUNNEL RELEASE Right 7/24/2020    Procedure: RELEASE, CARPAL TUNNEL, RIGHT;  Surgeon: Sugey Ayon MD;  Location: [facility];  Service: Orthopedics;  Laterality: Right;  Regional & MAC    CATARACT EXTRACTION      CHOLECYSTECTOMY      COLONOSCOPY N/A 5/30/2016    Procedure: COLONOSCOPY;  Surgeon: BINH Souza MD;  Location: [address]);  Service: Endoscopy;  Laterality: N/A;    COLONOSCOPY N/A 6/17/2020    Procedure: COLONOSCOPYSuprep;  Surgeon: Antonio Weller MD;  Location: [facility] ENDO;  Service: Endoscopy;  Laterality: N/A;    ESOPHAGOGASTRODUODENOSCOPY N/A 6/17/2020    Procedure: EGD (ESOPHAGOGASTRODUODENOSCOPY);  Surgeon: Antonio Weller MD;  Location: [facility] ENDO;  Service: Endoscopy;  Laterality: N/A;    ESOPHAGOGASTRODUODENOSCOPY N/A 9/2/2022    Procedure: EGD  "(ESOPHAGOGASTRODUODENOSCOPY;  Surgeon: Antonio Weller MD;  Location: [facility] ENDO;  Service: Endoscopy;  Laterality: N/A;    EXCISION OF GANGLION OF WRIST Right 7/24/2020    Procedure: EXCISION, GANGLION CYST, WRIST, RIGHT volar;  Surgeon: Sugey Ayon MD;  Location: [facility] OR;  Service: Orthopedics;  Laterality: Right;  Regional & MAC    HERNIA REPAIR      HIATAL HERNIA REPAIR      HYSTERECTOMY      lap band surgery  10/11/2011    Realize C Band (11mL)    LAPAROSCOPIC GASTRIC BANDING      LAPAROSCOPIC REMOVAL OF GASTRIC BAND N/A 4/7/2021    Procedure: REMOVAL-GASTRIC BAND-LAPAROSCOPIC-;  Surgeon: Oswaldo Mayfield MD;  Location: [facility] OR [address] [place] [address];  Service: General;  Laterality: N/A;    LAPAROSCOPIC SLEEVE GASTRECTOMY N/A 4/7/2021    Procedure: GASTRECTOMY-SLEEVE-LAPAROSCOPIC;  Surgeon: Oswaldo Mayfield MD;  [facility];  Service: General;  Laterality: N/A;    OOPHORECTOMY      TONSILLECTOMY      TOTAL KNEE ARTHROPLASTY  7/23/12    VENTRICULOPERITONEAL SHUNT           Review of Systems:  ROS:  Constitutional: no fever or chills  Skin: no rash or suspicious lesions  Musculoskeletal: See HPI.   Neurological: no headaches, lightheadedness, or dizziness. No numbness or tingling  Psychological/behavioral: no anxiety or depression      OBJECTIVE:     PHYSICAL EXAM:  Height: 5' 4" (162.6 cm) Weight: 71.7 kg (158 lb)  Vitals:    10/11/22 1052   BP: 116/75   Pulse: 62   Weight: 71.7 kg (158 lb)   Height: 5' 4" (1.626 m)     Vitals reviewed.  Constitutional: NAD. Patient appears well-developed and well-nourished.   HENT:   Head: Normocephalic and atraumatic.   Neck: Normal range of motion.   Cardiovascular: Normal rate.    Pulmonary/Chest: Effort normal. No respiratory distress.   Neurological: Patient is awake, alert, oriented.   Psychiatric: Patient has a normal mood and affect. Behavior is normal. Judgment and thought content normal.      Musculoskeletal:      Right hand/wrist  Well-healed " surgical scars right hand dorsally, radially, and volarly.  Dupuytren's nodules noted in the palm.  MCP fusion noted.  Atrophy of her thenar muscles in her intrinsic hand muscles.  Positive median nerve compression test and tinel over the right carpal tunnel.  Negative Tinel's at the elbow cubital tunnel. Decresaed sensation to light touch in thumb tip, otherwise intact sensation and motor all distributions - median/ain/radial/pin/ulnar.  good capillary refill, 2+ radial pulses.      Left hand/wrist  Thenar and intrinsic hand muscle atrophy.  Negative median nerve compression test.  Positive Tinel over the carpal tunnel.  Intact sensation and motor all distributions-median/ain/radial/pin/ulnar.  Good capillary refill 2+ radial pulse      RADIOGRAPHS:  Right thumb XRay, 7/27/2022  FINDINGS:  A single surgical screw fuses the 1st MCP joint.  Hardware is intact.  Progressive bony fusion at the operative site.  I see no acute fracture.  Degenerative change at the interphalangeal joint.  Prior 1st CMC arthroplasty.     Impression:  Postoperative changes with no acute process seen.    Comments: I have personally reviewed the imaging and I agree with the above radiologist's report.    MRI R Wrist 02/26/21  Postsurgical change from 1st CMC joint arthroplasty, noting resection of the trapezium. There is prominent marrow edema signal in the distal scaphoid (series 7, image 8-9) and probably at the base the 1st metacarpal, noting artifact from surgical changes limits assessment.  There is marked increased signal and thickening at the distal flexor carpi radialis tendon at the level of the scaphoid (series 8, image 17), suggesting tendinosis/partial tearing.  The flexor pollicis longus appears intact.  No fractures identified.  There is mild increased signal in the abductor pollicis musculature, probable mild strain (series 5, image 23-24). No fatty atrophy.  Arthrodesis at the 1st MCP joint noted, partially imaged.     There  is mild carpal carpal degenerative changes, noting mild subchondral cystic change.  There is increased signal in the peripheral TFCC, at the ulna styloid and foveal attachment, suggesting a sprain, may have a chronic component.  There is a small central TFCC tear (series 8, image 10).     The Scapholunate and lunate triquetral ligaments are intact.     The dorsal and ventral tendons demonstrate normal size, signal and location.  There is mild increased signal in the ECU, just past the ulnar groove (series 5, image 10-11), suggesting mild tendinosis.  The dorsal and ventral tendons otherwise demonstrate normal size, signal, and location.  No tenosynovitis.  The hook of the hamate is intact.     The median and ulnar nerves are grossly unremarkable.     No marrow replacement process.     Impression:  Postsurgical change from 1st CMC joint arthroplasty, as above. Small central tear and peripheral TFCC sprain, may have a chronic component.    EMG 10/6/22:  Right moderate to severe carpal tunnel, left mild-to-moderate carpal tunnel    ASSESSMENT/PLAN:   Diagnoses and all orders for this visit:    Bilateral carpal tunnel syndrome           - We talked at length about the anatomy and pathophysiology of   Encounter Diagnosis   Name Primary?    Bilateral carpal tunnel syndrome Yes       72F with recurrent right carpal tunnel syndrome. Hx right open carpal tunnel release 2020.  Her EMG prior to her 1st surgery was mild to moderate on the right, and new EMG is moderate to severe.  Discussed with patient her diagnosis and treatment options both conservative and surgical.  At this time patient would like to proceed with surgery for revision right carpal tunnel release.  Consent signed in clinic today.

## 2022-10-12 ENCOUNTER — OFFICE VISIT (OUTPATIENT)
Dept: FAMILY MEDICINE | Facility: CLINIC | Age: 72
End: 2022-10-12
Payer: MEDICARE

## 2022-10-12 ENCOUNTER — CLINICAL SUPPORT (OUTPATIENT)
Dept: SMOKING CESSATION | Facility: CLINIC | Age: 72
End: 2022-10-12
Payer: COMMERCIAL

## 2022-10-12 VITALS
HEIGHT: 64 IN | HEART RATE: 93 BPM | OXYGEN SATURATION: 95 % | SYSTOLIC BLOOD PRESSURE: 114 MMHG | BODY MASS INDEX: 26.85 KG/M2 | WEIGHT: 157.31 LBS | DIASTOLIC BLOOD PRESSURE: 60 MMHG

## 2022-10-12 DIAGNOSIS — Z87.891 EX-SMOKER: ICD-10-CM

## 2022-10-12 DIAGNOSIS — F17.200 NICOTINE DEPENDENCE: Primary | ICD-10-CM

## 2022-10-12 DIAGNOSIS — R05.1 ACUTE COUGH: Primary | ICD-10-CM

## 2022-10-12 PROCEDURE — 1101F PR PT FALLS ASSESS DOC 0-1 FALLS W/OUT INJ PAST YR: ICD-10-PCS | Mod: CPTII,S$GLB,, | Performed by: FAMILY MEDICINE

## 2022-10-12 PROCEDURE — 1101F PT FALLS ASSESS-DOCD LE1/YR: CPT | Mod: CPTII,S$GLB,, | Performed by: FAMILY MEDICINE

## 2022-10-12 PROCEDURE — 3078F DIAST BP <80 MM HG: CPT | Mod: CPTII,S$GLB,, | Performed by: FAMILY MEDICINE

## 2022-10-12 PROCEDURE — 1159F PR MEDICATION LIST DOCUMENTED IN MEDICAL RECORD: ICD-10-PCS | Mod: CPTII,S$GLB,, | Performed by: FAMILY MEDICINE

## 2022-10-12 PROCEDURE — 3288F PR FALLS RISK ASSESSMENT DOCUMENTED: ICD-10-PCS | Mod: CPTII,S$GLB,, | Performed by: FAMILY MEDICINE

## 2022-10-12 PROCEDURE — 96372 THER/PROPH/DIAG INJ SC/IM: CPT | Mod: S$GLB,,, | Performed by: FAMILY MEDICINE

## 2022-10-12 PROCEDURE — 99214 PR OFFICE/OUTPT VISIT, EST, LEVL IV, 30-39 MIN: ICD-10-PCS | Mod: 25,S$GLB,, | Performed by: FAMILY MEDICINE

## 2022-10-12 PROCEDURE — 3288F FALL RISK ASSESSMENT DOCD: CPT | Mod: CPTII,S$GLB,, | Performed by: FAMILY MEDICINE

## 2022-10-12 PROCEDURE — 99404 PREV MED CNSL INDIV APPRX 60: CPT | Mod: S$GLB,,, | Performed by: GENERAL PRACTICE

## 2022-10-12 PROCEDURE — 1160F RVW MEDS BY RX/DR IN RCRD: CPT | Mod: CPTII,S$GLB,, | Performed by: FAMILY MEDICINE

## 2022-10-12 PROCEDURE — 3074F SYST BP LT 130 MM HG: CPT | Mod: CPTII,S$GLB,, | Performed by: FAMILY MEDICINE

## 2022-10-12 PROCEDURE — 1125F AMNT PAIN NOTED PAIN PRSNT: CPT | Mod: CPTII,S$GLB,, | Performed by: FAMILY MEDICINE

## 2022-10-12 PROCEDURE — 99404 PR PREVENT COUNSEL,INDIV,60 MIN: ICD-10-PCS | Mod: S$GLB,,, | Performed by: GENERAL PRACTICE

## 2022-10-12 PROCEDURE — 99999 PR PBB SHADOW E&M-EST. PATIENT-LVL I: CPT | Mod: PBBFAC,,,

## 2022-10-12 PROCEDURE — 1160F PR REVIEW ALL MEDS BY PRESCRIBER/CLIN PHARMACIST DOCUMENTED: ICD-10-PCS | Mod: CPTII,S$GLB,, | Performed by: FAMILY MEDICINE

## 2022-10-12 PROCEDURE — 3008F PR BODY MASS INDEX (BMI) DOCUMENTED: ICD-10-PCS | Mod: CPTII,S$GLB,, | Performed by: FAMILY MEDICINE

## 2022-10-12 PROCEDURE — 3078F PR MOST RECENT DIASTOLIC BLOOD PRESSURE < 80 MM HG: ICD-10-PCS | Mod: CPTII,S$GLB,, | Performed by: FAMILY MEDICINE

## 2022-10-12 PROCEDURE — 3074F PR MOST RECENT SYSTOLIC BLOOD PRESSURE < 130 MM HG: ICD-10-PCS | Mod: CPTII,S$GLB,, | Performed by: FAMILY MEDICINE

## 2022-10-12 PROCEDURE — 99214 OFFICE O/P EST MOD 30 MIN: CPT | Mod: 25,S$GLB,, | Performed by: FAMILY MEDICINE

## 2022-10-12 PROCEDURE — 1125F PR PAIN SEVERITY QUANTIFIED, PAIN PRESENT: ICD-10-PCS | Mod: CPTII,S$GLB,, | Performed by: FAMILY MEDICINE

## 2022-10-12 PROCEDURE — 96372 PR INJECTION,THERAP/PROPH/DIAG2ST, IM OR SUBCUT: ICD-10-PCS | Mod: S$GLB,,, | Performed by: FAMILY MEDICINE

## 2022-10-12 PROCEDURE — 1159F MED LIST DOCD IN RCRD: CPT | Mod: CPTII,S$GLB,, | Performed by: FAMILY MEDICINE

## 2022-10-12 PROCEDURE — 3008F BODY MASS INDEX DOCD: CPT | Mod: CPTII,S$GLB,, | Performed by: FAMILY MEDICINE

## 2022-10-12 PROCEDURE — 99999 PR PBB SHADOW E&M-EST. PATIENT-LVL I: ICD-10-PCS | Mod: PBBFAC,,,

## 2022-10-12 RX ORDER — POTASSIUM CHLORIDE 750 MG/1
TABLET, EXTENDED RELEASE ORAL
Qty: 60 TABLET | Refills: 5 | Status: SHIPPED | OUTPATIENT
Start: 2022-10-12 | End: 2022-11-03 | Stop reason: SDUPTHER

## 2022-10-12 RX ORDER — PREDNISOLONE ACETATE 10 MG/ML
1 SUSPENSION/ DROPS OPHTHALMIC 3 TIMES DAILY
Qty: 15 ML | Refills: 1 | Status: ON HOLD | OUTPATIENT
Start: 2022-10-12 | End: 2022-11-22 | Stop reason: SDUPTHER

## 2022-10-12 RX ORDER — CODEINE PHOSPHATE AND GUAIFENESIN 10; 100 MG/5ML; MG/5ML
5 SOLUTION ORAL 3 TIMES DAILY PRN
Qty: 240 ML | Refills: 0 | Status: SHIPPED | OUTPATIENT
Start: 2022-10-12 | End: 2022-10-22

## 2022-10-12 RX ORDER — FUROSEMIDE 20 MG/1
20 TABLET ORAL DAILY
Qty: 30 TABLET | Refills: 11 | Status: SHIPPED | OUTPATIENT
Start: 2022-10-12

## 2022-10-12 RX ORDER — AZITHROMYCIN 250 MG/1
TABLET, FILM COATED ORAL
Qty: 6 TABLET | Refills: 0 | Status: SHIPPED | OUTPATIENT
Start: 2022-10-12 | End: 2022-11-16

## 2022-10-12 RX ORDER — TRIAMCINOLONE ACETONIDE 40 MG/ML
80 INJECTION, SUSPENSION INTRA-ARTICULAR; INTRAMUSCULAR ONCE
Status: COMPLETED | OUTPATIENT
Start: 2022-10-12 | End: 2022-10-12

## 2022-10-12 RX ADMIN — TRIAMCINOLONE ACETONIDE 80 MG: 40 INJECTION, SUSPENSION INTRA-ARTICULAR; INTRAMUSCULAR at 01:10

## 2022-10-12 NOTE — PROGRESS NOTES
"Subjective:      Patient ID: Luz Burk is a 72 y.o. female.    Chief Complaint: URI      Vitals:    10/12/22 1211   BP: 114/60   Pulse: 93   SpO2: 95%   Weight: 71.3 kg (157 lb 4.8 oz)   Height: 5' 4" (1.626 m)        HPI   Sick; had sleeve surgery and band removed  Back hurts,  Sick with coughing, took month long cruise, other sick on trip, she is now for 4 days; others didn't have covid; she has had covid vaccines  Needs flu vaccine  Started smoking after husbnad passed in Feb  She is quitting smoking  Going to antismoking clinic  Right flank pain      Problem List  Patient Active Problem List   Diagnosis    Osteoarthritis of knee    Long term (current) use of anticoagulants    Constipation    Change in bowel habits    Colonic polyp    Hamstring tightness of both lower extremities    Venous insufficiency of both lower extremities    Diastolic dysfunction    Episodic paroxysmal hemicrania, not intractable    GERD (gastroesophageal reflux disease)    Carpal tunnel syndrome on right    Pain of right hand    Range of motion deficit    CMC arthritis    Decreased pinch strength    Hypothyroidism due to acquired atrophy of thyroid    Precordial chest pain    Sinus bradycardia    Hernia, hiatal    Diarrhea    Anxiety    Recurrent herpes labialis    Senile purpura    Major depressive disorder, recurrent, mild    Peripheral vascular disease, unspecified    Pain in hand    Stiffness in joint    Weakness of extremity    Need for assistance with personal care    Weakness        ALLERGIES:   Review of patient's allergies indicates:   Allergen Reactions    Omeprazole Diarrhea    Prevacid [lansoprazole] Diarrhea       MEDS:   Current Outpatient Medications:     ascorbic acid, vitamin C, (VITAMIN C) 100 MG tablet, Take 100 mg by mouth once daily., Disp: , Rfl:     BIOTIN ORAL, Take by mouth once daily., Disp: , Rfl:     calcium citrate-vitamin D3 315-200 mg (CITRACAL+D) 315 mg-5 mcg (200 unit) per tablet, Take 1 tablet by " mouth once daily., Disp: , Rfl:     cholecalciferol, vitamin D3, (VITAMIN D3) 125 mcg (5,000 unit) Tab, Take 1 tablet (5,000 Units total) by mouth once daily., Disp: 90 tablet, Rfl: 3    citalopram (CELEXA) 20 MG tablet, Take 1 tablet by mouth once daily, Disp: 90 tablet, Rfl: 3    cyanocobalamin 1,000 mcg/mL injection, INJECT 1 ML EVERY MONTH, Disp: 10 mL, Rfl: 5    fluticasone (FLONASE) 50 mcg/actuation nasal spray, 1 spray by Nasal route once daily. , Disp: , Rfl:     levothyroxine (SYNTHROID) 50 MCG tablet, Take 1 tablet (50 mcg total) by mouth before breakfast., Disp: 90 tablet, Rfl: 3    loratadine (CLARITIN) 10 mg tablet, Take 1 tablet (10 mg total) by mouth once daily., Disp: 90 tablet, Rfl: 3    melatonin 10 mg Tab, Take by mouth., Disp: , Rfl:     multivitamin capsule, Take 1 capsule by mouth once daily., Disp: , Rfl:     nicotine (NICODERM CQ) 21 mg/24 hr, Place 1 patch onto the skin once daily., Disp: 28 patch, Rfl: 0    nicotine, polacrilex, (NICORETTE) 2 mg Gum, Take 1 each (2 mg total) by mouth as needed (Use in place of cigarettes)., Disp: 100 each, Rfl: 0    pantoprazole (PROTONIX) 40 MG tablet, Take 1 tablet (40 mg total) by mouth 2 (two) times daily before meals., Disp: 60 tablet, Rfl: 12    valACYclovir (VALTREX) 1000 MG tablet, Take 2 tablets (2,000 mg total) by mouth every 12 (twelve) hours as needed (herpes outbreak). 2 tablets as soon as possible after symptom onset, then 2 tablets 12 hours later (4 total), Disp: 30 tablet, Rfl: 2    famotidine (PEPCID) 20 MG tablet, Take 1 tablet by mouth twice daily, Disp: 60 tablet, Rfl: 0    furosemide (LASIX) 20 MG tablet, Take 1 tablet (20 mg total) by mouth once daily., Disp: 30 tablet, Rfl: 11    potassium chloride (KLOR-CON) 10 MEQ TbSR, TAKE TWO TABLETS BY MOUTH ONCE DAILY WITH LASIX Strength: 10 mEq, Disp: 180 tablet, Rfl: 3    prednisoLONE acetate (PRED FORTE) 1 % DrpS, Place 1 drop into the left eye 3 (three) times daily., Disp: 15 mL, Rfl: 1     sulfamethoxazole-trimethoprim 800-160mg (BACTRIM DS) 800-160 mg Tab, Take 1 tablet by mouth 2 (two) times daily., Disp: 20 tablet, Rfl: 0  No current facility-administered medications for this visit.    Facility-Administered Medications Ordered in Other Visits:     acetaminophen tablet 1,000 mg, 1,000 mg, Oral, Once, Sugey Edouard MD    fentaNYL injection 25 mcg, 25 mcg, Intravenous, Q5 Min PRN, Sugey Edouard MD    fentaNYL injection 25 mcg, 25 mcg, Intravenous, Q5 Min PRN, Quinten Chu MD, 100 mcg at 10/30/20 0920    lidocaine (PF) 10 mg/ml (1%) injection 10 mg, 1 mL, Intradermal, Once, Rachele Gonzalez MD    midazolam (VERSED) 1 mg/mL injection 0.5 mg, 0.5 mg, Intravenous, PRN, Sugey Edouard MD    midazolam (VERSED) 1 mg/mL injection 0.5 mg, 0.5 mg, Intravenous, PRN, Quinten Chu MD, 1 mg at 10/30/20 0920      History:  Current Providers as of 10/12/2022  PCP: Hilario Slaughter MD  Care Team Provider: Tamela Bolton LPN  Care Team Provider: Shanda Todd PA-C  Care Team Provider: Sugey Ayon MD  Care Team Provider: Barbara Moss MD  Care Team Provider: Deonte Wells MD  Care Team Provider: Oswaldo Mayfield MD  Encounter Provider: Hilario Slaughter MD, starting on Wed Oct 12, 2022 12:00 AM  Referring Provider: not found, starting on Wed Oct 12, 2022 12:00 AM  Consulting Physician: Hilario Slaughter MD, starting on Wed Oct 12, 2022 12:08 PM (Active)   Past Medical History:   Diagnosis Date    Cataract     Collagenous colitis     Dx 5/30/16    Dry mouth 1/30/2018    Excessive thirst 1/30/2018    Fecal incontinence 5/25/2016    Gastric banding status 8/20/2012    Hydrocephalus 1/15/2013    Hypothyroidism     LAP-BAND surgery status 8/6/2015    Muscle weakness 9/4/2016    OA (osteoarthritis)     Obesity     Other specified prophylactic or treatment measure 7/27/2012    Pain aggravated by changing postions 9/4/2016    Palpitations 1/29/2021    SI  "(sacroiliac) pain 9/4/2016    Sleep apnea     Thumb pain, right 12/11/2020    Urinary frequency 1/30/2018     Past Surgical History:   Procedure Laterality Date    APPENDECTOMY      ARTHROPLASTY OF JOINT OF FINGER Right 10/30/2020    Procedure: ARTHROPLASTY, FINGER cmc joint right thumb;  Surgeon: Sugey Ayon MD;  Location: [facility];  Service: Orthopedics;  Laterality: Right;  MAC/Regional    CARPAL TUNNEL RELEASE Right 07/24/2020    Procedure: RELEASE, CARPAL TUNNEL, RIGHT;  Surgeon: Sugey Ayon MD;  Location: [facility];  Service: Orthopedics;  Laterality: Right;  Regional & MAC    CARPAL TUNNEL RELEASE Right 10/31/2022    Procedure: RELEASE, CARPAL TUNNEL "REVISION" RIGHT NEUROLYSIS MEDIAL NERVE, PATCH PLACEMENT;  Surgeon: Sugye Ayon MD;  Location: [facility];  Service: Orthopedics;  Laterality: Right;    CATARACT EXTRACTION      CERVICAL FUSION  1981    CHOLECYSTECTOMY      COLONOSCOPY N/A 05/30/2016    Procedure: COLONOSCOPY;  Surgeon: BINH Souza MD;  Location: [address]);  Service: Endoscopy;  Laterality: N/A;    COLONOSCOPY N/A 06/17/2020    Procedure: COLONOSCOPYSuprep;  Surgeon: Antonio Weller MD;  Location: [facility];  Service: Endoscopy;  Laterality: N/A;    ESOPHAGOGASTRODUODENOSCOPY N/A 06/17/2020    Procedure: EGD (ESOPHAGOGASTRODUODENOSCOPY);  Surgeon: Antonio Weller MD;  Location: [facility];  Service: Endoscopy;  Laterality: N/A;    ESOPHAGOGASTRODUODENOSCOPY N/A 09/02/2022    Procedure: EGD (ESOPHAGOGASTRODUODENOSCOPY;  Surgeon: Antonio Weller MD;  Location: [facility];  Service: Endoscopy;  Laterality: N/A;    EXCISION OF GANGLION OF WRIST Right 07/24/2020    Procedure: EXCISION, GANGLION CYST, WRIST, RIGHT volar;  Surgeon: Sugey Ayon MD;  Location: [facility];  Service: Orthopedics;  Laterality: Right;  Regional & MAC    HERNIA REPAIR      HIATAL HERNIA REPAIR      HYSTERECTOMY      INTERNAL NEUROLYSIS USING OPERATING MICROSCOPE  10/31/2022    " Procedure: NEUROLYSIS, INTERNAL, USING OPERATING MICROSCOPE;  Surgeon: Sugey Ayon MD;  Location: [facility] OR;  Service: Orthopedics;;    lap band surgery  10/11/2011    Realize C Band (11mL)    LAPAROSCOPIC GASTRIC BANDING      LAPAROSCOPIC REMOVAL OF GASTRIC BAND N/A 04/07/2021    Procedure: REMOVAL-GASTRIC BAND-LAPAROSCOPIC-;  Surgeon: Oswaldo Mayfield MD;  Location: [address];  Service: General;  Laterality: N/A;    LAPAROSCOPIC SLEEVE GASTRECTOMY N/A 04/07/2021    Procedure: GASTRECTOMY-SLEEVE-LAPAROSCOPIC;  Surgeon: Oswaldo Mayfield MD;  Location: [facility] OR [address] [place] [address];  Service: General;  Laterality: N/A;    OOPHORECTOMY      TONSILLECTOMY      TOTAL KNEE ARTHROPLASTY  07/23/2012    VENTRICULOPERITONEAL SHUNT       Social History     Tobacco Use    Smoking status: Every Day     Packs/day: 2.00     Years: 25.00     Pack years: 50.00     Types: Cigarettes    Smokeless tobacco: Never    Tobacco comments:     quit in 2002 recently started smoking again in 1/2022   Substance Use Topics    Alcohol use: Yes     Alcohol/week: 14.0 standard drinks     Types: 14 Glasses of wine per week     Comment: 2 glasses wine daily    Drug use: No         Review of Systems   Constitutional: Negative.    HENT: Negative.     Respiratory: Negative.     Cardiovascular: Negative.    Gastrointestinal: Negative.    Endocrine: Negative.    Genitourinary: Negative.    Musculoskeletal: Negative.    Psychiatric/Behavioral: Negative.     All other systems reviewed and are negative.  Objective:     Physical Exam  Vitals and nursing note reviewed.   Constitutional:       Appearance: She is well-developed.   HENT:      Head: Normocephalic.   Eyes:      Conjunctiva/sclera: Conjunctivae normal.      Pupils: Pupils are equal, round, and reactive to light.   Cardiovascular:      Rate and Rhythm: Normal rate and regular rhythm.      Heart sounds: Normal heart sounds.   Pulmonary:      Effort: Pulmonary effort is normal.      Breath  sounds: Normal breath sounds.   Musculoskeletal:         General: Normal range of motion.      Cervical back: Normal range of motion and neck supple.   Skin:     General: Skin is warm and dry.   Neurological:      Mental Status: She is alert and oriented to person, place, and time.      Deep Tendon Reflexes: Reflexes are normal and symmetric.   Psychiatric:         Behavior: Behavior normal.         Thought Content: Thought content normal.         Judgment: Judgment normal.           Assessment:     1. Acute cough    2. Ex-smoker      Plan:        Medication List            Accurate as of October 12, 2022 11:59 PM. If you have any questions, ask your nurse or doctor.                START taking these medications      guaiFENesin-codeine 100-10 mg/5 ml  mg/5 mL syrup  Commonly known as: TUSSI-ORGANIDIN NR  Take 5 mLs by mouth 3 (three) times daily as needed for Cough.  Started by: Hilario Slaughter MD            CHANGE how you take these medications      potassium chloride 10 MEQ Tbsr  Commonly known as: KLOR-CON  TAKE TWO TABLETS BY MOUTH ONCE DAILY WITH LASIX  Strength: 10 mEq  What changed: See the new instructions.  Changed by: Hilario Slaughter MD            CONTINUE taking these medications      ascorbic acid (vitamin C) 100 MG tablet  Commonly known as: VITAMIN C     BIOTIN ORAL     calcium citrate-vitamin D3 315-200 mg 315 mg-5 mcg (200 unit) per tablet  Commonly known as: CITRACAL+D     cholecalciferol (vitamin D3) 125 mcg (5,000 unit) Tab  Commonly known as: VITAMIN D3  Take 1 tablet (5,000 Units total) by mouth once daily.     citalopram 20 MG tablet  Commonly known as: CeleXA  Take 1 tablet by mouth once daily     cyanocobalamin 1,000 mcg/mL injection  INJECT 1 ML EVERY MONTH     famotidine 20 MG tablet  Commonly known as: PEPCID  Take 1 tablet by mouth twice daily     fluticasone propionate 50 mcg/actuation nasal spray  Commonly known as: FLONASE     furosemide 20 MG tablet  Commonly known as:  LASIX  Take 1 tablet (20 mg total) by mouth once daily.     levothyroxine 50 MCG tablet  Commonly known as: SYNTHROID  Take 1 tablet (50 mcg total) by mouth before breakfast.     loratadine 10 mg tablet  Commonly known as: CLARITIN  Take 1 tablet (10 mg total) by mouth once daily.     melatonin 10 mg Tab     multivitamin capsule     nicotine (polacrilex) 2 mg Gum  Commonly known as: NICORETTE  Take 1 each (2 mg total) by mouth as needed (Use in place of cigarettes).     nicotine 21 mg/24 hr  Commonly known as: NICODERM CQ  Place 1 patch onto the skin once daily.     pantoprazole 40 MG tablet  Commonly known as: PROTONIX  Take 1 tablet (40 mg total) by mouth 2 (two) times daily before meals.     prednisoLONE acetate 1 % Drps  Commonly known as: PRED FORTE  Place 1 drop into the left eye 3 (three) times daily.     valACYclovir 1000 MG tablet  Commonly known as: VALTREX  Take 2 tablets (2,000 mg total) by mouth every 12 (twelve) hours as needed (herpes outbreak). 2 tablets as soon as possible after symptom onset, then 2 tablets 12 hours later (4 total)               Where to Get Your Medications        These medications were sent to [id number] - [facility] [address] [facility]  [address] [facility] [address]      Phone: [phone]   furosemide 20 MG tablet  guaiFENesin-codeine 100-10 mg/5 ml  mg/5 mL syrup  prednisoLONE acetate 1 % Drps       Information about where to get these medications is not yet available    Ask your nurse or doctor about these medications  potassium chloride 10 MEQ Tbsr       Acute cough    Ex-smoker    Other orders  -     Influenza - Quadrivalent (Adjuvanted)  -     guaiFENesin-codeine 100-10 mg/5 ml (TUSSI-ORGANIDIN NR)  mg/5 mL syrup; Take 5 mLs by mouth 3 (three) times daily as needed for Cough.  Dispense: 240 mL; Refill: 0  -     triamcinolone acetonide injection 80 mg  -     furosemide (LASIX) 20 MG tablet; Take 1 tablet (20 mg total) by mouth once daily.   Dispense: 30 tablet; Refill: 11  -     Discontinue: potassium chloride (KLOR-CON) 10 MEQ TbSR; TAKE TWO TABLETS BY MOUTH ONCE DAILY WITH LASIX  Strength: 10 mEq  Dispense: 60 tablet; Refill: 5  -     Discontinue: azithromycin (Z-ROULA) 250 MG tablet; Take 2 tablets by mouth on day 1; Take 1 tablet by mouth on days 2-5  Dispense: 6 tablet; Refill: 0

## 2022-10-14 NOTE — PROGRESS NOTES
Individual Follow-Up Form    10/12/2022    Quit Date: NA    Clinical Status of Patient: Outpatient    Continuing Medication: yes  Patches    Other Medications: Nicotine gum     Target Symptoms: Withdrawal and medication side effects. The following were rated moderate (3) to severe (4) on TCRS:  Moderate (3): none  Severe (4): none    Comments: Spoke with patient via phone. Patient states that she had not been feeling good and had to visit PCP. Patient states that she is making today her quit day. Patient states that she will not be buying cigarettes any longer. Reminded patient that she is not to borrow from friend if she has an urge to smoke. Patient states that she is continuing on 21 mg Nicotine Patch QD and 2 mg Nicotine Gum as needed in place of cigarettes. Patient reports no negative side effects at this time. Reviewed triggers/cues, strategies to help with quit and behavioral change. The patient denies any abnormal behavioral or mental changes at this time.      Diagnosis: F17.210    Next Visit: 1 week

## 2022-10-19 ENCOUNTER — CLINICAL SUPPORT (OUTPATIENT)
Dept: SMOKING CESSATION | Facility: CLINIC | Age: 72
End: 2022-10-19
Payer: COMMERCIAL

## 2022-10-19 DIAGNOSIS — F17.200 NICOTINE DEPENDENCE: Primary | ICD-10-CM

## 2022-10-19 PROCEDURE — 99999 PR PBB SHADOW E&M-EST. PATIENT-LVL I: ICD-10-PCS | Mod: PBBFAC,,,

## 2022-10-19 PROCEDURE — 99404 PR PREVENT COUNSEL,INDIV,60 MIN: ICD-10-PCS | Mod: S$GLB,,, | Performed by: GENERAL PRACTICE

## 2022-10-19 PROCEDURE — 99999 PR PBB SHADOW E&M-EST. PATIENT-LVL I: CPT | Mod: PBBFAC,,,

## 2022-10-19 PROCEDURE — 99404 PREV MED CNSL INDIV APPRX 60: CPT | Mod: S$GLB,,, | Performed by: GENERAL PRACTICE

## 2022-10-19 NOTE — PROGRESS NOTES
Individual Follow-Up Form    10/19/2022    Quit Date: NA    Clinical Status of Patient: Outpatient    Continuing Medication: yes Nicotine gum     Other Medications: Patches     Target Symptoms: Withdrawal and medication side effects. The following were  rated moderate (3) to severe (4) on TCRS:  Moderate (3): none  Severe (4): none    Comments: Patient present with friend/room mate. Both have been supporting each other since losing their spouse's. Both share in the same goals and both are at appointment expressing that they don't think that they can continue in the program because of not being mentally ready to quit. Still continued to advise patient on setting goals, behavior change and reviewed medication. Patient expresses that she stopped using patches a few days ago. Expressed my sorrow in her not wanting to continue and advised that I am here whenever they decide to continue with cessation. The patient denies any abnormal behavioral or mental changes at this time.        Diagnosis: F17.210    Next Visit: 2 weeks

## 2022-10-20 ENCOUNTER — HOSPITAL ENCOUNTER (OUTPATIENT)
Dept: RADIOLOGY | Facility: HOSPITAL | Age: 72
Discharge: HOME | End: 2022-10-20
Attending: PODIATRIST
Payer: MEDICARE

## 2022-10-20 ENCOUNTER — HOSPITAL ENCOUNTER (OUTPATIENT)
Dept: RADIOLOGY | Facility: HOSPITAL | Age: 72
Discharge: HOME | End: 2022-10-20
Attending: SURGERY
Payer: MEDICARE

## 2022-10-20 ENCOUNTER — OFFICE VISIT (OUTPATIENT)
Dept: PODIATRY | Facility: CLINIC | Age: 72
End: 2022-10-20
Payer: MEDICARE

## 2022-10-20 VITALS
HEART RATE: 80 BPM | SYSTOLIC BLOOD PRESSURE: 124 MMHG | BODY MASS INDEX: 27 KG/M2 | DIASTOLIC BLOOD PRESSURE: 68 MMHG | HEIGHT: 64 IN

## 2022-10-20 DIAGNOSIS — K44.9 PARAESOPHAGEAL HERNIA: ICD-10-CM

## 2022-10-20 DIAGNOSIS — M21.621 TAILOR'S BUNION OF BOTH FEET: ICD-10-CM

## 2022-10-20 DIAGNOSIS — M21.622 TAILOR'S BUNION OF BOTH FEET: Primary | ICD-10-CM

## 2022-10-20 DIAGNOSIS — M89.8X7 EXOSTOSIS OF TOE: ICD-10-CM

## 2022-10-20 DIAGNOSIS — M21.621 TAILOR'S BUNION OF BOTH FEET: Primary | ICD-10-CM

## 2022-10-20 DIAGNOSIS — M21.622 TAILOR'S BUNION OF BOTH FEET: ICD-10-CM

## 2022-10-20 PROCEDURE — 1159F PR MEDICATION LIST DOCUMENTED IN MEDICAL RECORD: ICD-10-PCS | Mod: CPTII,S$GLB,, | Performed by: PODIATRIST

## 2022-10-20 PROCEDURE — 3074F PR MOST RECENT SYSTOLIC BLOOD PRESSURE < 130 MM HG: ICD-10-PCS | Mod: CPTII,S$GLB,, | Performed by: PODIATRIST

## 2022-10-20 PROCEDURE — A9698 NON-RAD CONTRAST MATERIALNOC: HCPCS | Performed by: SURGERY

## 2022-10-20 PROCEDURE — 99999 PR PBB SHADOW E&M-EST. PATIENT-LVL IV: CPT | Mod: PBBFAC,,, | Performed by: PODIATRIST

## 2022-10-20 PROCEDURE — 1159F MED LIST DOCD IN RCRD: CPT | Mod: CPTII,S$GLB,, | Performed by: PODIATRIST

## 2022-10-20 PROCEDURE — 3074F SYST BP LT 130 MM HG: CPT | Mod: CPTII,S$GLB,, | Performed by: PODIATRIST

## 2022-10-20 PROCEDURE — 25500020 PHARM REV CODE 255: Performed by: SURGERY

## 2022-10-20 PROCEDURE — 3078F DIAST BP <80 MM HG: CPT | Mod: CPTII,S$GLB,, | Performed by: PODIATRIST

## 2022-10-20 PROCEDURE — 73630 X-RAY EXAM OF FOOT: CPT | Mod: TC,FY,PO,LT

## 2022-10-20 PROCEDURE — 74220 FL ESOPHAGRAM COMPLETE: ICD-10-PCS | Mod: 26,,, | Performed by: RADIOLOGY

## 2022-10-20 PROCEDURE — 1160F RVW MEDS BY RX/DR IN RCRD: CPT | Mod: CPTII,S$GLB,, | Performed by: PODIATRIST

## 2022-10-20 PROCEDURE — 99214 OFFICE O/P EST MOD 30 MIN: CPT | Mod: S$GLB,,, | Performed by: PODIATRIST

## 2022-10-20 PROCEDURE — 99214 PR OFFICE/OUTPT VISIT, EST, LEVL IV, 30-39 MIN: ICD-10-PCS | Mod: S$GLB,,, | Performed by: PODIATRIST

## 2022-10-20 PROCEDURE — 1125F PR PAIN SEVERITY QUANTIFIED, PAIN PRESENT: ICD-10-PCS | Mod: CPTII,S$GLB,, | Performed by: PODIATRIST

## 2022-10-20 PROCEDURE — 74220 X-RAY XM ESOPHAGUS 1CNTRST: CPT | Mod: TC

## 2022-10-20 PROCEDURE — 3078F PR MOST RECENT DIASTOLIC BLOOD PRESSURE < 80 MM HG: ICD-10-PCS | Mod: CPTII,S$GLB,, | Performed by: PODIATRIST

## 2022-10-20 PROCEDURE — 99999 PR PBB SHADOW E&M-EST. PATIENT-LVL IV: ICD-10-PCS | Mod: PBBFAC,,, | Performed by: PODIATRIST

## 2022-10-20 PROCEDURE — 74220 X-RAY XM ESOPHAGUS 1CNTRST: CPT | Mod: 26,,, | Performed by: RADIOLOGY

## 2022-10-20 PROCEDURE — 1160F PR REVIEW ALL MEDS BY PRESCRIBER/CLIN PHARMACIST DOCUMENTED: ICD-10-PCS | Mod: CPTII,S$GLB,, | Performed by: PODIATRIST

## 2022-10-20 PROCEDURE — 1125F AMNT PAIN NOTED PAIN PRSNT: CPT | Mod: CPTII,S$GLB,, | Performed by: PODIATRIST

## 2022-10-20 RX ADMIN — BARIUM SULFATE 100 ML: 0.6 SUSPENSION ORAL at 09:10

## 2022-10-20 RX ADMIN — BARIUM SULFATE 200 ML: 0.81 POWDER, FOR SUSPENSION ORAL at 09:10

## 2022-10-20 NOTE — PROGRESS NOTES
"Subjective:      Patient ID: Luz Burk is a 72 y.o. female.    Chief Complaint: Surgical Consult (Bilateral )    Referral from  for painful tailor's bunions the right foot and also complains of pain to left 5th toe.  Relates she was told that she had a plantar wart on the left 5th toe and she used over-the-counter salicylic acid with no improvement.  Pain worsened by wearing enclosed shoes.  No pain at rest.    Vitals:    10/20/22 1101   BP: 124/68   Pulse: 80   Height: 5' 4" (1.626 m)   PainSc:   3   PainLoc: Toe      Past Medical History:   Diagnosis Date    Cataract     Collagenous colitis     Dx 5/30/16    Dry mouth 1/30/2018    Excessive thirst 1/30/2018    Fecal incontinence 5/25/2016    Gastric banding status 8/20/2012    Hydrocephalus 1/15/2013    Hypothyroidism     LAP-BAND surgery status 8/6/2015    Muscle weakness 9/4/2016    OA (osteoarthritis)     Obesity     Other specified prophylactic or treatment measure 7/27/2012    Pain aggravated by changing postions 9/4/2016    Palpitations 1/29/2021    SI (sacroiliac) pain 9/4/2016    Sleep apnea     Thumb pain, right 12/11/2020    Urinary frequency 1/30/2018       Past Surgical History:   Procedure Laterality Date    APPENDECTOMY      ARTHROPLASTY OF JOINT OF FINGER Right 10/30/2020    Procedure: ARTHROPLASTY, FINGER cmc joint right thumb;  Surgeon: Sugey Ayon MD;  Location: [facility];  Service: Orthopedics;  Laterality: Right;  MAC/Regional    CARPAL TUNNEL RELEASE Right 7/24/2020    Procedure: RELEASE, CARPAL TUNNEL, RIGHT;  Surgeon: Sugey Ayon MD;  Location: [facility] OR;  Service: Orthopedics;  Laterality: Right;  Regional & MAC    CATARACT EXTRACTION      CHOLECYSTECTOMY      COLONOSCOPY N/A 5/30/2016    Procedure: COLONOSCOPY;  Surgeon: BINH Souza MD;  Location: [address]);  Service: Endoscopy;  Laterality: N/A;    COLONOSCOPY N/A 6/17/2020    Procedure: COLONOSCOPYSuprep;  Surgeon: Antoino Weller MD;  " Location: [facility];  Service: Endoscopy;  Laterality: N/A;    ESOPHAGOGASTRODUODENOSCOPY N/A 6/17/2020    Procedure: EGD (ESOPHAGOGASTRODUODENOSCOPY);  Surgeon: Antonio Weller MD;  Location: [facility];  Service: Endoscopy;  Laterality: N/A;    ESOPHAGOGASTRODUODENOSCOPY N/A 9/2/2022    Procedure: EGD (ESOPHAGOGASTRODUODENOSCOPY;  Surgeon: Antonio Weller MD;  Location: [facility];  Service: Endoscopy;  Laterality: N/A;    EXCISION OF GANGLION OF WRIST Right 7/24/2020    Procedure: EXCISION, GANGLION CYST, WRIST, RIGHT volar;  Surgeon: Sugey Ayon MD;  Location: [facility];  Service: Orthopedics;  Laterality: Right;  Regional & MAC    HERNIA REPAIR      HIATAL HERNIA REPAIR      HYSTERECTOMY      lap band surgery  10/11/2011    Realize C Band (11mL)    LAPAROSCOPIC GASTRIC BANDING      LAPAROSCOPIC REMOVAL OF GASTRIC BAND N/A 4/7/2021    Procedure: REMOVAL-GASTRIC BAND-LAPAROSCOPIC-;  Surgeon: Oswaldo Mayfield MD;  Location: [facility] OR [address] [place] [address];  Service: General;  Laterality: N/A;    LAPAROSCOPIC SLEEVE GASTRECTOMY N/A 4/7/2021    Procedure: GASTRECTOMY-SLEEVE-LAPAROSCOPIC;  Surgeon: Oswaldo Mayfield MD;  Location: [facility] OR [address] [place] [address];  Service: General;  Laterality: N/A;    OOPHORECTOMY      TONSILLECTOMY      TOTAL KNEE ARTHROPLASTY  7/23/12    VENTRICULOPERITONEAL SHUNT         Family History   Problem Relation Age of Onset    Alzheimer's disease Mother 84    Prostate cancer Father 70    Lupus Father     Emphysema Father     Cancer Father         prostate cancer    COPD Father     Alcohol abuse Father     Stroke Father     Breast cancer Sister     Cancer Sister         breast cancer    Hepatitis Sister     Cirrhosis Sister     No Known Problems Sister     Cancer Brother         lymphoma from Rheumatoid arthritis Tx    Stroke Brother     No Known Problems Brother     No Known Problems Daughter     No Known Problems Son     No Known Problems Son     Cancer Unknown     Arthritis Unknown     Colon  cancer Neg Hx     Ovarian cancer Neg Hx     Melanoma Neg Hx        Social History     Socioeconomic History    Marital status:    Tobacco Use    Smoking status: Every Day     Packs/day: 2.00     Years: 25.00     Pack years: 50.00     Types: Cigarettes    Smokeless tobacco: Never    Tobacco comments:     quit in 2002 recently started smoking again in 1/2022   Substance and Sexual Activity    Alcohol use: Yes     Alcohol/week: 14.0 standard drinks     Types: 14 Glasses of wine per week     Comment: 2 glasses wine daily    Drug use: No    Sexual activity: Not Currently     Partners: Male     Social Determinants of Health     Financial Resource Strain: Low Risk     Difficulty of Paying Living Expenses: Not hard at all   Food Insecurity: No Food Insecurity    Worried About Running Out of Food in the Last Year: Never true    Ran Out of Food in the Last Year: Never true   Transportation Needs: No Transportation Needs    Lack of Transportation (Medical): No    Lack of Transportation (Non-Medical): No   Physical Activity: Sufficiently Active    Days of Exercise per Week: 7 days    Minutes of Exercise per Session: 150+ min   Stress: No Stress Concern Present    Feeling of Stress : Not at all   Social Connections: Moderately Isolated    Frequency of Communication with Friends and Family: More than three times a week    Frequency of Social Gatherings with Friends and Family: More than three times a week    Attends Yarsani Services: Never    Active Member of Clubs or Organizations: Yes    Attends Club or Organization Meetings: More than 4 times per year    Marital Status:    Housing Stability: Low Risk     Unable to Pay for Housing in the Last Year: No    Number of Places Lived in the Last Year: 1    Unstable Housing in the Last Year: No       Current Outpatient Medications   Medication Sig Dispense Refill    ascorbic acid, vitamin C, (VITAMIN C) 100 MG tablet Take 100 mg by mouth once daily.      azithromycin  (Z-ROULA) 250 MG tablet Take 2 tablets by mouth on day 1; Take 1 tablet by mouth on days 2-5 6 tablet 0    BIOTIN ORAL Take by mouth once daily.      calcium citrate-vitamin D3 315-200 mg (CITRACAL+D) 315 mg-5 mcg (200 unit) per tablet Take 1 tablet by mouth once daily.      cholecalciferol, vitamin D3, (VITAMIN D3) 125 mcg (5,000 unit) Tab Take 1 tablet (5,000 Units total) by mouth once daily. 90 tablet 3    citalopram (CELEXA) 20 MG tablet Take 1 tablet by mouth once daily 90 tablet 3    cyanocobalamin 1,000 mcg/mL injection INJECT 1 ML EVERY MONTH 10 mL 5    fluticasone (FLONASE) 50 mcg/actuation nasal spray 1 spray by Nasal route once daily.       furosemide (LASIX) 20 MG tablet Take 1 tablet (20 mg total) by mouth once daily. 30 tablet 11    guaiFENesin-codeine 100-10 mg/5 ml (TUSSI-ORGANIDIN NR)  mg/5 mL syrup Take 5 mLs by mouth 3 (three) times daily as needed for Cough. 240 mL 0    levothyroxine (SYNTHROID) 50 MCG tablet Take 1 tablet (50 mcg total) by mouth before breakfast. 90 tablet 3    loratadine (CLARITIN) 10 mg tablet Take 1 tablet (10 mg total) by mouth once daily. 90 tablet 3    melatonin 10 mg Tab Take by mouth.      multivitamin capsule Take 1 capsule by mouth once daily.      pantoprazole (PROTONIX) 40 MG tablet Take 1 tablet (40 mg total) by mouth 2 (two) times daily before meals. 60 tablet 12    potassium chloride (KLOR-CON) 10 MEQ TbSR TAKE TWO TABLETS BY MOUTH ONCE DAILY WITH LASIX  Strength: 10 mEq 60 tablet 5    prednisoLONE acetate (PRED FORTE) 1 % DrpS Place 1 drop into the left eye 3 (three) times daily. 15 mL 1    valACYclovir (VALTREX) 1000 MG tablet Take 2 tablets (2,000 mg total) by mouth every 12 (twelve) hours as needed (herpes outbreak). 2 tablets as soon as possible after symptom onset, then 2 tablets 12 hours later (4 total) 30 tablet 2    famotidine (PEPCID) 20 MG tablet Take 1 tablet by mouth twice daily (Patient not taking: No sig reported) 60 tablet 0    nicotine  (NICODERM CQ) 21 mg/24 hr Place 1 patch onto the skin once daily. (Patient not taking: No sig reported) 28 patch 0    nicotine, polacrilex, (NICORETTE) 2 mg Gum Take 1 each (2 mg total) by mouth as needed (Use in place of cigarettes). (Patient not taking: No sig reported) 100 each 0     No current facility-administered medications for this visit.     Facility-Administered Medications Ordered in Other Visits   Medication Dose Route Frequency Provider Last Rate Last Admin    acetaminophen tablet 1,000 mg  1,000 mg Oral Once Sugey Edouard MD        fentaNYL injection 25 mcg  25 mcg Intravenous Q5 Min PRN Sugey Edouard MD        fentaNYL injection 25 mcg  25 mcg Intravenous Q5 Min PRN Quinten Chu MD   100 mcg at 10/30/20 0920    lidocaine (PF) 10 mg/ml (1%) injection 10 mg  1 mL Intradermal Once Rachele Gonzalez MD        midazolam (VERSED) 1 mg/mL injection 0.5 mg  0.5 mg Intravenous PRN Sugey Edouard MD        midazolam (VERSED) 1 mg/mL injection 0.5 mg  0.5 mg Intravenous PRN Quinten Chu MD   1 mg at 10/30/20 0920       Review of patient's allergies indicates:   Allergen Reactions    Omeprazole Diarrhea    Prevacid [lansoprazole] Diarrhea         Review of Systems   Constitutional: Negative for chills, fever and malaise/fatigue.   HENT:  Negative for congestion and hearing loss.    Cardiovascular:  Negative for chest pain, claudication and leg swelling.   Respiratory:  Negative for cough and shortness of breath.    Musculoskeletal:  Positive for arthritis, back pain and joint pain. Negative for muscle cramps and muscle weakness.   Gastrointestinal:  Negative for nausea and vomiting.   Neurological:  Negative for numbness, paresthesias and weakness.   Psychiatric/Behavioral:  Negative for altered mental status.          Objective:      Physical Exam  Constitutional:       General: She is not in acute distress.     Appearance: She is not ill-appearing.    Cardiovascular:      Pulses:           Dorsalis pedis pulses are 2+ on the right side and 2+ on the left side.        Posterior tibial pulses are 1+ on the right side and 1+ on the left side.      Comments: Mild nonpitting edema to lower extremity bilateral.  Skin temp is warm to foot bilateral.  No rubor on dependency bilateral foot.  No hair growth bilateral extremity.  Musculoskeletal:      Comments: Adductovarus deformity bilateral 5th toe.  Palpable bony prominence lateral left 5th toe DIPJ region with overlying callus.    Moderate sized tailor's bunion bilateral right greater than left with overlying palpable bony prominence right 5th met head.   Skin:     General: Skin is warm.      Capillary Refill: Capillary refill takes less than 2 seconds.      Findings: No ecchymosis or erythema.      Nails: There is no clubbing.      Comments: Hyperkeratotic skin overlying the distal lateral left 5th toe DIPJ region with normal resting skin tension lines.       Neurological:      Mental Status: She is alert.             Assessment:       Encounter Diagnoses   Name Primary?    Tailor's bunion of both feet Yes    Exostosis of toe          Plan:       Luz was seen today for surgical consult.    Diagnoses and all orders for this visit:    Tailor's bunion of both feet  -     X-Ray Foot Complete Left; Future    Exostosis of toe  -     X-Ray Foot Complete Left; Future      I counseled the patient on her conditions, their implications and medical management.    We discussed wearing shoes adequate room the toe box to prevent any direct pressure to the toes.  Dispensed supplies silicone toe sleeve to left 5th toe.  We also discussed surgical intervention consisting of a tailor's bunionectomy with 5th metatarsal osteotomy to help realign the 5th toe bilateral.  She would also require an exostectomy of the left 5th toe.    Patient like to continue conservative care at this time however return when she has her other orthopedic  ailments resolved surgically such as carpal tunnel syndrome to the right hand.      Right foot x-ray reviewed noting moderate increase in the lateral deviation angle of the 5th metatarsal as well as an increase in the 4-5 intermetatarsal angle right foot.  Left foot weight-bearing x-ray ordered.    RTC p.r.n. as discussed    A portion of this note was generated by voice recognition software and may contain spelling and grammar errors.        .

## 2022-10-25 ENCOUNTER — ANESTHESIA EVENT (OUTPATIENT)
Dept: SURGERY | Facility: HOSPITAL | Age: 72
End: 2022-10-25
Payer: MEDICARE

## 2022-10-25 NOTE — ANESTHESIA PREPROCEDURE EVALUATION
10/25/2022  Luz Burk is a 72 y.o., female.    Patient's chart and medical records reviewed.  OK to proceed to [facility].      Pre-op Assessment    I have reviewed the Patient Summary Reports.     I have reviewed the Nursing Notes. I have reviewed the NPO Status.   I have reviewed the Medications.     Review of Systems  Cardiovascular:   NYHA Classification I    Pulmonary:   Sleep Apnea    Education provided regarding risk of obstructive sleep apnea  Medical reason for not educating patient about risks of obstructive sleep apnea   Hepatic/GI:   Hiatal Hernia, GERD S/p lap band now needs removal and gastric sleeve   Musculoskeletal:   Arthritis     Neurological:   Neuromuscular Disease, Headaches    Endocrine:   Hypothyroidism        Physical Exam  General: Well nourished    Airway:  Mallampati: II           Anesthesia Plan  Type of Anesthesia, risks & benefits discussed:    Anesthesia Type: Gen Natural Airway, MAC, Regional  Intra-op Monitoring Plan: Standard ASA Monitors  Post Op Pain Control Plan: multimodal analgesia and IV/PO Opioids PRN  Induction:  IV  Informed Consent: Informed consent signed with the Patient and all parties understand the risks and agree with anesthesia plan.  All questions answered.   ASA Score: 2    Ready For Surgery From Anesthesia Perspective.     .

## 2022-10-27 DIAGNOSIS — Z98.890 POST-OPERATIVE STATE: Primary | ICD-10-CM

## 2022-10-27 RX ORDER — TRAMADOL HYDROCHLORIDE 50 MG/1
50 TABLET ORAL EVERY 6 HOURS PRN
Qty: 20 TABLET | Refills: 0 | Status: SHIPPED | OUTPATIENT
Start: 2022-10-27 | End: 2022-11-16

## 2022-10-28 ENCOUNTER — TELEPHONE (OUTPATIENT)
Dept: ORTHOPEDICS | Facility: CLINIC | Age: 72
End: 2022-10-28
Payer: MEDICARE

## 2022-10-28 ENCOUNTER — OFFICE VISIT (OUTPATIENT)
Dept: OPHTHALMOLOGY | Facility: CLINIC | Age: 72
End: 2022-10-28
Payer: MEDICARE

## 2022-10-28 DIAGNOSIS — H10.13 CONJUNCTIVITIS, ALLERGIC, BILATERAL: Primary | ICD-10-CM

## 2022-10-28 PROCEDURE — 99999 PR PBB SHADOW E&M-EST. PATIENT-LVL III: CPT | Mod: PBBFAC,,, | Performed by: OPHTHALMOLOGY

## 2022-10-28 PROCEDURE — 1126F PR PAIN SEVERITY QUANTIFIED, NO PAIN PRESENT: ICD-10-PCS | Mod: CPTII,S$GLB,, | Performed by: OPHTHALMOLOGY

## 2022-10-28 PROCEDURE — 99214 OFFICE O/P EST MOD 30 MIN: CPT | Mod: S$GLB,,, | Performed by: OPHTHALMOLOGY

## 2022-10-28 PROCEDURE — 99214 PR OFFICE/OUTPT VISIT, EST, LEVL IV, 30-39 MIN: ICD-10-PCS | Mod: S$GLB,,, | Performed by: OPHTHALMOLOGY

## 2022-10-28 PROCEDURE — 3288F FALL RISK ASSESSMENT DOCD: CPT | Mod: CPTII,S$GLB,, | Performed by: OPHTHALMOLOGY

## 2022-10-28 PROCEDURE — 1159F PR MEDICATION LIST DOCUMENTED IN MEDICAL RECORD: ICD-10-PCS | Mod: CPTII,S$GLB,, | Performed by: OPHTHALMOLOGY

## 2022-10-28 PROCEDURE — 1101F PR PT FALLS ASSESS DOC 0-1 FALLS W/OUT INJ PAST YR: ICD-10-PCS | Mod: CPTII,S$GLB,, | Performed by: OPHTHALMOLOGY

## 2022-10-28 PROCEDURE — 3288F PR FALLS RISK ASSESSMENT DOCUMENTED: ICD-10-PCS | Mod: CPTII,S$GLB,, | Performed by: OPHTHALMOLOGY

## 2022-10-28 PROCEDURE — 99999 PR PBB SHADOW E&M-EST. PATIENT-LVL III: ICD-10-PCS | Mod: PBBFAC,,, | Performed by: OPHTHALMOLOGY

## 2022-10-28 PROCEDURE — 1101F PT FALLS ASSESS-DOCD LE1/YR: CPT | Mod: CPTII,S$GLB,, | Performed by: OPHTHALMOLOGY

## 2022-10-28 PROCEDURE — 1126F AMNT PAIN NOTED NONE PRSNT: CPT | Mod: CPTII,S$GLB,, | Performed by: OPHTHALMOLOGY

## 2022-10-28 PROCEDURE — 1159F MED LIST DOCD IN RCRD: CPT | Mod: CPTII,S$GLB,, | Performed by: OPHTHALMOLOGY

## 2022-10-28 NOTE — PROGRESS NOTES
HPI    Otilio pt  OU-discharge in the AM for several weeks   OD-noticed a red dot    Chronic Follicular Conj. OU    Meds: Pred TID OS       Last edited by Martha Nicholas MA on 10/28/2022  9:15 AM.            Assessment /Plan     For exam results, see Encounter Report.    Conjunctivitis, allergic, bilateral    Cx no growth on initial visit. No signs of infection today    Presume allergic in origin -- can trial eysuvis 1-2x/day OU and pataday ES    Conj tarsal pigmented lesion OD -- plan for excisional bx in OR

## 2022-10-28 NOTE — TELEPHONE ENCOUNTER
Spoke c pt. Informed pt of 6:45 a.m. arrival time for 10/31/22 surgery at the [facility]. Reminded pt of NPO status & PO appt. Pt expressed understanding & was thankful.

## 2022-10-30 ENCOUNTER — TELEPHONE (OUTPATIENT)
Dept: ORTHOPEDICS | Facility: CLINIC | Age: 72
End: 2022-10-30
Payer: MEDICARE

## 2022-10-30 NOTE — TELEPHONE ENCOUNTER
Spoke c pt. Informed pt of 5:45 a.m. arrival time for 10/31/22 surgery at the [facility]. Reminded pt of NPO status & PO appt. Pt expressed understanding & was thankful.

## 2022-10-31 ENCOUNTER — NURSE TRIAGE (OUTPATIENT)
Dept: ADMINISTRATIVE | Facility: CLINIC | Age: 72
End: 2022-10-31
Payer: MEDICARE

## 2022-10-31 ENCOUNTER — DOCUMENTATION ONLY (OUTPATIENT)
Dept: ORTHOPEDICS | Facility: CLINIC | Age: 72
End: 2022-10-31
Payer: MEDICARE

## 2022-10-31 ENCOUNTER — TELEPHONE (OUTPATIENT)
Dept: ORTHOPEDICS | Facility: CLINIC | Age: 72
End: 2022-10-31
Payer: MEDICARE

## 2022-10-31 ENCOUNTER — ANESTHESIA (OUTPATIENT)
Dept: SURGERY | Facility: HOSPITAL | Age: 72
End: 2022-10-31
Payer: MEDICARE

## 2022-10-31 ENCOUNTER — HOSPITAL ENCOUNTER (OUTPATIENT)
Facility: HOSPITAL | Age: 72
Discharge: HOME | End: 2022-10-31
Attending: ORTHOPAEDIC SURGERY | Admitting: ORTHOPAEDIC SURGERY
Payer: MEDICARE

## 2022-10-31 VITALS
SYSTOLIC BLOOD PRESSURE: 119 MMHG | DIASTOLIC BLOOD PRESSURE: 45 MMHG | BODY MASS INDEX: 26.8 KG/M2 | TEMPERATURE: 98 F | RESPIRATION RATE: 23 BRPM | HEART RATE: 61 BPM | HEIGHT: 64 IN | OXYGEN SATURATION: 99 % | WEIGHT: 157 LBS

## 2022-10-31 DIAGNOSIS — G56.00 CARPAL TUNNEL SYNDROME: Primary | ICD-10-CM

## 2022-10-31 PROCEDURE — 64721 CARPAL TUNNEL SURGERY: CPT | Mod: RT,,, | Performed by: ORTHOPAEDIC SURGERY

## 2022-10-31 PROCEDURE — 76942 ECHO GUIDE FOR BIOPSY: CPT | Mod: 26,,, | Performed by: ANESTHESIOLOGY

## 2022-10-31 PROCEDURE — 25000003 PHARM REV CODE 250: Performed by: STUDENT IN AN ORGANIZED HEALTH CARE EDUCATION/TRAINING PROGRAM

## 2022-10-31 PROCEDURE — D9220A PRA ANESTHESIA: ICD-10-PCS | Mod: ANES,,, | Performed by: ANESTHESIOLOGY

## 2022-10-31 PROCEDURE — 76942 PR U/S GUIDANCE FOR NEEDLE GUIDANCE: ICD-10-PCS | Mod: 26,,, | Performed by: ANESTHESIOLOGY

## 2022-10-31 PROCEDURE — 63600175 PHARM REV CODE 636 W HCPCS: Performed by: STUDENT IN AN ORGANIZED HEALTH CARE EDUCATION/TRAINING PROGRAM

## 2022-10-31 PROCEDURE — 64415 NJX AA&/STRD BRCH PLXS IMG: CPT | Mod: 59,RT,, | Performed by: ANESTHESIOLOGY

## 2022-10-31 PROCEDURE — 64721 PR REVISE MEDIAN N/CARPAL TUNNEL SURG: ICD-10-PCS | Mod: RT,,, | Performed by: ORTHOPAEDIC SURGERY

## 2022-10-31 PROCEDURE — 99900035 HC TECH TIME PER 15 MIN (STAT)

## 2022-10-31 PROCEDURE — 36000706: Performed by: ORTHOPAEDIC SURGERY

## 2022-10-31 PROCEDURE — 37000008 HC ANESTHESIA 1ST 15 MINUTES: Performed by: ORTHOPAEDIC SURGERY

## 2022-10-31 PROCEDURE — 37000009 HC ANESTHESIA EA ADD 15 MINS: Performed by: ORTHOPAEDIC SURGERY

## 2022-10-31 PROCEDURE — D9220A PRA ANESTHESIA: ICD-10-PCS | Mod: CRNA,,, | Performed by: NURSE ANESTHETIST, CERTIFIED REGISTERED

## 2022-10-31 PROCEDURE — 71000015 HC POSTOP RECOV 1ST HR: Performed by: ORTHOPAEDIC SURGERY

## 2022-10-31 PROCEDURE — D9220A PRA ANESTHESIA: Mod: ANES,,, | Performed by: ANESTHESIOLOGY

## 2022-10-31 PROCEDURE — 25000003 PHARM REV CODE 250: Performed by: NURSE ANESTHETIST, CERTIFIED REGISTERED

## 2022-10-31 PROCEDURE — C1763 CONN TISS, NON-HUMAN: HCPCS | Performed by: ORTHOPAEDIC SURGERY

## 2022-10-31 PROCEDURE — 63600175 PHARM REV CODE 636 W HCPCS: Performed by: NURSE ANESTHETIST, CERTIFIED REGISTERED

## 2022-10-31 PROCEDURE — 76942 ECHO GUIDE FOR BIOPSY: CPT | Performed by: ANESTHESIOLOGY

## 2022-10-31 PROCEDURE — 64415 PR NERVE BLOCK INJ, ANES/STEROID, BRACHIAL PLEXUS, INCL IMAG GUIDANCE: ICD-10-PCS | Mod: 59,RT,, | Performed by: ANESTHESIOLOGY

## 2022-10-31 PROCEDURE — 63600175 PHARM REV CODE 636 W HCPCS: Performed by: ANESTHESIOLOGY

## 2022-10-31 PROCEDURE — 36000707: Performed by: ORTHOPAEDIC SURGERY

## 2022-10-31 PROCEDURE — 94761 N-INVAS EAR/PLS OXIMETRY MLT: CPT

## 2022-10-31 PROCEDURE — 27201423 OPTIME MED/SURG SUP & DEVICES STERILE SUPPLY: Performed by: ORTHOPAEDIC SURGERY

## 2022-10-31 PROCEDURE — D9220A PRA ANESTHESIA: Mod: CRNA,,, | Performed by: NURSE ANESTHETIST, CERTIFIED REGISTERED

## 2022-10-31 PROCEDURE — 25000003 PHARM REV CODE 250: Performed by: ORTHOPAEDIC SURGERY

## 2022-10-31 PROCEDURE — 71000033 HC RECOVERY, INTIAL HOUR: Performed by: ORTHOPAEDIC SURGERY

## 2022-10-31 DEVICE — PROTECTOR AXOGUARD NERVE 10X40: Type: IMPLANTABLE DEVICE | Site: HAND | Status: FUNCTIONAL

## 2022-10-31 DEVICE — MEMBRANE CLARIX 1K 2.5CMX2.5CM: Type: IMPLANTABLE DEVICE | Site: HAND | Status: FUNCTIONAL

## 2022-10-31 RX ORDER — MUPIROCIN 20 MG/G
OINTMENT TOPICAL
Status: DISCONTINUED | OUTPATIENT
Start: 2022-10-31 | End: 2022-10-31 | Stop reason: HOSPADM

## 2022-10-31 RX ORDER — OXYCODONE HYDROCHLORIDE 5 MG/1
5 TABLET ORAL
Status: DISCONTINUED | OUTPATIENT
Start: 2022-10-31 | End: 2022-10-31 | Stop reason: HOSPADM

## 2022-10-31 RX ORDER — FENTANYL CITRATE 50 UG/ML
25 INJECTION, SOLUTION INTRAMUSCULAR; INTRAVENOUS EVERY 5 MIN PRN
Status: DISCONTINUED | OUTPATIENT
Start: 2022-10-31 | End: 2022-10-31 | Stop reason: HOSPADM

## 2022-10-31 RX ORDER — ACETAMINOPHEN 500 MG
1000 TABLET ORAL ONCE
Status: COMPLETED | OUTPATIENT
Start: 2022-10-31 | End: 2022-10-31

## 2022-10-31 RX ORDER — MIDAZOLAM HYDROCHLORIDE 1 MG/ML
.5-4 INJECTION INTRAMUSCULAR; INTRAVENOUS
Status: DISCONTINUED | OUTPATIENT
Start: 2022-10-31 | End: 2022-10-31 | Stop reason: HOSPADM

## 2022-10-31 RX ORDER — DEXAMETHASONE SODIUM PHOSPHATE 4 MG/ML
INJECTION, SOLUTION INTRA-ARTICULAR; INTRALESIONAL; INTRAMUSCULAR; INTRAVENOUS; SOFT TISSUE
Status: DISCONTINUED | OUTPATIENT
Start: 2022-10-31 | End: 2022-10-31

## 2022-10-31 RX ORDER — BACITRACIN ZINC 500 UNIT/G
OINTMENT (GRAM) TOPICAL
Status: DISCONTINUED | OUTPATIENT
Start: 2022-10-31 | End: 2022-10-31 | Stop reason: HOSPADM

## 2022-10-31 RX ORDER — CEFAZOLIN SODIUM 1 G/3ML
INJECTION, POWDER, FOR SOLUTION INTRAMUSCULAR; INTRAVENOUS
Status: DISCONTINUED | OUTPATIENT
Start: 2022-10-31 | End: 2022-10-31

## 2022-10-31 RX ORDER — FAMOTIDINE 10 MG/ML
INJECTION INTRAVENOUS
Status: DISCONTINUED | OUTPATIENT
Start: 2022-10-31 | End: 2022-10-31

## 2022-10-31 RX ORDER — SODIUM CHLORIDE 9 MG/ML
INJECTION, SOLUTION INTRAVENOUS CONTINUOUS PRN
Status: DISCONTINUED | OUTPATIENT
Start: 2022-10-31 | End: 2022-10-31

## 2022-10-31 RX ORDER — ONDANSETRON 2 MG/ML
INJECTION INTRAMUSCULAR; INTRAVENOUS
Status: DISCONTINUED | OUTPATIENT
Start: 2022-10-31 | End: 2022-10-31

## 2022-10-31 RX ORDER — PROPOFOL 10 MG/ML
VIAL (ML) INTRAVENOUS
Status: DISCONTINUED | OUTPATIENT
Start: 2022-10-31 | End: 2022-10-31

## 2022-10-31 RX ORDER — FENTANYL CITRATE 50 UG/ML
25-200 INJECTION, SOLUTION INTRAMUSCULAR; INTRAVENOUS
Status: DISCONTINUED | OUTPATIENT
Start: 2022-10-31 | End: 2022-10-31 | Stop reason: HOSPADM

## 2022-10-31 RX ORDER — PROPOFOL 10 MG/ML
VIAL (ML) INTRAVENOUS CONTINUOUS PRN
Status: DISCONTINUED | OUTPATIENT
Start: 2022-10-31 | End: 2022-10-31

## 2022-10-31 RX ORDER — CELECOXIB 200 MG/1
400 CAPSULE ORAL ONCE
Status: DISCONTINUED | OUTPATIENT
Start: 2022-10-31 | End: 2022-10-31 | Stop reason: HOSPADM

## 2022-10-31 RX ORDER — CEFAZOLIN SODIUM 2 G/50ML
2 SOLUTION INTRAVENOUS
Status: DISCONTINUED | OUTPATIENT
Start: 2022-10-31 | End: 2022-10-31 | Stop reason: HOSPADM

## 2022-10-31 RX ORDER — LIDOCAINE HCL/PF 100 MG/5ML
SYRINGE (ML) INTRAVENOUS
Status: DISCONTINUED | OUTPATIENT
Start: 2022-10-31 | End: 2022-10-31

## 2022-10-31 RX ADMIN — FENTANYL CITRATE 100 MCG: 50 INJECTION INTRAMUSCULAR; INTRAVENOUS at 08:10

## 2022-10-31 RX ADMIN — LIDOCAINE HYDROCHLORIDE 50 MG: 20 INJECTION, SOLUTION INTRAVENOUS at 08:10

## 2022-10-31 RX ADMIN — SODIUM CHLORIDE: 0.9 INJECTION, SOLUTION INTRAVENOUS at 08:10

## 2022-10-31 RX ADMIN — ONDANSETRON 4 MG: 2 INJECTION INTRAMUSCULAR; INTRAVENOUS at 08:10

## 2022-10-31 RX ADMIN — MUPIROCIN: 20 OINTMENT TOPICAL at 07:10

## 2022-10-31 RX ADMIN — MIDAZOLAM HYDROCHLORIDE 2 MG: 1 INJECTION, SOLUTION INTRAMUSCULAR; INTRAVENOUS at 08:10

## 2022-10-31 RX ADMIN — DEXAMETHASONE SODIUM PHOSPHATE 4 MG: 4 INJECTION, SOLUTION INTRAMUSCULAR; INTRAVENOUS at 08:10

## 2022-10-31 RX ADMIN — FAMOTIDINE 20 MG: 10 INJECTION, SOLUTION INTRAVENOUS at 08:10

## 2022-10-31 RX ADMIN — PROPOFOL 125 MCG/KG/MIN: 10 INJECTION, EMULSION INTRAVENOUS at 08:10

## 2022-10-31 RX ADMIN — PROPOFOL 30 MG: 10 INJECTION, EMULSION INTRAVENOUS at 08:10

## 2022-10-31 RX ADMIN — MEPIVACAINE HYDROCHLORIDE 30 ML: 15 INJECTION, SOLUTION EPIDURAL; INFILTRATION at 08:10

## 2022-10-31 RX ADMIN — CEFAZOLIN 2 G: 330 INJECTION, POWDER, FOR SOLUTION INTRAMUSCULAR; INTRAVENOUS at 08:10

## 2022-10-31 RX ADMIN — PROPOFOL 50 MG: 10 INJECTION, EMULSION INTRAVENOUS at 08:10

## 2022-10-31 RX ADMIN — ACETAMINOPHEN 1000 MG: 500 TABLET ORAL at 07:10

## 2022-10-31 NOTE — PLAN OF CARE
VSS. Patient able to tolerate oral liquids. Patient denies c/o pain. Patient/family received home medication per bedside delivery. Dressing intact. Knee TEDs intact. Patient instructed not to wear JUNE hose without wearing closed-back shoes or  socks due to increased risk of falls, verbalized understanding. No distress noted. Patient states she is ready for discharge. Discharge instructions reviewed with patient and family, verbalized understanding. IV discontinued with catheter tip intact. Staff at bedside to help patient dress. Patient wheeled to lobby via staff.

## 2022-10-31 NOTE — PROGRESS NOTES
Spoke c Nurse on call who stated that she had already spoken with Lexis Redding LPN who also informed her to relay to pt to elevate and begin taking OTC Tylenol and Iburprofen alternating with the Tramadol as prescribed.

## 2022-10-31 NOTE — PLAN OF CARE
Pre-op complete. Pt calm, will continue to monitor. Pt's friend at bedside. Pt's belongings placed in locker #5, 1 bag.

## 2022-10-31 NOTE — TELEPHONE ENCOUNTER
OOC NT incoming call -  Pt reports she had hand surgery this morning and  is having pain 7/10 uncontrolled with Tramadol as ordered.  Post op  protocol followed and NT placed call to Dr. Granda hand surgery clinic. Spoke with diana Murdock intake OOC tool entry. Awaiting call back.    Spoke with Lexis from dr. Granda office instructions received see care advice.  Return call placed to pt and instructed as per MD advise. No further questions.      Encounter routed to PCP and Dr. Granda.     Reason for Disposition   MILD TO MODERATE post-op pain (e.g., pain scale 1-7) that is not controlled with pain medications    Additional Information   Negative: Bright red, wide-spread, sunburn-like rash   Negative: SEVERE headache and after spinal (epidural) anesthesia   Negative: Vomiting and persists > 4 hours   Negative: Vomiting and abdomen looks much more swollen than usual   Negative: Drinking very little and dehydration suspected (e.g., no urine > 12 hours, very dry mouth, very lightheaded)   Negative: Patient sounds very sick or weak to the triager   Negative: Sounds like a serious complication to the triager   Negative: Fever > 100.4 F (38.0 C)   Negative: Caller has URGENT question and triager unable to answer question   Negative: SEVERE post-op pain (e.g., excruciating, pain scale 8-10) that is not controlled with pain medications   Negative: Headache and after spinal (epidural) anesthesia and not severe   Negative: Fever present > 3 days (72 hours)   Negative: Patient wants to be seen    Protocols used: Post-Op Symptoms and Vrfhbscad-U-BT

## 2022-10-31 NOTE — OP NOTE
"[facility] Surgery ([facility])  Surgery Department  Operative Note    SUMMARY     Date of Procedure: 10/31/2022     Procedure: Procedure(s) (LRB):  RELEASE, CARPAL TUNNEL "REVISION" RIGHT NEUROLYSIS MEDIAL NERVE, PATCH PLACEMENT (Right) splint application plaster made by surgeon right upper extremity    Surgeon(s) and Role:     * Sugey Ayon MD - Primary    Assisting Surgeon: Jose PEREZ    Pre-Operative Diagnosis: Bilateral carpal tunnel syndrome [G56.03]  Pain [R52]    Post-Operative Diagnosis: Post-Op Diagnosis Codes:     * Bilateral carpal tunnel syndrome [G56.03]     * Pain [R52]    Anesthesia: Regional    Technical Procedures Used: surgery    Description of the Findings of the Procedure:  Indication for procedure Ms Burk is a 72-year-old female who has failed conservative treatment for recurrent carpal tunnel nerve testing was performed the patient does have a history of cramping in her hand but we did discuss that that most likely is not caused by the recurrent carpal tunnel she does have pain we discussed revision carpal tunnel with that entails patient understands agrees with this plan risks benefits were explained in great detail    Procedure in detail the correct site was marked with the patient's participation in the holding area the patient underwent regional anesthesia was brought to the operating placed supine position underwent MAC anesthesia non sterile tourniquet was placed on the right upper extremity the right upper extremity was prepped and draped normal sterile fashion time-out was conducted for the correct procedure to be indicated IV antibiotics given patient preoperatively and extensile incision using Oro's cardinal lines were made going across the wrist into the forearm the arm was exsanguinated with an Esmarch tourniquet was insufflated 250 mmHg the incision was made careful dissection down to the median nerve was maintain the median nerve was identified in the form it was " completely decompressed from a proximal to distal direction neurolysis was performed under magnification after it was completely decompressed and exogen nerve wrap was size 10 x 40 was wrapped around the nerve protected from further scarring sutured by 6 0 nylon clear X patch was then sutured into position after the areas irrigated copious amounts normal saline Vicryl nylon closed the skin sterile dressing was applied tourniquet was deflated brisk cap refill sued patient was placed in a well-padded splint tolerated suture was brought to cover area stable condition     Postop plans patient keep the dressing clean dry intact 2 weeks out sutures will be removed patient will be protected weight-bearing for 4 weeks    Of note patient did have significant compression  Around the nerve with hypervascularity  Significant Surgical Tasks Conducted by the Assistant(s), if Applicable: retraction    Complications: No    Estimated Blood Loss (EBL): * No values recorded between 10/31/2022  8:38 AM and 10/31/2022  9:17 AM *           Implants:   Implant Name Type Inv. Item Serial No.  Lot No. LRB No. Used Action   [id number]  MEMBRANE CLARIX 1K [id number] [employer]  Right 1 Implanted   [id number]  PROTECTOR AXOGUARD NERVE [id number] [employer]  Right 1 Implanted       Specimens:   Specimen (24h ago, onward)      None                    Condition: Good    Disposition: PACU - hemodynamically stable.    Attestation: I performed the procedure.    Discharge Note    SUMMARY     Admit Date: 10/31/2022    Discharge Date and Time: No discharge date for patient encounter.    Hospital Course (synopsis of major diagnoses, care, treatment, and services provided during the course of the hospital stay): surgery     Final Diagnosis: Post-Op Diagnosis Codes:     * Bilateral carpal tunnel syndrome [G56.03]     * Pain [R52]    Disposition: Home or Self Care    Follow Up/Patient Instructions:      Medications:  Reconciled Home Medications:      Medication List        ASK your doctor about these medications      ascorbic acid (vitamin C) 100 MG tablet  Commonly known as: VITAMIN C  Take 100 mg by mouth once daily.     azithromycin 250 MG tablet  Commonly known as: Z-ROULA  Take 2 tablets by mouth on day 1; Take 1 tablet by mouth on days 2-5     BIOTIN ORAL  Take by mouth once daily.     calcium citrate-vitamin D3 315-200 mg 315 mg-5 mcg (200 unit) per tablet  Commonly known as: CITRACAL+D  Take 1 tablet by mouth once daily.     cholecalciferol (vitamin D3) 125 mcg (5,000 unit) Tab  Commonly known as: VITAMIN D3  Take 1 tablet (5,000 Units total) by mouth once daily.     citalopram 20 MG tablet  Commonly known as: CeleXA  Take 1 tablet by mouth once daily     cyanocobalamin 1,000 mcg/mL injection  INJECT 1 ML EVERY MONTH     famotidine 20 MG tablet  Commonly known as: PEPCID  Take 1 tablet by mouth twice daily     fluticasone propionate 50 mcg/actuation nasal spray  Commonly known as: FLONASE  1 spray by Nasal route once daily.     furosemide 20 MG tablet  Commonly known as: LASIX  Take 1 tablet (20 mg total) by mouth once daily.     levothyroxine 50 MCG tablet  Commonly known as: SYNTHROID  Take 1 tablet (50 mcg total) by mouth before breakfast.     loratadine 10 mg tablet  Commonly known as: CLARITIN  Take 1 tablet (10 mg total) by mouth once daily.     melatonin 10 mg Tab  Take by mouth.     multivitamin capsule  Take 1 capsule by mouth once daily.     nicotine (polacrilex) 2 mg Gum  Commonly known as: NICORETTE  Take 1 each (2 mg total) by mouth as needed (Use in place of cigarettes).     nicotine 21 mg/24 hr  Commonly known as: NICODERM CQ  Place 1 patch onto the skin once daily.     pantoprazole 40 MG tablet  Commonly known as: PROTONIX  Take 1 tablet (40 mg total) by mouth 2 (two) times daily before meals.     potassium chloride 10 MEQ Tbsr  Commonly known as: KLOR-CON  TAKE TWO TABLETS BY MOUTH ONCE  DAILY WITH LASIX  Strength: 10 mEq     prednisoLONE acetate 1 % Drps  Commonly known as: PRED FORTE  Place 1 drop into the left eye 3 (three) times daily.     traMADoL 50 mg tablet  Commonly known as: ULTRAM  Take 1 tablet (50 mg total) by mouth every 6 (six) hours as needed for Pain.     valACYclovir 1000 MG tablet  Commonly known as: VALTREX  Take 2 tablets (2,000 mg total) by mouth every 12 (twelve) hours as needed (herpes outbreak). 2 tablets as soon as possible after symptom onset, then 2 tablets 12 hours later (4 total)            No discharge procedures on file.   Follow-up Information       [facility] Follow up in 2 week(s).    Specialty: Orthopedics  Contact information:  [address], [address]  [facility] [address] [phone]  Additional information:  [facility], 9th Floor   Please park in [address] and use [place] elevators

## 2022-10-31 NOTE — TELEPHONE ENCOUNTER
Spoke with On call nurse advise patient can take tylenol and/or ibuprofen in between tylenol and to continue to ice an elevate she advised she will let pt know.

## 2022-10-31 NOTE — ANESTHESIA PROCEDURE NOTES
Right infraclavicular single injection    Patient location during procedure: pre-op   Block not for primary anesthetic.  Reason for block: at surgeon's request and post-op pain management   Post-op Pain Location: Right hand pain   Start time: 10/31/2022 8:01 AM  Timeout: 10/31/2022 8:00 AM   End time: 10/31/2022 8:06 AM    Staffing  Authorizing Provider: Pipe Chavez MD  Performing Provider: Pipe Chavez MD    Preanesthetic Checklist  Completed: patient identified, IV checked, site marked, risks and benefits discussed, surgical consent, monitors and equipment checked, pre-op evaluation and timeout performed  Peripheral Block  Patient position: sitting  Prep: ChloraPrep  Patient monitoring: heart rate, cardiac monitor, continuous pulse ox, continuous capnometry and frequent blood pressure checks  Block type: infraclavicular  Laterality: right  Injection technique: single shot  Needle  Needle type: Echogenic   Needle gauge: 21 G  Needle length: 4 in  Needle localization: ultrasound guidance and anatomical landmarks   -ultrasound image captured on disc.  Assessment  Injection assessment: negative aspiration and negative parasthesia  Paresthesia pain: none  Heart rate change: no  Slow fractionated injection: yes  Pain Tolerance: comfortable throughout block and no complaints  Medications:    Medications: mepivacaine (CARBOCAINE) injection 15 mg/mL (1.5%) - Perineural   30 mL - 10/31/2022 8:06:00 AM    Additional Notes  VSS.  [facility] RN monitoring vitals throughout procedure.  Patient tolerated procedure well.    30 mL mepivacaine 1.5% w/ epi 1:200k

## 2022-10-31 NOTE — DISCHARGE INSTRUCTIONS
AFTER HAND SURGERY    DOS:  Keep affected wrist elevated above the level of your heart  Check circulation frequently in fingers by pressing on the nail bed. Nail bed should turn white and then pink when released.  Exercise fingers to promote circulation.  Advance diet as tolerated  Resume home medications today.      DONT:  No driving for 24 hours or while taking narcotic pain medication    CALL PHYSICIAN FOR:  Obvious bleeding  Excessive swelling  Drainage (pus) from the wound  Pain unrelieved by pain medication.  If dressing gets wet

## 2022-10-31 NOTE — TELEPHONE ENCOUNTER
----- Message from Collette Banegas sent at 10/31/2022  4:37 PM CDT -----  Type: Patient Call Back    Who called: On call nurse     What is the request in detail: Pt had surgery and is in a lot of pain after taking the tramadol     Can the clinic reply by [facility]?    Would the patient rather a call back or a response via [facility]?     Best call back number: [phone]    Additional Information

## 2022-10-31 NOTE — BRIEF OP NOTE
"[place] - Surgery ([facility])  Brief Operative Note    Surgery Date: 10/31/2022     Surgeon(s) and Role:     * Sugey Ayon MD - Primary    Assisting Surgeon: None    Pre-op Diagnosis:  Bilateral carpal tunnel syndrome [G56.03]  Pain [R52]    Post-op Diagnosis:  Post-Op Diagnosis Codes:     * Bilateral carpal tunnel syndrome [G56.03]     * Pain [R52]    Procedure(s) (LRB):  RELEASE, CARPAL TUNNEL "REVISION" RIGHT NEUROLYSIS MEDIAL NERVE, PATCH PLACEMENT (Right)    Anesthesia: Regional    Operative Findings: carpal tunnel     Estimated Blood Loss: * No values recorded between 10/31/2022  8:38 AM and 10/31/2022  9:17 AM *         Specimens:   Specimen (24h ago, onward)      None              Discharge Note    OUTCOME: Patient tolerated treatment/procedure well without complication and is now ready for discharge.    DISPOSITION: Home or Self Care    FINAL DIAGNOSIS:  <principal problem not specified>    FOLLOWUP: In clinic    DISCHARGE INSTRUCTIONS:  No discharge procedures on file.    "

## 2022-10-31 NOTE — TRANSFER OF CARE
"Anesthesia Transfer of Care Note    Patient: Luz Burk    Procedure(s) Performed: Procedure(s) (LRB):  RELEASE, CARPAL TUNNEL "REVISION" RIGHT NEUROLYSIS MEDIAL NERVE, PATCH PLACEMENT (Right)    Patient location: PACU    Anesthesia Type: general    Transport from OR: Transported from OR on 100% O2 by closed face mask with adequate spontaneous ventilation    Post pain: adequate analgesia    Post assessment: no apparent anesthetic complications and tolerated procedure well    Post vital signs: stable    Level of consciousness: sedated and responds to stimulation    Nausea/Vomiting: no nausea/vomiting    Complications: none    Transfer of care protocol was followed      Last vitals:   Visit Vitals  BP (!) 101/54 (BP Location: Left arm, Patient Position: Lying)   Pulse (!) 58   Temp 36.5 °C (97.7 °F) (Tympanic)   Resp 20   Ht 5' 4" (1.626 m)   Wt 71.2 kg (157 lb)   LMP  (LMP Unknown)   SpO2 97%   Breastfeeding No   BMI 26.95 kg/m²     "

## 2022-11-01 ENCOUNTER — TELEPHONE (OUTPATIENT)
Dept: ORTHOPEDICS | Facility: CLINIC | Age: 72
End: 2022-11-01
Payer: MEDICARE

## 2022-11-01 NOTE — TELEPHONE ENCOUNTER
----- Message from Sinai Li sent at 11/1/2022  3:45 PM CDT -----  Type: Patient Call Back    Who called:Self    What is the request in detail: Pt would like to know how long she has to wear the sling and how long before she can drive.    Can the clinic reply by [facility]? no    Would the patient rather a call back or a response via [facility]? Call back    Best call back number: [phone] (home)       Additional Information:

## 2022-11-01 NOTE — ANESTHESIA POSTPROCEDURE EVALUATION
"Anesthesia Post Evaluation    Patient: Luz Burk    Procedure(s) Performed: Procedure(s) (LRB):  RELEASE, CARPAL TUNNEL "REVISION" RIGHT NEUROLYSIS MEDIAL NERVE, PATCH PLACEMENT (Right)    Final Anesthesia Type: general      Patient location during evaluation: PACU  Patient participation: Yes- Able to Participate  Level of consciousness: awake and alert  Post-procedure vital signs: reviewed and stable  Pain management: adequate  Airway patency: patent    PONV status at discharge: No PONV  Anesthetic complications: no      Cardiovascular status: blood pressure returned to baseline  Respiratory status: unassisted  Hydration status: euvolemic  Follow-up not needed.          Vitals Value Taken Time   /45 10/31/22 0945   Temp 36.6 °C (97.8 °F) 10/31/22 0945   Pulse 61 10/31/22 0945   Resp 23 10/31/22 0945   SpO2 99 % 10/31/22 0945         Event Time   Out of Recovery 09:48:00         Pain/Dawson Score: Pain Rating Prior to Med Admin: 0 (10/31/2022  8:00 AM)  Pain Rating Post Med Admin: 0 (10/31/2022  8:05 AM)  Dawson Score: 9 (10/31/2022  9:18 AM)        "

## 2022-11-01 NOTE — TELEPHONE ENCOUNTER
Spoke to pt advised sling may be worn up to two weeks depending on pain toleration and she should not drive until she has followed up with her 2 wk post op. Pt voiced understanding with no additional questions or concerns.

## 2022-11-03 ENCOUNTER — TELEPHONE (OUTPATIENT)
Dept: FAMILY MEDICINE | Facility: CLINIC | Age: 72
End: 2022-11-03
Payer: MEDICARE

## 2022-11-03 RX ORDER — POTASSIUM CHLORIDE 750 MG/1
TABLET, EXTENDED RELEASE ORAL
Qty: 180 TABLET | Refills: 3 | OUTPATIENT
Start: 2022-11-03

## 2022-11-04 ENCOUNTER — PATIENT MESSAGE (OUTPATIENT)
Dept: BARIATRICS | Facility: CLINIC | Age: 72
End: 2022-11-04
Payer: MEDICARE

## 2022-11-08 ENCOUNTER — OFFICE VISIT (OUTPATIENT)
Dept: ORTHOPEDICS | Facility: CLINIC | Age: 72
End: 2022-11-08
Payer: MEDICARE

## 2022-11-08 VITALS — WEIGHT: 157 LBS | BODY MASS INDEX: 26.8 KG/M2 | HEIGHT: 64 IN

## 2022-11-08 DIAGNOSIS — Z47.89 ORTHOPEDIC AFTERCARE: Primary | ICD-10-CM

## 2022-11-08 PROCEDURE — 1125F AMNT PAIN NOTED PAIN PRSNT: CPT | Mod: CPTII,S$GLB,, | Performed by: PHYSICIAN ASSISTANT

## 2022-11-08 PROCEDURE — 3288F FALL RISK ASSESSMENT DOCD: CPT | Mod: CPTII,S$GLB,, | Performed by: PHYSICIAN ASSISTANT

## 2022-11-08 PROCEDURE — 3288F PR FALLS RISK ASSESSMENT DOCUMENTED: ICD-10-PCS | Mod: CPTII,S$GLB,, | Performed by: PHYSICIAN ASSISTANT

## 2022-11-08 PROCEDURE — 99999 PR PBB SHADOW E&M-EST. PATIENT-LVL III: CPT | Mod: PBBFAC,,, | Performed by: PHYSICIAN ASSISTANT

## 2022-11-08 PROCEDURE — 1159F MED LIST DOCD IN RCRD: CPT | Mod: CPTII,S$GLB,, | Performed by: PHYSICIAN ASSISTANT

## 2022-11-08 PROCEDURE — 3008F BODY MASS INDEX DOCD: CPT | Mod: CPTII,S$GLB,, | Performed by: PHYSICIAN ASSISTANT

## 2022-11-08 PROCEDURE — 1101F PR PT FALLS ASSESS DOC 0-1 FALLS W/OUT INJ PAST YR: ICD-10-PCS | Mod: CPTII,S$GLB,, | Performed by: PHYSICIAN ASSISTANT

## 2022-11-08 PROCEDURE — 99024 PR POST-OP FOLLOW-UP VISIT: ICD-10-PCS | Mod: S$GLB,,, | Performed by: PHYSICIAN ASSISTANT

## 2022-11-08 PROCEDURE — 1101F PT FALLS ASSESS-DOCD LE1/YR: CPT | Mod: CPTII,S$GLB,, | Performed by: PHYSICIAN ASSISTANT

## 2022-11-08 PROCEDURE — 99024 POSTOP FOLLOW-UP VISIT: CPT | Mod: S$GLB,,, | Performed by: PHYSICIAN ASSISTANT

## 2022-11-08 PROCEDURE — 99999 PR PBB SHADOW E&M-EST. PATIENT-LVL III: ICD-10-PCS | Mod: PBBFAC,,, | Performed by: PHYSICIAN ASSISTANT

## 2022-11-08 PROCEDURE — 3008F PR BODY MASS INDEX (BMI) DOCUMENTED: ICD-10-PCS | Mod: CPTII,S$GLB,, | Performed by: PHYSICIAN ASSISTANT

## 2022-11-08 PROCEDURE — 1125F PR PAIN SEVERITY QUANTIFIED, PAIN PRESENT: ICD-10-PCS | Mod: CPTII,S$GLB,, | Performed by: PHYSICIAN ASSISTANT

## 2022-11-08 PROCEDURE — 1159F PR MEDICATION LIST DOCUMENTED IN MEDICAL RECORD: ICD-10-PCS | Mod: CPTII,S$GLB,, | Performed by: PHYSICIAN ASSISTANT

## 2022-11-08 NOTE — PROGRESS NOTES
Ms. Burk is here today for a cast/splint change.  She reports her splint feels loose.  She underwent right revision carpal tunnel release on 10/31/2022.    Her well padded plaster splint right upper extremity was rewrapped to be more secure, splint change was not required.  Patient will follow up as scheduled.      Luz was seen today for post-op evaluation.    Diagnoses and all orders for this visit:    Orthopedic aftercare

## 2022-11-10 ENCOUNTER — TELEPHONE (OUTPATIENT)
Dept: ORTHOPEDICS | Facility: CLINIC | Age: 72
End: 2022-11-10
Payer: MEDICARE

## 2022-11-14 ENCOUNTER — OFFICE VISIT (OUTPATIENT)
Dept: ORTHOPEDICS | Facility: CLINIC | Age: 72
End: 2022-11-14
Payer: MEDICARE

## 2022-11-14 VITALS — BODY MASS INDEX: 26.79 KG/M2 | WEIGHT: 156.94 LBS | HEIGHT: 64 IN

## 2022-11-14 DIAGNOSIS — Z98.890 POST-OPERATIVE STATE: Primary | ICD-10-CM

## 2022-11-14 PROCEDURE — 1125F AMNT PAIN NOTED PAIN PRSNT: CPT | Mod: CPTII,S$GLB,, | Performed by: PHYSICIAN ASSISTANT

## 2022-11-14 PROCEDURE — 3288F FALL RISK ASSESSMENT DOCD: CPT | Mod: CPTII,S$GLB,, | Performed by: PHYSICIAN ASSISTANT

## 2022-11-14 PROCEDURE — 3008F PR BODY MASS INDEX (BMI) DOCUMENTED: ICD-10-PCS | Mod: CPTII,S$GLB,, | Performed by: PHYSICIAN ASSISTANT

## 2022-11-14 PROCEDURE — 99999 PR PBB SHADOW E&M-EST. PATIENT-LVL IV: ICD-10-PCS | Mod: PBBFAC,,, | Performed by: PHYSICIAN ASSISTANT

## 2022-11-14 PROCEDURE — 3008F BODY MASS INDEX DOCD: CPT | Mod: CPTII,S$GLB,, | Performed by: PHYSICIAN ASSISTANT

## 2022-11-14 PROCEDURE — 1159F MED LIST DOCD IN RCRD: CPT | Mod: CPTII,S$GLB,, | Performed by: PHYSICIAN ASSISTANT

## 2022-11-14 PROCEDURE — 1125F PR PAIN SEVERITY QUANTIFIED, PAIN PRESENT: ICD-10-PCS | Mod: CPTII,S$GLB,, | Performed by: PHYSICIAN ASSISTANT

## 2022-11-14 PROCEDURE — 99024 POSTOP FOLLOW-UP VISIT: CPT | Mod: S$GLB,,, | Performed by: PHYSICIAN ASSISTANT

## 2022-11-14 PROCEDURE — 1101F PR PT FALLS ASSESS DOC 0-1 FALLS W/OUT INJ PAST YR: ICD-10-PCS | Mod: CPTII,S$GLB,, | Performed by: PHYSICIAN ASSISTANT

## 2022-11-14 PROCEDURE — 1101F PT FALLS ASSESS-DOCD LE1/YR: CPT | Mod: CPTII,S$GLB,, | Performed by: PHYSICIAN ASSISTANT

## 2022-11-14 PROCEDURE — 99999 PR PBB SHADOW E&M-EST. PATIENT-LVL IV: CPT | Mod: PBBFAC,,, | Performed by: PHYSICIAN ASSISTANT

## 2022-11-14 PROCEDURE — 3288F PR FALLS RISK ASSESSMENT DOCUMENTED: ICD-10-PCS | Mod: CPTII,S$GLB,, | Performed by: PHYSICIAN ASSISTANT

## 2022-11-14 PROCEDURE — 1159F PR MEDICATION LIST DOCUMENTED IN MEDICAL RECORD: ICD-10-PCS | Mod: CPTII,S$GLB,, | Performed by: PHYSICIAN ASSISTANT

## 2022-11-14 PROCEDURE — 99024 PR POST-OP FOLLOW-UP VISIT: ICD-10-PCS | Mod: S$GLB,,, | Performed by: PHYSICIAN ASSISTANT

## 2022-11-14 NOTE — PROGRESS NOTES
"Ms. Bukr is here today for a post-operative visit.  She is 14 days status post right carpal tunnel release revision with  neurolysis of the median nerve by Dr. Ayon on 10/31/22. She reports that she is doing well.  Pain is minimal. She has some erythema and edema at the proximal incision she denies notable pain. She is scheduled to begin therapy tomorrow. She denies fever, chills, and sweats since the time of the surgery.     Of note patient did have significant compression  Around the nerve with hypervascularity    Physical exam:    Vitals:    11/14/22 1031   Weight: 71.2 kg (156 lb 15.5 oz)   Height: 5' 4" (1.626 m)   PainSc:   2     Vital signs are stable, patient is afebrile.  Patient is well dressed and well groomed, no acute distress.  Alert and oriented to person, place, and time.  Post op dressing taken down.  Incision is clean, dry and intact.  There is erythema and edema of the proximal incision, no tenderness, no drainage. There is no erythema or exudate.  There is no sign of any infection. She is NVI. Sutures removed without difficulty.  She has good ROM.              Assessment: status post right carpal tunnel release revision with  neurolysis of the median nerve by Dr. Ayon on 10/31/22    Plan:  Luz was seen today for post-op evaluation.    Diagnoses and all orders for this visit:    Post-operative state    PO instruction reviewed and provided to patient  Discussed severity of nerve compression  Therapy scheduled to begin tomorrow  Transition to right wrist brace, no weight bearing for 2 wks   RTC 4 wks      15 min preparing/fitting/educating on brace.   "

## 2022-11-14 NOTE — PROGRESS NOTES
"  [facility] Occupational Therapy  Initial Evaluation     Date: 11/15/2022  Patient: Luz Burk  Chart Number: [id number]  Referring Physician: Hilda Woodruff PA  Therapy Diagnosis: No diagnosis found.    Physician Orders: S/p carpal tunnel revision  Start at 2 weeks PO  Eval and Treat, modalities as needed  8+ visits  Medical Diagnosis: G56.00 (ICD-10-CM) - Carpal tunnel syndrome   Evaluation Date: 11/15/2022  Plan of Care Certification Date: 1/13/2023  Authorization Period: 11/15/2022 - 12/13/2022  Surgery Date and Procedure: 10/31/2022-   RELEASE, CARPAL TUNNEL "REVISION" RIGHT NEUROLYSIS MEDIAL NERVE, PATCH PLACEMENT (Right) splint application plaster made by surgeon right upper extremity     Date of Return to MD: ***    Visit #: 1 of 1  Time In: ***  Time Out: ***  Total Billable Time: ***    Precautions: Standard ,  protected weight-bearing for 4 weeks    Subjective     History of Current Condition: Ms. Burk is here today for a post-operative visit.  She is 14 days status post right carpal tunnel release revision with  neurolysis of the median nerve by Dr. Ayon on 10/31/22. She reports that she is doing well.  Pain is minimal. She has some erythema and edema at the proximal incision she denies notable pain. She is scheduled to begin therapy tomorrow. She denies fever, chills, and sweats since the time of the surgery.        Involved Side: R  Dominant Side: ***  Date of Onset: ***  Imaging: NONE  Previous Therapy: ***    Patient's Goals for Therapy: ***    Pain:  Functional Pain Scale Rating 0-10:   {NUMBERS 1-10:07853}/10 on average  {NUMBERS 1-10:89675}/10 at best  {NUMBERS 1-10:31607}/10 at worst  Location: ***  Description: ***  Aggravating Factors: ***  Easing Factors: ***    Occupation:  ***  Working presently: {Work history:05511}  Duties: ***    Functional Limitations/Social History:    Prior Level of Function: ***  Current Level of Function:***    Home/Living environment " : {LIVES WITH:02464}  Home Access: ***  DME: {DME OWNED:10361}     Leisure: {AMB OT HAND LEISURE:43937}    Driving: ***    Past Medical History/Physical Systems Review:   Luz Burk  has a past medical history of Cataract, Collagenous colitis, Dry mouth, Excessive thirst, Fecal incontinence, Gastric banding status, Hydrocephalus, Hypothyroidism, LAP-BAND surgery status, Muscle weakness, OA (osteoarthritis), Obesity, Other specified prophylactic or treatment measure, Pain aggravated by changing postions, Palpitations, SI (sacroiliac) pain, Sleep apnea, Thumb pain, right, and Urinary frequency.    Luz Burk  has a past surgical history that includes Total knee arthroplasty (7/23/12); lap band surgery (10/11/2011); Hernia repair; Ventriculoperitoneal shunt; Hysterectomy; Cholecystectomy; Laparoscopic gastric banding; Colonoscopy (N/A, 5/30/2016); Appendectomy; Hiatal hernia repair; Oophorectomy; Cataract extraction; Tonsillectomy; Esophagogastroduodenoscopy (N/A, 6/17/2020); Colonoscopy (N/A, 6/17/2020); Carpal tunnel release (Right, 7/24/2020); Excision of ganglion of wrist (Right, 7/24/2020); Arthroplasty of joint of finger (Right, 10/30/2020); Laparoscopic removal of gastric band (N/A, 4/7/2021); Laparoscopic sleeve gastrectomy (N/A, 4/7/2021); Esophagogastroduodenoscopy (N/A, 9/2/2022); Carpal tunnel release (Right, 10/31/2022); and Internal neurolysis using operating microscope (10/31/2022).    Luz has a current medication list which includes the following prescription(s): ascorbic acid (vitamin c), azithromycin, biotin, calcium citrate-vitamin d3 315-200 mg, cholecalciferol (vitamin d3), citalopram, cyanocobalamin, famotidine, fluticasone propionate, furosemide, levothyroxine, loratadine, melatonin, multivitamin, nicotine, nicotine (polacrilex), pantoprazole, potassium chloride, prednisolone acetate, tramadol, and valacyclovir, and the following Facility-Administered Medications: acetaminophen,  fentanyl, fentanyl, lidocaine (pf) 10 mg/ml (1%), midazolam, and midazolam.    Review of patient's allergies indicates:   Allergen Reactions    Omeprazole Diarrhea    Prevacid [lansoprazole] Diarrhea          Objective     Mental status: {MENTAL STATUS:89504}    Observation:   ***      Sensation: {AMB OT SENSATION:97028} Distribution   11/15/2022 11/15/2022    Left Right   Monofilament Testing     Normal 1.65-2.83     Diminished Light Touch 3.22-3.61     Diminished Protective 3.84-4.31     Loss of Protective 4.56-6.65     Untestable >6.65         Wound Assessment:   ***  Size: ***  Location: ***    Edema: Circumferential measurements: In centimters     11/15/2022 11/15/2022    Left Right   Index:       P1      PIP     P2      DIP     P3     Long:       P1      PIP     P2      DIP     P3     Ring:       P1                 PIP                P2                  DIP     P3     Small:        P1                 PIP            P2             DIP     P3     Thumb:     Prox. Phalanx     IP     Distal Phalanx        Left   11/15/2022 Right  11/15/2022   MPs *** cm *** cm   PPC (Proximal Patel Crease) *** cm *** cm   PWC (Proximal Wrist Crease) *** cm *** cm      Left   11/15/2022 Right  11/15/2022   3 Inches distal to elbow crease *** cm *** cm   Elbow crease *** cm *** cm   3 Inches proximal to elbow crease *** cm *** cm       Range of Motion:   {Right/Left:75378}    Active/Passive*** ROM  Shoulder Left  11/15/2022 Right  11/15/2022   Extension     Flexion     Abduction     Internal Rotation     External Rotation         Elbow Left  11/15/2022 Right  11/15/2022   Flexion *** ***   Extension *** ***       Forearm Left  11/15/2022 Right  11/15/2022   Pronation *** ***   Supination *** ***       Wrist Left  11/15/2022 Right  11/15/2022   Extension *** ***   Flexion *** ***   Ulnar Deviation *** ***   Radial Deviation *** ***       Left Hand Finger ROM INDEX   11/15/2022 LONG   11/15/2022 RING   11/15/2022 SMALL   11/15/2022    MP  *** *** *** ***   PIP  *** *** *** ***   DIP  *** *** *** ***     Right Hand Finger ROM INDEX   11/15/2022 LONG   11/15/2022 RING   11/15/2022 SMALL   11/15/2022   MP  *** *** *** ***   PIP  *** *** *** ***   DIP  *** *** *** ***      Left Thumb range of motion  11/15/2022 Right Thumb range of motion  11/15/2022   MP *** ***   IP *** ***   Radial Abduction *** ***   Palmar Abduction *** ***   Opposition *** ***       Special Tests:   {Right/Left:60202}   11/15/2022   Thumb CMC Grind Test    Finkelstein's Test    Phalen's Test    Tinel's Test at ***    Mohamud's Test    Extrinsic Tightness Test    Intrinsic Tightness Test    Yeung's Shift     ORL Test    Froment's Sign    Lolita's Sign     Piano Key Test     ECU Synergy     TFCC Load      Manual Muscle Testing:   ***     Strength: (ELVIS Dynamometer in psi.)      11/15/2022 11/15/2022    Left Right   Rung II *** ***       Pinch Strength (Measured in psi)     11/15/2022 11/15/2022    Left Right   Pickering Pinch ***  ***    3pt Pinch ***  ***    2pt Pinch ***  ***      Fine Motor Coordination: 9 Hole Peg Test  Left 11/15/2022 Right 11/15/2022         CMS Impairment/Limitation/Restriction for FOTO Hand/Wrist/Finger Survey    Therapist reviewed FOTO scores for Luz Burk on 11/15/2022.   FOTO documents entered into [employer] - see Media section.    Limitation Score: ***%  Category: Self Care         Treatment     Treatment Time In: ***  Treatment Time Out: ***  Total Treatment time separate from Evaluation time:***    Luz received the following supervised modalities after being cleared for contraindications for *** minutes:   -***    Luz received the following direct contact modalities after being cleared for contraindications for *** minutes:  -***    Luz received the following manual therapy techniques for *** minutes:   -***    Luz performed therapeutic exercises for *** minutes including:  -***    Luz participated in dynamic functional therapeutic  activities to improve functional performance for ***  minutes, including:  -***    Home Exercise Program/Education:  Issued HEP (see patient instructions in EMR) and educated on modality use for pain management . Exercises were reviewed and Luz was able to demonstrate them prior to the end of the session.   Pt received a written copy of exercises to perform at home. Luz demonstrated good  understanding of the education provided.  Pt was advised to perform these exercises free of pain, and to stop performing them if pain occurs.    Patient/Family Education: role of OT, goals for OT, scheduling/cancellations - pt verbalized understanding. Discussed insurance limitations with patient.    Additional Education provided: ***      Assessment     Luz Burk is a 72 y.o. female presents with limitations as described in problem list. Patient can benefit from Occupational Therapy services for Iontophoresis, ultrasound, moist heat, therapeutic exercises, home exercise program provied with written instructions, ice and strengthening and orthotics, if deemed necessary . The following goals were discussed with the patient and she is in agreement with them as to be addressed in the treatment plan.    The patient's rehab potential is Good.     Anticipated barriers to occupational therapy: none  Pt has no cultural, educational or language barriers to learning provided.    Profile and History Assessment of Occupational Performance Level of Clinical Decision Making Complexity Score   Occupational Profile:   Luz Burk is a 72 y.o. female who is {DESC; EMPLOYMENT STATUS:76594} Luz Burk has difficulty with  ADLs and IADLs as listed previously, which  affecting his/her daily functional abilities.      Comorbidities:    has a past medical history of Cataract, Collagenous colitis, Dry mouth, Excessive thirst, Fecal incontinence, Gastric banding status, Hydrocephalus, Hypothyroidism, LAP-BAND surgery status, Muscle  weakness, OA (osteoarthritis), Obesity, Other specified prophylactic or treatment measure, Pain aggravated by changing postions, Palpitations, SI (sacroiliac) pain, Sleep apnea, Thumb pain, right, and Urinary frequency.    Medical and Therapy History Review:   {History Review:25438}               Performance Deficits    Physical:  Joint Mobility  Joint Stability  Muscle Power/Strength  Muscle Endurance  Edema   Strength  Pinch Strength  Fine Motor Coordination    Cognitive:  No Deficits    Psychosocial:    Habits  Routines  Rituals     Clinical Decision Making:  low    Assessment Process:  Problem-Focused Assessments    Modification/Need for Assistance:  Not Necessary    Intervention Selection:  Multiple Treatment Options       low  Based on PMHX, co morbidities , data from assessments and functional level of assistance required with task and clinical presentation directly impacting function.         Goals:    LTG's (8 weeks):  1)   Increase ROM *** degrees in {Anatomy; location extremity upper:03525} *** to increase functional hand use for ***  2)   Increase  strength *** lbs. to grasp ***  3)   Increase *** pinch *** psis for ***  4)   Decrease complaints of pain to  {NUMBERS 1-10:97236} out of 10 at worst to increase functional hand use for ADL/work/leisure activities.  5)   Patient to score at ***% or less on FOTO to demonstrate improved perception of functional *** use.  6)   Pt will return to near to prior level of function for ADLs and household management reporting I or Mod I with ADLs (dressing, feeding, grooming, toileting).     STG's (4 weeks)  1)   Patient to be IND with HEP and modalities for pain/edema managment.  2)   Increase ROM *** degrees to increase functional hand use for ADLs/work/leisure activities.  3)   Increase  strength *** lbs. to improve functional grasp for ADLs/work/leisure activities.   4)   Increase *** pinch *** psi's to increase independence with button and FM  Coordination.   5)   Patient to be IND wiht Orthotic use, wear and care precautions.   6)   Decrease complaints of pain to  {NUMBERS 1-10:77679} out of 10 at worst to increase functional hand use for ADL/work/leisure activities.          Plan     Pt to be treated by Occupational Therapy 2 times per week for 8 weeks during the certification period from 11/15/2022 to 1/13/2023 to achieve the established goals.     Treatment to include: Paraffin, Fluidotherapy, Manual therapy/joint mobilizations, Modalities for pain management, US 3 mhz, Therapeutic exercises/activities., Iontophoresis with 2.0 cc Dexamethasone, Strengthening, Orthotic Fabrication/Fit/Training, Edema Control, Scar Management, Wound Care, Electrical Modalities, Joint Protection, and Energy Conservation, as well as any other treatments deemed necessary based on the patient's needs or progress.     Julieth Owens, OTR/L

## 2022-11-15 ENCOUNTER — CLINICAL SUPPORT (OUTPATIENT)
Dept: REHABILITATION | Facility: HOSPITAL | Age: 72
End: 2022-11-15
Payer: MEDICARE

## 2022-11-15 ENCOUNTER — TELEPHONE (OUTPATIENT)
Dept: ORTHOPEDICS | Facility: CLINIC | Age: 72
End: 2022-11-15
Payer: MEDICARE

## 2022-11-15 DIAGNOSIS — M79.641 PAIN OF RIGHT HAND: ICD-10-CM

## 2022-11-15 DIAGNOSIS — G56.00 CARPAL TUNNEL SYNDROME: ICD-10-CM

## 2022-11-15 DIAGNOSIS — Z98.890 POST-OPERATIVE STATE: Primary | ICD-10-CM

## 2022-11-15 DIAGNOSIS — Z74.1 NEED FOR ASSISTANCE WITH PERSONAL CARE: ICD-10-CM

## 2022-11-15 DIAGNOSIS — R29.898 WEAKNESS OF EXTREMITY: ICD-10-CM

## 2022-11-15 DIAGNOSIS — M25.60 STIFFNESS IN JOINT: ICD-10-CM

## 2022-11-15 DIAGNOSIS — R53.1 WEAKNESS: ICD-10-CM

## 2022-11-15 PROBLEM — M79.643 PAIN IN HAND: Status: ACTIVE | Noted: 2022-11-15

## 2022-11-15 PROCEDURE — 97110 THERAPEUTIC EXERCISES: CPT | Mod: PO

## 2022-11-15 PROCEDURE — 97165 OT EVAL LOW COMPLEX 30 MIN: CPT | Mod: PO

## 2022-11-15 RX ORDER — SULFAMETHOXAZOLE AND TRIMETHOPRIM 800; 160 MG/1; MG/1
1 TABLET ORAL 2 TIMES DAILY
Qty: 20 TABLET | Refills: 0 | Status: SHIPPED | OUTPATIENT
Start: 2022-11-15 | End: 2022-12-09 | Stop reason: SDUPTHER

## 2022-11-15 NOTE — PATIENT INSTRUCTIONS
"[facility], OCCUPATIONAL THERAPY  HOME EXERCISE PROGRAM       AROM: Supination / Pronation   With your elbow by your side, turn your   palm up then turn your palm down.      AROM: Wrist Flexion / Extension               Bend your wrist forward and back as far as possible.          AROM: Wrist Radial / Ulnar Deviation  Bend your wrist from side to side as far as possible.      AAROM: Isolated MCP Flexion / Extension ("Wave")   Bend only your large, bottom knuckles. Hold 3 seconds.   Keep the tips of your fingers straight. Straighten fingers.      AROM: Isolated IPJ Flexion / Extension ("Hook")   Bend only your middle and end knuckles. Hold 3 seconds.   Straighten your fingers.       AROM: MCP and PIP Flexion / Extension ("Straight Fist")  Bend your bottom and middle knuckles, keeping the tips of your fingers straight.   Try to touch the pads of your fingers on your palm. Hold 3 seconds.   Straighten your fingers.       AROM: Composite Flexion / Extension ("Full Fist")  Bend every joint in your hand into a fist. Hold 3 seconds.   Straighten your fingers.         DASHAWN  AROM: Composite Flexion   Bend both joints of thumb as far as possible.   Try to touch base of little finger.      AROM: Radial Adduction / Abduction  Place your palm flat on the table. Move thumb out   to side. Move back alongside index finger.                                       AROM: Palmar Adduction / Abduction   Rest your small finger on the table. Move thumb   sideways, out and away from   palm. Move back to rest along palm.                        AROM: Opposition   Touch tip of thumb to nail tip of each  finger in turn, making an "O" shape.      AROM: Composite Movement Circumduction  Make clockwise circles with thumb. Reverse and make counterclockwise   circles   with thumb.                                    AROM: Composite Flesion ("Pinky Slides")  Touch thumb to tip of small finger. Slide thumb down   small finger into palm. "

## 2022-11-15 NOTE — PLAN OF CARE
"  [facility] Occupational Therapy  Initial Evaluation     Date: 11/15/2022  Patient: Luz Burk  Chart Number: [id number]  Referring Physician: Hilda Woodruff PA  Therapy Diagnosis: weakness, stiffness, pain, edema, assistance with personal care    Physician Orders: S/p carpal tunnel revision  Start at 2 weeks PO  Eval and Treat, modalities as needed  8+ visits  Medical Diagnosis: G56.00 (ICD-10-CM) - Carpal tunnel syndrome   Evaluation Date: 11/15/2022  Plan of Care Certification Date: 1/13/2023  Authorization Period: 11/15/2022 - 12/13/2022  Surgery Date and Procedure: 10/31/2022-   RELEASE, CARPAL TUNNEL "REVISION" RIGHT NEUROLYSIS MEDIAL NERVE, PATCH PLACEMENT (Right) splint application plaster made by surgeon right upper extremity     Date of Return to MD: 11/23/2022    Visit #: 1 of 1  Time In: 1500  Time Out: 1600  Total Billable Time: 60    Precautions: Standard , protected weight-bearing for 4 weeks    Subjective     History of Current Condition: She is 15 days status post right carpal tunnel release revision with  neurolysis of the median nerve by Dr. Ayon on 10/31/22. She reports that she is doing well.  Pain is minimal. She has some erythema and edema at the proximal incision she denies notable pain.      Involved Side: R  Dominant Side: R  Date of Onset: 6-8 months  Imaging: NONE  Previous Therapy: none    Patient's Goals for Therapy: "Be able to use my hand"    Pain:  Functional Pain Scale Rating 0-10:   5/10 on average  5/10 at best  10/10 at worst  Location: volar aspect of palm  Description: stretching   Aggravating Factors: using my hand for anything  Easing Factors: pain medication    Occupation:  n/a  Working presently: retired  Duties: n/a    Functional Limitations/Social History:    Prior Level of Function: Independent   Current Level of Function:Independent    Home/Living environment : lives with an adult   Home Access: ramp  DME: none     Leisure: " cooking and driving    Driving: Y    Past Medical History/Physical Systems Review:   Luz Burk  has a past medical history of Cataract, Collagenous colitis, Dry mouth, Excessive thirst, Fecal incontinence, Gastric banding status, Hydrocephalus, Hypothyroidism, LAP-BAND surgery status, Muscle weakness, OA (osteoarthritis), Obesity, Other specified prophylactic or treatment measure, Pain aggravated by changing postions, Palpitations, SI (sacroiliac) pain, Sleep apnea, Thumb pain, right, and Urinary frequency.    Luz Burk  has a past surgical history that includes Total knee arthroplasty (7/23/12); lap band surgery (10/11/2011); Hernia repair; Ventriculoperitoneal shunt; Hysterectomy; Cholecystectomy; Laparoscopic gastric banding; Colonoscopy (N/A, 5/30/2016); Appendectomy; Hiatal hernia repair; Oophorectomy; Cataract extraction; Tonsillectomy; Esophagogastroduodenoscopy (N/A, 6/17/2020); Colonoscopy (N/A, 6/17/2020); Carpal tunnel release (Right, 7/24/2020); Excision of ganglion of wrist (Right, 7/24/2020); Arthroplasty of joint of finger (Right, 10/30/2020); Laparoscopic removal of gastric band (N/A, 4/7/2021); Laparoscopic sleeve gastrectomy (N/A, 4/7/2021); Esophagogastroduodenoscopy (N/A, 9/2/2022); Carpal tunnel release (Right, 10/31/2022); and Internal neurolysis using operating microscope (10/31/2022).    Luz has a current medication list which includes the following prescription(s): ascorbic acid (vitamin c), azithromycin, biotin, calcium citrate-vitamin d3 315-200 mg, cholecalciferol (vitamin d3), citalopram, cyanocobalamin, famotidine, fluticasone propionate, furosemide, levothyroxine, loratadine, melatonin, multivitamin, nicotine, nicotine (polacrilex), pantoprazole, potassium chloride, prednisolone acetate, tramadol, and valacyclovir, and the following Facility-Administered Medications: acetaminophen, fentanyl, fentanyl, lidocaine (pf) 10 mg/ml (1%), midazolam, and midazolam.    Review  of patient's allergies indicates:   Allergen Reactions    Omeprazole Diarrhea    Prevacid [lansoprazole] Diarrhea          Objective     Mental status: alert, oriented x3    Observation:   Pt has edema, redness, and fever at incision site that measures 3.5 cm x 3.5 cm. Pt states that doctor is aware and antibiotics have been prescribed.      Sensation: Median Nerve Distribution   11/15/2022 11/15/2022    Left Right   Monofilament Testing     Normal 1.65-2.83 2.83 2.83          Left   11/15/2022 Right  11/15/2022   PWC (Proximal Wrist Crease) 15.5 cm 17.5 cm       Range of Motion:   Right    Wrist Left  11/15/2022 Right  11/15/2022   Extension 75 60   Flexion 70 70   Ulnar Deviation 25 18   Radial Deviation 30 15        Strength: (ELVIS Dynamometer in psi.)      11/15/2022 11/15/2022    Left Right   Rung II 45 20       Pinch Strength (Measured in psi)     11/15/2022 11/15/2022    Left Right   Key Pinch 15  5    3pt Pinch 4  11    2pt Pinch 1  6      Fine Motor Coordination: 9 Hole Peg Test  Left 11/15/2022 Right 11/15/2022   20 21     CMS Impairment/Limitation/Restriction for FOTO Hand/Wrist/Finger Survey    Therapist reviewed FOTO scores for Luz Burk on 11/15/2022.   FOTO documents entered into EPIC - see Media section.    Limitation Score: Will complete at next visit  Category: Self Care         Treatment     Treatment Time In: 1545  Treatment Time Out: 1600  Total Treatment time separate from Evaluation time:15      Luz performed therapeutic exercises for 15 minutes including:  -Education and demonstration of HEP      Home Exercise Program/Education:  Issued HEP (see patient instructions in EMR) and educated on modality use for pain management . Exercises were reviewed and Luz was able to demonstrate them prior to the end of the session.   Pt received a written copy of exercises to perform at home. Luz demonstrated good  understanding of the education provided.  Pt was advised to perform these  exercises free of pain, and to stop performing them if pain occurs.    Patient/Family Education: role of OT, goals for OT, scheduling/cancellations - pt verbalized understanding. Discussed insurance limitations with patient.    Additional Education provided: How to massage thenar eminence to relief pain      Assessment     Luz Burk is a 72 y.o. female presents with limitations as described in problem list. Patient can benefit from Occupational Therapy services for Iontophoresis, ultrasound, moist heat, therapeutic exercises, home exercise program provied with written instructions, ice and strengthening and orthotics, if deemed necessary . The following goals were discussed with the patient and she is in agreement with them as to be addressed in the treatment plan.    The patient's rehab potential is Good.     Anticipated barriers to occupational therapy: none  Pt has no cultural, educational or language barriers to learning provided.    Profile and History Assessment of Occupational Performance Level of Clinical Decision Making Complexity Score   Occupational Profile:   Luz Burk is a 72 y.o. female who is retired Luz Burk has difficulty with  ADLs and IADLs as listed previously, which  affecting his/her daily functional abilities.      Comorbidities:    has a past medical history of Cataract, Collagenous colitis, Dry mouth, Excessive thirst, Fecal incontinence, Gastric banding status, Hydrocephalus, Hypothyroidism, LAP-BAND surgery status, Muscle weakness, OA (osteoarthritis), Obesity, Other specified prophylactic or treatment measure, Pain aggravated by changing postions, Palpitations, SI (sacroiliac) pain, Sleep apnea, Thumb pain, right, and Urinary frequency.    Medical and Therapy History Review:   Expanded               Performance Deficits    Physical:  Joint Mobility  Joint Stability  Muscle Power/Strength  Muscle Endurance  Edema   Strength  Pinch Strength  Fine Motor  Coordination    Cognitive:  No Deficits    Psychosocial:    Habits  Routines  Rituals     Clinical Decision Making:  low    Assessment Process:  Problem-Focused Assessments    Modification/Need for Assistance:  Not Necessary    Intervention Selection:  Multiple Treatment Options       low  Based on PMHX, co morbidities , data from assessments and functional level of assistance required with task and clinical presentation directly impacting function.         Goals:    LTG's (8 weeks):  1)   Increase ROM 15 degrees in wrist extension to increase functional hand use for ADL/work/leisure activities.  2)   Increase  strength 25 lbs. to improve functional grasp for ADLs/work/leisure activities.  3)   Increase key pinch 10 psis to improve functional grasp for ADLs/work/leisure activities.  4)   Decrease complaints of pain to  3 out of 10 at worst to increase functional hand use for ADL/work/leisure activities.  6)   Pt will return to near to prior level of function for ADLs and household management reporting I or Mod I with ADLs (dressing, feeding, grooming, toileting).     STG's (4 weeks)  1)   Patient to be IND with HEP and modalities for pain/edema managment.  2)   Increase ROM 10 degrees to increase functional hand use for ADLs/work/leisure activities.  3)   Increase  strength 15 lbs. to improve functional grasp for ADLs/work/leisure activities.   4)   Increase key pinch 5 psi's to increase independence with button and FM Coordination.   5)   Patient to be IND wiht Orthotic use, wear and care precautions.   6)   Decrease complaints of pain to  6 out of 10 at worst to increase functional hand use for ADL/work/leisure activities.          Plan     Pt to be treated by Occupational Therapy 2 times per week for 8 weeks during the certification period from 11/15/2022 to 1/13/2023 to achieve the established goals.     Treatment to include: Paraffin, Fluidotherapy, Manual therapy/joint mobilizations, Modalities for  pain management, US 3 mhz, Therapeutic exercises/activities., Iontophoresis with 2.0 cc Dexamethasone, Strengthening, Orthotic Fabrication/Fit/Training, Edema Control, Scar Management, Wound Care, Electrical Modalities, Joint Protection, and Energy Conservation, as well as any other treatments deemed necessary based on the patient's needs or progress.     Julieth Owens, OTR/L

## 2022-11-15 NOTE — TELEPHONE ENCOUNTER
Spoke with pt informed her that Lili reviewed the photos of her incision with Dr. Oswald and while it is likely inflammed/ swollen, we'd like her to begin an antibiotic.Lili sent one to her pharmacy. Got pt scheduled for a 1wk  incision check

## 2022-11-16 NOTE — PROGRESS NOTES
"  Occupational Therapy Daily Treatment Note     Name: Luz Burk  Clinic Number: [id number]    Therapy Diagnosis:   Encounter Diagnoses   Name Primary?    Pain of right hand Yes    Stiffness in joint     Weakness of extremity     Need for assistance with personal care     Weakness      Physician: Hilda Woodruff PA    Visit Date: 11/17/2022    Physician Orders: S/p carpal tunnel revision  Start at 2 weeks PO  Eval and Treat, modalities as needed  8+ visits  Medical Diagnosis: G56.00 (ICD-10-CM) - Carpal tunnel syndrome   Evaluation Date: 11/15/2022  Plan of Care Certification Date: 1/13/2023  Authorization Period: 11/15/2022 - 12/13/2022  Surgery Date and Procedure: 10/31/2022-   RELEASE, CARPAL TUNNEL "REVISION" RIGHT NEUROLYSIS MEDIAL NERVE, PATCH PLACEMENT (Right) splint application plaster made by surgeon right upper extremity     Time In:0930  Time Out: 1018  Total Billable Time: 48 minutes    Precautions:   Standard , protected weight-bearing for 4 weeks    Subjective     Pt reports: " My swelling has gone down and so has my pain."  she was compliant with home exercise program given last session.   Response to previous treatment:Decreased edema   Functional change: decreased edema    Pain: 3/10  Location: right wrists      Objective       Mental status: alert, oriented x3     Observation:   Pt has edema, redness, and fever at incision site that measures 3.5 cm x 3.5 cm. Pt states that doctor is aware and antibiotics have been prescribed.        Sensation: Median Nerve Distribution    11/15/2022 11/15/2022     Left Right   Monofilament Testing       Normal 1.65-2.83 2.83 2.83              Left   11/15/2022 Right  11/15/2022   PWC (Proximal Wrist Crease) 15.5 cm 17.5 cm         Range of Motion:   Right     Wrist Left  11/15/2022 Right  11/15/2022   Extension 75 60   Flexion 70 70   Ulnar Deviation 25 18   Radial Deviation 30 15          Strength: (ELVIS Dynamometer in psi.)        11/15/2022 11/15/2022 "     Left Right   Rung II 45 20         Pinch Strength (Measured in psi)       11/15/2022 11/15/2022     Left Right   Key Pinch 15  5    3pt Pinch 4  11    2pt Pinch 1  6       Fine Motor Coordination: 9 Hole Peg Test  Left 11/15/2022 Right 11/15/2022   20 21      CMS Impairment/Limitation/Restriction for FOTO Hand/Wrist/Finger Survey     Therapist reviewed FOTO scores for Luz Burk on 11/15/2022.   FOTO documents entered into EPIC - see Media section.     Limitation Score: Will complete at next visit  Category: Self Care           Treatment     Luz received the following direct contact modalities after being cleared for contraindications for 10 minutes:  -moist hot pack to decrease pain and increase extensibility    Luz received the following manual therapy techniques for 15 minutes:   -retrograde massage on each digit, palm, and wrist to decrease edema    Luz received therapeutic exercises 23 for  minutes including:  -wrist maze 2 min  - gripper on first rung pom pom   - yellow putty roll out 3 min  - green flex bar roll out 3 min      Home Exercises and Education Provided     Education provided:   - Progress towards goals     Written Home Exercises Provided: Patient instructed to cont prior HEP.  Exercises were reviewed and Luz was able to demonstrate them prior to the end of the session.  Luz demonstrated good  understanding of the HEP provided.   .   See EMR under Patient Instructions for exercises provided prior visit.     Assessment     Pt would continue to benefit from skilled OT. Pt's incision site redness and edema has decreased from 3.5 cm diameter to 2 cm diameter which has decreased her pain and increased her overall range of motion. Pt tolerated added therapeutic exercises well today without complaints of pain.    Luz is progressing well towards her goals and there are no updates to goals at this time. Pt prognosis is Good.     Pt will continue to benefit from skilled  outpatient occupational therapy to address the deficits listed in the problem list on initial evaluation provide pt/family education and to maximize pt's level of independence in the home and community environment.     Anticipated barriers to occupational therapy: Pt has an eye surgery next week and a trip planned for a week in December     Pt's spiritual, cultural and educational needs considered and pt agreeable to plan of care and goals.    Goals:    LTG's (8 weeks):  1)   Increase ROM 15 degrees in wrist extension to increase functional hand use for ADL/work/leisure activities.  2)   Increase  strength 25 lbs. to improve functional grasp for ADLs/work/leisure activities.  3)   Increase key pinch 10 psis to improve functional grasp for ADLs/work/leisure activities.  4)   Decrease complaints of pain to  3 out of 10 at worst to increase functional hand use for ADL/work/leisure activities.  6)   Pt will return to near to prior level of function for ADLs and household management reporting I or Mod I with ADLs (dressing, feeding, grooming, toileting).      STG's (4 weeks)  1)   Patient to be IND with HEP and modalities for pain/edema managment.  2)   Increase ROM 10 degrees to increase functional hand use for ADLs/work/leisure activities.  3)   Increase  strength 15 lbs. to improve functional grasp for ADLs/work/leisure activities.   4)   Increase key pinch 5 psi's to increase independence with button and FM Coordination.   5)   Patient to be IND wiht Orthotic use, wear and care precautions.   6)   Decrease complaints of pain to  6 out of 10 at worst to increase functional hand use for ADL/work/leisure activities.              Plan      Pt to be treated by Occupational Therapy 2 times per week for 8 weeks during the certification period from 11/15/2022 to 1/13/2023 to achieve the established goals.      Treatment to include: Paraffin, Fluidotherapy, Manual therapy/joint mobilizations, Modalities for pain  management, US 3 mhz, Therapeutic exercises/activities., Iontophoresis with 2.0 cc Dexamethasone, Strengthening, Orthotic Fabrication/Fit/Training, Edema Control, Scar Management, Wound Care, Electrical Modalities, Joint Protection, and Energy Conservation, as well as any other treatments deemed necessary based on the patient's needs or progress.      Julieth Owens, OTR/L    Updates/Grading for next session: Continue with current plan of care      Julieth Owens, OTR/L

## 2022-11-17 ENCOUNTER — CLINICAL SUPPORT (OUTPATIENT)
Dept: REHABILITATION | Facility: HOSPITAL | Age: 72
End: 2022-11-17
Payer: MEDICARE

## 2022-11-17 DIAGNOSIS — R29.898 WEAKNESS OF EXTREMITY: ICD-10-CM

## 2022-11-17 DIAGNOSIS — M25.60 STIFFNESS IN JOINT: ICD-10-CM

## 2022-11-17 DIAGNOSIS — M79.641 PAIN OF RIGHT HAND: Primary | ICD-10-CM

## 2022-11-17 DIAGNOSIS — R53.1 WEAKNESS: ICD-10-CM

## 2022-11-17 DIAGNOSIS — Z74.1 NEED FOR ASSISTANCE WITH PERSONAL CARE: ICD-10-CM

## 2022-11-17 PROCEDURE — 97140 MANUAL THERAPY 1/> REGIONS: CPT | Mod: PO

## 2022-11-17 PROCEDURE — 97110 THERAPEUTIC EXERCISES: CPT | Mod: PO

## 2022-11-18 ENCOUNTER — TELEPHONE (OUTPATIENT)
Dept: OPHTHALMOLOGY | Facility: CLINIC | Age: 72
End: 2022-11-18
Payer: MEDICARE

## 2022-11-22 ENCOUNTER — TELEPHONE (OUTPATIENT)
Dept: ORTHOPEDICS | Facility: CLINIC | Age: 72
End: 2022-11-22
Payer: MEDICARE

## 2022-11-22 ENCOUNTER — ANESTHESIA (OUTPATIENT)
Dept: SURGERY | Facility: OTHER | Age: 72
End: 2022-11-22
Payer: MEDICARE

## 2022-11-22 ENCOUNTER — HOSPITAL ENCOUNTER (OUTPATIENT)
Facility: OTHER | Age: 72
Discharge: HOME | End: 2022-11-22
Attending: OPHTHALMOLOGY | Admitting: OPHTHALMOLOGY
Payer: MEDICARE

## 2022-11-22 ENCOUNTER — ANESTHESIA EVENT (OUTPATIENT)
Dept: SURGERY | Facility: OTHER | Age: 72
End: 2022-11-22
Payer: MEDICARE

## 2022-11-22 ENCOUNTER — TELEPHONE (OUTPATIENT)
Dept: OPHTHALMOLOGY | Facility: CLINIC | Age: 72
End: 2022-11-22
Payer: MEDICARE

## 2022-11-22 VITALS
BODY MASS INDEX: 26.46 KG/M2 | DIASTOLIC BLOOD PRESSURE: 67 MMHG | WEIGHT: 155 LBS | OXYGEN SATURATION: 95 % | HEIGHT: 64 IN | RESPIRATION RATE: 18 BRPM | TEMPERATURE: 98 F | SYSTOLIC BLOOD PRESSURE: 107 MMHG | HEART RATE: 68 BPM

## 2022-11-22 DIAGNOSIS — H25.10 AGE-RELATED NUCLEAR CATARACT: ICD-10-CM

## 2022-11-22 DIAGNOSIS — H11.9 CONJUNCTIVAL LESION: Primary | ICD-10-CM

## 2022-11-22 PROCEDURE — 88341 PR IHC OR ICC EACH ADD'L SINGLE ANTIBODY  STAINPR: ICD-10-PCS | Mod: 26,,, | Performed by: STUDENT IN AN ORGANIZED HEALTH CARE EDUCATION/TRAINING PROGRAM

## 2022-11-22 PROCEDURE — 88304 TISSUE EXAM BY PATHOLOGIST: CPT | Mod: 26,,, | Performed by: STUDENT IN AN ORGANIZED HEALTH CARE EDUCATION/TRAINING PROGRAM

## 2022-11-22 PROCEDURE — 88304 TISSUE EXAM BY PATHOLOGIST: CPT | Performed by: STUDENT IN AN ORGANIZED HEALTH CARE EDUCATION/TRAINING PROGRAM

## 2022-11-22 PROCEDURE — 36000706: Performed by: OPHTHALMOLOGY

## 2022-11-22 PROCEDURE — 88341 IMHCHEM/IMCYTCHM EA ADD ANTB: CPT | Performed by: STUDENT IN AN ORGANIZED HEALTH CARE EDUCATION/TRAINING PROGRAM

## 2022-11-22 PROCEDURE — 88342 IMHCHEM/IMCYTCHM 1ST ANTB: CPT | Performed by: STUDENT IN AN ORGANIZED HEALTH CARE EDUCATION/TRAINING PROGRAM

## 2022-11-22 PROCEDURE — 63600175 PHARM REV CODE 636 W HCPCS: Performed by: NURSE ANESTHETIST, CERTIFIED REGISTERED

## 2022-11-22 PROCEDURE — 88342 CHG IMMUNOCYTOCHEMISTRY: ICD-10-PCS | Mod: 26,,, | Performed by: STUDENT IN AN ORGANIZED HEALTH CARE EDUCATION/TRAINING PROGRAM

## 2022-11-22 PROCEDURE — 25000003 PHARM REV CODE 250: Performed by: OPHTHALMOLOGY

## 2022-11-22 PROCEDURE — 71000015 HC POSTOP RECOV 1ST HR: Performed by: OPHTHALMOLOGY

## 2022-11-22 PROCEDURE — 88304 PR  SURG PATH,LEVEL III: ICD-10-PCS | Mod: 26,,, | Performed by: STUDENT IN AN ORGANIZED HEALTH CARE EDUCATION/TRAINING PROGRAM

## 2022-11-22 PROCEDURE — 37000009 HC ANESTHESIA EA ADD 15 MINS: Performed by: OPHTHALMOLOGY

## 2022-11-22 PROCEDURE — 36000707: Performed by: OPHTHALMOLOGY

## 2022-11-22 PROCEDURE — 68110 PR EXCIS CONJUNC LESN,=<1 CM: ICD-10-PCS | Mod: RT,,, | Performed by: OPHTHALMOLOGY

## 2022-11-22 PROCEDURE — 68110 EXC LES CONJUNCTIVA <1 CM: CPT | Mod: RT,,, | Performed by: OPHTHALMOLOGY

## 2022-11-22 PROCEDURE — 88342 IMHCHEM/IMCYTCHM 1ST ANTB: CPT | Mod: 26,,, | Performed by: STUDENT IN AN ORGANIZED HEALTH CARE EDUCATION/TRAINING PROGRAM

## 2022-11-22 PROCEDURE — 88341 IMHCHEM/IMCYTCHM EA ADD ANTB: CPT | Mod: 26,,, | Performed by: STUDENT IN AN ORGANIZED HEALTH CARE EDUCATION/TRAINING PROGRAM

## 2022-11-22 PROCEDURE — 37000008 HC ANESTHESIA 1ST 15 MINUTES: Performed by: OPHTHALMOLOGY

## 2022-11-22 RX ORDER — MOXIFLOXACIN 5 MG/ML
1 SOLUTION/ DROPS OPHTHALMIC
Status: COMPLETED | OUTPATIENT
Start: 2022-11-22 | End: 2022-11-22

## 2022-11-22 RX ORDER — MOXIFLOXACIN 5 MG/ML
SOLUTION/ DROPS OPHTHALMIC
Status: DISCONTINUED | OUTPATIENT
Start: 2022-11-22 | End: 2022-11-22 | Stop reason: HOSPADM

## 2022-11-22 RX ORDER — ACETAMINOPHEN 325 MG/1
650 TABLET ORAL EVERY 4 HOURS PRN
Status: DISCONTINUED | OUTPATIENT
Start: 2022-11-22 | End: 2022-11-22 | Stop reason: HOSPADM

## 2022-11-22 RX ORDER — NEOMYCIN SULFATE, POLYMYXIN B SULFATE, AND DEXAMETHASONE 3.5; 10000; 1 MG/G; [USP'U]/G; MG/G
OINTMENT OPHTHALMIC NIGHTLY
Qty: 3.5 G | Refills: 0 | Status: SHIPPED | OUTPATIENT
Start: 2022-11-22 | End: 2022-11-23 | Stop reason: SDUPTHER

## 2022-11-22 RX ORDER — PREDNISOLONE ACETATE 10 MG/ML
1 SUSPENSION/ DROPS OPHTHALMIC 3 TIMES DAILY
Qty: 5 ML | Refills: 1 | Status: SHIPPED | OUTPATIENT
Start: 2022-11-22 | End: 2022-12-26

## 2022-11-22 RX ORDER — TETRACAINE HYDROCHLORIDE 5 MG/ML
SOLUTION OPHTHALMIC
Status: DISCONTINUED | OUTPATIENT
Start: 2022-11-22 | End: 2022-11-22 | Stop reason: HOSPADM

## 2022-11-22 RX ORDER — FENTANYL CITRATE 50 UG/ML
INJECTION, SOLUTION INTRAMUSCULAR; INTRAVENOUS
Status: DISCONTINUED | OUTPATIENT
Start: 2022-11-22 | End: 2022-11-22

## 2022-11-22 RX ORDER — TETRACAINE HYDROCHLORIDE 5 MG/ML
1 SOLUTION OPHTHALMIC
Status: COMPLETED | OUTPATIENT
Start: 2022-11-22 | End: 2022-11-22

## 2022-11-22 RX ORDER — NEOMYCIN SULFATE, POLYMYXIN B SULFATE, AND DEXAMETHASONE 3.5; 10000; 1 MG/G; [USP'U]/G; MG/G
OINTMENT OPHTHALMIC NIGHTLY
Qty: 3.5 G | Refills: 0 | Status: SHIPPED | OUTPATIENT
Start: 2022-11-22 | End: 2022-11-22 | Stop reason: SDUPTHER

## 2022-11-22 RX ORDER — OFLOXACIN 3 MG/ML
1 SOLUTION/ DROPS OPHTHALMIC 3 TIMES DAILY
Qty: 5 ML | Refills: 0 | Status: SHIPPED | OUTPATIENT
Start: 2022-11-22 | End: 2022-12-02

## 2022-11-22 RX ORDER — PREDNISOLONE ACETATE 10 MG/ML
SUSPENSION/ DROPS OPHTHALMIC
Status: DISCONTINUED | OUTPATIENT
Start: 2022-11-22 | End: 2022-11-22 | Stop reason: HOSPADM

## 2022-11-22 RX ORDER — SODIUM CHLORIDE 0.9 % (FLUSH) 0.9 %
2 SYRINGE (ML) INJECTION
Status: DISCONTINUED | OUTPATIENT
Start: 2022-11-22 | End: 2022-11-22 | Stop reason: HOSPADM

## 2022-11-22 RX ORDER — PREDNISOLONE ACETATE 10 MG/ML
1 SUSPENSION/ DROPS OPHTHALMIC 3 TIMES DAILY
Qty: 15 ML | Refills: 1 | Status: SHIPPED | OUTPATIENT
Start: 2022-11-22 | End: 2023-03-22

## 2022-11-22 RX ORDER — OFLOXACIN 3 MG/ML
1 SOLUTION/ DROPS OPHTHALMIC 3 TIMES DAILY
Qty: 5 ML | Refills: 0 | Status: SHIPPED | OUTPATIENT
Start: 2022-11-22 | End: 2022-11-22 | Stop reason: SDUPTHER

## 2022-11-22 RX ORDER — MIDAZOLAM HYDROCHLORIDE 1 MG/ML
INJECTION INTRAMUSCULAR; INTRAVENOUS
Status: DISCONTINUED | OUTPATIENT
Start: 2022-11-22 | End: 2022-11-22

## 2022-11-22 RX ORDER — LIDOCAINE HYDROCHLORIDE 40 MG/ML
INJECTION, SOLUTION RETROBULBAR
Status: DISCONTINUED | OUTPATIENT
Start: 2022-11-22 | End: 2022-11-22 | Stop reason: HOSPADM

## 2022-11-22 RX ADMIN — MOXIFLOXACIN 1 DROP: 5 SOLUTION/ DROPS OPHTHALMIC at 10:11

## 2022-11-22 RX ADMIN — TETRACAINE HYDROCHLORIDE 1 DROP: 5 SOLUTION OPHTHALMIC at 10:11

## 2022-11-22 RX ADMIN — MIDAZOLAM HYDROCHLORIDE 1 MG: 1 INJECTION, SOLUTION INTRAMUSCULAR; INTRAVENOUS at 11:11

## 2022-11-22 RX ADMIN — MIDAZOLAM HYDROCHLORIDE 2 MG: 1 INJECTION, SOLUTION INTRAMUSCULAR; INTRAVENOUS at 11:11

## 2022-11-22 RX ADMIN — FENTANYL CITRATE 100 MCG: 50 INJECTION, SOLUTION INTRAMUSCULAR; INTRAVENOUS at 11:11

## 2022-11-22 NOTE — BRIEF OP NOTE
BRIEF DISCHARGE NOTE:    Date of discharge: 11/22/2022    Reason for hospitalization -  Conjunctival surgery    Final Diagnosis - conjunctival lesion    Procedures and treatment provided - Status post excisional biopsy    Diet - Advance to regular as tolerated    Activity - as tolerated    Disposition at the end of the case - Good.    Discharge: to home    The patient tolerated the procedure well and knows to follow up with me tomorrow morning in the eye clinic, sooner if needed.    Patient and family instructions (as appropriate) - Given to patient on discharge    Nelly Cabrera MD

## 2022-11-22 NOTE — OP NOTE
Date of surgery: 11/22/22    SURGEON: EMMIE BORREGO MD    PREOPERATIVEE DIAGNOSIS:  Atypical pigmented conjunctival lesion of the right eye.                                                                               POSTOPERATIVE DIAGNOSIS:  Atypical pigmented conjunctival lesion of the right eye.    PROCEDURE PERFORMED:  Conjunctival lesion excisional biopsy    ANESTHESIA:  Monitored anesthesia care and topical.    SPECIMEN: Conjuntival lesion, sent to pathology    INDICATIONS FOR PROCEDURE:  The patient presented to clinic with suspicious conjunctival lesion.  Risks, benefits and alternatives were discussed, and the  patient agreed to proceed with lesion excision.     PROCEDURE IN DETAIL:  Patient was met in the preop holding area and the surgical site was marked.  The consent was confirmed to be signed.  The patient was brought into the operating room by the anesthesia  team and placed under monitored anesthesia care.  Then 4% lidocaine drops were placed into the right eye.  The right eye was prepped and draped in a sterile ophthalmic fashion.  A Kelsey speculum was placed into the right eye.  The extent of the lesion was measured, and 3mm margins were demarcated with the marking pen.  1% Lidocaine with epi was injected underneath the lesion. Blunt wescotts were used to remove the lesion, taking care to not touch the actual lesion with the instruments.  The lesion was removed and sent to pathology.  The instruments used were passed off.  Cautery was used to achieve hemostasis.   The Kelsey speculum was removed.  The eye was washed, dried, patched, and shielded. The patient tolerated the procedure well.  The patient is to follow up with me tomorrow morning, sooner if needed.

## 2022-11-22 NOTE — ANESTHESIA PREPROCEDURE EVALUATION
11/22/2022  Luz Burk is a 72 y.o., female.      Pre-op Assessment    I have reviewed the Patient Summary Reports.     I have reviewed the Nursing Notes. I have reviewed the NPO Status.   I have reviewed the Medications.     Review of Systems  Anesthesia Hx:  No problems with previous Anesthesia  Denies Family Hx of Anesthesia complications.   Denies Personal Hx of Anesthesia complications.   Social:  Smoker    Hematology/Oncology:  Hematology Normal   Oncology Normal     EENT/Dental:EENT/Dental Normal   Cardiovascular:  Cardiovascular Normal Exercise tolerance: poor     Pulmonary:   Sleep Apnea    Renal/:  Renal/ Normal     Musculoskeletal:   Cervical fusion Spine Disorders: cervical    Neurological:  Neurology Normal    Endocrine:   Hypothyroidism    Dermatological:  Skin Normal    Psych:   anxiety          Physical Exam  General: Well nourished, Cooperative, Oriented and Alert    Airway:  Mouth Opening: Normal  TM Distance: Normal  Neck ROM: Normal ROM    Dental:  Intact        Anesthesia Plan  Type of Anesthesia, risks & benefits discussed:    Anesthesia Type: MAC  Intra-op Monitoring Plan: Standard ASA Monitors  Informed Consent: Informed consent signed with the Patient and all parties understand the risks and agree with anesthesia plan.  All questions answered.   ASA Score: 3    Ready For Surgery From Anesthesia Perspective.     .

## 2022-11-22 NOTE — PLAN OF CARE
Luz Burk has met all discharge criteria from Phase II. Vital Signs are stable, ambulating  without difficulty. Discharge instructions given, patient verbalized understanding. Discharged from facility via wheelchair in stable condition.

## 2022-11-22 NOTE — H&P
History    Chief complaint:  Painless eyelid lesion    Present Ilness/Diagnosis: tarsal conj pigmented lesion    Past Medical History:  has a past medical history of Cataract, Collagenous colitis, Dry mouth (1/30/2018), Excessive thirst (1/30/2018), Fecal incontinence (5/25/2016), Gastric banding status (8/20/2012), Hydrocephalus (1/15/2013), Hypothyroidism, LAP-BAND surgery status (8/6/2015), Muscle weakness (9/4/2016), OA (osteoarthritis), Obesity, Other specified prophylactic or treatment measure (7/27/2012), Pain aggravated by changing postions (9/4/2016), Palpitations (1/29/2021), SI (sacroiliac) pain (9/4/2016), Sleep apnea, Thumb pain, right (12/11/2020), and Urinary frequency (1/30/2018).    Family History/Social History: refer to chart    Allergies:   Review of patient's allergies indicates:   Allergen Reactions    Omeprazole Diarrhea    Prevacid [lansoprazole] Diarrhea       Current Medications: No current facility-administered medications for this encounter.    Current Outpatient Medications:     ascorbic acid, vitamin C, (VITAMIN C) 100 MG tablet, Take 100 mg by mouth once daily., Disp: , Rfl:     BIOTIN ORAL, Take by mouth once daily., Disp: , Rfl:     calcium citrate-vitamin D3 315-200 mg (CITRACAL+D) 315 mg-5 mcg (200 unit) per tablet, Take 1 tablet by mouth once daily., Disp: , Rfl:     cholecalciferol, vitamin D3, (VITAMIN D3) 125 mcg (5,000 unit) Tab, Take 1 tablet (5,000 Units total) by mouth once daily., Disp: 90 tablet, Rfl: 3    citalopram (CELEXA) 20 MG tablet, Take 1 tablet by mouth once daily, Disp: 90 tablet, Rfl: 3    cyanocobalamin 1,000 mcg/mL injection, INJECT 1 ML EVERY MONTH, Disp: 10 mL, Rfl: 5    famotidine (PEPCID) 20 MG tablet, Take 1 tablet by mouth twice daily, Disp: 60 tablet, Rfl: 0    fluticasone (FLONASE) 50 mcg/actuation nasal spray, 1 spray by Nasal route once daily. , Disp: , Rfl:     furosemide (LASIX) 20 MG tablet, Take 1 tablet (20 mg total) by mouth once daily., Disp:  30 tablet, Rfl: 11    levothyroxine (SYNTHROID) 50 MCG tablet, Take 1 tablet (50 mcg total) by mouth before breakfast., Disp: 90 tablet, Rfl: 3    loratadine (CLARITIN) 10 mg tablet, Take 1 tablet (10 mg total) by mouth once daily., Disp: 90 tablet, Rfl: 3    melatonin 10 mg Tab, Take by mouth., Disp: , Rfl:     multivitamin capsule, Take 1 capsule by mouth once daily., Disp: , Rfl:     nicotine (NICODERM CQ) 21 mg/24 hr, Place 1 patch onto the skin once daily., Disp: 28 patch, Rfl: 0    nicotine, polacrilex, (NICORETTE) 2 mg Gum, Take 1 each (2 mg total) by mouth as needed (Use in place of cigarettes)., Disp: 100 each, Rfl: 0    pantoprazole (PROTONIX) 40 MG tablet, Take 1 tablet (40 mg total) by mouth 2 (two) times daily before meals., Disp: 60 tablet, Rfl: 12    potassium chloride (KLOR-CON) 10 MEQ TbSR, TAKE TWO TABLETS BY MOUTH ONCE DAILY WITH LASIX Strength: 10 mEq, Disp: 180 tablet, Rfl: 3    prednisoLONE acetate (PRED FORTE) 1 % DrpS, Place 1 drop into the left eye 3 (three) times daily., Disp: 15 mL, Rfl: 1    sulfamethoxazole-trimethoprim 800-160mg (BACTRIM DS) 800-160 mg Tab, Take 1 tablet by mouth 2 (two) times daily., Disp: 20 tablet, Rfl: 0    valACYclovir (VALTREX) 1000 MG tablet, Take 2 tablets (2,000 mg total) by mouth every 12 (twelve) hours as needed (herpes outbreak). 2 tablets as soon as possible after symptom onset, then 2 tablets 12 hours later (4 total), Disp: 30 tablet, Rfl: 2    Facility-Administered Medications Ordered in Other Encounters:     acetaminophen tablet 1,000 mg, 1,000 mg, Oral, Once, Sugey Edouard MD    fentaNYL injection 25 mcg, 25 mcg, Intravenous, Q5 Min PRN, Sugey Edouard MD    fentaNYL injection 25 mcg, 25 mcg, Intravenous, Q5 Min PRN, Quinten Chu MD, 100 mcg at 10/30/20 0920    lidocaine (PF) 10 mg/ml (1%) injection 10 mg, 1 mL, Intradermal, Once, Rachele Gonzalez MD    midazolam (VERSED) 1 mg/mL injection 0.5 mg, 0.5 mg, Intravenous, PRN,  Sugey Edouard MD    midazolam (VERSED) 1 mg/mL injection 0.5 mg, 0.5 mg, Intravenous, PRN, Quinten Chu MD, 1 mg at 10/30/20 0920    Physical Exam    BP: Vital signs stable  General: No apparent distress  HEENT: tarsal conj pigmented lesion  Lungs: adequate respirations  Heart: + pulses  Abdomen: soft  Rectal/pelvic: deferred    Impression: tarsal conj pigmented lesion    See previous clinic notes for surgical indications.    Plan: excisional bx

## 2022-11-22 NOTE — ANESTHESIA POSTPROCEDURE EVALUATION
Anesthesia Post Evaluation    Patient: Luz Burk    Procedure(s) Performed: Procedure(s) (LRB):  EXCISIONAL BIOPSY (Right)    Final Anesthesia Type: MAC      Patient location during evaluation: [facility]  Patient participation: Yes- Able to Participate  Level of consciousness: awake and alert  Post-procedure vital signs: reviewed and stable  Pain management: adequate  Airway patency: patent    PONV status at discharge: No PONV  Anesthetic complications: no      Cardiovascular status: blood pressure returned to baseline  Respiratory status: unassisted and spontaneous ventilation  Hydration status: euvolemic  Follow-up not needed.          Vitals Value Taken Time   /77 11/22/22 1016   Temp 36.4 °C (97.5 °F) 11/22/22 1016   Pulse 75 11/22/22 1016   Resp 18 11/22/22 1016   SpO2 94 % 11/22/22 1016         No case tracking events are documented in the log.      Pain/Dawson Score: No data recorded

## 2022-11-23 ENCOUNTER — OFFICE VISIT (OUTPATIENT)
Dept: ORTHOPEDICS | Facility: CLINIC | Age: 72
End: 2022-11-23
Payer: MEDICARE

## 2022-11-23 ENCOUNTER — OFFICE VISIT (OUTPATIENT)
Dept: OPTOMETRY | Facility: CLINIC | Age: 72
End: 2022-11-23
Payer: MEDICARE

## 2022-11-23 VITALS — HEIGHT: 64 IN | WEIGHT: 155 LBS | BODY MASS INDEX: 26.46 KG/M2

## 2022-11-23 DIAGNOSIS — Z98.890 POST-OPERATIVE STATE: Primary | ICD-10-CM

## 2022-11-23 DIAGNOSIS — H11.9 LESION OF CONJUNCTIVA: Primary | ICD-10-CM

## 2022-11-23 PROCEDURE — 1126F AMNT PAIN NOTED NONE PRSNT: CPT | Mod: CPTII,S$GLB,, | Performed by: OPTOMETRIST

## 2022-11-23 PROCEDURE — 1126F PR PAIN SEVERITY QUANTIFIED, NO PAIN PRESENT: ICD-10-PCS | Mod: CPTII,S$GLB,, | Performed by: OPTOMETRIST

## 2022-11-23 PROCEDURE — 1159F PR MEDICATION LIST DOCUMENTED IN MEDICAL RECORD: ICD-10-PCS | Mod: CPTII,S$GLB,, | Performed by: PHYSICIAN ASSISTANT

## 2022-11-23 PROCEDURE — 3288F PR FALLS RISK ASSESSMENT DOCUMENTED: ICD-10-PCS | Mod: CPTII,S$GLB,, | Performed by: PHYSICIAN ASSISTANT

## 2022-11-23 PROCEDURE — 3008F BODY MASS INDEX DOCD: CPT | Mod: CPTII,S$GLB,, | Performed by: PHYSICIAN ASSISTANT

## 2022-11-23 PROCEDURE — 99024 PR POST-OP FOLLOW-UP VISIT: ICD-10-PCS | Mod: S$GLB,,, | Performed by: OPTOMETRIST

## 2022-11-23 PROCEDURE — 3288F FALL RISK ASSESSMENT DOCD: CPT | Mod: CPTII,S$GLB,, | Performed by: OPTOMETRIST

## 2022-11-23 PROCEDURE — 1101F PR PT FALLS ASSESS DOC 0-1 FALLS W/OUT INJ PAST YR: ICD-10-PCS | Mod: CPTII,S$GLB,, | Performed by: OPTOMETRIST

## 2022-11-23 PROCEDURE — 1159F MED LIST DOCD IN RCRD: CPT | Mod: CPTII,S$GLB,, | Performed by: PHYSICIAN ASSISTANT

## 2022-11-23 PROCEDURE — 99024 PR POST-OP FOLLOW-UP VISIT: ICD-10-PCS | Mod: S$GLB,,, | Performed by: PHYSICIAN ASSISTANT

## 2022-11-23 PROCEDURE — 99999 PR PBB SHADOW E&M-EST. PATIENT-LVL III: ICD-10-PCS | Mod: PBBFAC,,, | Performed by: OPTOMETRIST

## 2022-11-23 PROCEDURE — 1101F PR PT FALLS ASSESS DOC 0-1 FALLS W/OUT INJ PAST YR: ICD-10-PCS | Mod: CPTII,S$GLB,, | Performed by: PHYSICIAN ASSISTANT

## 2022-11-23 PROCEDURE — 99999 PR PBB SHADOW E&M-EST. PATIENT-LVL III: CPT | Mod: PBBFAC,,, | Performed by: OPTOMETRIST

## 2022-11-23 PROCEDURE — 99024 POSTOP FOLLOW-UP VISIT: CPT | Mod: S$GLB,,, | Performed by: PHYSICIAN ASSISTANT

## 2022-11-23 PROCEDURE — 1101F PT FALLS ASSESS-DOCD LE1/YR: CPT | Mod: CPTII,S$GLB,, | Performed by: PHYSICIAN ASSISTANT

## 2022-11-23 PROCEDURE — 3288F FALL RISK ASSESSMENT DOCD: CPT | Mod: CPTII,S$GLB,, | Performed by: PHYSICIAN ASSISTANT

## 2022-11-23 PROCEDURE — 1101F PT FALLS ASSESS-DOCD LE1/YR: CPT | Mod: CPTII,S$GLB,, | Performed by: OPTOMETRIST

## 2022-11-23 PROCEDURE — 1125F AMNT PAIN NOTED PAIN PRSNT: CPT | Mod: CPTII,S$GLB,, | Performed by: PHYSICIAN ASSISTANT

## 2022-11-23 PROCEDURE — 99024 POSTOP FOLLOW-UP VISIT: CPT | Mod: S$GLB,,, | Performed by: OPTOMETRIST

## 2022-11-23 PROCEDURE — 3288F PR FALLS RISK ASSESSMENT DOCUMENTED: ICD-10-PCS | Mod: CPTII,S$GLB,, | Performed by: OPTOMETRIST

## 2022-11-23 PROCEDURE — 99999 PR PBB SHADOW E&M-EST. PATIENT-LVL III: ICD-10-PCS | Mod: PBBFAC,,, | Performed by: PHYSICIAN ASSISTANT

## 2022-11-23 PROCEDURE — 1125F PR PAIN SEVERITY QUANTIFIED, PAIN PRESENT: ICD-10-PCS | Mod: CPTII,S$GLB,, | Performed by: PHYSICIAN ASSISTANT

## 2022-11-23 PROCEDURE — 3008F PR BODY MASS INDEX (BMI) DOCUMENTED: ICD-10-PCS | Mod: CPTII,S$GLB,, | Performed by: PHYSICIAN ASSISTANT

## 2022-11-23 PROCEDURE — 99999 PR PBB SHADOW E&M-EST. PATIENT-LVL III: CPT | Mod: PBBFAC,,, | Performed by: PHYSICIAN ASSISTANT

## 2022-11-23 RX ORDER — NEOMYCIN SULFATE, POLYMYXIN B SULFATE, AND DEXAMETHASONE 3.5; 10000; 1 MG/G; [USP'U]/G; MG/G
OINTMENT OPHTHALMIC NIGHTLY
Qty: 3.5 G | Refills: 0 | Status: SHIPPED | OUTPATIENT
Start: 2022-11-23 | End: 2022-12-13

## 2022-11-23 NOTE — PROGRESS NOTES
"Ms. Burk is here today for a post-operative visit.  She is 3 weeks status post right carpal tunnel release revision with  neurolysis of the median nerve by Dr. Ayon on 10/31/22. She reports that she is doing well. She has begun therapy. She notes improving edema and erythema at the incision. She notes intermittent shooting pain at the dorsal thumb with motion- opposition. Denies palmar hand symptoms. She denies fever, chills, and sweats since the time of the surgery.     Of note patient did have significant compression around the nerve with hypervascularity    Physical exam:    Vitals:    11/23/22 1255   Weight: 70.3 kg (154 lb 15.7 oz)   Height: 5' 4" (1.626 m)   PainSc: 10-Worst pain ever       Vital signs are stable, patient is afebrile.  Patient is well dressed and well groomed, no acute distress.  Alert and oriented to person, place, and time.  Incision is well healed.  There is improving erythema and edema of the proximal incision, no tenderness, no drainage. There is no erythema or exudate.  There is no sign of any infection. She is NVI.  She has good ROM.      Assessment: status post right carpal tunnel release revision with  neurolysis of the median nerve by Dr. Ayon on 10/31/22    Plan:  Luz was seen today for post-op evaluation.    Diagnoses and all orders for this visit:    Post-operative state      PO instruction reviewed and provided to patient  Continue therapy, HEP   RTC 3 wks if needed          "

## 2022-11-28 ENCOUNTER — PATIENT MESSAGE (OUTPATIENT)
Dept: OPHTHALMOLOGY | Facility: CLINIC | Age: 72
End: 2022-11-28
Payer: MEDICARE

## 2022-11-28 ENCOUNTER — CLINICAL SUPPORT (OUTPATIENT)
Dept: SMOKING CESSATION | Facility: CLINIC | Age: 72
End: 2022-11-28
Payer: COMMERCIAL

## 2022-11-28 DIAGNOSIS — F17.200 NICOTINE DEPENDENCE: Primary | ICD-10-CM

## 2022-11-28 PROCEDURE — 99407 BEHAV CHNG SMOKING > 10 MIN: CPT | Mod: S$GLB,,,

## 2022-11-28 PROCEDURE — 99407 PR TOBACCO USE CESSATION INTENSIVE >10 MINUTES: ICD-10-PCS | Mod: S$GLB,,,

## 2022-11-28 NOTE — PROGRESS NOTES
Spoke with patient today in regard to smoking cessation progress for 3 month telephone follow up, she states not tobacco free. Patient states she has cut down on tobacco intake and not ready to return to the program at this time. Informed patient of benefit period, future follow ups, and contact information if any further help or support is needed. Will complete smart form for 3 month follow up on Quit attempt #1.

## 2022-11-28 NOTE — PROGRESS NOTES
"  Occupational Therapy Daily Treatment Note     Name: Luz Burk  Clinic Number: [id number]    Therapy Diagnosis:   Encounter Diagnoses   Name Primary?    Pain of right hand Yes    Stiffness in joint     Weakness of extremity     Need for assistance with personal care     Weakness        Physician: Hilda Woodruff PA    Visit Date: 11/29/2022    Physician Orders: S/p carpal tunnel revision  Start at 2 weeks PO  Eval and Treat, modalities as needed  8+ visits  Medical Diagnosis: G56.00 (ICD-10-CM) - Carpal tunnel syndrome   Evaluation Date: 11/15/2022  Plan of Care Certification Date: 1/13/2023  Authorization Period: 11/15/2022 - 12/13/2022  Surgery Date and Procedure: 10/31/2022-   RELEASE, CARPAL TUNNEL "REVISION" RIGHT NEUROLYSIS MEDIAL NERVE, PATCH PLACEMENT (Right) splint application plaster made by surgeon right upper extremity    Visit #: 2/11  Time In:0930  Time Out: 1015  Total Billable Time: 45 minutes    Precautions:   Standard , protected weight-bearing for 4 weeks  11/28/22    Subjective     Pt reports: " My wrist doesn't hurt at all. It's my thumb that is giving me problems."  she was compliant with home exercise program given last session.   Response to previous treatment:Decreased edema   Functional change: decreased edema    Pain: 0/10  Location: right wrists      Objective       Mental status: alert, oriented x3     Observation:   Pt has edema, redness, and fever at incision site that measures 3.5 cm x 3.5 cm. Pt states that doctor is aware and antibiotics have been prescribed.        Sensation: Median Nerve Distribution    11/15/2022 11/15/2022     Left Right   Monofilament Testing       Normal 1.65-2.83 2.83 2.83              Left   11/15/2022 Right  11/15/2022   PWC (Proximal Wrist Crease) 15.5 cm 17.5 cm         Range of Motion:   Right     Wrist Left  11/15/2022 Right  11/15/2022   Extension 75 60   Flexion 70 70   Ulnar Deviation 25 18   Radial Deviation 30 15          Strength: " (ELVIS Dynamometer in psi.)        11/15/2022 11/15/2022     Left Right   Rung II 45 20         Pinch Strength (Measured in psi)       11/15/2022 11/15/2022     Left Right   Key Pinch 15  5    3pt Pinch 4  11    2pt Pinch 1  6       Fine Motor Coordination: 9 Hole Peg Test  Left 11/15/2022 Right 11/15/2022   20 21      CMS Impairment/Limitation/Restriction for FOTO Hand/Wrist/Finger Survey     Therapist reviewed FOTO scores for Luz Burk on 11/15/2022.   FOTO documents entered into [employer] - see Media section.     Limitation Score: Will complete at next visit  Category: Self Care           Treatment     Luz received the following direct contact modalities after being cleared for contraindications for 7 minutes:  - paraffin and moist hot pack to decrease pain and increase extensibility    Luz received the following manual therapy techniques for 15 minutes:   -retrograde massage on each digit, palm, and wrist to decrease edema    Luz received therapeutic exercises 23 for  minutes including:  -wrist maze 2 min  - gripper on first rung pom pom   - yellow putty roll out 3 min  - green flex bar roll out 3 min  - wrist circumduction with yellow ball 3 min      Home Exercises and Education Provided     Education provided:   - Progress towards goals     Written Home Exercises Provided: Patient instructed to cont prior HEP.  Exercises were reviewed and Luz was able to demonstrate them prior to the end of the session.  Luz demonstrated good  understanding of the HEP provided.   .   See EMR under Patient Instructions for exercises provided prior visit.     Assessment     Pt would continue to benefit from skilled OT. Pt's incision site redness and edema continues to decrease which has decreased her pain and increased range of motion. Pt states that she has radiating pain on her dorsal thumb, but no pain in her wrist. Pt tolerated added therapeutic exercises well today without complaints of pain.    Luz is  progressing well towards her goals and there are no updates to goals at this time. Pt prognosis is Good.     Pt will continue to benefit from skilled outpatient occupational therapy to address the deficits listed in the problem list on initial evaluation provide pt/family education and to maximize pt's level of independence in the home and community environment.     Anticipated barriers to occupational therapy: Pt has an eye surgery next week and a trip planned for a week in December     Pt's spiritual, cultural and educational needs considered and pt agreeable to plan of care and goals.    Goals:    LTG's (8 weeks):  1)   Increase ROM 15 degrees in wrist extension to increase functional hand use for ADL/work/leisure activities.  2)   Increase  strength 25 lbs. to improve functional grasp for ADLs/work/leisure activities.  3)   Increase key pinch 10 psis to improve functional grasp for ADLs/work/leisure activities.  4)   Decrease complaints of pain to  3 out of 10 at worst to increase functional hand use for ADL/work/leisure activities.  6)   Pt will return to near to prior level of function for ADLs and household management reporting I or Mod I with ADLs (dressing, feeding, grooming, toileting).      STG's (4 weeks)  1)   Patient to be IND with HEP and modalities for pain/edema managment.  2)   Increase ROM 10 degrees to increase functional hand use for ADLs/work/leisure activities.  3)   Increase  strength 15 lbs. to improve functional grasp for ADLs/work/leisure activities.   4)   Increase key pinch 5 psi's to increase independence with button and FM Coordination.   5)   Patient to be IND wiht Orthotic use, wear and care precautions.   6)   Decrease complaints of pain to  6 out of 10 at worst to increase functional hand use for ADL/work/leisure activities.              Plan      Pt to be treated by Occupational Therapy 2 times per week for 8 weeks during the certification period from 11/15/2022 to  1/13/2023 to achieve the established goals.      Treatment to include: Paraffin, Fluidotherapy, Manual therapy/joint mobilizations, Modalities for pain management, US 3 mhz, Therapeutic exercises/activities., Iontophoresis with 2.0 cc Dexamethasone, Strengthening, Orthotic Fabrication/Fit/Training, Edema Control, Scar Management, Wound Care, Electrical Modalities, Joint Protection, and Energy Conservation, as well as any other treatments deemed necessary based on the patient's needs or progress.      Julieth Owens, OTR/L    Updates/Grading for next session: Continue with current plan of care      Julieth Owens, OTR/L

## 2022-11-28 NOTE — PROGRESS NOTES
HPI     Post-op Evaluation     Additional comments: 1 day po           Comments    Conj Tarsal Lesion Removal OD- 11/22/2022  Patient states the right eye is feeling well today. No pain. No discharge.   Her vision is doing well.  Chronic Follicular Conj. OU  Patch removed in office without complication. Pt states she was only able   to get the antibiotic gtt from the pharmacy and not the other drop or   ointment. Pt has not used the gtts yet.          Last edited by Asuncion Rudolph, OD on 11/28/2022  2:52 PM.            Assessment /Plan     For exam results, see Encounter Report.    Lesion of conjunctiva  -     neomycin-polymyxin-dexamethasone (DEXACINE) 3.5 mg/g-10,000 unit/g-0.1 % Oint; Place a ribbon into the right eye every evening. for 20 days  Dispense: 3.5 g; Refill: 0      Cx no growth on initial visit. No signs of infection today.     Conj tarsal pigmented lesion OD -- successful excisional bx in OR yesterday    Applied Tobradex ilya to affected area in office today due to pt being unable to receive all her gtts/ilya (insurance hold at the pharmacy). Continue Tobradex ilya qhs OD, Oflox tid OD, and PF tid OD x 10 days.  Dr. Cabrera to call and check on pt due to pt going out of town for most of December.  RTC ASAP if symptoms arise

## 2022-11-29 ENCOUNTER — CLINICAL SUPPORT (OUTPATIENT)
Dept: REHABILITATION | Facility: HOSPITAL | Age: 72
End: 2022-11-29
Payer: MEDICARE

## 2022-11-29 DIAGNOSIS — R29.898 WEAKNESS OF EXTREMITY: ICD-10-CM

## 2022-11-29 DIAGNOSIS — R53.1 WEAKNESS: ICD-10-CM

## 2022-11-29 DIAGNOSIS — Z74.1 NEED FOR ASSISTANCE WITH PERSONAL CARE: ICD-10-CM

## 2022-11-29 DIAGNOSIS — M79.641 PAIN OF RIGHT HAND: Primary | ICD-10-CM

## 2022-11-29 DIAGNOSIS — M25.60 STIFFNESS IN JOINT: ICD-10-CM

## 2022-11-29 PROCEDURE — 97140 MANUAL THERAPY 1/> REGIONS: CPT | Mod: PO

## 2022-11-29 PROCEDURE — 97110 THERAPEUTIC EXERCISES: CPT | Mod: PO

## 2022-11-30 NOTE — PROGRESS NOTES
"  Occupational Therapy Daily Treatment Note     Name: Luz Burk  Clinic Number: [id number]    Therapy Diagnosis:   Encounter Diagnoses   Name Primary?    Pain of right hand Yes    Stiffness in joint     Weakness of extremity     Need for assistance with personal care     Weakness          Physician: Hilda Woodruff PA    Visit Date: 12/1/2022    Physician Orders: S/p carpal tunnel revision  Start at 2 weeks PO  Eval and Treat, modalities as needed  8+ visits  Medical Diagnosis: G56.00 (ICD-10-CM) - Carpal tunnel syndrome   Evaluation Date: 11/15/2022  Plan of Care Certification Date: 1/13/2023  Authorization Period: 11/15/2022 - 12/13/2022  Surgery Date and Procedure: 10/31/2022-   RELEASE, CARPAL TUNNEL "REVISION" RIGHT NEUROLYSIS MEDIAL NERVE, PATCH PLACEMENT (Right) splint application plaster made by surgeon right upper extremity    Visit #: 3/11  Time In:0800  Time Out: 0900  Total Billable Time: 60 minutes    Precautions:   Standard , protected weight-bearing for 4 weeks  11/28/22    Subjective     Pt reports: " My wrist doesn't hurt at all. It's my thumb that is giving me problems."  she was compliant with home exercise program given last session.   Response to previous treatment:Decreased edema   Functional change: decreased edema, decreased pain    Pain: 0/10  Location: right wrists      Objective       Mental status: alert, oriented x3     Observation:   Pt has edema, redness, and fever at incision site that measures 3.5 cm x 3.5 cm. Pt states that doctor is aware and antibiotics have been prescribed.        Sensation: Median Nerve Distribution    11/15/2022 11/15/2022     Left Right   Monofilament Testing       Normal 1.65-2.83 2.83 2.83              Left   11/15/2022 Right  11/15/2022   PWC (Proximal Wrist Crease) 15.5 cm 17.5 cm         Range of Motion:   Right     Wrist Left  11/15/2022 Right  11/15/2022   Extension 75 60   Flexion 70 70   Ulnar Deviation 25 18   Radial Deviation 30 15       "    Strength: (ELVIS Dynamometer in psi.)        11/15/2022 11/15/2022     Left Right   Rung II 45 20         Pinch Strength (Measured in psi)       11/15/2022 11/15/2022     Left Right   Key Pinch 15  5    3pt Pinch 4  11    2pt Pinch 1  6       Fine Motor Coordination: 9 Hole Peg Test  Left 11/15/2022 Right 11/15/2022   20 21      CMS Impairment/Limitation/Restriction for FOTO Hand/Wrist/Finger Survey     Therapist reviewed FOTO scores for Luz Burk on 11/15/2022.   FOTO documents entered into [employer] - see Media section.     Limitation Score: Will complete at next visit  Category: Self Care           Treatment     Luz received the following direct contact modalities after being cleared for contraindications for 10 minutes:  - paraffin and moist hot pack to decrease pain and increase extensibility    Luz received the following manual therapy techniques for 15 minutes:   -retrograde massage on each digit, palm, and wrist to decrease edema    Luz received therapeutic exercises 35 for  minutes including:  -wrist maze 2 min  - gripper on first rung pom pom   - yellow putty roll out 3 min  - green flex bar roll out 3 min  - wrist circumduction with yellow ball 3 min  - TGE x 20  - median nerve glides x 20  - golf ball massage 2 minutes    Home Exercises and Education Provided     Education provided:   - Progress towards goals     Written Home Exercises Provided: Patient instructed to cont prior HEP.  Exercises were reviewed and Luz was able to demonstrate them prior to the end of the session.  Luz demonstrated good  understanding of the HEP provided.   .   See EMR under Patient Instructions for exercises provided prior visit.     Assessment     Pt would continue to benefit from skilled OT. Pt continues to have slight redness at the sight of incision. Pt states that she has radiating pain on her dorsal thumb, but no pain in her wrist. Pt tolerated added therapeutic exercises well today without  complaints of pain.    Luz is progressing well towards her goals and there are no updates to goals at this time. Pt prognosis is Good.     Pt will continue to benefit from skilled outpatient occupational therapy to address the deficits listed in the problem list on initial evaluation provide pt/family education and to maximize pt's level of independence in the home and community environment.     Anticipated barriers to occupational therapy: Pt has an eye surgery next week and a trip planned for a week in December     Pt's spiritual, cultural and educational needs considered and pt agreeable to plan of care and goals.    Goals:    LTG's (8 weeks):  1)   Increase ROM 15 degrees in wrist extension to increase functional hand use for ADL/work/leisure activities.  2)   Increase  strength 25 lbs. to improve functional grasp for ADLs/work/leisure activities.  3)   Increase key pinch 10 psis to improve functional grasp for ADLs/work/leisure activities.  4)   Decrease complaints of pain to  3 out of 10 at worst to increase functional hand use for ADL/work/leisure activities.  6)   Pt will return to near to prior level of function for ADLs and household management reporting I or Mod I with ADLs (dressing, feeding, grooming, toileting).      STG's (4 weeks)  1)   Patient to be IND with HEP and modalities for pain/edema managment.  2)   Increase ROM 10 degrees to increase functional hand use for ADLs/work/leisure activities.  3)   Increase  strength 15 lbs. to improve functional grasp for ADLs/work/leisure activities.   4)   Increase key pinch 5 psi's to increase independence with button and FM Coordination.   5)   Patient to be IND wiht Orthotic use, wear and care precautions.   6)   Decrease complaints of pain to  6 out of 10 at worst to increase functional hand use for ADL/work/leisure activities.              Plan      Pt to be treated by Occupational Therapy 2 times per week for 8 weeks during the certification  period from 11/15/2022 to 1/13/2023 to achieve the established goals.      Treatment to include: Paraffin, Fluidotherapy, Manual therapy/joint mobilizations, Modalities for pain management, US 3 mhz, Therapeutic exercises/activities., Iontophoresis with 2.0 cc Dexamethasone, Strengthening, Orthotic Fabrication/Fit/Training, Edema Control, Scar Management, Wound Care, Electrical Modalities, Joint Protection, and Energy Conservation, as well as any other treatments deemed necessary based on the patient's needs or progress.      Julieth Owens, OTR/L    Updates/Grading for next session: Continue with current plan of care      Julieth Owens, OTR/L

## 2022-12-01 ENCOUNTER — CLINICAL SUPPORT (OUTPATIENT)
Dept: REHABILITATION | Facility: HOSPITAL | Age: 72
End: 2022-12-01
Payer: MEDICARE

## 2022-12-01 DIAGNOSIS — M79.641 PAIN OF RIGHT HAND: Primary | ICD-10-CM

## 2022-12-01 DIAGNOSIS — Z74.1 NEED FOR ASSISTANCE WITH PERSONAL CARE: ICD-10-CM

## 2022-12-01 DIAGNOSIS — R53.1 WEAKNESS: ICD-10-CM

## 2022-12-01 DIAGNOSIS — R29.898 WEAKNESS OF EXTREMITY: ICD-10-CM

## 2022-12-01 DIAGNOSIS — M25.60 STIFFNESS IN JOINT: ICD-10-CM

## 2022-12-01 PROCEDURE — 97018 PARAFFIN BATH THERAPY: CPT | Mod: 59,PO

## 2022-12-01 PROCEDURE — 97140 MANUAL THERAPY 1/> REGIONS: CPT | Mod: PO

## 2022-12-01 PROCEDURE — 97110 THERAPEUTIC EXERCISES: CPT | Mod: PO

## 2022-12-07 ENCOUNTER — PATIENT MESSAGE (OUTPATIENT)
Dept: BARIATRICS | Facility: CLINIC | Age: 72
End: 2022-12-07
Payer: MEDICARE

## 2022-12-09 ENCOUNTER — TELEPHONE (OUTPATIENT)
Dept: FAMILY MEDICINE | Facility: CLINIC | Age: 72
End: 2022-12-09
Payer: MEDICARE

## 2022-12-09 DIAGNOSIS — Z98.890 POST-OPERATIVE STATE: ICD-10-CM

## 2022-12-09 LAB
FINAL PATHOLOGIC DIAGNOSIS: NORMAL
GROSS: NORMAL
Lab: NORMAL
MICROSCOPIC EXAM: NORMAL

## 2022-12-09 RX ORDER — SULFAMETHOXAZOLE AND TRIMETHOPRIM 800; 160 MG/1; MG/1
1 TABLET ORAL 2 TIMES DAILY
Qty: 20 TABLET | Refills: 0 | Status: SHIPPED | OUTPATIENT
Start: 2022-12-09 | End: 2023-03-22

## 2022-12-12 ENCOUNTER — TELEPHONE (OUTPATIENT)
Dept: OPHTHALMOLOGY | Facility: CLINIC | Age: 72
End: 2022-12-12
Payer: MEDICARE

## 2022-12-12 ENCOUNTER — PATIENT MESSAGE (OUTPATIENT)
Dept: SURGERY | Facility: CLINIC | Age: 72
End: 2022-12-12
Payer: MEDICARE

## 2022-12-12 NOTE — TELEPHONE ENCOUNTER
Pls call pt and let her know the pathology report came back as benign - no evidence of malignancy.  Thank you,.

## 2022-12-13 ENCOUNTER — CLINICAL SUPPORT (OUTPATIENT)
Dept: REHABILITATION | Facility: HOSPITAL | Age: 72
End: 2022-12-13
Payer: MEDICARE

## 2022-12-13 DIAGNOSIS — R53.1 WEAKNESS: ICD-10-CM

## 2022-12-13 DIAGNOSIS — R29.898 WEAKNESS OF EXTREMITY: ICD-10-CM

## 2022-12-13 DIAGNOSIS — M25.60 STIFFNESS IN JOINT: ICD-10-CM

## 2022-12-13 DIAGNOSIS — M79.641 PAIN OF RIGHT HAND: Primary | ICD-10-CM

## 2022-12-13 DIAGNOSIS — Z74.1 NEED FOR ASSISTANCE WITH PERSONAL CARE: ICD-10-CM

## 2022-12-13 PROCEDURE — 97530 THERAPEUTIC ACTIVITIES: CPT | Mod: PO

## 2022-12-13 PROCEDURE — 97140 MANUAL THERAPY 1/> REGIONS: CPT | Mod: PO

## 2022-12-13 NOTE — PROGRESS NOTES
"  Occupational Therapy Daily Treatment Note     Name: Luz Burk  Clinic Number: [id number]    Therapy Diagnosis:   Encounter Diagnoses   Name Primary?    Pain of right hand Yes    Stiffness in joint     Weakness of extremity     Need for assistance with personal care     Weakness          Physician: Hilda Woodruff PA    Visit Date: 12/13/2022    Physician Orders: S/p carpal tunnel revision  Start at 2 weeks PO  Eval and Treat, modalities as needed  8+ visits  Medical Diagnosis: G56.00 (ICD-10-CM) - Carpal tunnel syndrome   Evaluation Date: 11/15/2022  Plan of Care Certification Date: 1/13/2023  Authorization Period: 11/15/2022 - 12/13/2022  Surgery Date and Procedure: 10/31/2022-   RELEASE, CARPAL TUNNEL "REVISION" RIGHT NEUROLYSIS MEDIAL NERVE, PATCH PLACEMENT (Right) splint application plaster made by surgeon right upper extremity    Visit #: 4/11  Time In:0800  Time Out: 0900  Total Billable Time: 60 minutes    Precautions:   Standard , protected weight-bearing for 4 weeks  11/28/22    Subjective     Pt reports: " My wrist hurt on my cruise some from carrying the luggage, but other than that, I don't have any pain."  she was compliant with home exercise program given last session.   Response to previous treatment:Decreased edema   Functional change: decreased edema, decreased pain    Pain: 0/10  Location: right wrists      Objective       Mental status: alert, oriented x3     Observation:   Pt has edema, redness, and fever at incision site that measures 3.5 cm x 3.5 cm. Pt states that doctor is aware and antibiotics have been prescribed.        Sensation: Median Nerve Distribution    11/15/2022 11/15/2022     Left Right   Monofilament Testing       Normal 1.65-2.83 2.83 2.83              Left   11/15/2022 Right  11/15/2022 Right  12/13/22   PWC (Proximal Wrist Crease) 15.5 cm 17.5 cm 16.7         Range of Motion:   Right     Wrist Left  11/15/2022 Right  11/15/2022 Right  12/13/22   Extension 75 60 85 "   Flexion 70 70 95   Ulnar Deviation 25 18 35   Radial Deviation 30 15 25          Strength: (ELVIS Dynamometer in psi.)        11/15/2022 11/15/2022 12/13/22     Left Right Right   Rung II 45 20 50         Pinch Strength (Measured in psi)       11/15/2022 11/15/2022 12/13/22     Left Right Right   Key Pinch 15  5  10   3pt Pinch 11 4 7   2pt Pinch 6 1 6      Fine Motor Coordination: 9 Hole Peg Test  Left 11/15/2022 Right 11/15/2022 Right  12/13/22   20 21 21      CMS Impairment/Limitation/Restriction for FOTO Hand/Wrist/Finger Survey     Therapist reviewed FOTO scores for Luz Burk on 11/15/2022.   FOTO documents entered into [employer] - see Media section.     Limitation Score: Will complete at next visit  Category: Self Care           Treatment     Luz received the following direct contact modalities after being cleared for contraindications for 5 minutes:  - paraffin and moist hot pack to decrease pain and increase extensibility    Luz received the following manual therapy techniques for 15 minutes:   -retrograde massage on each digit, palm, and wrist to decrease edema    Luz received therapeutic activity 40 for  minutes including:  -wrist maze 2 min  - gripper on first rung pom pom   - yellow putty roll out 3 min  - green flex bar roll out 3 min  - wrist circumduction with yellow ball 3 min  - TGE x 20  - median nerve glides x 20  - golf ball massage 2 minutes  - reassessment of objective measures    Home Exercises and Education Provided     Education provided:   - Progress towards goals     Written Home Exercises Provided: Patient instructed to cont prior HEP.  Exercises were reviewed and Luz was able to demonstrate them prior to the end of the session.  Luz demonstrated good  understanding of the HEP provided.   .   See EMR under Patient Instructions for exercises provided prior visit.     Assessment     Pt would continue to benefit from skilled OT. Pt was reassesed for all objective  measures today. Pt has improved in overall range of motion, strength, coordination, edema and pain. She has met all of her short term goals and continues to tolerate therapeutic exercises without complaints of pain.      Luz is progressing well towards her goals and there are no updates to goals at this time. Pt prognosis is Good.     Pt will continue to benefit from skilled outpatient occupational therapy to address the deficits listed in the problem list on initial evaluation provide pt/family education and to maximize pt's level of independence in the home and community environment.     Anticipated barriers to occupational therapy: Pt has an eye surgery next week and a trip planned for a week in December     Pt's spiritual, cultural and educational needs considered and pt agreeable to plan of care and goals.    Goals:    LTG's (8 weeks):  1)   Increase ROM 15 degrees in wrist extension to increase functional hand use for ADL/work/leisure activities. Ongoing  2)   Increase  strength 25 lbs. to improve functional grasp for ADLs/work/leisure activities.Ongoing  3)   Increase key pinch 10 psis to improve functional grasp for ADLs/work/leisure activities.Ongoing  4)   Decrease complaints of pain to  3 out of 10 at worst to increase functional hand use for ADL/work/leisure activities.Ongoing  6)   Pt will return to near to prior level of function for ADLs and household management reporting I or Mod I with ADLs (dressing, feeding, grooming, toileting). Ongoing     STG's (4 weeks)  1)   Patient to be IND with HEP and modalities for pain/edema managment. MET 12/13/22  2)   Increase ROM 10 degrees to increase functional hand use for ADLs/work/leisure activities. MET 12/13/22  3)   Increase  strength 15 lbs. to improve functional grasp for ADLs/work/leisure activities. MET 12/13/22  4)   Increase key pinch 5 psi's to increase independence with button and FM Coordination. MET 12/13/22  5)   Patient to be IND wiht  Orthotic use, wear and care precautions. MET 12/13/22  6)   Decrease complaints of pain to  6 out of 10 at worst to increase functional hand use for ADL/work/leisure activities. MET 12/13/22              Plan      Pt to be treated by Occupational Therapy 2 times per week for 8 weeks during the certification period from 11/15/2022 to 1/13/2023 to achieve the established goals.      Treatment to include: Paraffin, Fluidotherapy, Manual therapy/joint mobilizations, Modalities for pain management, US 3 mhz, Therapeutic exercises/activities., Iontophoresis with 2.0 cc Dexamethasone, Strengthening, Orthotic Fabrication/Fit/Training, Edema Control, Scar Management, Wound Care, Electrical Modalities, Joint Protection, and Energy Conservation, as well as any other treatments deemed necessary based on the patient's needs or progress.       Updates/Grading for next session: Continue with current plan of care       Julieth Owens OTR/L

## 2023-01-09 ENCOUNTER — PATIENT MESSAGE (OUTPATIENT)
Dept: BARIATRICS | Facility: CLINIC | Age: 73
End: 2023-01-09
Payer: MEDICARE

## 2023-01-18 ENCOUNTER — HOSPITAL ENCOUNTER (OUTPATIENT)
Dept: RADIOLOGY | Facility: HOSPITAL | Age: 73
Discharge: HOME | End: 2023-01-18
Attending: FAMILY MEDICINE
Payer: MEDICARE

## 2023-01-18 DIAGNOSIS — R91.1 SOLITARY PULMONARY NODULE: ICD-10-CM

## 2023-01-18 PROCEDURE — 71250 CT CHEST WITHOUT CONTRAST: ICD-10-PCS | Mod: 26,,, | Performed by: STUDENT IN AN ORGANIZED HEALTH CARE EDUCATION/TRAINING PROGRAM

## 2023-01-18 PROCEDURE — 71250 CT THORAX DX C-: CPT | Mod: 26,,, | Performed by: STUDENT IN AN ORGANIZED HEALTH CARE EDUCATION/TRAINING PROGRAM

## 2023-01-18 PROCEDURE — 71250 CT THORAX DX C-: CPT | Mod: TC,PO

## 2023-02-02 ENCOUNTER — PATIENT MESSAGE (OUTPATIENT)
Dept: SURGERY | Facility: CLINIC | Age: 73
End: 2023-02-02
Payer: MEDICARE

## 2023-02-02 DIAGNOSIS — K44.9 HERNIA, HIATAL: Primary | ICD-10-CM

## 2023-02-02 NOTE — TELEPHONE ENCOUNTER
Care Due:                  Date            Visit Type   Department     Provider  --------------------------------------------------------------------------------                                SAME DAY -                              ESTABLISHED   [facility] FAMILY  Last Visit: 10-      PATIENT      MEDICINE       Hilario Slaughter  Next Visit: None Scheduled  None         None Found                                                            Last  Test          Frequency    Reason                     Performed    Due Date  --------------------------------------------------------------------------------    TSH.........  12 months..  levothyroxine............  05- 05-    [facility] Embedded Care Gaps. Reference number: [id number]. 2/02/2023   2:23:27 PM CST

## 2023-02-02 NOTE — TELEPHONE ENCOUNTER
Refill Routing Note   Medication(s) are not appropriate for processing by [facility] for the following reason(s):       Requires lab    [facility] action(s):  Defer   Care gaps identified: Yes                      Appointments  past 12m or future 3m with PCP    Date Provider   Last Visit   10/12/2022 Hilario Slaughter MD   Next Visit   Visit date not found Hilario Slaughter MD   ED visits in past 90 days: 0        Note composed:3:14 PM 02/02/2023

## 2023-02-03 RX ORDER — LEVOTHYROXINE SODIUM 50 UG/1
TABLET ORAL
Qty: 90 TABLET | Refills: 3 | Status: SHIPPED | OUTPATIENT
Start: 2023-02-03 | End: 2024-02-16 | Stop reason: SDUPTHER

## 2023-02-09 ENCOUNTER — PATIENT MESSAGE (OUTPATIENT)
Dept: BARIATRICS | Facility: CLINIC | Age: 73
End: 2023-02-09
Payer: MEDICARE

## 2023-02-14 ENCOUNTER — PATIENT MESSAGE (OUTPATIENT)
Dept: BARIATRICS | Facility: CLINIC | Age: 73
End: 2023-02-14
Payer: MEDICARE

## 2023-02-20 ENCOUNTER — TELEPHONE (OUTPATIENT)
Dept: HEPATOLOGY | Facility: CLINIC | Age: 73
End: 2023-02-20
Payer: MEDICARE

## 2023-02-20 NOTE — TELEPHONE ENCOUNTER
SUBJECTIVE:   Nursing Notes:   Stephanie Miller LPN  7/27/2020  1:45 PM  Signed  Patient presents to clinic for follow up with ongoing pain and swelling to neck and shoulders.  Pain rated at 3 and movement makes pain worsen.  Medication Reconciliation: complete    Stephanie Miller LPN      Hailey Shah is a 76 year old female who presents to clinic today for neck and right shoulder pain.  Last week on 7/21, had a little discomfort.  The next day was sorting clothes at [employer] and was getting worse.  By that afternoon, she was in excruciating pain.  Went to [facility].  Pain started in her right shoulder, traveled across her neck to her left shoulder.  If she moves wrong, might have shoulder pain, but most of it is across the back of her neck.  No pain radiating to her hands.  Occasionally has a little numbness/tingling in her hands/arms.  No weakness.  Neck has been stiff with this.  Has chronic stiffness, but the pain was uncharacteristic for her.  X-rays were on 7/22/2020 had shown degenerative anterolisthesis of C4 on C5, cervical degenerative changes were present with mild disc height loss and uncovertebral hypertrophy most pronounced at C5-6 with other diffuse facet hypertrophy present as well.  X-ray of her right shoulder had shown calcific tendinopathy or bursitis as well as osteoarthritis of the AC joint and the glenohumeral joint.    HPI    I personally reviewed medications/allergies/history listed below:    Patient Active Problem List    Diagnosis Date Noted     Family history of ischemic heart disease 01/16/2018     Priority: Medium     Overview:   Brother had fatal myocardial infarction at age 60.       Lumbago 01/16/2018     Priority: Medium     Overview:   Chronic low back pain secondary to injury, 1995, with chronic pain syndrome.       Hyperlipidemia 08/28/2017     Priority: Medium     Overview:   IMO Update 10/11       Essential hypertension 08/28/2017     Priority: Medium     Overview:   IMO  ----- Message from Cristian Mckeon sent at 2/20/2023 11:20 AM CST -----  Regarding: call back  Pt call to schedule 1 year appt    Call      Update       PVC's (premature ventricular contractions) 2016     Priority: Medium     At risk for amiodarone toxicity with long term use 2016     Priority: Medium     Arthralgia of right hip 2016     Priority: Medium     Idiopathic cardiomyopathy (H) 2014     Priority: Medium     CKD (chronic kidney disease) stage 3, GFR 30-59 ml/min (H) 2014     Priority: Medium     Raynaud's disease 2012     Priority: Medium     Arthropathy of shoulder region 2010     Priority: Medium     Overview:   IMO Update 10/11       RCT (rotator cuff tear) 2010     Priority: Medium     Overview:   IMO Update 10/11       Shoulder pain 04/15/2010     Priority: Medium     Obesity 2008     Priority: Medium     Overview:   Updated per 10/1/17 IMO import       Pain in joint, lower leg 2008     Priority: Medium     Overview:   IMO Update 10/11       Raynaud's phenomenon (by history or observed) 2008     Priority: Medium     Overview:   IMO Update 10/11       Past Medical History:   Diagnosis Date     Chronic diastolic heart failure (H)     2014,ECHO 10/30/2013 - Final Impressions:  1. Mild left ventricular enlargement with a mild decrease in systolic function, estimated ejection fraction 45%.  2. Mild right ventricular enlargement with normal systolic function.  3. Mild left atrial enlargement.  4. Mild mitral regurgitation.  5. Trace tricuspid regurgitation.  6. Mild left ventricular diastolic dysfunction.  7. When compared t*     Chronic kidney disease, stage III (moderate) (H)     2014     Essential (primary) hypertension     No Comments Provided     Hyperlipidemia     No Comments Provided     Low back pain     Chronic low back pain secondary to injury, , with chronic pain syndrome.     Other cardiomyopathies (CODE)     2013,Diagnosed by angio and cath study 2013 at [facility]. EF of 35% at first study     Personal history of other medical treatment (CODE)       with all vaginal deliveries     Raynaud's syndrome without gangrene     No Comments Provided     Tinnitus     No Comments Provided     Ventricular premature depolarization     3/25/2013     Ventricular tachycardia (H)     3/4/2014      Past Surgical History:   Procedure Laterality Date     ARTHROSCOPY SHOULDER Right 1989    Shoulder surgery for impingement     ARTHROSCOPY SHOULDER Left 1988     Left shoulder surgery for impingement     ARTHROSCOPY SHOULDER  08/2009    and debridement, distal clavical excision     Cardiac ablation for pvc      not successful     COLONOSCOPY      4/24/06,Inflammation noted, no further issues     COLONOSCOPY  07/28/2016 7/28/2016,wnl no need f/u     DILATION AND CURETTAGE      D&C for retained placentas x2     FINGER SURGERY Left 10/1989    fourth finger, cyst removal     HYSTERECTOMY TOTAL ABDOMINAL, BILATERAL SALPINGO-OOPHORECTOMY, COMBINED      1991,Soren-Clifton procedure     MAMMOPLASTY AUGMENTATION      1983     REPAIR BLADDER      7/1999, with [facility] and Juan Miguel procedure with cystoscopy     Family History   Problem Relation Age of Onset     Heart Disease Mother         Heart Disease,CHF     Hypertension Mother         Hypertension     Other - See Comments Mother         rheumatic fever     Other - See Comments Father         Old age     Breast Cancer Sister         Cancer-breast     Heart Disease Brother         Heart Disease,MI     Blood Disease No family hx of         Blood Disease,no clotting disorders     Social History     Tobacco Use     Smoking status: Never Smoker     Smokeless tobacco: Never Used     Tobacco comment: Quit smoking: 3-4 diet coke daily   Substance Use Topics     Alcohol use: No     Alcohol/week: 0.0 standard drinks     Social History     Social History Narrative    . Gerardo - .  Retired from [employer] as a -retired 1/18/2012.  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    Children: Fariba Umana, 1963, SALINAS Waite,  "Shreya Esquivel, 1965, Tricia HongDominic, 1967, [place] Fouzia Robbie, 1968, [place]     Current Outpatient Medications   Medication Sig Dispense Refill     amiodarone (PACERONE/CODARONE) 100 MG TABS tablet Take 1.5 tablets (150 mg) by mouth daily Take 1 1/2 tablet daily 135 tablet 3     amLODIPine (NORVASC) 2.5 MG tablet Take 2.5 mg by mouth       ascorbic acid (VITAMIN C) 500 MG tablet Take 500 mg by mouth       aspirin EC 81 MG EC tablet Take 81 mg by mouth       Calcium Carbonate-Vitamin D (CALCIUM 500 + D) 500-125 MG-UNIT TABS        furosemide (LASIX) 40 MG tablet Take 40 mg by mouth       metoprolol succinate (TOPROL-XL) 50 MG 24 hr tablet TAKE 1 TABLET BY MOUTH EVERY DAY 30 tablet 0     Multiple Vitamin (MULTI-VITAMINS) TABS Take 1 tablet by mouth       rosuvastatin (CRESTOR) 40 MG tablet Take 1 tablet (40 mg) by mouth At Bedtime 90 tablet 1     spironolactone (ALDACTONE) 25 MG tablet TAKE 1/2 TABLET BY MOUTH DAILY 45 tablet 4     Allergies   Allergen Reactions     Acetaminophen-Codeine Nausea and Vomiting     Sulfa Drugs      Other reaction(s): *Unknown - Childhood Rxn       Review of Systems   Constitutional: Negative for chills and fever.   Respiratory: Negative for cough and shortness of breath.    Cardiovascular: Negative for peripheral edema.   Musculoskeletal: Positive for arthralgias and neck pain.        OBJECTIVE:     /76 (BP Location: Right arm, Patient Position: Sitting, Cuff Size: Adult Regular)   Pulse 60   Temp 98.5  F (36.9  C) (Tympanic)   Resp 16   Ht 1.626 m (5' 4\")   Wt 80.9 kg (178 lb 4 oz)   LMP  (LMP Unknown)   Breastfeeding No   BMI 30.60 kg/m    Body mass index is 30.6 kg/m .  Physical Exam  Constitutional:       Appearance: Normal appearance.   Eyes:      Pupils: Pupils are equal, round, and reactive to light.   Neck:      Musculoskeletal: Normal range of motion and neck supple.   Cardiovascular:      Rate and Rhythm: Normal rate and regular rhythm.      " Pulses: Normal pulses.      Heart sounds: No murmur.   Pulmonary:      Effort: Pulmonary effort is normal.      Breath sounds: Normal breath sounds. No wheezing, rhonchi or rales.   Musculoskeletal:      Comments: Neck: She has decreased range of motion with rotation laterally left greater than right.  She does not have any spinous process tenderness in her cervical spine.  She does have a lot of tenderness over the trapezius muscles bilaterally.   strength is normal in hands.    Shoulders: She is able to abduct fully to 180 degrees actively.  Neer's, Walsh and empty can test are all negative bilaterally.  She does have some decreased range of motion with both internal and external rotation of both shoulders.   Neurological:      Mental Status: She is alert.           PHQ-9 SCORE 12/23/2015 4/26/2016 8/28/2017   PHQ-9 Total Score 5 9 0       PHQ-2 Score:     PHQ-2 ( 1999 [employer]) 7/27/2020 7/22/2020   Q1: Little interest or pleasure in doing things 0 0   Q2: Feeling down, depressed or hopeless 0 0   PHQ-2 Score 0 0       SANGEETA-7 SCORE 7/22/2020   Total Score 0       I personally reviewed results withpatient as listed below:   Diagnostic Test Results:  none     ASSESSMENT/PLAN:       ICD-10-CM    1. Neck pain  M54.2    2. Chronic right shoulder pain  M25.511     G89.29        1.  She has certainly some degenerative changes on her x-ray as noted.  Her pain currently is more in the location of her trapezius muscles.  Discussed that likely the underlying arthritis has caused some strain on the muscles in her neck.  Offered physical therapy referral, which she declines for now.  She may call later if this is desired.  2.  Discussed that she has underlying osteoarthritis likely the cause of her shoulder pain.  She feels she is able to manage fine for now.  May consider orthopedic consultation in the future if this is worsening.    Roxana Maki MD  [facility] AND \[facility] [place] [facility]\""    Portions of this dictation  were created using the Dragon Nuance voice recognition system. Proofreading was completed but there may be errors in text.

## 2023-02-20 NOTE — TELEPHONE ENCOUNTER
Returned patient's call, she stated that she'd received a letter stating that she needed to followup with Dr. Blackwell in April. Patient was informed that Dr. Blackwell is in [place] now. I offered the patient an April appointment but she declined stating that she will be on a cruise so I scheduled the patient for the next available on 5/16/23. Patient voiced understanding.

## 2023-02-21 ENCOUNTER — PATIENT MESSAGE (OUTPATIENT)
Dept: ADMINISTRATIVE | Facility: OTHER | Age: 73
End: 2023-02-21
Payer: MEDICARE

## 2023-02-22 ENCOUNTER — PATIENT MESSAGE (OUTPATIENT)
Dept: BARIATRICS | Facility: CLINIC | Age: 73
End: 2023-02-22
Payer: MEDICARE

## 2023-03-03 ENCOUNTER — PES CALL (OUTPATIENT)
Dept: ADMINISTRATIVE | Facility: CLINIC | Age: 73
End: 2023-03-03
Payer: MEDICARE

## 2023-03-08 ENCOUNTER — PATIENT MESSAGE (OUTPATIENT)
Dept: BARIATRICS | Facility: CLINIC | Age: 73
End: 2023-03-08
Payer: MEDICARE

## 2023-03-14 ENCOUNTER — PATIENT MESSAGE (OUTPATIENT)
Dept: BARIATRICS | Facility: CLINIC | Age: 73
End: 2023-03-14
Payer: MEDICARE

## 2023-03-16 ENCOUNTER — TELEPHONE (OUTPATIENT)
Dept: FAMILY MEDICINE | Facility: CLINIC | Age: 73
End: 2023-03-16
Payer: MEDICARE

## 2023-03-16 NOTE — TELEPHONE ENCOUNTER
Spoke to pt she stated she fell a couple of days ago. When she put a qutip in her ear blood came out. Pt is out of town. I suggested to pt to go to ER in that town. Pt refused stating she can wait until the week that she is back in town. Pt stated she can see another provider. I offered her Dr. Wheeler for Wednesday 3/22/23 at 9:00. Pt confirmed          ----- Message from Angeles Waldron sent at 3/16/2023  3:06 PM CDT -----  Type:  Sooner Apoointment Request    Caller is requesting a sooner appointment.  Caller declined first available appointment listed below.  Caller will not accept being placed on the waitlist and is requesting a message be sent to doctor.  Name of Caller: pt  When is the first available appointment? none  Symptoms: dizzy spell to the point she passed out   Would the patient rather a call back or a response via [email or web address]?   Best Call Back Number:[phone]  Additional Information: in town from 20-25

## 2023-03-22 ENCOUNTER — OFFICE VISIT (OUTPATIENT)
Dept: FAMILY MEDICINE | Facility: CLINIC | Age: 73
End: 2023-03-22
Payer: MEDICARE

## 2023-03-22 ENCOUNTER — TELEPHONE (OUTPATIENT)
Dept: FAMILY MEDICINE | Facility: CLINIC | Age: 73
End: 2023-03-22

## 2023-03-22 VITALS
HEIGHT: 64 IN | OXYGEN SATURATION: 98 % | TEMPERATURE: 98 F | WEIGHT: 159.31 LBS | HEART RATE: 69 BPM | BODY MASS INDEX: 27.2 KG/M2 | SYSTOLIC BLOOD PRESSURE: 120 MMHG | DIASTOLIC BLOOD PRESSURE: 66 MMHG

## 2023-03-22 DIAGNOSIS — R42 DIZZINESS AND GIDDINESS: ICD-10-CM

## 2023-03-22 DIAGNOSIS — R42 DIZZINESS: Primary | ICD-10-CM

## 2023-03-22 DIAGNOSIS — R21 RASH: ICD-10-CM

## 2023-03-22 PROCEDURE — 3074F PR MOST RECENT SYSTOLIC BLOOD PRESSURE < 130 MM HG: ICD-10-PCS | Mod: CPTII,S$GLB,, | Performed by: FAMILY MEDICINE

## 2023-03-22 PROCEDURE — 99214 OFFICE O/P EST MOD 30 MIN: CPT | Mod: S$GLB,,, | Performed by: FAMILY MEDICINE

## 2023-03-22 PROCEDURE — 3288F PR FALLS RISK ASSESSMENT DOCUMENTED: ICD-10-PCS | Mod: CPTII,S$GLB,, | Performed by: FAMILY MEDICINE

## 2023-03-22 PROCEDURE — 3288F FALL RISK ASSESSMENT DOCD: CPT | Mod: CPTII,S$GLB,, | Performed by: FAMILY MEDICINE

## 2023-03-22 PROCEDURE — 1126F AMNT PAIN NOTED NONE PRSNT: CPT | Mod: CPTII,S$GLB,, | Performed by: FAMILY MEDICINE

## 2023-03-22 PROCEDURE — 3008F BODY MASS INDEX DOCD: CPT | Mod: CPTII,S$GLB,, | Performed by: FAMILY MEDICINE

## 2023-03-22 PROCEDURE — 99214 PR OFFICE/OUTPT VISIT, EST, LEVL IV, 30-39 MIN: ICD-10-PCS | Mod: S$GLB,,, | Performed by: FAMILY MEDICINE

## 2023-03-22 PROCEDURE — 1101F PR PT FALLS ASSESS DOC 0-1 FALLS W/OUT INJ PAST YR: ICD-10-PCS | Mod: CPTII,S$GLB,, | Performed by: FAMILY MEDICINE

## 2023-03-22 PROCEDURE — 3078F PR MOST RECENT DIASTOLIC BLOOD PRESSURE < 80 MM HG: ICD-10-PCS | Mod: CPTII,S$GLB,, | Performed by: FAMILY MEDICINE

## 2023-03-22 PROCEDURE — 3078F DIAST BP <80 MM HG: CPT | Mod: CPTII,S$GLB,, | Performed by: FAMILY MEDICINE

## 2023-03-22 PROCEDURE — 1101F PT FALLS ASSESS-DOCD LE1/YR: CPT | Mod: CPTII,S$GLB,, | Performed by: FAMILY MEDICINE

## 2023-03-22 PROCEDURE — 1159F MED LIST DOCD IN RCRD: CPT | Mod: CPTII,S$GLB,, | Performed by: FAMILY MEDICINE

## 2023-03-22 PROCEDURE — 1159F PR MEDICATION LIST DOCUMENTED IN MEDICAL RECORD: ICD-10-PCS | Mod: CPTII,S$GLB,, | Performed by: FAMILY MEDICINE

## 2023-03-22 PROCEDURE — 3008F PR BODY MASS INDEX (BMI) DOCUMENTED: ICD-10-PCS | Mod: CPTII,S$GLB,, | Performed by: FAMILY MEDICINE

## 2023-03-22 PROCEDURE — 3074F SYST BP LT 130 MM HG: CPT | Mod: CPTII,S$GLB,, | Performed by: FAMILY MEDICINE

## 2023-03-22 PROCEDURE — 1126F PR PAIN SEVERITY QUANTIFIED, NO PAIN PRESENT: ICD-10-PCS | Mod: CPTII,S$GLB,, | Performed by: FAMILY MEDICINE

## 2023-03-22 RX ORDER — TRIAMCINOLONE ACETONIDE 1 MG/G
CREAM TOPICAL 2 TIMES DAILY
Qty: 80 G | Refills: 1 | Status: SHIPPED | OUTPATIENT
Start: 2023-03-22 | End: 2023-05-26

## 2023-03-22 RX ORDER — MECLIZINE HYDROCHLORIDE 25 MG/1
25 TABLET ORAL 3 TIMES DAILY PRN
Qty: 20 TABLET | Refills: 0 | Status: SHIPPED | OUTPATIENT
Start: 2023-03-22

## 2023-03-22 NOTE — TELEPHONE ENCOUNTER
Dr lynch to call in meds for mri    We need to notify pt once the rx has been sent to the pharmacy

## 2023-03-22 NOTE — PROGRESS NOTES
Subjective:       Patient ID: Luz Burk is a 72 y.o. female.    Chief Complaint: Fall, Dizziness, and spot on leg    71 y/o female with h/o hydrocephalus, s/p shunt , states she has been feeling dizzi for over 2 weeks, fell 2 weeks ago, while on awakening going to the [place], hit whole side on left side on shoulder, arm and hip, 3 days later walked into the patio door and hit right side of the head, noticed blood in the left ear, minimal oozing from left ear until yesterday    No LOC  Also having some sinus issues and nasal congestion  Also c/o rash on right thigh laterally for months, not itching, dry rash    Review of Systems    Objective:      Physical Exam  Vitals and nursing note reviewed.   Constitutional:       General: She is not in acute distress.     Appearance: Normal appearance. She is well-developed. She is not ill-appearing, toxic-appearing or diaphoretic.   HENT:      Head: Normocephalic and atraumatic.      Jaw: No trismus.      Right Ear: Hearing, tympanic membrane, ear canal and external ear normal.      Left Ear: Hearing, tympanic membrane, ear canal and external ear normal.      Ears:      Comments: Left tm no blood noted, tm normal   no blood in ear canal  Right ear canal no ear, tms normal     Nose: Nose normal. No nasal deformity, mucosal edema or rhinorrhea.      Right Sinus: No maxillary sinus tenderness or frontal sinus tenderness.      Left Sinus: No maxillary sinus tenderness or frontal sinus tenderness.      Mouth/Throat:      Mouth: Mucous membranes are moist.      Dentition: Normal dentition.      Pharynx: Oropharynx is clear. Uvula midline. No posterior oropharyngeal erythema or uvula swelling.   Eyes:      General: Lids are normal. No scleral icterus.        Right eye: No discharge.         Left eye: No discharge.      Conjunctiva/sclera: Conjunctivae normal.      Comments: Sclera clear bilat   Neck:      Trachea: Trachea and phonation normal.   Cardiovascular:      Rate and  Rhythm: Normal rate and regular rhythm.      Pulses: Normal pulses.      Heart sounds: Normal heart sounds.   Pulmonary:      Effort: Pulmonary effort is normal. No respiratory distress.      Breath sounds: Normal breath sounds.   Abdominal:      General: Bowel sounds are normal. There is no distension.      Palpations: Abdomen is soft. There is no mass or pulsatile mass.      Tenderness: There is no abdominal tenderness.   Musculoskeletal:         General: No deformity. Normal range of motion.      Cervical back: Full passive range of motion without pain, normal range of motion and neck supple.   Skin:     General: Skin is warm and dry.      Coloration: Skin is not pale.      Comments: Right lateral thigh with mild eczematous rash   Neurological:      Mental Status: She is alert and oriented to person, place, and time.      Motor: No abnormal muscle tone.      Coordination: Coordination normal.   Psychiatric:         Speech: Speech normal.         Behavior: Behavior normal. Behavior is cooperative.         Thought Content: Thought content normal.         Judgment: Judgment normal.       Assessment:       1. Dizziness        Plan:         Luz was seen today for fall, dizziness and spot on leg.    Diagnoses and all orders for this visit:    Dizziness      Meclizine

## 2023-03-23 ENCOUNTER — HOSPITAL ENCOUNTER (OUTPATIENT)
Dept: RADIOLOGY | Facility: HOSPITAL | Age: 73
Discharge: HOME | End: 2023-03-23
Attending: FAMILY MEDICINE
Payer: MEDICARE

## 2023-03-23 DIAGNOSIS — R42 DIZZINESS: ICD-10-CM

## 2023-03-23 PROCEDURE — 70250 XR SHUNT SERIES: ICD-10-PCS | Mod: 26,,, | Performed by: RADIOLOGY

## 2023-03-23 PROCEDURE — 70250 X-RAY EXAM OF SKULL: CPT | Mod: 26,,, | Performed by: RADIOLOGY

## 2023-03-23 PROCEDURE — 74018 RADEX ABDOMEN 1 VIEW: CPT | Mod: 26,,, | Performed by: RADIOLOGY

## 2023-03-23 PROCEDURE — 70553 MRI BRAIN W WO CONTRAST: ICD-10-PCS | Mod: 26,,, | Performed by: RADIOLOGY

## 2023-03-23 PROCEDURE — 25500020 PHARM REV CODE 255: Performed by: FAMILY MEDICINE

## 2023-03-23 PROCEDURE — 70553 MRI BRAIN STEM W/O & W/DYE: CPT | Mod: 26,,, | Performed by: RADIOLOGY

## 2023-03-23 PROCEDURE — 71045 XR SHUNT SERIES: ICD-10-PCS | Mod: 26,,, | Performed by: RADIOLOGY

## 2023-03-23 PROCEDURE — 71045 X-RAY EXAM CHEST 1 VIEW: CPT | Mod: 26,,, | Performed by: RADIOLOGY

## 2023-03-23 PROCEDURE — 72020 XR SHUNT SERIES: ICD-10-PCS | Mod: 26,,, | Performed by: RADIOLOGY

## 2023-03-23 PROCEDURE — 71045 X-RAY EXAM CHEST 1 VIEW: CPT | Mod: TC

## 2023-03-23 PROCEDURE — 74018 XR SHUNT SERIES: ICD-10-PCS | Mod: 26,,, | Performed by: RADIOLOGY

## 2023-03-23 PROCEDURE — 74018 RADEX ABDOMEN 1 VIEW: CPT | Mod: TC

## 2023-03-23 PROCEDURE — A9585 GADOBUTROL INJECTION: HCPCS | Performed by: FAMILY MEDICINE

## 2023-03-23 PROCEDURE — 70553 MRI BRAIN STEM W/O & W/DYE: CPT | Mod: TC

## 2023-03-23 PROCEDURE — 72020 X-RAY EXAM OF SPINE 1 VIEW: CPT | Mod: 26,,, | Performed by: RADIOLOGY

## 2023-03-23 RX ORDER — GADOBUTROL 604.72 MG/ML
8 INJECTION INTRAVENOUS
Status: COMPLETED | OUTPATIENT
Start: 2023-03-23 | End: 2023-03-23

## 2023-03-23 RX ADMIN — GADOBUTROL 8 ML: 604.72 INJECTION INTRAVENOUS at 03:03

## 2023-03-25 ENCOUNTER — TELEPHONE (OUTPATIENT)
Dept: URGENT CARE | Facility: CLINIC | Age: 73
End: 2023-03-25
Payer: MEDICARE

## 2023-04-05 ENCOUNTER — PATIENT MESSAGE (OUTPATIENT)
Dept: BARIATRICS | Facility: CLINIC | Age: 73
End: 2023-04-05
Payer: MEDICARE

## 2023-04-06 ENCOUNTER — PES CALL (OUTPATIENT)
Dept: ADMINISTRATIVE | Facility: CLINIC | Age: 73
End: 2023-04-06
Payer: MEDICARE

## 2023-04-11 ENCOUNTER — PATIENT MESSAGE (OUTPATIENT)
Dept: BARIATRICS | Facility: CLINIC | Age: 73
End: 2023-04-11
Payer: MEDICARE

## 2023-05-03 ENCOUNTER — PATIENT MESSAGE (OUTPATIENT)
Dept: BARIATRICS | Facility: CLINIC | Age: 73
End: 2023-05-03
Payer: MEDICARE

## 2023-05-09 ENCOUNTER — PATIENT MESSAGE (OUTPATIENT)
Dept: BARIATRICS | Facility: CLINIC | Age: 73
End: 2023-05-09
Payer: MEDICARE

## 2023-05-10 RX ORDER — CITALOPRAM 20 MG/1
TABLET, FILM COATED ORAL
Qty: 90 TABLET | Refills: 1 | Status: SHIPPED | OUTPATIENT
Start: 2023-05-10 | End: 2023-10-22

## 2023-05-10 NOTE — TELEPHONE ENCOUNTER
Refill Routing Note   Medication(s) are not appropriate for processing by [facility] for the following reason(s):      Drug-disease interaction    [facility] action(s):  Defer Labs due   Medication Therapy Plan: Drug-Disease: citalopram and Sinus bradycardia    Pharmacist review requested: Yes     Appointments  past 12m or future 3m with PCP    Date Provider   Last Visit   10/12/2022 Hilario Slaughter MD   Next Visit   Visit date not found Hilario Slaughter MD   ED visits in past 90 days: 0        Note composed:1:16 PM 05/10/2023

## 2023-05-10 NOTE — TELEPHONE ENCOUNTER
Care Due:                  Date            Visit Type   Department     Provider  --------------------------------------------------------------------------------                                SAME DAY -                              ESTABLISHED   [facility]  Last Visit: 10-      PATIENT      MEDICINE       Hilario Slaughter  Next Visit: None Scheduled  None         None Found                                                            Last  Test          Frequency    Reason                     Performed    Due Date  --------------------------------------------------------------------------------    CMP.........  12 months..  furosemide...............  07- 07-    [facility] Embedded Care Due Messages. Reference number: [id number].   5/10/2023 10:37:54 AM CDT

## 2023-05-10 NOTE — TELEPHONE ENCOUNTER
Provider Staff:  Action required for this patient     Please see care gap opportunities below in Care Due Message: CMP due 7/23/23    Thanks!  [facility]     Appointments      Date Provider   Last Visit   10/12/2022 Hilario Slaughter MD   Next Visit   Visit date not found Hilario Slaughter MD     Refill Decision Note   Luz Burk  is requesting a refill authorization.  Brief Assessment and Rationale for Refill:  Approve     Medication Therapy Plan:         Alert overridden per protocol: Yes   Pharmacist review requested: Yes   Comments:     Note composed:2:03 PM 05/10/2023

## 2023-05-12 NOTE — PROGRESS NOTES
Hepatology Note    PATIENT: Luz Burk  MRN: [id number]  DATE: 5/16/2023    Provider: Hepatologist - Dr Blackwell  Urgency of review: non-urgent  Referring provider: No ref. provider found    Diagnosis:   1. Elevated liver enzymes    2. Gastroesophageal reflux disease without esophagitis    3. Hiatal hernia    4. Polyp of colon, unspecified part of colon, unspecified type    5. Hypothyroidism due to acquired atrophy of thyroid          Chief complaint:   Chief Complaint   Patient presents with    Elevated Hepatic Enzymes    Hepatic Disease       Subjective:    Initial History: Luz Burk is a 72 y.o. female who was referred to Hepatology Clinic for consultation of Transminitis    05/16/2023  Her liver enzymes have normalized per labs in 2022  Likely etiology was alcohol with NSAIds  No new complains from liver standpoint  Imaging 2022--The liver is normal in appearance. It measures 15.3 cm in length  h/o hydrocephalus, s/p shunt  EGD 2022--H Hernia    5/2021  Patient had episode of Diarrhea. During lab evaluation found to have elevated LFT. Patient does not have any new complains from liver standpoint. AST//140. T Bili 0.3, ALK PO4 96. Liver enzymes were normal in 4/2021. NATE negative     - The patient reports no symptoms of hepatitis including malaise or flu-like symptoms to suggest a flare.  - The patient reports no fevers/chills or pruritis to suggest biliary disease.      She has no significant history of Alcohol since 2 months. Previously 2-3 cocktails per day    She denies history of IV drug use/Tatto  She  denies high-risk sexual contacts, no raw seafood, no sick contacts    She  denies hepatotoxic medication    She has no known family history of liver disease.       Review of systems:  A review of 12+ systems was conducted with pertinent positive and negative findings documented in HPI with all other systems reviewed and negative.    Past Medical History:   Past Medical History:   Diagnosis  "Date    Cataract     Collagenous colitis     Dx 16    Dry mouth 2018    Excessive thirst 2018    Fecal incontinence 2016    Gastric banding status 2012    Hydrocephalus 1/15/2013    Hypothyroidism     LAP-BAND surgery status 2015    Muscle weakness 2016    OA (osteoarthritis)     Obesity     Other specified prophylactic or treatment measure 2012    Pain aggravated by changing postions 2016    Palpitations 2021    SI (sacroiliac) pain 2016    Sleep apnea     Thumb pain, right 2020    Urinary frequency 2018       Past Surgical HIstory:   Past Surgical History:   Procedure Laterality Date    APPENDECTOMY      ARTHROPLASTY OF JOINT OF FINGER Right 10/30/2020    Procedure: ARTHROPLASTY, FINGER cmc joint right thumb;  Surgeon: Sugey Ayon MD;  Location: [facility];  Service: Orthopedics;  Laterality: Right;  MAC/Regional    BRAIN SURGERY   &     CARPAL TUNNEL RELEASE Right 2020    Procedure: RELEASE, CARPAL TUNNEL, RIGHT;  Surgeon: Sugey Ayon MD;  Location: [facility];  Service: Orthopedics;  Laterality: Right;  Regional & MAC    CARPAL TUNNEL RELEASE Right 10/31/2022    Procedure: RELEASE, CARPAL TUNNEL "REVISION" RIGHT NEUROLYSIS MEDIAL NERVE, PATCH PLACEMENT;  Surgeon: Sugey Ayon MD;  Location: [facility];  Service: Orthopedics;  Laterality: Right;    CATARACT EXTRACTION      CERVICAL FUSION  1981     SECTION  1974,76&79    CHOLECYSTECTOMY      COLONOSCOPY N/A 2016    Procedure: COLONOSCOPY;  Surgeon: BINH Souza MD;  Location: [address]);  Service: Endoscopy;  Laterality: N/A;    COLONOSCOPY N/A 2020    Procedure: COLONOSCOPYSuprep;  Surgeon: Antonio Weller MD;  Location: [facility];  Service: Endoscopy;  Laterality: N/A;    ESOPHAGOGASTRODUODENOSCOPY N/A 2020    Procedure: EGD (ESOPHAGOGASTRODUODENOSCOPY);  Surgeon: Antonio Weller MD;  Location: [facility];  Service: Endoscopy;  " Laterality: N/A;    ESOPHAGOGASTRODUODENOSCOPY N/A 09/02/2022    Procedure: EGD (ESOPHAGOGASTRODUODENOSCOPY;  Surgeon: Antonio Weller MD;  Location: [facility];  Service: Endoscopy;  Laterality: N/A;    EXCISION OF GANGLION OF WRIST Right 07/24/2020    Procedure: EXCISION, GANGLION CYST, WRIST, RIGHT volar;  Surgeon: Sugey Ayon MD;  Location: [facility] OR;  Service: Orthopedics;  Laterality: Right;  Regional & MAC    EXCISIONAL BIOPSY Right 11/22/2022    Procedure: EXCISIONAL BIOPSY;  Surgeon: Nelly Cabrera MD;  Location: [facility] OR;  Service: Ophthalmology;  Laterality: Right;  Biopsy Left lower lid with cryo unit    EYE SURGERY  4-5 years ago    HERNIA REPAIR      HIATAL HERNIA REPAIR      HYSTERECTOMY      INTERNAL NEUROLYSIS USING OPERATING MICROSCOPE  10/31/2022    Procedure: NEUROLYSIS, INTERNAL, USING OPERATING MICROSCOPE;  Surgeon: Sugey Ayon MD;  Location: [facility];  Service: Orthopedics;;    JOINT REPLACEMENT  2010    lap band surgery  10/11/2011    Realize C Band (11mL)    LAPAROSCOPIC GASTRIC BANDING      LAPAROSCOPIC REMOVAL OF GASTRIC BAND N/A 04/07/2021    Procedure: REMOVAL-GASTRIC BAND-LAPAROSCOPIC-;  Surgeon: Oswaldo Mayfield MD;  Location: [facility] OR [address] [place] [address];  Service: General;  Laterality: N/A;    LAPAROSCOPIC SLEEVE GASTRECTOMY N/A 04/07/2021    Procedure: GASTRECTOMY-SLEEVE-LAPAROSCOPIC;  Surgeon: Oswaldo Mayfield MD;  Location: [facility] OR 2ND FLR;  Service: General;  Laterality: N/A;    OOPHORECTOMY      SPINE SURGERY  70s    TONSILLECTOMY      TOTAL KNEE ARTHROPLASTY  07/23/2012    VENTRICULOPERITONEAL SHUNT         Family History:   Family History   Problem Relation Age of Onset    Alzheimer's disease Mother 84    Prostate cancer Father 70    Lupus Father     Emphysema Father     Cancer Father         prostate cancer    COPD Father     Alcohol abuse Father     Stroke Father     Arthritis Father     Hearing loss Father     Breast cancer Sister     Cancer Sister          breast cancer    Hepatitis Sister     Cirrhosis Sister     No Known Problems Sister     Cancer Brother         lymphoma from Rheumatoid arthritis Tx    Stroke Brother     Arthritis Brother     No Known Problems Brother     No Known Problems Daughter     No Known Problems Son     No Known Problems Son     Cancer Other     Arthritis Other     Colon cancer Neg Hx     Ovarian cancer Neg Hx     Melanoma Neg Hx        Social History:  reports that she has been smoking cigarettes. She started smoking about 16 months ago. She has a 0.25 pack-year smoking history. She has been exposed to tobacco smoke. She has never used smokeless tobacco. She reports current alcohol use of about 4.0 standard drinks per week. She reports that she does not use drugs.    PFSH:  Past medical, family, and social history reviewed as documented in chart with pertinent positive medical, family, and social history detailed in HPI.    Allergies:  Review of patient's allergies indicates:  No Known Allergies      Medications:  Current Outpatient Medications   Medication Sig Dispense Refill    ascorbic acid, vitamin C, (VITAMIN C) 100 MG tablet Take 100 mg by mouth once daily.      BIOTIN ORAL Take by mouth once daily.      calcium citrate-vitamin D3 315-200 mg (CITRACAL+D) 315 mg-5 mcg (200 unit) per tablet Take 1 tablet by mouth once daily.      cholecalciferol, vitamin D3, (VITAMIN D3) 125 mcg (5,000 unit) Tab Take 1 tablet (5,000 Units total) by mouth once daily. 90 tablet 3    citalopram (CELEXA) 20 MG tablet Take 1 tablet by mouth once daily 90 tablet 1    cyanocobalamin 1,000 mcg/mL injection INJECT 1 ML EVERY MONTH 10 mL 5    fluticasone (FLONASE) 50 mcg/actuation nasal spray 1 spray by Nasal route once daily.       furosemide (LASIX) 20 MG tablet Take 1 tablet (20 mg total) by mouth once daily. (Patient taking differently: Take 20 mg by mouth as needed.) 30 tablet 11    levothyroxine (SYNTHROID) 50 MCG tablet TAKE 1 TABLET BY MOUTH BEFORE  BREAKFAST 90 tablet 3    loratadine (CLARITIN) 10 mg tablet Take 1 tablet (10 mg total) by mouth once daily. 90 tablet 3    melatonin 10 mg Tab Take by mouth every evening.      multivitamin capsule Take 1 capsule by mouth once daily.      pantoprazole (PROTONIX) 40 MG tablet Take 1 tablet (40 mg total) by mouth 2 (two) times daily before meals. 60 tablet 12    potassium chloride (KLOR-CON) 10 MEQ TbSR TAKE TWO TABLETS BY MOUTH ONCE DAILY WITH LASIX  Strength: 10 mEq 180 tablet 3    valACYclovir (VALTREX) 1000 MG tablet Take 2 tablets (2,000 mg total) by mouth every 12 (twelve) hours as needed (herpes outbreak). 2 tablets as soon as possible after symptom onset, then 2 tablets 12 hours later (4 total) 30 tablet 2    famotidine (PEPCID) 20 MG tablet Take 1 tablet by mouth twice daily (Patient not taking: Reported on 5/16/2023) 60 tablet 0    meclizine (ANTIVERT) 25 mg tablet Take 1 tablet (25 mg total) by mouth 3 (three) times daily as needed for Dizziness. (Patient not taking: Reported on 5/16/2023) 20 tablet 0    nicotine (NICODERM CQ) 21 mg/24 hr Place 1 patch onto the skin once daily. (Patient not taking: Reported on 5/16/2023) 28 patch 0    nicotine, polacrilex, (NICORETTE) 2 mg Gum Take 1 each (2 mg total) by mouth as needed (Use in place of cigarettes). (Patient not taking: Reported on 5/16/2023) 100 each 0    triamcinolone acetonide 0.1% (KENALOG) 0.1 % cream Apply topically 2 (two) times daily. for 10 days 80 g 1     No current facility-administered medications for this visit.     Facility-Administered Medications Ordered in Other Visits   Medication Dose Route Frequency Provider Last Rate Last Admin    acetaminophen tablet 1,000 mg  1,000 mg Oral Once Sugey Edouard MD        fentaNYL injection 25 mcg  25 mcg Intravenous Q5 Min PRN Sugey Edouard MD        fentaNYL injection 25 mcg  25 mcg Intravenous Q5 Min PRN Quinten Chu MD   100 mcg at 10/30/20 0920    lidocaine (PF) 10 mg/ml (1%)  "injection 10 mg  1 mL Intradermal Once Rachele Bhavya Gonzalez MD        midazolam (VERSED) 1 mg/mL injection 0.5 mg  0.5 mg Intravenous PRN Sugey Edouard MD        midazolam (VERSED) 1 mg/mL injection 0.5 mg  0.5 mg Intravenous PRN Quinten Chu MD   1 mg at 10/30/20 0920       Review of Systems   Constitutional:  Negative for fatigue, fever and unexpected weight change.   HENT:  Negative for ear pain, nosebleeds and trouble swallowing.    Eyes:  Negative for discharge and redness.   Respiratory:  Negative for cough and shortness of breath.    Cardiovascular:  Negative for palpitations and leg swelling.   Gastrointestinal:  Negative for abdominal distention, abdominal pain, diarrhea and vomiting.   Endocrine: Negative for cold intolerance and polyuria.   Genitourinary:  Negative for flank pain and hematuria.   Musculoskeletal:  Negative for back pain.   Skin:  Negative for pallor.   Neurological:  Negative for seizures and headaches.   Hematological:  Does not bruise/bleed easily.   Psychiatric/Behavioral:  Negative for confusion and hallucinations.          Objective:      Vitals:   Vitals:    05/16/23 0838   BP: 114/62   BP Location: Left arm   Patient Position: Sitting   BP Method: Small (Manual)   Pulse: 68   Weight: 73.2 kg (161 lb 6 oz)   Height: 5' 4" (1.626 m)         Physical Exam  Constitutional:       Appearance: Normal appearance.   HENT:      Head: Normocephalic and atraumatic.      Right Ear: External ear normal.      Left Ear: External ear normal.      Mouth/Throat:      Mouth: Mucous membranes are moist.   Eyes:      Extraocular Movements: Extraocular movements intact.      Pupils: Pupils are equal, round, and reactive to light.   Cardiovascular:      Rate and Rhythm: Normal rate and regular rhythm.      Pulses: Normal pulses.      Heart sounds: Normal heart sounds.   Pulmonary:      Effort: Pulmonary effort is normal.      Breath sounds: Normal breath sounds.   Abdominal:      " General: Bowel sounds are normal. There is no distension.      Palpations: Abdomen is soft. There is no mass.      Tenderness: There is no abdominal tenderness.   Musculoskeletal:         General: Normal range of motion.      Cervical back: Normal range of motion and neck supple.   Neurological:      General: No focal deficit present.      Mental Status: She is alert and oriented to person, place, and time.       Laboratory Data:  No visits with results within 1 Week(s) from this visit.   Latest known visit with results is:   Admission on 2022, Discharged on 2022   Component Date Value Ref Range Status    Final Pathologic Diagnosis 2022    Final                    Value:Conjunctiva, right tarsal, excisional biopsy:  - Primary acquired melanosis without atypia (primary conjunctival  hypermelanosis)  - Negative for malignancy  - Additional levels were performed and examined      Interp By Maxi Virgen MD, PhD, Signed on 2022 at 14:20    Microscopic Exam 2022    Final                    Value:Microscopic examination showed a minute fragment of conjunctival epithelium  with retained goblet cells and a minimal number of cytologically normal  melanocytes along the basal epithelial layer.  There is no significant  increase in mitotic activity (less than 1 mitosis per 10 high-power fields)  or melanocytic spread along the basal layer or into the underlying supportive  tissue.  Immunostains for MART1, S100 and Ki67 proliferation index were performed:  MART1 and S100 highlighted the small population of melanocytes along the  basal epithelial layer; Ki67 proliferation index highlighted a small  population of normal basal cells (less than 5%).  Positive controls and  internal negative controls for immunostains were examined and were  appropriate.      Gross 2022    Final                    Value:Pathology ID: [id number]     :  1950  SURGERY MRN:  [id number]/PATHOLOGY MRN:   "[id number]  PART 1:  Received in formalin labeled as "pigmented tarsal conjunctival lesion" is a  single fragment of tan-gray soft tissue (0.4 x 0.2 x 0.2 cm), entirely  submitted in OKD--1-LARA.  Filippo Winn, MS, PA([facility])      Disclaimer 11/22/2022    Final                    Value:Unless the case is a 'gross only' or additional testing only, the final  diagnosis for each specimen is based on a microscopic examination of  appropriate tissue sections.           Imaging:  I personally reviewed imaging studies and outside records..    Imaging study:       Assessment:       1. Elevated liver enzymes    2. Gastroesophageal reflux disease without esophagitis    3. Hiatal hernia    4. Polyp of colon, unspecified part of colon, unspecified type    5. Hypothyroidism due to acquired atrophy of thyroid            Plan:     Luz was seen today for elevated hepatic enzymes and hepatic disease.    Diagnoses and all orders for this visit:    Elevated liver enzymes  -     CBC Auto Differential; Future; Expected date: 05/16/2023  -     Comprehensive Metabolic Panel; Future; Expected date: 05/16/2023  -     US Elastography Liver w/imaging; Future; Expected date: 05/16/2023    Gastroesophageal reflux disease without esophagitis    Hiatal hernia    Polyp of colon, unspecified part of colon, unspecified type    Hypothyroidism due to acquired atrophy of thyroid          Elevated liver enzymes  Labs today  Etiological work up negative in 2021 except postive PeTH  Stop alcohol and avoid NSAIDs    GERD  Continue PPI    Hiatal Hernia  Diet and education    Colonic polyp  On surveillance        I have sent communication to the referring physician and/or primary care provider.  Time spent during this encounter includes preparing for, treating, managing this patient and over half of that time was spent on counseling and coordination of care.  I also spent 30 minutes in reviewing the labs and medical records. We discussed in depth the nature " of the patient's liver disease, the management plan in details. I have provided the patient with an opportunity to ask questions and have all questions answered to his satisfaction.       Butch Blackwell MD  Transplant Hepatologist and Gastroenterologist  Hepatology and Liver Transplant  [facility]  [facility]

## 2023-05-15 ENCOUNTER — HOSPITAL ENCOUNTER (OUTPATIENT)
Dept: RADIOLOGY | Facility: HOSPITAL | Age: 73
Discharge: HOME | End: 2023-05-15
Attending: FAMILY MEDICINE
Payer: MEDICARE

## 2023-05-15 DIAGNOSIS — Z12.31 ENCOUNTER FOR SCREENING MAMMOGRAM FOR BREAST CANCER: ICD-10-CM

## 2023-05-15 PROCEDURE — 77063 BREAST TOMOSYNTHESIS BI: CPT | Mod: 26,,, | Performed by: RADIOLOGY

## 2023-05-15 PROCEDURE — 77067 SCR MAMMO BI INCL CAD: CPT | Mod: TC,PO

## 2023-05-15 PROCEDURE — 77067 SCR MAMMO BI INCL CAD: CPT | Mod: 26,,, | Performed by: RADIOLOGY

## 2023-05-15 PROCEDURE — 77063 MAMMO DIGITAL SCREENING BILAT WITH TOMO: ICD-10-PCS | Mod: 26,,, | Performed by: RADIOLOGY

## 2023-05-15 PROCEDURE — 77067 MAMMO DIGITAL SCREENING BILAT WITH TOMO: ICD-10-PCS | Mod: 26,,, | Performed by: RADIOLOGY

## 2023-05-16 ENCOUNTER — OFFICE VISIT (OUTPATIENT)
Dept: HEPATOLOGY | Facility: CLINIC | Age: 73
End: 2023-05-16
Payer: MEDICARE

## 2023-05-16 ENCOUNTER — CLINICAL SUPPORT (OUTPATIENT)
Dept: ENDOSCOPY | Facility: HOSPITAL | Age: 73
End: 2023-05-16
Attending: SURGERY
Payer: MEDICARE

## 2023-05-16 VITALS
HEIGHT: 64 IN | BODY MASS INDEX: 27.55 KG/M2 | HEART RATE: 68 BPM | DIASTOLIC BLOOD PRESSURE: 62 MMHG | WEIGHT: 161.38 LBS | SYSTOLIC BLOOD PRESSURE: 114 MMHG

## 2023-05-16 VITALS — BODY MASS INDEX: 27.49 KG/M2 | WEIGHT: 161 LBS | HEIGHT: 64 IN

## 2023-05-16 DIAGNOSIS — K44.9 HERNIA, HIATAL: ICD-10-CM

## 2023-05-16 DIAGNOSIS — K21.9 GASTROESOPHAGEAL REFLUX DISEASE WITHOUT ESOPHAGITIS: Chronic | ICD-10-CM

## 2023-05-16 DIAGNOSIS — K44.9 HIATAL HERNIA: ICD-10-CM

## 2023-05-16 DIAGNOSIS — K63.5 POLYP OF COLON, UNSPECIFIED PART OF COLON, UNSPECIFIED TYPE: ICD-10-CM

## 2023-05-16 DIAGNOSIS — R74.8 ELEVATED LIVER ENZYMES: Primary | ICD-10-CM

## 2023-05-16 DIAGNOSIS — K21.9 GASTROESOPHAGEAL REFLUX DISEASE, UNSPECIFIED WHETHER ESOPHAGITIS PRESENT: ICD-10-CM

## 2023-05-16 DIAGNOSIS — E03.4 HYPOTHYROIDISM DUE TO ACQUIRED ATROPHY OF THYROID: ICD-10-CM

## 2023-05-16 PROCEDURE — 3074F PR MOST RECENT SYSTOLIC BLOOD PRESSURE < 130 MM HG: ICD-10-PCS | Mod: CPTII,,, | Performed by: INTERNAL MEDICINE

## 2023-05-16 PROCEDURE — 3074F SYST BP LT 130 MM HG: CPT | Mod: CPTII,,, | Performed by: INTERNAL MEDICINE

## 2023-05-16 PROCEDURE — 99214 PR OFFICE/OUTPT VISIT, EST, LEVL IV, 30-39 MIN: ICD-10-PCS | Mod: ,,, | Performed by: INTERNAL MEDICINE

## 2023-05-16 PROCEDURE — 1126F AMNT PAIN NOTED NONE PRSNT: CPT | Mod: CPTII,,, | Performed by: INTERNAL MEDICINE

## 2023-05-16 PROCEDURE — 3078F DIAST BP <80 MM HG: CPT | Mod: CPTII,,, | Performed by: INTERNAL MEDICINE

## 2023-05-16 PROCEDURE — 1159F PR MEDICATION LIST DOCUMENTED IN MEDICAL RECORD: ICD-10-PCS | Mod: CPTII,,, | Performed by: INTERNAL MEDICINE

## 2023-05-16 PROCEDURE — 3078F PR MOST RECENT DIASTOLIC BLOOD PRESSURE < 80 MM HG: ICD-10-PCS | Mod: CPTII,,, | Performed by: INTERNAL MEDICINE

## 2023-05-16 PROCEDURE — 1101F PT FALLS ASSESS-DOCD LE1/YR: CPT | Mod: CPTII,,, | Performed by: INTERNAL MEDICINE

## 2023-05-16 PROCEDURE — 99214 OFFICE O/P EST MOD 30 MIN: CPT | Mod: ,,, | Performed by: INTERNAL MEDICINE

## 2023-05-16 PROCEDURE — 1126F PR PAIN SEVERITY QUANTIFIED, NO PAIN PRESENT: ICD-10-PCS | Mod: CPTII,,, | Performed by: INTERNAL MEDICINE

## 2023-05-16 PROCEDURE — 3288F FALL RISK ASSESSMENT DOCD: CPT | Mod: CPTII,,, | Performed by: INTERNAL MEDICINE

## 2023-05-16 PROCEDURE — 3008F PR BODY MASS INDEX (BMI) DOCUMENTED: ICD-10-PCS | Mod: CPTII,,, | Performed by: INTERNAL MEDICINE

## 2023-05-16 PROCEDURE — 99999 PR PBB SHADOW E&M-EST. PATIENT-LVL V: ICD-10-PCS | Mod: PBBFAC,,, | Performed by: INTERNAL MEDICINE

## 2023-05-16 PROCEDURE — 99999 PR PBB SHADOW E&M-EST. PATIENT-LVL V: CPT | Mod: PBBFAC,,, | Performed by: INTERNAL MEDICINE

## 2023-05-16 PROCEDURE — 3288F PR FALLS RISK ASSESSMENT DOCUMENTED: ICD-10-PCS | Mod: CPTII,,, | Performed by: INTERNAL MEDICINE

## 2023-05-16 PROCEDURE — 1159F MED LIST DOCD IN RCRD: CPT | Mod: CPTII,,, | Performed by: INTERNAL MEDICINE

## 2023-05-16 PROCEDURE — 1101F PR PT FALLS ASSESS DOC 0-1 FALLS W/OUT INJ PAST YR: ICD-10-PCS | Mod: CPTII,,, | Performed by: INTERNAL MEDICINE

## 2023-05-16 PROCEDURE — 3008F BODY MASS INDEX DOCD: CPT | Mod: CPTII,,, | Performed by: INTERNAL MEDICINE

## 2023-05-16 NOTE — PATIENT INSTRUCTIONS
Recommend healthy living: no nicotine,  should avoid alcohol, healthy diet and regular exercise aiming for fitness, and weight control  Discussed healthy alcohol daily limit of 0.5 oz of pure alcohol in any 24 hours (roughly one 12-oz beers, 4 oz of wine (8%-12% alcohol), or 1.25 oz (half a shot) of liquor (80 proof)), can not save up.  2.   Discussed good exercise program, 4 key elements: 1. Aerobic (walking, swimming, dancing, jogging, biking, etc, 2. Muscle strengthening / resistance exercise, need to do 2-3 times  weekly, 3. Stretching daily, good stretch takes a whole  total minute. 4. Balance exercise daily.  3.   Discussed healthy diet for over 15 minutes with handouts given to the patient

## 2023-05-23 ENCOUNTER — LAB VISIT (OUTPATIENT)
Dept: LAB | Facility: HOSPITAL | Age: 73
End: 2023-05-23
Attending: INTERNAL MEDICINE
Payer: MEDICARE

## 2023-05-23 DIAGNOSIS — R74.8 ELEVATED LIVER ENZYMES: Primary | ICD-10-CM

## 2023-05-23 LAB
ALBUMIN SERPL BCP-MCNC: 3.8 G/DL (ref 3.5–5.2)
ALBUMIN SERPL BCP-MCNC: 3.8 G/DL (ref 3.5–5.2)
ALP SERPL-CCNC: 54 U/L (ref 38–126)
ALP SERPL-CCNC: 54 U/L (ref 38–126)
ALT SERPL W/O P-5'-P-CCNC: 19 U/L (ref 10–44)
ALT SERPL W/O P-5'-P-CCNC: 19 U/L (ref 10–44)
ANION GAP SERPL CALC-SCNC: 3 MMOL/L (ref 8–16)
ANION GAP SERPL CALC-SCNC: 3 MMOL/L (ref 8–16)
AST SERPL-CCNC: 29 U/L (ref 15–46)
AST SERPL-CCNC: 29 U/L (ref 15–46)
BASOPHILS # BLD AUTO: 0.07 K/UL (ref 0–0.2)
BASOPHILS # BLD AUTO: 0.07 K/UL (ref 0–0.2)
BASOPHILS NFR BLD: 1.2 % (ref 0–1.9)
BASOPHILS NFR BLD: 1.2 % (ref 0–1.9)
BILIRUB SERPL-MCNC: 0.4 MG/DL (ref 0.1–1)
BILIRUB SERPL-MCNC: 0.4 MG/DL (ref 0.1–1)
CALCIUM SERPL-MCNC: 8.6 MG/DL (ref 8.7–10.5)
CALCIUM SERPL-MCNC: 8.6 MG/DL (ref 8.7–10.5)
CHLORIDE SERPL-SCNC: 103 MMOL/L (ref 95–110)
CHLORIDE SERPL-SCNC: 103 MMOL/L (ref 95–110)
CO2 SERPL-SCNC: 31 MMOL/L (ref 23–29)
CO2 SERPL-SCNC: 31 MMOL/L (ref 23–29)
CREAT SERPL-MCNC: 0.72 MG/DL (ref 0.5–1.4)
CREAT SERPL-MCNC: 0.72 MG/DL (ref 0.5–1.4)
DIFFERENTIAL METHOD: ABNORMAL
DIFFERENTIAL METHOD: ABNORMAL
EOSINOPHIL # BLD AUTO: 0.4 K/UL (ref 0–0.5)
EOSINOPHIL # BLD AUTO: 0.4 K/UL (ref 0–0.5)
EOSINOPHIL NFR BLD: 6 % (ref 0–8)
EOSINOPHIL NFR BLD: 6 % (ref 0–8)
ERYTHROCYTE [DISTWIDTH] IN BLOOD BY AUTOMATED COUNT: 11.2 % (ref 11.5–14.5)
ERYTHROCYTE [DISTWIDTH] IN BLOOD BY AUTOMATED COUNT: 11.2 % (ref 11.5–14.5)
EST. GFR  (NO RACE VARIABLE): >60 ML/MIN/1.73 M^2
EST. GFR  (NO RACE VARIABLE): >60 ML/MIN/1.73 M^2
GLUCOSE SERPL-MCNC: 97 MG/DL (ref 70–110)
GLUCOSE SERPL-MCNC: 97 MG/DL (ref 70–110)
HCT VFR BLD AUTO: 34.9 % (ref 37–48.5)
HCT VFR BLD AUTO: 34.9 % (ref 37–48.5)
HGB BLD-MCNC: 11.7 G/DL (ref 12–16)
HGB BLD-MCNC: 11.7 G/DL (ref 12–16)
IMM GRANULOCYTES # BLD AUTO: 0.03 K/UL (ref 0–0.04)
IMM GRANULOCYTES # BLD AUTO: 0.03 K/UL (ref 0–0.04)
IMM GRANULOCYTES NFR BLD AUTO: 0.5 % (ref 0–0.5)
IMM GRANULOCYTES NFR BLD AUTO: 0.5 % (ref 0–0.5)
LYMPHOCYTES # BLD AUTO: 1.7 K/UL (ref 1–4.8)
LYMPHOCYTES # BLD AUTO: 1.7 K/UL (ref 1–4.8)
LYMPHOCYTES NFR BLD: 29.4 % (ref 18–48)
LYMPHOCYTES NFR BLD: 29.4 % (ref 18–48)
MCH RBC QN AUTO: 33 PG (ref 27–31)
MCH RBC QN AUTO: 33 PG (ref 27–31)
MCHC RBC AUTO-ENTMCNC: 33.5 G/DL (ref 32–36)
MCHC RBC AUTO-ENTMCNC: 33.5 G/DL (ref 32–36)
MCV RBC AUTO: 98 FL (ref 82–98)
MCV RBC AUTO: 98 FL (ref 82–98)
MONOCYTES # BLD AUTO: 0.6 K/UL (ref 0.3–1)
MONOCYTES # BLD AUTO: 0.6 K/UL (ref 0.3–1)
MONOCYTES NFR BLD: 9.5 % (ref 4–15)
MONOCYTES NFR BLD: 9.5 % (ref 4–15)
NEUTROPHILS # BLD AUTO: 3.1 K/UL (ref 1.8–7.7)
NEUTROPHILS # BLD AUTO: 3.1 K/UL (ref 1.8–7.7)
NEUTROPHILS NFR BLD: 53.4 % (ref 38–73)
NEUTROPHILS NFR BLD: 53.4 % (ref 38–73)
NRBC BLD-RTO: 0 /100 WBC
NRBC BLD-RTO: 0 /100 WBC
PLATELET # BLD AUTO: 273 K/UL (ref 150–450)
PLATELET # BLD AUTO: 273 K/UL (ref 150–450)
PMV BLD AUTO: 9.5 FL (ref 9.2–12.9)
PMV BLD AUTO: 9.5 FL (ref 9.2–12.9)
POTASSIUM SERPL-SCNC: 4.5 MMOL/L (ref 3.5–5.1)
POTASSIUM SERPL-SCNC: 4.5 MMOL/L (ref 3.5–5.1)
PROT SERPL-MCNC: 6.4 G/DL (ref 6–8.4)
PROT SERPL-MCNC: 6.4 G/DL (ref 6–8.4)
RBC # BLD AUTO: 3.55 M/UL (ref 4–5.4)
RBC # BLD AUTO: 3.55 M/UL (ref 4–5.4)
SODIUM SERPL-SCNC: 137 MMOL/L (ref 136–145)
SODIUM SERPL-SCNC: 137 MMOL/L (ref 136–145)
UUN UR-MCNC: 11 MG/DL (ref 7–17)
UUN UR-MCNC: 11 MG/DL (ref 7–17)
WBC # BLD AUTO: 5.79 K/UL (ref 3.9–12.7)
WBC # BLD AUTO: 5.79 K/UL (ref 3.9–12.7)

## 2023-05-23 PROCEDURE — 80053 COMPREHEN METABOLIC PANEL: CPT | Mod: PO | Performed by: INTERNAL MEDICINE

## 2023-05-23 PROCEDURE — 85025 COMPLETE CBC W/AUTO DIFF WBC: CPT | Mod: PO | Performed by: INTERNAL MEDICINE

## 2023-05-23 PROCEDURE — 36415 COLL VENOUS BLD VENIPUNCTURE: CPT | Mod: PO | Performed by: INTERNAL MEDICINE

## 2023-05-26 ENCOUNTER — OFFICE VISIT (OUTPATIENT)
Dept: INTERNAL MEDICINE | Facility: CLINIC | Age: 73
End: 2023-05-26
Payer: MEDICARE

## 2023-05-26 VITALS
WEIGHT: 158.75 LBS | HEIGHT: 64 IN | OXYGEN SATURATION: 98 % | HEART RATE: 78 BPM | SYSTOLIC BLOOD PRESSURE: 116 MMHG | DIASTOLIC BLOOD PRESSURE: 74 MMHG | BODY MASS INDEX: 27.1 KG/M2

## 2023-05-26 DIAGNOSIS — F33.0 MAJOR DEPRESSIVE DISORDER, RECURRENT, MILD: ICD-10-CM

## 2023-05-26 DIAGNOSIS — Z00.00 ENCOUNTER FOR PREVENTIVE HEALTH EXAMINATION: Primary | ICD-10-CM

## 2023-05-26 DIAGNOSIS — R00.1 SINUS BRADYCARDIA: ICD-10-CM

## 2023-05-26 DIAGNOSIS — I51.89 DIASTOLIC DYSFUNCTION: ICD-10-CM

## 2023-05-26 DIAGNOSIS — I73.9 PERIPHERAL VASCULAR DISEASE, UNSPECIFIED: ICD-10-CM

## 2023-05-26 DIAGNOSIS — Z79.01 LONG TERM (CURRENT) USE OF ANTICOAGULANTS: ICD-10-CM

## 2023-05-26 DIAGNOSIS — E53.8 B12 DEFICIENCY: ICD-10-CM

## 2023-05-26 DIAGNOSIS — R29.898 WEAKNESS OF EXTREMITY: ICD-10-CM

## 2023-05-26 DIAGNOSIS — M25.60 STIFFNESS IN JOINT: ICD-10-CM

## 2023-05-26 DIAGNOSIS — D69.2 SENILE PURPURA: ICD-10-CM

## 2023-05-26 DIAGNOSIS — G56.01 CARPAL TUNNEL SYNDROME ON RIGHT: ICD-10-CM

## 2023-05-26 DIAGNOSIS — K44.9 HERNIA, HIATAL: ICD-10-CM

## 2023-05-26 DIAGNOSIS — K63.5 POLYP OF COLON, UNSPECIFIED PART OF COLON, UNSPECIFIED TYPE: ICD-10-CM

## 2023-05-26 DIAGNOSIS — M17.0 OSTEOARTHRITIS OF BOTH KNEES, UNSPECIFIED OSTEOARTHRITIS TYPE: ICD-10-CM

## 2023-05-26 DIAGNOSIS — I87.2 VENOUS INSUFFICIENCY OF BOTH LOWER EXTREMITIES: ICD-10-CM

## 2023-05-26 DIAGNOSIS — K21.9 GASTROESOPHAGEAL REFLUX DISEASE WITHOUT ESOPHAGITIS: Chronic | ICD-10-CM

## 2023-05-26 DIAGNOSIS — E03.4 HYPOTHYROIDISM DUE TO ACQUIRED ATROPHY OF THYROID: ICD-10-CM

## 2023-05-26 DIAGNOSIS — F41.9 ANXIETY: ICD-10-CM

## 2023-05-26 DIAGNOSIS — R19.4 CHANGE IN BOWEL HABITS: ICD-10-CM

## 2023-05-26 DIAGNOSIS — M19.049 CMC ARTHRITIS: ICD-10-CM

## 2023-05-26 DIAGNOSIS — G44.039 EPISODIC PAROXYSMAL HEMICRANIA, NOT INTRACTABLE: ICD-10-CM

## 2023-05-26 DIAGNOSIS — B00.1 RECURRENT HERPES LABIALIS: ICD-10-CM

## 2023-05-26 PROCEDURE — 3008F PR BODY MASS INDEX (BMI) DOCUMENTED: ICD-10-PCS | Mod: CPTII,S$GLB,, | Performed by: NURSE PRACTITIONER

## 2023-05-26 PROCEDURE — 3288F FALL RISK ASSESSMENT DOCD: CPT | Mod: CPTII,S$GLB,, | Performed by: NURSE PRACTITIONER

## 2023-05-26 PROCEDURE — 1159F MED LIST DOCD IN RCRD: CPT | Mod: CPTII,S$GLB,, | Performed by: NURSE PRACTITIONER

## 2023-05-26 PROCEDURE — 99999 PR PBB SHADOW E&M-EST. PATIENT-LVL V: CPT | Mod: PBBFAC,,, | Performed by: NURSE PRACTITIONER

## 2023-05-26 PROCEDURE — 1159F PR MEDICATION LIST DOCUMENTED IN MEDICAL RECORD: ICD-10-PCS | Mod: CPTII,S$GLB,, | Performed by: NURSE PRACTITIONER

## 2023-05-26 PROCEDURE — 3074F PR MOST RECENT SYSTOLIC BLOOD PRESSURE < 130 MM HG: ICD-10-PCS | Mod: CPTII,S$GLB,, | Performed by: NURSE PRACTITIONER

## 2023-05-26 PROCEDURE — 99499 RISK ADDL DX/OHS AUDIT: ICD-10-PCS | Mod: S$GLB,,, | Performed by: NURSE PRACTITIONER

## 2023-05-26 PROCEDURE — 99999 PR PBB SHADOW E&M-EST. PATIENT-LVL V: ICD-10-PCS | Mod: PBBFAC,,, | Performed by: NURSE PRACTITIONER

## 2023-05-26 PROCEDURE — 1160F RVW MEDS BY RX/DR IN RCRD: CPT | Mod: CPTII,S$GLB,, | Performed by: NURSE PRACTITIONER

## 2023-05-26 PROCEDURE — 1170F PR FUNCTIONAL STATUS ASSESSED: ICD-10-PCS | Mod: CPTII,S$GLB,, | Performed by: NURSE PRACTITIONER

## 2023-05-26 PROCEDURE — 1100F PR PT FALLS ASSESS DOC 2+ FALLS/FALL W/INJURY/YR: ICD-10-PCS | Mod: CPTII,S$GLB,, | Performed by: NURSE PRACTITIONER

## 2023-05-26 PROCEDURE — 3074F SYST BP LT 130 MM HG: CPT | Mod: CPTII,S$GLB,, | Performed by: NURSE PRACTITIONER

## 2023-05-26 PROCEDURE — 1170F FXNL STATUS ASSESSED: CPT | Mod: CPTII,S$GLB,, | Performed by: NURSE PRACTITIONER

## 2023-05-26 PROCEDURE — G0439 PR MEDICARE ANNUAL WELLNESS SUBSEQUENT VISIT: ICD-10-PCS | Mod: S$GLB,,, | Performed by: NURSE PRACTITIONER

## 2023-05-26 PROCEDURE — 3008F BODY MASS INDEX DOCD: CPT | Mod: CPTII,S$GLB,, | Performed by: NURSE PRACTITIONER

## 2023-05-26 PROCEDURE — 3078F PR MOST RECENT DIASTOLIC BLOOD PRESSURE < 80 MM HG: ICD-10-PCS | Mod: CPTII,S$GLB,, | Performed by: NURSE PRACTITIONER

## 2023-05-26 PROCEDURE — 1126F AMNT PAIN NOTED NONE PRSNT: CPT | Mod: CPTII,S$GLB,, | Performed by: NURSE PRACTITIONER

## 2023-05-26 PROCEDURE — 1160F PR REVIEW ALL MEDS BY PRESCRIBER/CLIN PHARMACIST DOCUMENTED: ICD-10-PCS | Mod: CPTII,S$GLB,, | Performed by: NURSE PRACTITIONER

## 2023-05-26 PROCEDURE — 1126F PR PAIN SEVERITY QUANTIFIED, NO PAIN PRESENT: ICD-10-PCS | Mod: CPTII,S$GLB,, | Performed by: NURSE PRACTITIONER

## 2023-05-26 PROCEDURE — G0439 PPPS, SUBSEQ VISIT: HCPCS | Mod: S$GLB,,, | Performed by: NURSE PRACTITIONER

## 2023-05-26 PROCEDURE — 99499 UNLISTED E&M SERVICE: CPT | Mod: S$GLB,,, | Performed by: NURSE PRACTITIONER

## 2023-05-26 PROCEDURE — 3078F DIAST BP <80 MM HG: CPT | Mod: CPTII,S$GLB,, | Performed by: NURSE PRACTITIONER

## 2023-05-26 PROCEDURE — 3288F PR FALLS RISK ASSESSMENT DOCUMENTED: ICD-10-PCS | Mod: CPTII,S$GLB,, | Performed by: NURSE PRACTITIONER

## 2023-05-26 PROCEDURE — 1100F PTFALLS ASSESS-DOCD GE2>/YR: CPT | Mod: CPTII,S$GLB,, | Performed by: NURSE PRACTITIONER

## 2023-05-26 NOTE — PROGRESS NOTES
"Luz Burk presented for a  Medicare AWV and comprehensive Health Risk Assessment today. The following components were reviewed and updated:    Medical history  Family History  Social history  Allergies and Current Medications  Health Risk Assessment  Health Maintenance  Care Team         ** See Completed Assessments for Annual Wellness Visit within the encounter summary.**         The following assessments were completed:  Living Situation  CAGE  Depression Screening  Timed Get Up and Go  Whisper Test  Cognitive Function Screening  Nutrition Screening  ADL Screening  PAQ Screening        Vitals:    05/26/23 1307   BP: 116/74   Pulse: 78   SpO2: 98%   Weight: 72 kg (158 lb 11.7 oz)   Height: 5' 4" (1.626 m)     Body mass index is 27.25 kg/m².  Physical Exam  Vitals and nursing note reviewed.   Constitutional:       General: She is not in acute distress.     Appearance: She is well-developed. She is not ill-appearing.   HENT:      Head: Normocephalic and atraumatic.   Eyes:      Pupils: Pupils are equal, round, and reactive to light.   Cardiovascular:      Rate and Rhythm: Normal rate and regular rhythm.      Heart sounds: Normal heart sounds.   Pulmonary:      Effort: Pulmonary effort is normal. No respiratory distress.      Breath sounds: Normal breath sounds.   Musculoskeletal:         General: Normal range of motion.      Cervical back: Normal range of motion.   Skin:     General: Skin is warm and dry.   Neurological:      Mental Status: She is alert and oriented to person, place, and time.      Coordination: Coordination normal.   Psychiatric:         Mood and Affect: Mood normal.         Behavior: Behavior normal.         Thought Content: Thought content normal.         Judgment: Judgment normal.             Diagnoses and health risks identified today and associated recommendations/orders:    1. Encounter for preventive health examination    2. Major depressive disorder, recurrent, mild  Chronic; stable. " Continue current treatment plan as previously prescribed by PCP.     3. Senile purpura  Chronic; stable. Continue current treatment plan as previously prescribed by PCP.     4. Peripheral vascular disease, unspecified  Chronic; stable. Continue current treatment plan as previously prescribed by PCP.    5. Venous insufficiency of both lower extremities  Chronic; stable. Continue current treatment plan as previously prescribed by PCP.    6. Sinus bradycardia  Chronic; stable. Continue current treatment plan as previously prescribed by PCP.    7. Diastolic dysfunction  Chronic; stable. Continue current treatment plan as previously prescribed by PCP.    8. Episodic paroxysmal hemicrania, not intractable  Chronic; stable. Continue current treatment plan as previously prescribed by PCP.    9. Carpal tunnel syndrome on right  Chronic; stable. Continue current treatment plan as previously prescribed by PCP.    10. Anxiety  Chronic; stable. Continue current treatment plan as previously prescribed by PCP.    11. Long term (current) use of anticoagulants  Chronic; stable. Continue current treatment plan as previously prescribed by PCP.    12. Recurrent herpes labialis  Chronic; stable. Continue current treatment plan as previously prescribed by PCP.    13. Hypothyroidism due to acquired atrophy of thyroid  Chronic; stable. Continue current treatment plan as previously prescribed by PCP.    14. Gastroesophageal reflux disease without esophagitis  Chronic; stable. Continue current treatment plan as previously prescribed by PCP.    15. Hernia, hiatal  Chronic; stable. Continue current treatment plan as previously prescribed by PCP.    16. Polyp of colon, unspecified part of colon, unspecified type  Chronic; stable. Continue current treatment plan as previously prescribed by PCP.    17. Change in bowel habits  Chronic; stable. Continue current treatment plan as previously prescribed by PCP.    18. Osteoarthritis of both knees,  unspecified osteoarthritis type  Chronic; stable. Continue current treatment plan as previously prescribed by PCP.    19. CMC arthritis  Chronic; stable. Continue current treatment plan as previously prescribed by PCP.    20. Weakness of extremity  Chronic; stable. Continue current treatment plan as previously prescribed by PCP.    21. Stiffness in joint  Chronic; stable. Continue current treatment plan as previously prescribed by PCP.      Provided Luz with a 5-10 year written screening schedule and personal prevention plan. Recommendations were developed using the USPSTF age appropriate recommendations. Education, counseling, and referrals were provided as needed. After Visit Summary printed and given to patient which includes a list of additional screenings\tests needed.    Review for Opioid Screening: Patient does not have rx for Opioids.  Review for Substance Use Disorders: Patient does not use substance.    Follow up for AWV in 1 year.    Carl Irving NP    I offered to discuss advanced care planning, including how to pick a person who would make decisions for you if you were unable to make them for yourself, called a health care power of , and what kind of decisions you might make such as use of life sustaining treatments such as ventilators and tube feeding when faced with a life limiting illness recorded on a living will that they will need to know. (How you want to be cared for as you near the end of your natural life)     X Patient is interested in learning more about how to make advanced directives.  I provided them paperwork and offered to discuss this with them.

## 2023-05-26 NOTE — PATIENT INSTRUCTIONS
Counseling and Referral of Other Preventative  (Italic type indicates deductible and co-insurance are waived)    Patient Name: Luz Burk  Today's Date: 5/26/2023    Health Maintenance         Date Due Completion Date    COVID-19 Vaccine (5 - Moderna series) 05/05/2022 3/10/2022    Influenza Vaccine (Season Ended) 09/01/2023 12/5/2021    Override on 11/13/2019: Done    Mammogram 05/15/2024 5/15/2023    Override on 8/4/2014: Done    Colorectal Cancer Screening 06/17/2025 6/17/2020    DEXA Scan 07/18/2025 7/18/2022    Override on 1/1/2016: Done (dexa at dr davis -)    Lipid Panel 04/05/2027 4/5/2022    TETANUS VACCINE 02/22/2028 2/22/2018          No orders of the defined types were placed in this encounter.    The following information is provided to all patients.  This information is to help you find resources for any of the problems found today that may be affecting your health:                Living healthy guide: www.[facility].[place].gov      Understanding Diabetes: www.diabetes.org      Eating healthy: www.cdc.gov/healthyweight      CDC home safety checklist: www.cdc.gov/steadi/patient.html      Agency on Aging: www.goea.[place].gov      Alcoholics anonymous (AA): www.aa.org      Physical Activity: www.mary.nih.gov/he4cafu      Tobacco use: www.quitwithusla.org

## 2023-05-27 RX ORDER — CYANOCOBALAMIN 1000 UG/ML
INJECTION, SOLUTION INTRAMUSCULAR; SUBCUTANEOUS
Qty: 10 ML | Refills: 5 | Status: SHIPPED | OUTPATIENT
Start: 2023-05-27

## 2023-06-07 ENCOUNTER — PATIENT MESSAGE (OUTPATIENT)
Dept: BARIATRICS | Facility: CLINIC | Age: 73
End: 2023-06-07
Payer: MEDICARE

## 2023-06-13 ENCOUNTER — PATIENT MESSAGE (OUTPATIENT)
Dept: BARIATRICS | Facility: CLINIC | Age: 73
End: 2023-06-13
Payer: MEDICARE

## 2023-07-19 ENCOUNTER — HOSPITAL ENCOUNTER (OUTPATIENT)
Facility: HOSPITAL | Age: 73
Discharge: HOME | End: 2023-07-19
Attending: INTERNAL MEDICINE | Admitting: INTERNAL MEDICINE
Payer: MEDICARE

## 2023-07-19 ENCOUNTER — ANESTHESIA (OUTPATIENT)
Dept: ENDOSCOPY | Facility: HOSPITAL | Age: 73
End: 2023-07-19
Payer: MEDICARE

## 2023-07-19 ENCOUNTER — ANESTHESIA EVENT (OUTPATIENT)
Dept: ENDOSCOPY | Facility: HOSPITAL | Age: 73
End: 2023-07-19
Payer: MEDICARE

## 2023-07-19 VITALS
TEMPERATURE: 98 F | DIASTOLIC BLOOD PRESSURE: 60 MMHG | OXYGEN SATURATION: 100 % | HEIGHT: 64 IN | SYSTOLIC BLOOD PRESSURE: 126 MMHG | WEIGHT: 165 LBS | HEART RATE: 65 BPM | BODY MASS INDEX: 28.17 KG/M2 | RESPIRATION RATE: 16 BRPM

## 2023-07-19 DIAGNOSIS — K21.9 GASTROESOPHAGEAL REFLUX DISEASE WITHOUT ESOPHAGITIS: Primary | Chronic | ICD-10-CM

## 2023-07-19 DIAGNOSIS — K21.9 GERD (GASTROESOPHAGEAL REFLUX DISEASE): ICD-10-CM

## 2023-07-19 PROCEDURE — 43251 EGD REMOVE LESION SNARE: CPT | Performed by: INTERNAL MEDICINE

## 2023-07-19 PROCEDURE — 88305 TISSUE EXAM BY PATHOLOGIST: CPT | Performed by: STUDENT IN AN ORGANIZED HEALTH CARE EDUCATION/TRAINING PROGRAM

## 2023-07-19 PROCEDURE — 43251 PR EGD, FLEX, W/REMOVAL, TUMOR/POLYP/LESION(S), SNARE: ICD-10-PCS | Mod: ,,, | Performed by: INTERNAL MEDICINE

## 2023-07-19 PROCEDURE — E9220 PRA ENDO ANESTHESIA: ICD-10-PCS | Mod: ,,, | Performed by: NURSE ANESTHETIST, CERTIFIED REGISTERED

## 2023-07-19 PROCEDURE — 25000003 PHARM REV CODE 250: Performed by: NURSE ANESTHETIST, CERTIFIED REGISTERED

## 2023-07-19 PROCEDURE — 37000009 HC ANESTHESIA EA ADD 15 MINS: Performed by: INTERNAL MEDICINE

## 2023-07-19 PROCEDURE — 43239 EGD BIOPSY SINGLE/MULTIPLE: CPT | Mod: 59,,, | Performed by: INTERNAL MEDICINE

## 2023-07-19 PROCEDURE — 88342 CHG IMMUNOCYTOCHEMISTRY: ICD-10-PCS | Mod: 26,,, | Performed by: STUDENT IN AN ORGANIZED HEALTH CARE EDUCATION/TRAINING PROGRAM

## 2023-07-19 PROCEDURE — 27201089 HC SNARE, DISP (ANY): Performed by: INTERNAL MEDICINE

## 2023-07-19 PROCEDURE — 88342 IMHCHEM/IMCYTCHM 1ST ANTB: CPT | Mod: 26,,, | Performed by: STUDENT IN AN ORGANIZED HEALTH CARE EDUCATION/TRAINING PROGRAM

## 2023-07-19 PROCEDURE — 88305 TISSUE EXAM BY PATHOLOGIST: CPT | Mod: 26,,, | Performed by: STUDENT IN AN ORGANIZED HEALTH CARE EDUCATION/TRAINING PROGRAM

## 2023-07-19 PROCEDURE — 88342 IMHCHEM/IMCYTCHM 1ST ANTB: CPT | Performed by: STUDENT IN AN ORGANIZED HEALTH CARE EDUCATION/TRAINING PROGRAM

## 2023-07-19 PROCEDURE — 43239 PR EGD, FLEX, W/BIOPSY, SGL/MULTI: ICD-10-PCS | Mod: 59,,, | Performed by: INTERNAL MEDICINE

## 2023-07-19 PROCEDURE — 37000008 HC ANESTHESIA 1ST 15 MINUTES: Performed by: INTERNAL MEDICINE

## 2023-07-19 PROCEDURE — 43239 EGD BIOPSY SINGLE/MULTIPLE: CPT | Mod: 59 | Performed by: INTERNAL MEDICINE

## 2023-07-19 PROCEDURE — 27201012 HC FORCEPS, HOT/COLD, DISP: Performed by: INTERNAL MEDICINE

## 2023-07-19 PROCEDURE — E9220 PRA ENDO ANESTHESIA: HCPCS | Mod: ,,, | Performed by: NURSE ANESTHETIST, CERTIFIED REGISTERED

## 2023-07-19 PROCEDURE — 43251 EGD REMOVE LESION SNARE: CPT | Mod: ,,, | Performed by: INTERNAL MEDICINE

## 2023-07-19 PROCEDURE — 88305 TISSUE EXAM BY PATHOLOGIST: ICD-10-PCS | Mod: 26,,, | Performed by: STUDENT IN AN ORGANIZED HEALTH CARE EDUCATION/TRAINING PROGRAM

## 2023-07-19 PROCEDURE — 63600175 PHARM REV CODE 636 W HCPCS: Performed by: NURSE ANESTHETIST, CERTIFIED REGISTERED

## 2023-07-19 RX ORDER — LIDOCAINE HYDROCHLORIDE 20 MG/ML
INJECTION INTRAVENOUS
Status: DISCONTINUED | OUTPATIENT
Start: 2023-07-19 | End: 2023-07-19

## 2023-07-19 RX ORDER — SODIUM CHLORIDE 9 MG/ML
INJECTION, SOLUTION INTRAVENOUS CONTINUOUS
Status: DISCONTINUED | OUTPATIENT
Start: 2023-07-19 | End: 2023-07-19 | Stop reason: HOSPADM

## 2023-07-19 RX ORDER — PROPOFOL 10 MG/ML
VIAL (ML) INTRAVENOUS CONTINUOUS PRN
Status: DISCONTINUED | OUTPATIENT
Start: 2023-07-19 | End: 2023-07-19

## 2023-07-19 RX ORDER — PROPOFOL 10 MG/ML
VIAL (ML) INTRAVENOUS
Status: DISCONTINUED | OUTPATIENT
Start: 2023-07-19 | End: 2023-07-19

## 2023-07-19 RX ADMIN — PROPOFOL 100 MG: 10 INJECTION, EMULSION INTRAVENOUS at 09:07

## 2023-07-19 RX ADMIN — GLYCOPYRROLATE 0.1 MG: 0.2 INJECTION, SOLUTION INTRAMUSCULAR; INTRAVENOUS at 08:07

## 2023-07-19 RX ADMIN — Medication 225 MCG/KG/MIN: at 09:07

## 2023-07-19 RX ADMIN — LIDOCAINE HYDROCHLORIDE 100 MG: 20 INJECTION INTRAVENOUS at 09:07

## 2023-07-19 RX ADMIN — SODIUM CHLORIDE: 0.9 INJECTION, SOLUTION INTRAVENOUS at 08:07

## 2023-07-19 NOTE — ANESTHESIA PREPROCEDURE EVALUATION
"                                                                                                             2023  Luz Burk is a 72 y.o., female.  Pre-operative evaluation for ESOPHAGOGASTRODUODENOSCOPY (EGD) (N/A), PH MONITORING, ESOPHAGUS, WIRELESS, (OFF REFLUX MEDS) (N/A)    Chief Complaint:GERD, hiatla hernia    PMH:  Vertigo-inner ear cyrstals  Hydrocephalus- shunt-neurology assessment  unconcerning  S/p gastric banding- converted to gastric sleeve  Past Surgical History:   Procedure Laterality Date    APPENDECTOMY      ARTHROPLASTY OF JOINT OF FINGER Right 10/30/2020    Procedure: ARTHROPLASTY, FINGER cmc joint right thumb;  Surgeon: Sugey Ayon MD;  Location: [facility] OR;  Service: Orthopedics;  Laterality: Right;  MAC/Regional    BRAIN SURGERY   &      shunt    CARPAL TUNNEL RELEASE Right 2020    Procedure: RELEASE, CARPAL TUNNEL, RIGHT;  Surgeon: Sugey Ayon MD;  Location: [facility] OR;  Service: Orthopedics;  Laterality: Right;  Regional & MAC    CARPAL TUNNEL RELEASE Right 10/31/2022    Procedure: RELEASE, CARPAL TUNNEL "REVISION" RIGHT NEUROLYSIS MEDIAL NERVE, PATCH PLACEMENT;  Surgeon: Sugey Ayon MD;  Location: [facility] OR;  Service: Orthopedics;  Laterality: Right;    CATARACT EXTRACTION Bilateral     CERVICAL FUSION  1981     SECTION  1974,76&79    CHOLECYSTECTOMY      COLONOSCOPY N/A 2016    Procedure: COLONOSCOPY;  Surgeon: BINH Souza MD;  Location: [address]);  Service: Endoscopy;  Laterality: N/A;    COLONOSCOPY N/A 2020    Procedure: COLONOSCOPYSuprep;  Surgeon: Antonio Weller MD;  Location: [facility];  Service: Endoscopy;  Laterality: N/A;    ESOPHAGOGASTRODUODENOSCOPY N/A 2020    Procedure: EGD (ESOPHAGOGASTRODUODENOSCOPY);  Surgeon: Antonio Weller MD;  Location: [facility];  Service: Endoscopy;  Laterality: N/A;    ESOPHAGOGASTRODUODENOSCOPY N/A 2022    Procedure: EGD " (ESOPHAGOGASTRODUODENOSCOPY;  Surgeon: Antonio Weller MD;  Location: [facility];  Service: Endoscopy;  Laterality: N/A;    EXCISION OF GANGLION OF WRIST Right 07/24/2020    Procedure: EXCISION, GANGLION CYST, WRIST, RIGHT volar;  Surgeon: Sugey Ayon MD;  Location: [facility];  Service: Orthopedics;  Laterality: Right;  Regional & MAC    EXCISIONAL BIOPSY Right 11/22/2022    Procedure: EXCISIONAL BIOPSY;  Surgeon: Nelly Cabrera MD;  Location: [facility];  Service: Ophthalmology;  Laterality: Right;  Biopsy Left lower lid with cryo unit    EYE SURGERY  4-5 years ago    HERNIA REPAIR      HIATAL HERNIA REPAIR      HYSTERECTOMY      INTERNAL NEUROLYSIS USING OPERATING MICROSCOPE  10/31/2022    Procedure: NEUROLYSIS, INTERNAL, USING OPERATING MICROSCOPE;  Surgeon: Sugey Ayon MD;  Location: [facility];  Service: Orthopedics;;    JOINT REPLACEMENT Right 2010    knee    lap band surgery  10/11/2011    Realize C Band (11mL)    LAPAROSCOPIC GASTRIC BANDING      LAPAROSCOPIC REMOVAL OF GASTRIC BAND N/A 04/07/2021    Procedure: REMOVAL-GASTRIC BAND-LAPAROSCOPIC-;  Surgeon: Oswaldo Mayfield MD;  Location: [address];  Service: General;  Laterality: N/A;    LAPAROSCOPIC SLEEVE GASTRECTOMY N/A 04/07/2021    Procedure: GASTRECTOMY-SLEEVE-LAPAROSCOPIC;  Surgeon: Oswaldo Mayfield MD;  Location: [facility] OR [address] [place] [address];  Service: General;  Laterality: N/A;    OOPHORECTOMY      SPINE SURGERY  70s    TONSILLECTOMY      TOTAL KNEE ARTHROPLASTY  07/23/2012    VENTRICULOPERITONEAL SHUNT           Vital Signs Range (Last 24H):         CBC:   No results for input(s): WBC, RBC, HGB, HCT, PLT, MCV, MCH, MCHC in the last 720 hours.    CMP: No results for input(s): NA, K, CL, CO2, BUN, CREATININE, GLU, MG, PHOS, CALCIUM, ALBUMIN, PROT, ALKPHOS, ALT, AST, BILITOT in the last 720 hours.    INR:  No results for input(s): PT, INR, PROTIME, APTT in the last 720 hours.      Diagnostic  Studies:      EKD Echo:    Pre-op Assessment    I have reviewed the Patient Summary Reports.     I have reviewed the Nursing Notes. I have reviewed the NPO Status.   I have reviewed the Medications.     Review of Systems  Anesthesia Hx:  No problems with previous Anesthesia    Social:  Non-Smoker, No Alcohol Use    Cardiovascular:  Cardiovascular Normal     Pulmonary:  Pulmonary Normal    Neurological:  Neurology Normal        Physical Exam  General: Well nourished, Cooperative, Alert and Oriented    Airway:  Mallampati: I   Mouth Opening: Normal  TM Distance: Normal  Tongue: Normal  Neck ROM: Normal ROM    Dental:  Intact    Chest/Lungs:  Normal Respiratory Rate        Anesthesia Plan  Type of Anesthesia, risks & benefits discussed:    Anesthesia Type: Gen Natural Airway  Intra-op Monitoring Plan: Standard ASA Monitors  Post Op Pain Control Plan: multimodal analgesia  Induction:  IV  Informed Consent: Informed consent signed with the Patient and all parties understand the risks and agree with anesthesia plan.  All questions answered.   ASA Score: 3  Day of Surgery Review of History & Physical: H&P Update referred to the surgeon/provider.    Ready For Surgery From Anesthesia Perspective.     .

## 2023-07-19 NOTE — H&P
Short Stay Endoscopy History and Physical    PCP - Hilario Slaughter MD    Procedure - EGD with Bravo pH probe placement  ASA - per anesthesia  Mallampati - per anesthesia  History of Anesthesia problems - no  Family history Anesthesia problems -  no   Plan of anesthesia - MAC    HPI:  This is a 72 y.o. female here for evaluation of GERD and hiatal hernia - pre-op evaluation.         ROS:  Constitutional: No fevers, chills  CV: No chest pain  Pulm: No cough, No shortness of breath  Ophtho: No vision changes  GI: see HPI    Medical History:  has a past medical history of Cataract, Collagenous colitis, Depression, Dry mouth (01/30/2018), Excessive thirst (01/30/2018), Fecal incontinence (05/25/2016), Gastric banding status (08/20/2012), Hydrocephalus (01/15/2013), Hypothyroidism, LAP-BAND surgery status (08/06/2015), Muscle weakness (09/04/2016), OA (osteoarthritis), Obesity, Other specified prophylactic or treatment measure (07/27/2012), Pain aggravated by changing postions (09/04/2016), Palpitations (01/29/2021), SI (sacroiliac) pain (09/04/2016), Sleep apnea, Thumb pain, right (12/11/2020), and Urinary frequency (01/30/2018).    Surgical History:  has a past surgical history that includes Total knee arthroplasty (07/23/2012); lap band surgery (10/11/2011); Hernia repair; Ventriculoperitoneal shunt; Hysterectomy; Cholecystectomy; Laparoscopic gastric banding; Colonoscopy (N/A, 05/30/2016); Appendectomy; Hiatal hernia repair; Oophorectomy; Cataract extraction (Bilateral); Tonsillectomy; Esophagogastroduodenoscopy (N/A, 06/17/2020); Colonoscopy (N/A, 06/17/2020); Carpal tunnel release (Right, 07/24/2020); Excision of ganglion of wrist (Right, 07/24/2020); Arthroplasty of joint of finger (Right, 10/30/2020); Laparoscopic removal of gastric band (N/A, 04/07/2021); Laparoscopic sleeve gastrectomy (N/A, 04/07/2021); Esophagogastroduodenoscopy (N/A, 09/02/2022); Carpal tunnel release (Right, 10/31/2022); Internal  neurolysis using operating microscope (10/31/2022); Cervical fusion (); Excisional biopsy (Right, 2022);  section (1974,76&79); Eye surgery (4-5 years ago); Brain surgery ( & ); Spine surgery (70s); and Joint replacement (Right, ).    Family History: family history includes Alcohol abuse in her father; Alzheimer's disease (age of onset: 84) in her mother; Arthritis in her brother, father, and another family member; Breast cancer in her sister; COPD in her father; Cancer in her brother, father, sister, and another family member; Cirrhosis in her sister; Emphysema in her father; Hearing loss in her father; Hepatitis in her sister; Lupus in her father; No Known Problems in her brother, daughter, sister, son, and son; Prostate cancer (age of onset: 70) in her father; Stroke in her brother and father.. Otherwise no colon cancer, inflammatory bowel disease, or GI malignancies.    Social History:  reports that she quit smoking about 2 months ago. Her smoking use included cigarettes. She has a 17.50 pack-year smoking history. She has been exposed to tobacco smoke. She has never used smokeless tobacco. She reports current alcohol use of about 4.0 standard drinks per week. She reports that she does not use drugs.    Review of patient's allergies indicates:  No Known Allergies    Medications:   Medications Prior to Admission   Medication Sig Dispense Refill Last Dose    ascorbic acid, vitamin C, (VITAMIN C) 100 MG tablet Take 100 mg by mouth once daily.   2023    cholecalciferol, vitamin D3, (VITAMIN D3) 125 mcg (5,000 unit) Tab Take 1 tablet (5,000 Units total) by mouth once daily. 90 tablet 3 2023    citalopram (CELEXA) 20 MG tablet Take 1 tablet by mouth once daily 90 tablet 1 2023    cyanocobalamin 1,000 mcg/mL injection INJECT 1 ML EVERY MONTH 10 mL 5 2023    levothyroxine (SYNTHROID) 50 MCG tablet TAKE 1 TABLET BY MOUTH BEFORE BREAKFAST 90 tablet 3 2023     pantoprazole (PROTONIX) 40 MG tablet Take 1 tablet (40 mg total) by mouth 2 (two) times daily before meals. 60 tablet 12 Past Week    potassium chloride (KLOR-CON) 10 MEQ TbSR TAKE TWO TABLETS BY MOUTH ONCE DAILY WITH LASIX  Strength: 10 mEq 180 tablet 3 7/18/2023    BIOTIN ORAL Take by mouth once daily.       calcium citrate-vitamin D3 315-200 mg (CITRACAL+D) 315 mg-5 mcg (200 unit) per tablet Take 1 tablet by mouth once daily.       fluticasone (FLONASE) 50 mcg/actuation nasal spray 1 spray by Nasal route once daily.        furosemide (LASIX) 20 MG tablet Take 1 tablet (20 mg total) by mouth once daily. (Patient taking differently: Take 20 mg by mouth as needed.) 30 tablet 11     loratadine (CLARITIN) 10 mg tablet Take 1 tablet (10 mg total) by mouth once daily. 90 tablet 3     meclizine (ANTIVERT) 25 mg tablet Take 1 tablet (25 mg total) by mouth 3 (three) times daily as needed for Dizziness. (Patient not taking: Reported on 5/26/2023) 20 tablet 0     melatonin 10 mg Tab Take by mouth every evening.       multivitamin capsule Take 1 capsule by mouth once daily.       triamcinolone acetonide 0.1% (KENALOG) 0.1 % cream Apply topically 2 (two) times daily. for 10 days 80 g 1     valACYclovir (VALTREX) 1000 MG tablet Take 2 tablets (2,000 mg total) by mouth every 12 (twelve) hours as needed (herpes outbreak). 2 tablets as soon as possible after symptom onset, then 2 tablets 12 hours later (4 total) 30 tablet 2        Physical Exam:    Vital Signs:   Vitals:    07/19/23 0818   BP: 136/64   Pulse: (!) 55   Resp: 17   Temp: 98.2 °F (36.8 °C)       General Appearance: Well appearing in no acute distress  Eyes:    No scleral icterus  Lungs: CTA anteriorly  Heart:  Regular rate and rhythm   Abdomen: Soft, non tender, non distended with normal bowel sounds.      Labs:  Lab Results   Component Value Date    WBC 5.79 05/23/2023    WBC 5.79 05/23/2023    HGB 11.7 (L) 05/23/2023    HGB 11.7 (L) 05/23/2023    HCT 34.9 (L)  05/23/2023    HCT 34.9 (L) 05/23/2023    MCV 98 05/23/2023    MCV 98 05/23/2023     05/23/2023     05/23/2023        BMP  Lab Results   Component Value Date     05/23/2023     05/23/2023    K 4.5 05/23/2023    K 4.5 05/23/2023     05/23/2023     05/23/2023    CO2 31 (H) 05/23/2023    CO2 31 (H) 05/23/2023    BUN 11 05/23/2023    BUN 11 05/23/2023    CREATININE 0.72 05/23/2023    CREATININE 0.72 05/23/2023    CALCIUM 8.6 (L) 05/23/2023    CALCIUM 8.6 (L) 05/23/2023    ANIONGAP 3 (L) 05/23/2023    ANIONGAP 3 (L) 05/23/2023    ESTGFRAFRICA >60.0 07/28/2022    ESTGFRAFRICA >60.0 07/28/2022    EGFRNONAA >60.0 07/28/2022    EGFRNONAA >60.0 07/28/2022     Lab Results   Component Value Date    INR 1.0 05/31/2021    INR 1.5 (A) 08/20/2012    INR 1.7 (H) 08/13/2012          Assessment:  72 y.o. female with GERD and hiatal hernia.     Plan:  Proceed with EGD with Bravo pH probe placement today.  I have explained the risks and benefits of endoscopy procedures to the patient including but not limited to bleeding, perforation, infection, and death.  All questions answered.      Eliu Wolf MD

## 2023-07-19 NOTE — PROVATION PATIENT INSTRUCTIONS
Discharge Summary/Instructions after an Endoscopic Procedure  Patient Name: Luz Burk  Patient MRN: [id number]  Patient YOB: 1950 Wednesday, July 19, 2023  Eliu Wolf MD  Dear patient,  As a result of recent federal legislation (The Federal Cures Act), you may   receive lab or pathology results from your procedure in your [facility]   account before your physician is able to contact you. Your physician or   their representative will relay the results to you with their   recommendations at their soonest availability.  Thank you,  RESTRICTIONS:  During your procedure today, you received medications for sedation.  These   medications may affect your judgment, balance and coordination.  Therefore,   for 24 hours, you have the following restrictions:   - DO NOT drive a car, operate machinery, make legal/financial decisions,   sign important papers or drink alcohol.    ACTIVITY:  Today: no heavy lifting, straining or running due to procedural   sedation/anesthesia.  The following day: return to full activity including work.  DIET:  Eat and drink normally unless instructed otherwise.     TREATMENT FOR COMMON SIDE EFFECTS:  - Mild abdominal pain, nausea, belching, bloating or excessive gas:  rest,   eat lightly and use a heating pad.  - Sore Throat: treat with throat lozenges and/or gargle with warm salt   water.  - Because air was used during the procedure, expelling large amounts of air   from your rectum or belching is normal.  - If a bowel prep was taken, you may not have a bowel movement for 1-3 days.    This is normal.  SYMPTOMS TO WATCH FOR AND REPORT TO YOUR PHYSICIAN:  1. Abdominal pain or bloating, other than gas cramps.  2. Chest pain.  3. Back pain.  4. Signs of infection such as: chills or fever occurring within 24 hours   after the procedure.  5. Rectal bleeding, which would show as bright red, maroon, or black stools.   (A tablespoon of blood from the rectum is not serious, especially if    hemorrhoids are present.)  6. Vomiting.  7. Weakness or dizziness.  GO DIRECTLY TO THE NEAREST EMERGENCY ROOM IF YOU HAVE ANY OF THE FOLLOWING:      Difficulty breathing              Chills and/or fever over 101 F   Persistent vomiting and/or vomiting blood   Severe abdominal pain   Severe chest pain   Black, tarry stools   Bleeding- more than one tablespoon   Any other symptom or condition that you feel may need urgent attention  Your doctor recommends these additional instructions:  If any biopsies were taken, your doctors clinic will contact you in 1 to 2   weeks with any results.  - Discharge patient to home.   - Patient has a contact number available for emergencies.  The signs and   symptoms of potential delayed complications were discussed with the   patient.  Return to normal activities tomorrow.  Written discharge   instructions were provided to the patient.   - Resume previous diet.   - Continue present medications.  Ensure to take PPI regularly.   - Await pathology results.   - Repeat upper endoscopy in 8 weeks to check healing.   - Return to referring physician.  For questions, problems or results please call your physician - Eliu Wolf MD at Work:  [phone].  [facility], EMERGENCY ROOM PHONE NUMBER: [phone]  IF A COMPLICATION OR EMERGENCY SITUATION ARISES AND YOU ARE UNABLE TO REACH   YOUR PHYSICIAN - GO DIRECTLY TO THE EMERGENCY ROOM.  Eliu Wolf MD  7/19/2023 9:54:23 AM  This report has been verified and signed electronically.  Dear patient,  As a result of recent federal legislation (The Federal Cures Act), you may   receive lab or pathology results from your procedure in your [facility]   account before your physician is able to contact you. Your physician or   their representative will relay the results to you with their   recommendations at their soonest availability.  Thank you,  PROVATION

## 2023-07-19 NOTE — PLAN OF CARE
Procedure completed. Pt discharged with family. In no acute distress. Discharge instructions given. Verbalizes understanding of post procedure restrictions and instructions.

## 2023-07-19 NOTE — TRANSFER OF CARE
"Anesthesia Transfer of Care Note    Patient: Luz Burk    Procedure(s) Performed: Procedure(s) (LRB):  ESOPHAGOGASTRODUODENOSCOPY (EGD) (N/A)    Patient location: GI    Anesthesia Type: general    Transport from OR: Transported from OR on room air with adequate spontaneous ventilation    Post pain: adequate analgesia    Post assessment: no apparent anesthetic complications    Post vital signs: stable    Level of consciousness: awake    Nausea/Vomiting: no nausea/vomiting    Complications: none    Transfer of care protocol was followed      Last vitals:   Visit Vitals  BP 95/64    Pulse (!) 55   Temp 36.8 °C (98.2 °F)   Resp 17   Ht 5' 4" (1.626 m)   Wt 74.8 kg (165 lb)   LMP  (LMP Unknown)   SpO2 100%   Breastfeeding No   BMI 28.32 kg/m²     "

## 2023-07-19 NOTE — ANESTHESIA POSTPROCEDURE EVALUATION
Anesthesia Post Evaluation    Patient: Luz Burk    Procedure(s) Performed: Procedure(s) (LRB):  ESOPHAGOGASTRODUODENOSCOPY (EGD) (N/A)    Final Anesthesia Type: general      Patient location during evaluation: PACU  Patient participation: Yes- Able to Participate  Level of consciousness: awake and alert and oriented  Post-procedure vital signs: reviewed and stable  Pain management: adequate  Airway patency: patent    PONV status at discharge: No PONV  Anesthetic complications: no      Cardiovascular status: hemodynamically stable  Respiratory status: unassisted  Hydration status: euvolemic  Follow-up not needed.          Vitals Value Taken Time   /62 07/19/23 1009   Temp 36.5 °C (97.7 °F) 07/19/23 0954   Pulse 55 07/19/23 1009   Resp 20 07/19/23 1009   SpO2 99 % 07/19/23 1009         No case tracking events are documented in the log.      Pain/Dawson Score: Dawson Score: 10 (7/19/2023 10:09 AM)

## 2023-07-20 ENCOUNTER — TELEPHONE (OUTPATIENT)
Dept: ENDOSCOPY | Facility: HOSPITAL | Age: 73
End: 2023-07-20
Payer: MEDICARE

## 2023-07-20 DIAGNOSIS — K21.00 GASTROESOPHAGEAL REFLUX DISEASE WITH ESOPHAGITIS, UNSPECIFIED WHETHER HEMORRHAGE: Primary | ICD-10-CM

## 2023-07-20 NOTE — TELEPHONE ENCOUNTER
"Called pt to schedule EGD. Pt states that [place] be [place] and will return in November.  PAT appt made to schedule in November.      ----- Message -----   From: Eliu Wolf MD   Sent: 2023   9:50 AM CDT   To: [facility]   Subject: EGD                                               Procedure: EGD     Diagnosis: GERD - follow-up esophagitis     Procedure Timin weeks     *If within 4 weeks selected, please hoa as high priority*     *If greater than 12 weeks, please select "5-12 weeks" and delay sending until 2 months prior to requested date*     Provider: Any GI provider     Location: No Preference     Additional Scheduling Information: No scheduling concerns     Prep Specifications:N/A     Have you attached a patient to this message: yes          "

## 2023-07-25 ENCOUNTER — PATIENT MESSAGE (OUTPATIENT)
Dept: SURGERY | Facility: CLINIC | Age: 73
End: 2023-07-25
Payer: MEDICARE

## 2023-07-26 ENCOUNTER — CLINICAL SUPPORT (OUTPATIENT)
Dept: SMOKING CESSATION | Facility: CLINIC | Age: 73
End: 2023-07-26
Payer: COMMERCIAL

## 2023-07-26 DIAGNOSIS — F17.200 NICOTINE DEPENDENCE: Primary | ICD-10-CM

## 2023-07-26 PROCEDURE — 99407 BEHAV CHNG SMOKING > 10 MIN: CPT | Mod: S$GLB,,, | Performed by: GENERAL PRACTICE

## 2023-07-26 PROCEDURE — 99407 PR TOBACCO USE CESSATION INTENSIVE >10 MINUTES: ICD-10-PCS | Mod: S$GLB,,, | Performed by: GENERAL PRACTICE

## 2023-07-26 NOTE — PROGRESS NOTES
Spoke with patient today in regard to smoking cessation progress 6/12 month telephone follow up, she states that she is tobacco free.  Commended patient on the accomplishments thus far.  Informed patient of benefit period, future follow up, and contact information if any further help or support is needed.  Will complete smart form for 6/12 month follow up on Quit attempt #1 and resolve episode.

## 2023-07-28 LAB
COMMENT: NORMAL
FINAL PATHOLOGIC DIAGNOSIS: NORMAL
GROSS: NORMAL
Lab: NORMAL
MICROSCOPIC EXAM: NORMAL

## 2023-08-02 ENCOUNTER — PATIENT MESSAGE (OUTPATIENT)
Dept: BARIATRICS | Facility: CLINIC | Age: 73
End: 2023-08-02
Payer: MEDICARE

## 2023-08-04 ENCOUNTER — TELEPHONE (OUTPATIENT)
Dept: BARIATRICS | Facility: CLINIC | Age: 73
End: 2023-08-04
Payer: MEDICARE

## 2023-08-04 DIAGNOSIS — Z98.84 S/P BARIATRIC SURGERY: Primary | ICD-10-CM

## 2023-08-04 NOTE — TELEPHONE ENCOUNTER
----- Message from Ofe Juarez MA sent at 8/4/2023  3:37 PM CDT -----  Regarding: FW: Prescription  Contact: [phone]    ----- Message -----  From: Marilee Gamboa  Sent: 8/4/2023  12:05 PM CDT  To: [facility] Staff  Subject: Prescription                                     Calling to request new prescription or refill on medication Rx pantoprazole (PROTONIX) 40 MG tablet 90day supply (going out of the country) to be sent to [facility] pharmacy in [place]. Please call patient to confirm prescription has been sent to pharmacy.    [facility] [address] [place] [address] [address] [facility]  [address] [facility] [address]  Phone: [phone] Fax: [phone]

## 2023-08-07 DIAGNOSIS — Z98.84 S/P BARIATRIC SURGERY: ICD-10-CM

## 2023-08-07 RX ORDER — PANTOPRAZOLE SODIUM 40 MG/1
40 TABLET, DELAYED RELEASE ORAL
Qty: 180 TABLET | Refills: 0 | Status: SHIPPED | OUTPATIENT
Start: 2023-08-07 | End: 2023-11-05

## 2023-08-07 RX ORDER — PANTOPRAZOLE SODIUM 40 MG/1
40 TABLET, DELAYED RELEASE ORAL
Qty: 90 TABLET | Refills: 4 | Status: SHIPPED | OUTPATIENT
Start: 2023-08-07 | End: 2023-08-07 | Stop reason: SDUPTHER

## 2023-09-06 ENCOUNTER — PATIENT MESSAGE (OUTPATIENT)
Dept: BARIATRICS | Facility: CLINIC | Age: 73
End: 2023-09-06
Payer: MEDICARE

## 2023-09-12 ENCOUNTER — PATIENT MESSAGE (OUTPATIENT)
Dept: BARIATRICS | Facility: CLINIC | Age: 73
End: 2023-09-12
Payer: MEDICARE

## 2023-10-04 ENCOUNTER — PATIENT MESSAGE (OUTPATIENT)
Dept: BARIATRICS | Facility: CLINIC | Age: 73
End: 2023-10-04
Payer: MEDICARE

## 2023-10-06 ENCOUNTER — TELEPHONE (OUTPATIENT)
Dept: BARIATRICS | Facility: CLINIC | Age: 73
End: 2023-10-06
Payer: MEDICARE

## 2023-10-06 ENCOUNTER — PATIENT MESSAGE (OUTPATIENT)
Dept: BARIATRICS | Facility: CLINIC | Age: 73
End: 2023-10-06
Payer: MEDICARE

## 2023-10-06 DIAGNOSIS — R63.4 WEIGHT LOSS: ICD-10-CM

## 2023-10-06 DIAGNOSIS — Z79.899 OTHER LONG TERM (CURRENT) DRUG THERAPY: ICD-10-CM

## 2023-10-06 DIAGNOSIS — M62.81 MUSCLE WEAKNESS: ICD-10-CM

## 2023-10-06 DIAGNOSIS — I51.89 DIASTOLIC DYSFUNCTION: ICD-10-CM

## 2023-10-06 DIAGNOSIS — R79.9 ABNORMAL FINDING OF BLOOD CHEMISTRY, UNSPECIFIED: ICD-10-CM

## 2023-10-06 DIAGNOSIS — Z98.84 S/P BARIATRIC SURGERY: Primary | ICD-10-CM

## 2023-10-06 DIAGNOSIS — Z98.84 STATUS POST LAPAROSCOPIC SLEEVE GASTRECTOMY: ICD-10-CM

## 2023-10-10 ENCOUNTER — PATIENT MESSAGE (OUTPATIENT)
Dept: BARIATRICS | Facility: CLINIC | Age: 73
End: 2023-10-10
Payer: MEDICARE

## 2023-10-20 NOTE — TELEPHONE ENCOUNTER
Refill Routing Note   Medication(s) are not appropriate for processing by [facility] for the following reason(s):      Drug-disease interaction     [facility] action(s):  Defer Care Due:  Appointment due            Pharmacist review requested: Yes     Appointments  past 12m or future 3m with PCP    Date Provider   Last Visit   10/12/2022 Hilario Slaughter MD   Next Visit   Visit date not found Hilario Slaughter MD   ED visits in past 90 days: 0        Note composed:1:19 PM 10/20/2023

## 2023-10-20 NOTE — TELEPHONE ENCOUNTER
Care Due:                  Date            Visit Type   Department     Provider  --------------------------------------------------------------------------------                                SAME DAY -                              ESTABLISHED   [facility] FAMILY  Last Visit: 10-      PATIENT      MEDICINE       Hilario Slaughter  Next Visit: None Scheduled  None         None Found                                                            Last  Test          Frequency    Reason                     Performed    Due Date  --------------------------------------------------------------------------------    Office Visit  15 months..  citalopram, furosemide...  10-   01-    [facility] Embedded Care Due Messages. Reference number: [id number].   10/20/2023 9:34:21 AM DAYNE

## 2023-10-22 RX ORDER — CITALOPRAM 20 MG/1
TABLET, FILM COATED ORAL
Qty: 90 TABLET | Refills: 0 | Status: SHIPPED | OUTPATIENT
Start: 2023-10-22 | End: 2024-01-22

## 2023-10-22 NOTE — TELEPHONE ENCOUNTER
Provider Staff:  Action required for this patient     Please see care gap opportunities below in Care Due Message.    Thanks!  [facility]     Appointments      Date Provider   Last Visit   10/12/2022 Hilario Slaughter MD   Next Visit   Visit date not found Hilario Slaughter MD      Refill Decision Note   Luz Burk  is requesting a refill authorization.  Brief Assessment and Rationale for Refill:  Approve     Medication Therapy Plan:         Pharmacist review requested: Yes   Extended chart review required: Yes   Comments:     Note composed:2:39 AM 10/22/2023

## 2023-10-23 ENCOUNTER — PATIENT MESSAGE (OUTPATIENT)
Dept: FAMILY MEDICINE | Facility: CLINIC | Age: 73
End: 2023-10-23
Payer: MEDICARE

## 2023-11-10 ENCOUNTER — TELEPHONE (OUTPATIENT)
Dept: HEPATOLOGY | Facility: CLINIC | Age: 73
End: 2023-11-10
Payer: MEDICARE

## 2023-11-10 ENCOUNTER — TELEPHONE (OUTPATIENT)
Dept: ENDOSCOPY | Facility: HOSPITAL | Age: 73
End: 2023-11-10

## 2023-11-10 ENCOUNTER — CLINICAL SUPPORT (OUTPATIENT)
Dept: ENDOSCOPY | Facility: HOSPITAL | Age: 73
End: 2023-11-10
Attending: INTERNAL MEDICINE
Payer: MEDICARE

## 2023-11-10 VITALS — HEIGHT: 64 IN | WEIGHT: 160 LBS | BODY MASS INDEX: 27.31 KG/M2

## 2023-11-10 DIAGNOSIS — K21.00 GASTROESOPHAGEAL REFLUX DISEASE WITH ESOPHAGITIS, UNSPECIFIED WHETHER HEMORRHAGE: ICD-10-CM

## 2023-11-10 NOTE — TELEPHONE ENCOUNTER
Spoke to pt to schedule procedure(s) Upper Endoscopy (EGD)       Physician to perform procedure(s) Dr. HERMINIO Schultz  Date of Procedure (s) 11/13/23  Arrival Time 8:30 AM  Time of Procedure(s) 9:30 AM   Location of Procedure(s) [place] 2nd Floor  Type of Rx Prep sent to patient: N/A  Instructions provided to patient via [facility]    Patient was informed on the following information and verbalized understanding. Screening questionnaire reviewed with patient and complete. If procedure requires anesthesia, a responsible adult needs to be present to accompany the patient home, patient cannot drive after receiving anesthesia. Appointment details are tentative, especially check-in time. Patient will receive a prep-op call 4 days prior to confirm check-in time for procedure. If applicable the patient should contact their pharmacy to verify Rx for procedure prep is ready for pick-up. Patient was advised to call the scheduling department at [phone] if pharmacy states no Rx is available. Patient was advised to call the endoscopy scheduling department if any questions or concerns arise.      [facility] Scheduling Department

## 2023-11-10 NOTE — TELEPHONE ENCOUNTER
Spoke to pt to schedule procedure(s) Upper Endoscopy (EGD)       Physician to perform procedure(s) Dr. LAURI Morgan  Date of Procedure (s) 11/14/23  Arrival Time 1:20 PM  Time of Procedure(s) 2:20 PM   Location of Procedure(s) [address]  Type of Rx Prep sent to patient: N/A  Instructions provided to patient via [facility]    Patient was informed on the following information and verbalized understanding. Screening questionnaire reviewed with patient and complete. If procedure requires anesthesia, a responsible adult needs to be present to accompany the patient home, patient cannot drive after receiving anesthesia. Appointment details are tentative, especially check-in time. Patient will receive a prep-op call 4 days prior to confirm check-in time for procedure. If applicable the patient should contact their pharmacy to verify Rx for procedure prep is ready for pick-up. Patient was advised to call the scheduling department at [phone] if pharmacy states no Rx is available. Patient was advised to call the endoscopy scheduling department if any questions or concerns arise.      [facility] Scheduling [facility]

## 2023-11-10 NOTE — TELEPHONE ENCOUNTER
----- Message from Che Washington sent at 11/10/2023  9:48 AM CST -----  Contact: Luz  Patient is calling in regards to appt. Reports she's supposed to be scheduled for a liver scan, would like to proceed with scheduling. Please return call to pt at  [phone].

## 2023-11-13 DIAGNOSIS — B00.1 RECURRENT HERPES LABIALIS: ICD-10-CM

## 2023-11-13 RX ORDER — VALACYCLOVIR HYDROCHLORIDE 1 G/1
TABLET, FILM COATED ORAL
Qty: 30 TABLET | Refills: 11 | Status: SHIPPED | OUTPATIENT
Start: 2023-11-13

## 2023-11-14 ENCOUNTER — ANESTHESIA EVENT (OUTPATIENT)
Dept: ENDOSCOPY | Facility: HOSPITAL | Age: 73
End: 2023-11-14
Payer: MEDICARE

## 2023-11-14 ENCOUNTER — ANESTHESIA (OUTPATIENT)
Dept: ENDOSCOPY | Facility: HOSPITAL | Age: 73
End: 2023-11-14
Payer: MEDICARE

## 2023-11-14 ENCOUNTER — PATIENT MESSAGE (OUTPATIENT)
Dept: BARIATRICS | Facility: CLINIC | Age: 73
End: 2023-11-14
Payer: MEDICARE

## 2023-11-14 ENCOUNTER — HOSPITAL ENCOUNTER (OUTPATIENT)
Facility: HOSPITAL | Age: 73
Discharge: HOME | End: 2023-11-14
Attending: INTERNAL MEDICINE | Admitting: INTERNAL MEDICINE
Payer: MEDICARE

## 2023-11-14 VITALS
TEMPERATURE: 98 F | BODY MASS INDEX: 27.31 KG/M2 | DIASTOLIC BLOOD PRESSURE: 60 MMHG | OXYGEN SATURATION: 98 % | RESPIRATION RATE: 16 BRPM | WEIGHT: 160 LBS | HEIGHT: 64 IN | HEART RATE: 66 BPM | SYSTOLIC BLOOD PRESSURE: 117 MMHG

## 2023-11-14 DIAGNOSIS — K21.9 GASTROESOPHAGEAL REFLUX DISEASE WITHOUT ESOPHAGITIS: Chronic | ICD-10-CM

## 2023-11-14 DIAGNOSIS — K21.9 GERD (GASTROESOPHAGEAL REFLUX DISEASE): ICD-10-CM

## 2023-11-14 DIAGNOSIS — K44.9 HIATAL HERNIA: Primary | ICD-10-CM

## 2023-11-14 PROCEDURE — 43235 PR EGD, FLEX, DIAGNOSTIC: ICD-10-PCS | Mod: ,,, | Performed by: INTERNAL MEDICINE

## 2023-11-14 PROCEDURE — 63600175 PHARM REV CODE 636 W HCPCS: Performed by: NURSE ANESTHETIST, CERTIFIED REGISTERED

## 2023-11-14 PROCEDURE — 37000009 HC ANESTHESIA EA ADD 15 MINS: Performed by: INTERNAL MEDICINE

## 2023-11-14 PROCEDURE — E9220 PRA ENDO ANESTHESIA: ICD-10-PCS | Mod: ,,, | Performed by: NURSE ANESTHETIST, CERTIFIED REGISTERED

## 2023-11-14 PROCEDURE — 43235 EGD DIAGNOSTIC BRUSH WASH: CPT | Performed by: INTERNAL MEDICINE

## 2023-11-14 PROCEDURE — 37000008 HC ANESTHESIA 1ST 15 MINUTES: Performed by: INTERNAL MEDICINE

## 2023-11-14 PROCEDURE — 43235 EGD DIAGNOSTIC BRUSH WASH: CPT | Mod: ,,, | Performed by: INTERNAL MEDICINE

## 2023-11-14 PROCEDURE — 25000003 PHARM REV CODE 250: Performed by: NURSE ANESTHETIST, CERTIFIED REGISTERED

## 2023-11-14 PROCEDURE — E9220 PRA ENDO ANESTHESIA: HCPCS | Mod: ,,, | Performed by: NURSE ANESTHETIST, CERTIFIED REGISTERED

## 2023-11-14 RX ORDER — SODIUM CHLORIDE 9 MG/ML
INJECTION, SOLUTION INTRAVENOUS CONTINUOUS
Status: DISCONTINUED | OUTPATIENT
Start: 2023-11-14 | End: 2023-11-14 | Stop reason: HOSPADM

## 2023-11-14 RX ORDER — LIDOCAINE HYDROCHLORIDE 20 MG/ML
INJECTION INTRAVENOUS
Status: DISCONTINUED | OUTPATIENT
Start: 2023-11-14 | End: 2023-11-14

## 2023-11-14 RX ORDER — PROPOFOL 10 MG/ML
VIAL (ML) INTRAVENOUS
Status: DISCONTINUED | OUTPATIENT
Start: 2023-11-14 | End: 2023-11-14

## 2023-11-14 RX ADMIN — LIDOCAINE HYDROCHLORIDE 80 MG: 20 INJECTION INTRAVENOUS at 11:11

## 2023-11-14 RX ADMIN — PROPOFOL 225 MCG/KG/MIN: 10 INJECTION, EMULSION INTRAVENOUS at 12:11

## 2023-11-14 RX ADMIN — SODIUM CHLORIDE: 0.9 INJECTION, SOLUTION INTRAVENOUS at 11:11

## 2023-11-14 NOTE — TRANSFER OF CARE
"Anesthesia Transfer of Care Note    Patient: Luz Burk    Procedure(s) Performed: Procedure(s) (LRB):  EGD (ESOPHAGOGASTRODUODENOSCOPY) (N/A)    Patient location: GI    Anesthesia Type: general    Transport from OR: Transported from OR on room air with adequate spontaneous ventilation    Post pain: adequate analgesia    Post assessment: no apparent anesthetic complications    Post vital signs: stable    Level of consciousness: awake and alert    Nausea/Vomiting: no nausea/vomiting    Complications: none    Transfer of care protocol was followed      Last vitals: Visit Vitals  BP (!) 122/59   Pulse (!) 58   Temp 36.3 °C (97.4 °F)   Resp 15   Ht 5' 4" (1.626 m)   Wt 72.6 kg (160 lb)   LMP  (LMP Unknown)   SpO2 99%   Breastfeeding No   BMI 27.46 kg/m²     "

## 2023-11-14 NOTE — PROVATION PATIENT INSTRUCTIONS
Discharge Summary/Instructions after an Endoscopic Procedure  Patient Name: Luz Burk  Patient MRN: [id number]  Patient YOB: 1950 Tuesday, November 14, 2023  Susie Morgan MD  Dear patient,  As a result of recent federal legislation (The Federal Cures Act), you may   receive lab or pathology results from your procedure in your [facility]   account before your physician is able to contact you. Your physician or   their representative will relay the results to you with their   recommendations at their soonest availability.  Thank you,  RESTRICTIONS:  During your procedure today, you received medications for sedation.  These   medications may affect your judgment, balance and coordination.  Therefore,   for 24 hours, you have the following restrictions:   - DO NOT drive a car, operate machinery, make legal/financial decisions,   sign important papers or drink alcohol.    ACTIVITY:  Today: no heavy lifting, straining or running due to procedural   sedation/anesthesia.  The following day: return to full activity including work.  DIET:  Eat and drink normally unless instructed otherwise.     TREATMENT FOR COMMON SIDE EFFECTS:  - Mild abdominal pain, nausea, belching, bloating or excessive gas:  rest,   eat lightly and use a heating pad.  - Sore Throat: treat with throat lozenges and/or gargle with warm salt   water.  - Because air was used during the procedure, expelling large amounts of air   from your rectum or belching is normal.  - If a bowel prep was taken, you may not have a bowel movement for 1-3 days.    This is normal.  SYMPTOMS TO WATCH FOR AND REPORT TO YOUR PHYSICIAN:  1. Abdominal pain or bloating, other than gas cramps.  2. Chest pain.  3. Back pain.  4. Signs of infection such as: chills or fever occurring within 24 hours   after the procedure.  5. Rectal bleeding, which would show as bright red, maroon, or black stools.   (A tablespoon of blood from the rectum is not serious, especially if    hemorrhoids are present.)  6. Vomiting.  7. Weakness or dizziness.  GO DIRECTLY TO THE NEAREST EMERGENCY ROOM IF YOU HAVE ANY OF THE FOLLOWING:      Difficulty breathing              Chills and/or fever over 101 F   Persistent vomiting and/or vomiting blood   Severe abdominal pain   Severe chest pain   Black, tarry stools   Bleeding- more than one tablespoon   Any other symptom or condition that you feel may need urgent attention  Your doctor recommends these additional instructions:  If any biopsies were taken, your doctors clinic will contact you in 1 to 2   weeks with any results.  - Discharge patient to home.   - Patient has a contact number available for emergencies.  The signs and   symptoms of potential delayed complications were discussed with the   patient.  Return to normal activities tomorrow.  Written discharge   instructions were provided to the patient.   - Resume previous diet.   - Continue present medications including protonix 40 mg twice a day.   - Return to referring physician as previously scheduled.   For questions, problems or results please call your physician - Susie Morgan MD at Work:  [phone].  [facility], EMERGENCY ROOM PHONE NUMBER: [phone]  IF A COMPLICATION OR EMERGENCY SITUATION ARISES AND YOU ARE UNABLE TO REACH   YOUR PHYSICIAN - GO DIRECTLY TO THE EMERGENCY ROOM.  Susie Morgan MD  11/14/2023 12:13:24 PM  This report has been verified and signed electronically.  Dear patient,  As a result of recent federal legislation (The Federal Cures Act), you may   receive lab or pathology results from your procedure in your [facility]   account before your physician is able to contact you. Your physician or   their representative will relay the results to you with their   recommendations at their soonest availability.  Thank you,  PROVATION

## 2023-11-14 NOTE — ANESTHESIA POSTPROCEDURE EVALUATION
Anesthesia Post Evaluation    Patient: Luz Burk    Procedure(s) Performed: Procedure(s) (LRB):  EGD (ESOPHAGOGASTRODUODENOSCOPY) (N/A)    Final Anesthesia Type: general      Patient location during evaluation: GI PACU  Patient participation: Yes- Able to Participate  Level of consciousness: awake and alert  Post-procedure vital signs: reviewed and stable  Pain management: adequate  Airway patency: patent    PONV status at discharge: No PONV  Anesthetic complications: no      Cardiovascular status: blood pressure returned to baseline and stable  Respiratory status: unassisted, spontaneous ventilation and room air  Hydration status: euvolemic  Follow-up not needed.          Vitals Value Taken Time   /60 11/14/23 1237   Temp 36.5 °C (97.7 °F) 11/14/23 1216   Pulse 66 11/14/23 1237   Resp 16 11/14/23 1237   SpO2 98 % 11/14/23 1237         Event Time   Out of Recovery 12:41:06         Pain/Dawson Score: Dawson Score: 10 (11/14/2023 12:28 PM)

## 2023-11-14 NOTE — H&P
Short Stay Endoscopy History and Physical    PCP - Hilario Slaughter MD  Referring Physician - Eliu Wolf MD  [address] [place] [address]    Procedure - EGD  ASA - per anesthesia  Mallampati - per anesthesia  History of Anesthesia problems - no  Family history Anesthesia problems -  no   Plan of anesthesia - General    HPI  73 y.o. female  Reason for procedure:  GERD with esophagitis   Follow up after 8 weeks of treatment  On protonix 40 mg BID  H/o hiatal hernia  H/o sleeve gastrectomy    ROS:  Constitutional: No fevers, chills, No weight loss  CV: No chest pain  Pulm: No cough, No shortness of breath  GI: see HPI    Medical History:  has a past medical history of Cataract, Collagenous colitis, Depression, Dry mouth (01/30/2018), Excessive thirst (01/30/2018), Fecal incontinence (05/25/2016), Gastric banding status (08/20/2012), Hydrocephalus (01/15/2013), Hypothyroidism, LAP-BAND surgery status (08/06/2015), Muscle weakness (09/04/2016), OA (osteoarthritis), Obesity, Other specified prophylactic or treatment measure (07/27/2012), Pain aggravated by changing postions (09/04/2016), Palpitations (01/29/2021), SI (sacroiliac) pain (09/04/2016), Sleep apnea, Thumb pain, right (12/11/2020), and Urinary frequency (01/30/2018).    Surgical History:  has a past surgical history that includes Total knee arthroplasty (07/23/2012); lap band surgery (10/11/2011); Hernia repair; Ventriculoperitoneal shunt; Hysterectomy; Cholecystectomy; Laparoscopic gastric banding; Colonoscopy (N/A, 05/30/2016); Appendectomy; Hiatal hernia repair; Oophorectomy; Cataract extraction (Bilateral); Tonsillectomy; Esophagogastroduodenoscopy (N/A, 06/17/2020); Colonoscopy (N/A, 06/17/2020); Carpal tunnel release (Right, 07/24/2020); Excision of ganglion of wrist (Right, 07/24/2020); Arthroplasty of joint of finger (Right, 10/30/2020); Laparoscopic removal of gastric band (N/A, 04/07/2021); Laparoscopic sleeve gastrectomy  (N/A, 2021); Esophagogastroduodenoscopy (N/A, 2022); Carpal tunnel release (Right, 10/31/2022); Internal neurolysis using operating microscope (10/31/2022); Cervical fusion (); Excisional biopsy (Right, 2022);  section (1974,76&79); Eye surgery (4-5 years ago); Brain surgery ( & ); Spine surgery (70s); Joint replacement (Right, ); and Esophagogastroduodenoscopy (N/A, 2023).    Family History: family history includes Alcohol abuse in her father; Alzheimer's disease (age of onset: 84) in her mother; Arthritis in her brother, father, and another family member; Breast cancer in her sister; COPD in her father; Cancer in her brother, father, sister, and another family member; Cirrhosis in her sister; Emphysema in her father; Hearing loss in her father; Hepatitis in her sister; Lupus in her father; No Known Problems in her brother, daughter, sister, son, and son; Prostate cancer (age of onset: 70) in her father; Stroke in her brother and father..    Social History:  reports that she quit smoking about 6 months ago. Her smoking use included cigarettes. She started smoking about 35 years ago. She has a 17.5 pack-year smoking history. She has been exposed to tobacco smoke. She has never used smokeless tobacco. She reports current alcohol use of about 4.0 standard drinks of alcohol per week. She reports that she does not use drugs.    Review of patient's allergies indicates:  No Known Allergies    Medications:   Medications Prior to Admission   Medication Sig Dispense Refill Last Dose    ascorbic acid, vitamin C, (VITAMIN C) 100 MG tablet Take 100 mg by mouth once daily.   Past Week    BIOTIN ORAL Take by mouth once daily.   Past Week    calcium citrate-vitamin D3 315-200 mg (CITRACAL+D) 315 mg-5 mcg (200 unit) per tablet Take 1 tablet by mouth once daily.   Past Week    cholecalciferol, vitamin D3, (VITAMIN D3) 125 mcg (5,000 unit) Tab Take 1 tablet (5,000 Units total) by mouth  once daily. 90 tablet 3 Past Week    citalopram (CELEXA) 20 MG tablet Take 1 tablet by mouth once daily 90 tablet 0 Past Week    cyanocobalamin 1,000 mcg/mL injection INJECT 1 ML EVERY MONTH 10 mL 5 Past Week    fluticasone (FLONASE) 50 mcg/actuation nasal spray 1 spray by Nasal route once daily.    Past Week    furosemide (LASIX) 20 MG tablet Take 1 tablet (20 mg total) by mouth once daily. (Patient taking differently: Take 20 mg by mouth as needed.) 30 tablet 11 Past Month    levothyroxine (SYNTHROID) 50 MCG tablet TAKE 1 TABLET BY MOUTH BEFORE BREAKFAST 90 tablet 3 11/14/2023    loratadine (CLARITIN) 10 mg tablet Take 1 tablet (10 mg total) by mouth once daily. 90 tablet 3 Past Week    melatonin 10 mg Tab Take by mouth every evening.   Past Week    multivitamin capsule Take 1 capsule by mouth once daily.   Past Week    potassium chloride (KLOR-CON) 10 MEQ TbSR TAKE TWO TABLETS BY MOUTH ONCE DAILY WITH LASIX  Strength: 10 mEq 180 tablet 3 Past Week    valACYclovir (VALTREX) 1000 MG tablet TAKE 2 TABLETS BY MOUTH AS SOON AS POSSIBLE AFTER SYMPTOM ONSET(HERPES OUTBREAK), THEN TAKE 2 TABLETS 12 HOURS LATER(4 TABLETS TOTAL) 30 tablet 11 Past Month    meclizine (ANTIVERT) 25 mg tablet Take 1 tablet (25 mg total) by mouth 3 (three) times daily as needed for Dizziness. (Patient not taking: Reported on 5/26/2023) 20 tablet 0 More than a month    pantoprazole (PROTONIX) 40 MG tablet Take 1 tablet (40 mg total) by mouth 2 (two) times daily before meals. 180 tablet 0     triamcinolone acetonide 0.1% (KENALOG) 0.1 % cream Apply topically 2 (two) times daily. for 10 days 80 g 1        Physical Exam:    Vital Signs:   Vitals:    11/14/23 1126   BP: (!) 122/59   Pulse: (!) 58   Resp: 15   Temp: 97.4 °F (36.3 °C)       General Appearance: Well appearing in no acute distress  Abdomen: Soft, non tender, non distended with normal bowel sounds, no masses      Labs:  Lab Results   Component Value Date    WBC 5.79 05/23/2023    WBC  5.79 05/23/2023    HGB 11.7 (L) 05/23/2023    HGB 11.7 (L) 05/23/2023    HCT 34.9 (L) 05/23/2023    HCT 34.9 (L) 05/23/2023     05/23/2023     05/23/2023    CHOL 159 04/05/2022    TRIG 106 04/05/2022    HDL 57 04/05/2022    ALT 19 05/23/2023    ALT 19 05/23/2023    AST 29 05/23/2023    AST 29 05/23/2023     05/23/2023     05/23/2023    K 4.5 05/23/2023    K 4.5 05/23/2023     05/23/2023     05/23/2023    CREATININE 0.72 05/23/2023    CREATININE 0.72 05/23/2023    BUN 11 05/23/2023    BUN 11 05/23/2023    CO2 31 (H) 05/23/2023    CO2 31 (H) 05/23/2023    TSH 1.318 05/31/2021    INR 1.0 05/31/2021    HGBA1C 5.4 02/26/2021       I have explained the risks and benefits of this endoscopic procedure to the patient including but not limited to bleeding, inflammation, infection, perforation, and death.      Susie Morgan MD

## 2023-11-14 NOTE — ANESTHESIA PREPROCEDURE EVALUATION
"                                                                                                             2023  Luz Burk is a 73 y.o., female.  Pre-operative evaluation for EGD (ESOPHAGOGASTRODUODENOSCOPY) (N/A)    Chief Complaint:GERD  PMH:  Vertigo-inner ear cyrstals  Hydrocephalus- shunt-neurology assessment  unconcerning.  Seeing Neurology on Thursday.    S/p gastric banding- converted to gastric sleeve  Past Surgical History:   Procedure Laterality Date    APPENDECTOMY      ARTHROPLASTY OF JOINT OF FINGER Right 10/30/2020    Procedure: ARTHROPLASTY, FINGER cmc joint right thumb;  Surgeon: Sugey yAon MD;  Location: [facility];  Service: Orthopedics;  Laterality: Right;  MAC/Regional    BRAIN SURGERY   &      shunt    CARPAL TUNNEL RELEASE Right 2020    Procedure: RELEASE, CARPAL TUNNEL, RIGHT;  Surgeon: Sugey Ayon MD;  Location: [facility];  Service: Orthopedics;  Laterality: Right;  Regional & MAC    CARPAL TUNNEL RELEASE Right 10/31/2022    Procedure: RELEASE, CARPAL TUNNEL "REVISION" RIGHT NEUROLYSIS MEDIAL NERVE, PATCH PLACEMENT;  Surgeon: Sugey Ayon MD;  Location: [facility];  Service: Orthopedics;  Laterality: Right;    CATARACT EXTRACTION Bilateral     CERVICAL FUSION  1981     SECTION  1974,76&79    CHOLECYSTECTOMY      COLONOSCOPY N/A 2016    Procedure: COLONOSCOPY;  Surgeon: BINH Souza MD;  Location: [address]);  Service: Endoscopy;  Laterality: N/A;    COLONOSCOPY N/A 2020    Procedure: COLONOSCOPYSuprep;  Surgeon: Antonio Weller MD;  Location: [facility];  Service: Endoscopy;  Laterality: N/A;    ESOPHAGOGASTRODUODENOSCOPY N/A 2020    Procedure: EGD (ESOPHAGOGASTRODUODENOSCOPY);  Surgeon: Antonio Weller MD;  Location: [facility];  Service: Endoscopy;  Laterality: N/A;    ESOPHAGOGASTRODUODENOSCOPY N/A 2022    Procedure: EGD (ESOPHAGOGASTRODUODENOSCOPY;  Surgeon: Antonio Weller MD;  " "Location: [facility] ENDO;  Service: Endoscopy;  Laterality: N/A;    ESOPHAGOGASTRODUODENOSCOPY N/A 7/19/2023    Procedure: ESOPHAGOGASTRODUODENOSCOPY (EGD);  Surgeon: Eliu Wolf MD;  Location: [facility] (4TH FLR);  Service: Endoscopy;  Laterality: N/A;  referral Dr [facility]-  7/13/23- procedure date and arrival time confirmed with precall. OFF PPI medication confirmed with pt. NELDA Marlow RN    EXCISION OF GANGLION OF WRIST Right 07/24/2020    Procedure: EXCISION, GANGLION CYST, WRIST, RIGHT volar;  Surgeon: Sugey Ayon MD;  Location: [facility] OR;  Service: Orthopedics;  Laterality: Right;  Regional & MAC    EXCISIONAL BIOPSY Right 11/22/2022    Procedure: EXCISIONAL BIOPSY;  Surgeon: Nelly Cabrera MD;  Location: [facility] OR;  Service: Ophthalmology;  Laterality: Right;  Biopsy Left lower lid with cryo unit    EYE SURGERY  4-5 years ago    HERNIA REPAIR      HIATAL HERNIA REPAIR      HYSTERECTOMY      INTERNAL NEUROLYSIS USING OPERATING MICROSCOPE  10/31/2022    Procedure: NEUROLYSIS, INTERNAL, USING OPERATING MICROSCOPE;  Surgeon: Sugey Ayon MD;  Location: [facility] OR;  Service: Orthopedics;;    JOINT REPLACEMENT Right 2010    knee    lap band surgery  10/11/2011    Realize C Band (11mL)    LAPAROSCOPIC GASTRIC BANDING      LAPAROSCOPIC REMOVAL OF GASTRIC BAND N/A 04/07/2021    Procedure: REMOVAL-GASTRIC BAND-LAPAROSCOPIC-;  Surgeon: Oswaldo Mayfield MD;  Location: [facility] OR 2ND FLR;  Service: General;  Laterality: N/A;    LAPAROSCOPIC SLEEVE GASTRECTOMY N/A 04/07/2021    Procedure: GASTRECTOMY-SLEEVE-LAPAROSCOPIC;  Surgeon: Oswaldo Mayfield MD;  Location: [facility] OR 2ND FLR;  Service: General;  Laterality: N/A;    OOPHORECTOMY      SPINE SURGERY  70s    TONSILLECTOMY      TOTAL KNEE ARTHROPLASTY  07/23/2012    VENTRICULOPERITONEAL SHUNT           Vital Signs Range (Last 24H):         CBC:   No results for input(s): "WBC", "RBC", "HGB", "HCT", "PLT", "MCV", "MCH", "MCHC" in the " "last 720 hours.    CMP: No results for input(s): "NA", "K", "CL", "CO2", "BUN", "CREATININE", "GLU", "MG", "PHOS", "CALCIUM", "ALBUMIN", "PROT", "ALKPHOS", "ALT", "AST", "BILITOT" in the last 720 hours.    INR:  No results for input(s): "PT", "INR", "PROTIME", "APTT" in the last 720 hours.      Diagnostic Studies:      EKD Echo:    Pre-op Assessment    I have reviewed the Patient Summary Reports.     I have reviewed the Nursing Notes. I have reviewed the NPO Status.   I have reviewed the Medications.     Review of Systems  Anesthesia Hx:  No problems with previous Anesthesia                Social:  Non-Smoker, No Alcohol Use       Cardiovascular:  Cardiovascular Normal Exercise tolerance: good                                           Pulmonary:  Pulmonary Normal                       Neurological:   Neuro Symptoms of dizziness                                Physical Exam  General: Well nourished, Cooperative, Alert and Oriented    Airway:  Mallampati: I   Mouth Opening: Normal  TM Distance: Normal  Tongue: Normal  Neck ROM: Normal ROM    Dental:  Intact    Chest/Lungs:  Normal Respiratory Rate        Anesthesia Plan  Type of Anesthesia, risks & benefits discussed:    Anesthesia Type: Gen Natural Airway  Intra-op Monitoring Plan: Standard ASA Monitors  Post Op Pain Control Plan: multimodal analgesia  Induction:  IV  Informed Consent: Informed consent signed with the Patient and all parties understand the risks and agree with anesthesia plan.  All questions answered.   ASA Score: 3  Day of Surgery Review of History & Physical: H&P Update referred to the surgeon/provider.    Ready For Surgery From Anesthesia Perspective.     .      "

## 2023-11-15 ENCOUNTER — LAB VISIT (OUTPATIENT)
Dept: LAB | Facility: HOSPITAL | Age: 73
End: 2023-11-15
Attending: PHYSICIAN ASSISTANT
Payer: MEDICARE

## 2023-11-15 ENCOUNTER — OFFICE VISIT (OUTPATIENT)
Dept: BARIATRICS | Facility: CLINIC | Age: 73
End: 2023-11-15
Payer: MEDICARE

## 2023-11-15 VITALS
SYSTOLIC BLOOD PRESSURE: 106 MMHG | DIASTOLIC BLOOD PRESSURE: 54 MMHG | BODY MASS INDEX: 28.42 KG/M2 | WEIGHT: 165.56 LBS | TEMPERATURE: 98 F | OXYGEN SATURATION: 96 % | HEART RATE: 83 BPM

## 2023-11-15 DIAGNOSIS — Z79.899 OTHER LONG TERM (CURRENT) DRUG THERAPY: ICD-10-CM

## 2023-11-15 DIAGNOSIS — R63.4 WEIGHT LOSS: ICD-10-CM

## 2023-11-15 DIAGNOSIS — L98.7 EXCESS SKIN OF ABDOMINAL WALL: Primary | ICD-10-CM

## 2023-11-15 DIAGNOSIS — M62.81 MUSCLE WEAKNESS: ICD-10-CM

## 2023-11-15 DIAGNOSIS — I51.89 DIASTOLIC DYSFUNCTION: ICD-10-CM

## 2023-11-15 DIAGNOSIS — R79.9 ABNORMAL FINDING OF BLOOD CHEMISTRY, UNSPECIFIED: ICD-10-CM

## 2023-11-15 DIAGNOSIS — Z98.84 STATUS POST LAPAROSCOPIC SLEEVE GASTRECTOMY: ICD-10-CM

## 2023-11-15 DIAGNOSIS — Z98.84 S/P BARIATRIC SURGERY: ICD-10-CM

## 2023-11-15 LAB
25(OH)D3+25(OH)D2 SERPL-MCNC: 56 NG/ML (ref 30–96)
ALBUMIN SERPL BCP-MCNC: 4 G/DL (ref 3.5–5.2)
ALP SERPL-CCNC: 55 U/L (ref 38–126)
ALT SERPL W/O P-5'-P-CCNC: 15 U/L (ref 10–44)
ANION GAP SERPL CALC-SCNC: 8 MMOL/L (ref 8–16)
AST SERPL-CCNC: 30 U/L (ref 15–46)
BASOPHILS # BLD AUTO: 0.05 K/UL (ref 0–0.2)
BASOPHILS NFR BLD: 0.7 % (ref 0–1.9)
BILIRUB SERPL-MCNC: 0.7 MG/DL (ref 0.1–1)
CALCIUM SERPL-MCNC: 9.1 MG/DL (ref 8.7–10.5)
CHLORIDE SERPL-SCNC: 104 MMOL/L (ref 95–110)
CHOLEST SERPL-MCNC: 189 MG/DL (ref 120–199)
CHOLEST/HDLC SERPL: 2.6 {RATIO} (ref 2–5)
CO2 SERPL-SCNC: 29 MMOL/L (ref 23–29)
CREAT SERPL-MCNC: 0.66 MG/DL (ref 0.5–1.4)
DIFFERENTIAL METHOD: ABNORMAL
EOSINOPHIL # BLD AUTO: 0.1 K/UL (ref 0–0.5)
EOSINOPHIL NFR BLD: 1.5 % (ref 0–8)
ERYTHROCYTE [DISTWIDTH] IN BLOOD BY AUTOMATED COUNT: 11.9 % (ref 11.5–14.5)
EST. GFR  (NO RACE VARIABLE): >60 ML/MIN/1.73 M^2
GLUCOSE SERPL-MCNC: 100 MG/DL (ref 70–110)
HCT VFR BLD AUTO: 35.6 % (ref 37–48.5)
HDLC SERPL-MCNC: 73 MG/DL (ref 40–75)
HDLC SERPL: 38.6 % (ref 20–50)
HGB BLD-MCNC: 11.8 G/DL (ref 12–16)
IMM GRANULOCYTES # BLD AUTO: 0.03 K/UL (ref 0–0.04)
IMM GRANULOCYTES NFR BLD AUTO: 0.4 % (ref 0–0.5)
IRON SERPL-MCNC: 119 UG/DL (ref 30–160)
LDLC SERPL CALC-MCNC: 103.8 MG/DL (ref 63–159)
LYMPHOCYTES # BLD AUTO: 1.6 K/UL (ref 1–4.8)
LYMPHOCYTES NFR BLD: 23.1 % (ref 18–48)
MCH RBC QN AUTO: 32.5 PG (ref 27–31)
MCHC RBC AUTO-ENTMCNC: 33.1 G/DL (ref 32–36)
MCV RBC AUTO: 98 FL (ref 82–98)
MONOCYTES # BLD AUTO: 0.6 K/UL (ref 0.3–1)
MONOCYTES NFR BLD: 8.2 % (ref 4–15)
NEUTROPHILS # BLD AUTO: 4.4 K/UL (ref 1.8–7.7)
NEUTROPHILS NFR BLD: 66.1 % (ref 38–73)
NONHDLC SERPL-MCNC: 116 MG/DL
NRBC BLD-RTO: 0 /100 WBC
PLATELET # BLD AUTO: 331 K/UL (ref 150–450)
PMV BLD AUTO: 9.2 FL (ref 9.2–12.9)
POTASSIUM SERPL-SCNC: 4.4 MMOL/L (ref 3.5–5.1)
PROT SERPL-MCNC: 6.8 G/DL (ref 6–8.4)
RBC # BLD AUTO: 3.63 M/UL (ref 4–5.4)
SATURATED IRON: 38 % (ref 20–50)
SODIUM SERPL-SCNC: 141 MMOL/L (ref 136–145)
TOTAL IRON BINDING CAPACITY: 317 UG/DL (ref 250–450)
TRANSFERRIN SERPL-MCNC: 214 MG/DL (ref 200–375)
TRIGL SERPL-MCNC: 61 MG/DL (ref 30–150)
UUN UR-MCNC: 12 MG/DL (ref 7–17)
VIT B12 SERPL-MCNC: 925 PG/ML (ref 210–950)
WBC # BLD AUTO: 6.7 K/UL (ref 3.9–12.7)

## 2023-11-15 PROCEDURE — 80053 COMPREHEN METABOLIC PANEL: CPT | Mod: PN | Performed by: PHYSICIAN ASSISTANT

## 2023-11-15 PROCEDURE — 3008F PR BODY MASS INDEX (BMI) DOCUMENTED: ICD-10-PCS | Mod: CPTII,S$GLB,, | Performed by: PHYSICIAN ASSISTANT

## 2023-11-15 PROCEDURE — 82306 VITAMIN D 25 HYDROXY: CPT | Mod: PN | Performed by: PHYSICIAN ASSISTANT

## 2023-11-15 PROCEDURE — 3288F PR FALLS RISK ASSESSMENT DOCUMENTED: ICD-10-PCS | Mod: CPTII,S$GLB,, | Performed by: PHYSICIAN ASSISTANT

## 2023-11-15 PROCEDURE — 84425 ASSAY OF VITAMIN B-1: CPT | Mod: PN | Performed by: PHYSICIAN ASSISTANT

## 2023-11-15 PROCEDURE — 99213 PR OFFICE/OUTPT VISIT, EST, LEVL III, 20-29 MIN: ICD-10-PCS | Mod: S$GLB,,, | Performed by: PHYSICIAN ASSISTANT

## 2023-11-15 PROCEDURE — 1126F AMNT PAIN NOTED NONE PRSNT: CPT | Mod: CPTII,S$GLB,, | Performed by: PHYSICIAN ASSISTANT

## 2023-11-15 PROCEDURE — 99999 PR PBB SHADOW E&M-EST. PATIENT-LVL V: CPT | Mod: PBBFAC,,, | Performed by: PHYSICIAN ASSISTANT

## 2023-11-15 PROCEDURE — 3078F PR MOST RECENT DIASTOLIC BLOOD PRESSURE < 80 MM HG: ICD-10-PCS | Mod: CPTII,S$GLB,, | Performed by: PHYSICIAN ASSISTANT

## 2023-11-15 PROCEDURE — 1159F MED LIST DOCD IN RCRD: CPT | Mod: CPTII,S$GLB,, | Performed by: PHYSICIAN ASSISTANT

## 2023-11-15 PROCEDURE — 3078F DIAST BP <80 MM HG: CPT | Mod: CPTII,S$GLB,, | Performed by: PHYSICIAN ASSISTANT

## 2023-11-15 PROCEDURE — 1101F PT FALLS ASSESS-DOCD LE1/YR: CPT | Mod: CPTII,S$GLB,, | Performed by: PHYSICIAN ASSISTANT

## 2023-11-15 PROCEDURE — 84466 ASSAY OF TRANSFERRIN: CPT | Mod: PN | Performed by: PHYSICIAN ASSISTANT

## 2023-11-15 PROCEDURE — 3008F BODY MASS INDEX DOCD: CPT | Mod: CPTII,S$GLB,, | Performed by: PHYSICIAN ASSISTANT

## 2023-11-15 PROCEDURE — 1159F PR MEDICATION LIST DOCUMENTED IN MEDICAL RECORD: ICD-10-PCS | Mod: CPTII,S$GLB,, | Performed by: PHYSICIAN ASSISTANT

## 2023-11-15 PROCEDURE — 82607 VITAMIN B-12: CPT | Mod: PN | Performed by: PHYSICIAN ASSISTANT

## 2023-11-15 PROCEDURE — 3074F SYST BP LT 130 MM HG: CPT | Mod: CPTII,S$GLB,, | Performed by: PHYSICIAN ASSISTANT

## 2023-11-15 PROCEDURE — 1101F PR PT FALLS ASSESS DOC 0-1 FALLS W/OUT INJ PAST YR: ICD-10-PCS | Mod: CPTII,S$GLB,, | Performed by: PHYSICIAN ASSISTANT

## 2023-11-15 PROCEDURE — 3288F FALL RISK ASSESSMENT DOCD: CPT | Mod: CPTII,S$GLB,, | Performed by: PHYSICIAN ASSISTANT

## 2023-11-15 PROCEDURE — 3074F PR MOST RECENT SYSTOLIC BLOOD PRESSURE < 130 MM HG: ICD-10-PCS | Mod: CPTII,S$GLB,, | Performed by: PHYSICIAN ASSISTANT

## 2023-11-15 PROCEDURE — 83540 ASSAY OF IRON: CPT | Mod: PN | Performed by: PHYSICIAN ASSISTANT

## 2023-11-15 PROCEDURE — 99999 PR PBB SHADOW E&M-EST. PATIENT-LVL V: ICD-10-PCS | Mod: PBBFAC,,, | Performed by: PHYSICIAN ASSISTANT

## 2023-11-15 PROCEDURE — 1126F PR PAIN SEVERITY QUANTIFIED, NO PAIN PRESENT: ICD-10-PCS | Mod: CPTII,S$GLB,, | Performed by: PHYSICIAN ASSISTANT

## 2023-11-15 PROCEDURE — 1160F RVW MEDS BY RX/DR IN RCRD: CPT | Mod: CPTII,S$GLB,, | Performed by: PHYSICIAN ASSISTANT

## 2023-11-15 PROCEDURE — 99213 OFFICE O/P EST LOW 20 MIN: CPT | Mod: S$GLB,,, | Performed by: PHYSICIAN ASSISTANT

## 2023-11-15 PROCEDURE — 80061 LIPID PANEL: CPT | Performed by: PHYSICIAN ASSISTANT

## 2023-11-15 PROCEDURE — 1160F PR REVIEW ALL MEDS BY PRESCRIBER/CLIN PHARMACIST DOCUMENTED: ICD-10-PCS | Mod: CPTII,S$GLB,, | Performed by: PHYSICIAN ASSISTANT

## 2023-11-15 PROCEDURE — 85025 COMPLETE CBC W/AUTO DIFF WBC: CPT | Mod: PN | Performed by: PHYSICIAN ASSISTANT

## 2023-11-15 NOTE — PROGRESS NOTES
BARIATRIC VISIT:    HPI:  Luz Burk is a 73 y.o. year old female presents for 2 year post op     Pt states that she is doing well   EGD shows a hernia but esophagitis resolved       Denies: changes in bowel movement pattern, fever, chills, dysphagia, chest pain, and shortness of breath.    Review of Systems   Constitutional:  Negative for fatigue and fever.   HENT:  Negative for tinnitus and trouble swallowing.    Eyes:  Negative for visual disturbance.   Respiratory:  Negative for cough and shortness of breath.    Cardiovascular:  Negative for chest pain, palpitations and leg swelling.   Gastrointestinal:  Positive for nausea and vomiting. Negative for abdominal pain, constipation and diarrhea.   Genitourinary:  Negative for decreased urine volume.   Musculoskeletal:  Negative for myalgias.   Psychiatric/Behavioral:  Negative for dysphoric mood, self-injury and suicidal ideas. The patient is not nervous/anxious.        EXERCISE:  Cleaning house, moving boxes.  Looking forward to water aerobics starting in the summer. Will restart senior classes soon.   VITAMINS: Caltrate, B12 shot, B6, MVI (theragram M), Biotin, Melatonin.     MEDICATIONS/ALLERGIES:  Have been reviewed.    DIET: Regular Bariatric diet    Physical Exam  Constitutional:       Appearance: She is obese.   HENT:      Head: Normocephalic and atraumatic.   Eyes:      Extraocular Movements: Extraocular movements intact.      Conjunctiva/sclera: Conjunctivae normal.      Pupils: Pupils are equal, round, and reactive to light.   Cardiovascular:      Rate and Rhythm: Normal rate and regular rhythm.      Pulses: Normal pulses.      Heart sounds: Normal heart sounds.   Pulmonary:      Effort: Pulmonary effort is normal.      Breath sounds: Normal breath sounds. No wheezing.   Abdominal:      General: Bowel sounds are normal.      Palpations: Abdomen is soft.      Tenderness: There is no abdominal tenderness.   Musculoskeletal:         General: Normal  range of motion.      Cervical back: Normal range of motion and neck supple.   Skin:     General: Skin is warm.      Capillary Refill: Capillary refill takes less than 2 seconds.   Neurological:      General: No focal deficit present.      Mental Status: She is alert and oriented to person, place, and time.   Psychiatric:         Mood and Affect: Mood normal.         Behavior: Behavior normal.         Thought Content: Thought content normal.         Judgment: Judgment normal.       ASSESSMENT:  - Morbid obesity s/p band removal to sleeve gastrectomy on 4/7/2021.  - Co-morbidities: GERD  - Tobacco use  - Alcohol use     PLAN:  - plastics for excess skin   - general surgery with lien for hernia repair  - Call the office for any issues.  - Check labs today.  - Increase Pepcid 40mg BID       30 minute visit, over 50% of time spent counseling patient face to face on diet, exercise, and weight loss.

## 2023-11-15 NOTE — PATIENT INSTRUCTIONS
"Snacks: (100-200 calories; >5g protein)    - 1 low-fat cheese stick with 8 cherry tomatoes or 1 serving fresh fruit  - 4 thin slices fat-free turkey breast and 1 slice low-fat cheese  - 4 thin slices fat-free honey ham with wedge of melon  - 2 slices of turkey gay  - Boiled eggs (can buy at [employer] already boiled w/ shell removed)  - for convenience,  [place] read, snack, go (deli meat and cheese rolls)  - P3 packets (Protein packs w/ cheese, nuts, lean deli meat)  - MHP Fit and Lean Protein Pudding (find at [employer] - per 1 cup serving = 100 calories, 15 g protein, 0 g sugar)  - 6-8 edamame pods (equivalent to about 1/4 cup edamame without pods).   - 1/4 cup unsalted nuts with ½ cup fruit  - 6-oz container Dannon Light n Fit vanilla yogurt, topped with 1oz unsalted nuts         - apple, celery or baby carrots spread with 2 Tbsp PB2  - apple slices with 1 oz slice low-fat cheese  - Apple slices dipped in 2 Tbsp of PB2  - 2 Tbsp PB2 mixed in light or greek yogurt or sugar-free pudding  - celery, cucumber, bell pepper or baby carrots dipped in ¼ cup hummus bean spread   - celery, cucumber, baby carrots dipped in high protein greek yogurt (Mix 16 oz plain greek yogurt + 1 packet of hidden valley ranch dip mix)  - Yo Links Beef Steak - 14g protein! (similar to beef jerky but very lean)  - 2 wedges Laughing Cow - Light Herb & Garlic Cheese with sliced cucumber or green bell pepper  - 1/2 cup low-fat cottage cheese with ¼ cup fruit or ¼ cup salsa  - 1/2 cup low fat cottage cheese with 10-15 cherry tomatoes  - 8 oz glass of FAIRLIFE fat free milk (13 g protein)  - 8 oz glass of FAIRLIFE fat free milk + 1 packet of sugar-free hot cocoa  - Add Atkins advantage Cafe Caramel shake to decaf coffee. Serve hot or blend with ice for "frappaccino" like drink  - RTD Protein drinks: Atkins, Low Carb Slim Fast, EAS light, Muscle Milk Light, etc.  - Homemade Protein drinks: GNC Soy95, Isopure, Nectar, UNJURY, Whey " Gourmet, etc. Mix 1 scoop powder with 8oz skim/1% milk or light soymilk.  - Protein bars: Atkins, EAS, Pure Protein,  Quest, Think Thin, Detour, etc. Must have 0-4 grams sugar - Read the label.    ** Be CREATIVE. You can always snack on bites of grilled chicken or tuna salad made with low fat franco, if needed!

## 2023-11-16 ENCOUNTER — OFFICE VISIT (OUTPATIENT)
Dept: ORTHOPEDICS | Facility: CLINIC | Age: 73
End: 2023-11-16
Payer: MEDICARE

## 2023-11-16 ENCOUNTER — HOSPITAL ENCOUNTER (OUTPATIENT)
Dept: RADIOLOGY | Facility: HOSPITAL | Age: 73
Discharge: HOME | End: 2023-11-16
Attending: ORTHOPAEDIC SURGERY
Payer: MEDICARE

## 2023-11-16 ENCOUNTER — OFFICE VISIT (OUTPATIENT)
Dept: NEUROLOGY | Facility: CLINIC | Age: 73
End: 2023-11-16
Payer: MEDICARE

## 2023-11-16 VITALS — WEIGHT: 166 LBS | HEIGHT: 64 IN | BODY MASS INDEX: 28.34 KG/M2

## 2023-11-16 VITALS
SYSTOLIC BLOOD PRESSURE: 126 MMHG | DIASTOLIC BLOOD PRESSURE: 74 MMHG | BODY MASS INDEX: 28.51 KG/M2 | HEART RATE: 61 BPM | WEIGHT: 167 LBS | HEIGHT: 64 IN

## 2023-11-16 DIAGNOSIS — Z96.651 HISTORY OF TOTAL KNEE ARTHROPLASTY, RIGHT: ICD-10-CM

## 2023-11-16 DIAGNOSIS — M17.12 PRIMARY OSTEOARTHRITIS OF LEFT KNEE: ICD-10-CM

## 2023-11-16 DIAGNOSIS — G44.039 EPISODIC PAROXYSMAL HEMICRANIA, NOT INTRACTABLE: Primary | ICD-10-CM

## 2023-11-16 DIAGNOSIS — M25.562 LEFT KNEE PAIN, UNSPECIFIED CHRONICITY: Primary | ICD-10-CM

## 2023-11-16 DIAGNOSIS — M25.562 LEFT KNEE PAIN, UNSPECIFIED CHRONICITY: ICD-10-CM

## 2023-11-16 DIAGNOSIS — G44.229 CHRONIC TENSION-TYPE HEADACHE, NOT INTRACTABLE: ICD-10-CM

## 2023-11-16 PROCEDURE — 99999 PR PBB SHADOW E&M-EST. PATIENT-LVL III: CPT | Mod: PBBFAC,,, | Performed by: ORTHOPAEDIC SURGERY

## 2023-11-16 PROCEDURE — 99214 PR OFFICE/OUTPT VISIT, EST, LEVL IV, 30-39 MIN: ICD-10-PCS | Mod: S$GLB,,, | Performed by: STUDENT IN AN ORGANIZED HEALTH CARE EDUCATION/TRAINING PROGRAM

## 2023-11-16 PROCEDURE — 1125F AMNT PAIN NOTED PAIN PRSNT: CPT | Mod: CPTII,S$GLB,, | Performed by: ORTHOPAEDIC SURGERY

## 2023-11-16 PROCEDURE — 3008F BODY MASS INDEX DOCD: CPT | Mod: CPTII,S$GLB,, | Performed by: STUDENT IN AN ORGANIZED HEALTH CARE EDUCATION/TRAINING PROGRAM

## 2023-11-16 PROCEDURE — 3074F SYST BP LT 130 MM HG: CPT | Mod: CPTII,S$GLB,, | Performed by: STUDENT IN AN ORGANIZED HEALTH CARE EDUCATION/TRAINING PROGRAM

## 2023-11-16 PROCEDURE — 1159F PR MEDICATION LIST DOCUMENTED IN MEDICAL RECORD: ICD-10-PCS | Mod: CPTII,S$GLB,, | Performed by: ORTHOPAEDIC SURGERY

## 2023-11-16 PROCEDURE — 1126F AMNT PAIN NOTED NONE PRSNT: CPT | Mod: CPTII,S$GLB,, | Performed by: STUDENT IN AN ORGANIZED HEALTH CARE EDUCATION/TRAINING PROGRAM

## 2023-11-16 PROCEDURE — 1160F PR REVIEW ALL MEDS BY PRESCRIBER/CLIN PHARMACIST DOCUMENTED: ICD-10-PCS | Mod: CPTII,S$GLB,, | Performed by: ORTHOPAEDIC SURGERY

## 2023-11-16 PROCEDURE — 73562 X-RAY EXAM OF KNEE 3: CPT | Mod: TC,RT

## 2023-11-16 PROCEDURE — 1126F PR PAIN SEVERITY QUANTIFIED, NO PAIN PRESENT: ICD-10-PCS | Mod: CPTII,S$GLB,, | Performed by: STUDENT IN AN ORGANIZED HEALTH CARE EDUCATION/TRAINING PROGRAM

## 2023-11-16 PROCEDURE — 99999 PR PBB SHADOW E&M-EST. PATIENT-LVL III: ICD-10-PCS | Mod: PBBFAC,,, | Performed by: STUDENT IN AN ORGANIZED HEALTH CARE EDUCATION/TRAINING PROGRAM

## 2023-11-16 PROCEDURE — 99203 PR OFFICE/OUTPT VISIT, NEW, LEVL III, 30-44 MIN: ICD-10-PCS | Mod: S$GLB,,, | Performed by: ORTHOPAEDIC SURGERY

## 2023-11-16 PROCEDURE — 99214 OFFICE O/P EST MOD 30 MIN: CPT | Mod: S$GLB,,, | Performed by: STUDENT IN AN ORGANIZED HEALTH CARE EDUCATION/TRAINING PROGRAM

## 2023-11-16 PROCEDURE — 99999 PR PBB SHADOW E&M-EST. PATIENT-LVL III: ICD-10-PCS | Mod: PBBFAC,,, | Performed by: ORTHOPAEDIC SURGERY

## 2023-11-16 PROCEDURE — 99999 PR PBB SHADOW E&M-EST. PATIENT-LVL III: CPT | Mod: PBBFAC,,, | Performed by: STUDENT IN AN ORGANIZED HEALTH CARE EDUCATION/TRAINING PROGRAM

## 2023-11-16 PROCEDURE — 3078F PR MOST RECENT DIASTOLIC BLOOD PRESSURE < 80 MM HG: ICD-10-PCS | Mod: CPTII,S$GLB,, | Performed by: STUDENT IN AN ORGANIZED HEALTH CARE EDUCATION/TRAINING PROGRAM

## 2023-11-16 PROCEDURE — 73562 XR KNEE ORTHO LEFT WITH FLEXION: ICD-10-PCS | Mod: 26,RT,, | Performed by: RADIOLOGY

## 2023-11-16 PROCEDURE — 1125F PR PAIN SEVERITY QUANTIFIED, PAIN PRESENT: ICD-10-PCS | Mod: CPTII,S$GLB,, | Performed by: ORTHOPAEDIC SURGERY

## 2023-11-16 PROCEDURE — 3008F PR BODY MASS INDEX (BMI) DOCUMENTED: ICD-10-PCS | Mod: CPTII,S$GLB,, | Performed by: STUDENT IN AN ORGANIZED HEALTH CARE EDUCATION/TRAINING PROGRAM

## 2023-11-16 PROCEDURE — 3008F BODY MASS INDEX DOCD: CPT | Mod: CPTII,S$GLB,, | Performed by: ORTHOPAEDIC SURGERY

## 2023-11-16 PROCEDURE — 1159F MED LIST DOCD IN RCRD: CPT | Mod: CPTII,S$GLB,, | Performed by: STUDENT IN AN ORGANIZED HEALTH CARE EDUCATION/TRAINING PROGRAM

## 2023-11-16 PROCEDURE — 1160F RVW MEDS BY RX/DR IN RCRD: CPT | Mod: CPTII,S$GLB,, | Performed by: ORTHOPAEDIC SURGERY

## 2023-11-16 PROCEDURE — 3288F FALL RISK ASSESSMENT DOCD: CPT | Mod: CPTII,S$GLB,, | Performed by: ORTHOPAEDIC SURGERY

## 2023-11-16 PROCEDURE — 73562 X-RAY EXAM OF KNEE 3: CPT | Mod: 26,RT,, | Performed by: RADIOLOGY

## 2023-11-16 PROCEDURE — 1159F MED LIST DOCD IN RCRD: CPT | Mod: CPTII,S$GLB,, | Performed by: ORTHOPAEDIC SURGERY

## 2023-11-16 PROCEDURE — 3074F PR MOST RECENT SYSTOLIC BLOOD PRESSURE < 130 MM HG: ICD-10-PCS | Mod: CPTII,S$GLB,, | Performed by: STUDENT IN AN ORGANIZED HEALTH CARE EDUCATION/TRAINING PROGRAM

## 2023-11-16 PROCEDURE — 99203 OFFICE O/P NEW LOW 30 MIN: CPT | Mod: S$GLB,,, | Performed by: ORTHOPAEDIC SURGERY

## 2023-11-16 PROCEDURE — 1101F PT FALLS ASSESS-DOCD LE1/YR: CPT | Mod: CPTII,S$GLB,, | Performed by: ORTHOPAEDIC SURGERY

## 2023-11-16 PROCEDURE — 3078F DIAST BP <80 MM HG: CPT | Mod: CPTII,S$GLB,, | Performed by: STUDENT IN AN ORGANIZED HEALTH CARE EDUCATION/TRAINING PROGRAM

## 2023-11-16 PROCEDURE — 3008F PR BODY MASS INDEX (BMI) DOCUMENTED: ICD-10-PCS | Mod: CPTII,S$GLB,, | Performed by: ORTHOPAEDIC SURGERY

## 2023-11-16 PROCEDURE — 1101F PR PT FALLS ASSESS DOC 0-1 FALLS W/OUT INJ PAST YR: ICD-10-PCS | Mod: CPTII,S$GLB,, | Performed by: ORTHOPAEDIC SURGERY

## 2023-11-16 PROCEDURE — 3288F PR FALLS RISK ASSESSMENT DOCUMENTED: ICD-10-PCS | Mod: CPTII,S$GLB,, | Performed by: ORTHOPAEDIC SURGERY

## 2023-11-16 PROCEDURE — 1159F PR MEDICATION LIST DOCUMENTED IN MEDICAL RECORD: ICD-10-PCS | Mod: CPTII,S$GLB,, | Performed by: STUDENT IN AN ORGANIZED HEALTH CARE EDUCATION/TRAINING PROGRAM

## 2023-11-16 PROCEDURE — 73564 X-RAY EXAM KNEE 4 OR MORE: CPT | Mod: 26,LT,, | Performed by: RADIOLOGY

## 2023-11-16 PROCEDURE — 73564 XR KNEE ORTHO LEFT WITH FLEXION: ICD-10-PCS | Mod: 26,LT,, | Performed by: RADIOLOGY

## 2023-11-16 NOTE — PROGRESS NOTES
[facility] - NEUROLOGY 7TH [place] [facility], [place]    Date: 11/16/23  Patient Name: Luz Burk   MRN: [id number]   PCP: Hilario Slaughter  Referring Provider: No ref. provider found    Assessment:   Luz Burk is a 73 y.o. female presenting for evaluation of headaches.  By description these are not migraines, but rather more similar to tension headaches.  She had an MRI as well as a shunt series in March of 2023 which was normal.  We reviewed the results of these tests today.  Given benign examination and normal imaging, no further workup is indicated.  RTC PRN    Plan:     Problem List Items Addressed This Visit          Neuro    Episodic paroxysmal hemicrania, not intractable - Primary     Other Visit Diagnoses       Chronic tension-type headache, not intractable                Dottie Lovett MD  [facility]   Department of Neurology/Epilepsy      Patient note was created using MModal Dictation.  Any errors in syntax or even information may not have been identified and edited on initial review prior to signing this note.  Subjective:        HPI:   Patient presents for evaluation of headaches and dizziness.   She has a history of  shunt for hydrocephalus placed 2005, then revision in 2007.  Getting ready to move to [place] at the end of this year and wanted to get shunt checked out.  She reports that she has had some dizzy spells lately and some headaches.  Headaches are bilateral, pressure feeling, no photophobia, phonophobia, nausea.  Takes tylenol for the headaches which helps.    Sometimes her shunt feels like it is overfull and she thinks it is sticking out  Estimates about 10 headache days a month    Just got back from cruise, did get a cold and had a lot of sinus pressure with that, allergies. Headaches worse since then.      Dizziness - reports mostly when she is getting up from a lying or seated position.  She occasionally reports a loss  "of balance with walking.     PAST MEDICAL HISTORY:  Past Medical History:   Diagnosis Date    Cataract     Collagenous colitis     Dx 17    Depression     Dry mouth 2018    Excessive thirst 2018    Fecal incontinence 2016    Gastric banding status 2012    Hydrocephalus 01/15/2013    Hypothyroidism     LAP-BAND surgery status 2015    Muscle weakness 2016    OA (osteoarthritis)     Obesity     Other specified prophylactic or treatment measure 2012    Pain aggravated by changing postions 2016    Palpitations 2021    SI (sacroiliac) pain 2016    Sleep apnea     Thumb pain, right 2020    Urinary frequency 2018       PAST SURGICAL HISTORY:  Past Surgical History:   Procedure Laterality Date    APPENDECTOMY      ARTHROPLASTY OF JOINT OF FINGER Right 10/30/2020    Procedure: ARTHROPLASTY, FINGER cmc joint right thumb;  Surgeon: Sugey Ayon MD;  Location: [facility];  Service: Orthopedics;  Laterality: Right;  MAC/Regional    BRAIN SURGERY   &      shunt    CARPAL TUNNEL RELEASE Right 2020    Procedure: RELEASE, CARPAL TUNNEL, RIGHT;  Surgeon: Sugey Ayon MD;  Location: [facility];  Service: Orthopedics;  Laterality: Right;  Regional & MAC    CARPAL TUNNEL RELEASE Right 10/31/2022    Procedure: RELEASE, CARPAL TUNNEL "REVISION" RIGHT NEUROLYSIS MEDIAL NERVE, PATCH PLACEMENT;  Surgeon: Sugey Ayon MD;  Location: [facility];  Service: Orthopedics;  Laterality: Right;    CATARACT EXTRACTION Bilateral     CERVICAL FUSION  1981     SECTION  1974,76&79    CHOLECYSTECTOMY      COLONOSCOPY N/A 2016    Procedure: COLONOSCOPY;  Surgeon: BINH Souza MD;  Location: [address]);  Service: Endoscopy;  Laterality: N/A;    COLONOSCOPY N/A 2020    Procedure: COLONOSCOPYSuprep;  Surgeon: Antonio Weller MD;  Location: [facility];  Service: Endoscopy;  Laterality: N/A;    ESOPHAGOGASTRODUODENOSCOPY " N/A 06/17/2020    Procedure: EGD (ESOPHAGOGASTRODUODENOSCOPY);  Surgeon: Antonio Weller MD;  Location: [facility];  Service: Endoscopy;  Laterality: N/A;    ESOPHAGOGASTRODUODENOSCOPY N/A 09/02/2022    Procedure: EGD (ESOPHAGOGASTRODUODENOSCOPY;  Surgeon: Antonio Weller MD;  Location: [facility] ENDO;  Service: Endoscopy;  Laterality: N/A;    ESOPHAGOGASTRODUODENOSCOPY N/A 7/19/2023    Procedure: ESOPHAGOGASTRODUODENOSCOPY (EGD);  Surgeon: Eliu Wolf MD;  Location: [facility] (4TH FLR);  Service: Endoscopy;  Laterality: N/A;  referral Dr [facility]  7/13/23- procedure date and arrival time confirmed with precall. OFF PPI medication confirmed with pt. NELDA Marlow RN    ESOPHAGOGASTRODUODENOSCOPY N/A 11/14/2023    Procedure: EGD (ESOPHAGOGASTRODUODENOSCOPY);  Surgeon: Susie Morgan MD;  Location: [facility] (4TH FLR);  Service: Endoscopy;  Laterality: N/A;  Pt wanted first available, won't be available in March when Dr. Wolf had availability   shunt  Referral:  Eliu Wolf MD  [facility] portal  LW    EXCISION OF GANGLION OF WRIST Right 07/24/2020    Procedure: EXCISION, GANGLION CYST, WRIST, RIGHT volar;  Surgeon: Sugey Ayon MD;  Location: [facility] OR;  Service: Orthopedics;  Laterality: Right;  Regional & MAC    EXCISIONAL BIOPSY Right 11/22/2022    Procedure: EXCISIONAL BIOPSY;  Surgeon: Nelly Cabrera MD;  Location: [facility] OR;  Service: Ophthalmology;  Laterality: Right;  Biopsy Left lower lid with cryo unit    EYE SURGERY  4-5 years ago    HERNIA REPAIR      HIATAL HERNIA REPAIR      HYSTERECTOMY      INTERNAL NEUROLYSIS USING OPERATING MICROSCOPE  10/31/2022    Procedure: NEUROLYSIS, INTERNAL, USING OPERATING MICROSCOPE;  Surgeon: Sugey Ayon MD;  Location: [facility] OR;  Service: Orthopedics;;    JOINT REPLACEMENT Right 2010    knee    lap band surgery  10/11/2011    Realize C Band (11mL)    LAPAROSCOPIC GASTRIC BANDING      LAPAROSCOPIC REMOVAL OF GASTRIC BAND N/A 04/07/2021     Procedure: REMOVAL-GASTRIC BAND-LAPAROSCOPIC-;  Surgeon: Oswaldo Mayfield MD;  [facility];  Service: General;  Laterality: N/A;    LAPAROSCOPIC SLEEVE GASTRECTOMY N/A 04/07/2021    Procedure: GASTRECTOMY-SLEEVE-LAPAROSCOPIC;  Surgeon: Oswaldo Mayfield MD;  Location: [facility] OR 2ND FLR;  Service: General;  Laterality: N/A;    OOPHORECTOMY      SPINE SURGERY  70s    TONSILLECTOMY      TOTAL KNEE ARTHROPLASTY  07/23/2012    VENTRICULOPERITONEAL SHUNT         CURRENT MEDS:  Current Outpatient Medications   Medication Sig Dispense Refill    ascorbic acid, vitamin C, (VITAMIN C) 100 MG tablet Take 100 mg by mouth once daily.      BIOTIN ORAL Take by mouth once daily.      calcium citrate-vitamin D3 315-200 mg (CITRACAL+D) 315 mg-5 mcg (200 unit) per tablet Take 1 tablet by mouth once daily.      cholecalciferol, vitamin D3, (VITAMIN D3) 125 mcg (5,000 unit) Tab Take 1 tablet (5,000 Units total) by mouth once daily. 90 tablet 3    citalopram (CELEXA) 20 MG tablet Take 1 tablet by mouth once daily 90 tablet 0    cyanocobalamin 1,000 mcg/mL injection INJECT 1 ML EVERY MONTH 10 mL 5    fluticasone (FLONASE) 50 mcg/actuation nasal spray 1 spray by Nasal route once daily.       furosemide (LASIX) 20 MG tablet Take 1 tablet (20 mg total) by mouth once daily. (Patient taking differently: Take 20 mg by mouth as needed.) 30 tablet 11    levothyroxine (SYNTHROID) 50 MCG tablet TAKE 1 TABLET BY MOUTH BEFORE BREAKFAST 90 tablet 3    loratadine (CLARITIN) 10 mg tablet Take 1 tablet (10 mg total) by mouth once daily. 90 tablet 3    meclizine (ANTIVERT) 25 mg tablet Take 1 tablet (25 mg total) by mouth 3 (three) times daily as needed for Dizziness. 20 tablet 0    melatonin 10 mg Tab Take by mouth every evening.      multivitamin capsule Take 1 capsule by mouth once daily.      pantoprazole (PROTONIX) 40 MG tablet Take 1 tablet (40 mg total) by mouth 2 (two) times daily before meals. 180 tablet 0    potassium  chloride (KLOR-CON) 10 MEQ TbSR TAKE TWO TABLETS BY MOUTH ONCE DAILY WITH LASIX  Strength: 10 mEq 180 tablet 3    triamcinolone acetonide 0.1% (KENALOG) 0.1 % cream Apply topically 2 (two) times daily. for 10 days 80 g 1    valACYclovir (VALTREX) 1000 MG tablet TAKE 2 TABLETS BY MOUTH AS SOON AS POSSIBLE AFTER SYMPTOM ONSET(HERPES OUTBREAK), THEN TAKE 2 TABLETS 12 HOURS LATER(4 TABLETS TOTAL) 30 tablet 11     No current facility-administered medications for this visit.     Facility-Administered Medications Ordered in Other Visits   Medication Dose Route Frequency Provider Last Rate Last Admin    acetaminophen tablet 1,000 mg  1,000 mg Oral Once Sugey Edouard MD        fentaNYL injection 25 mcg  25 mcg Intravenous Q5 Min PRN Sugey Edouard MD        fentaNYL injection 25 mcg  25 mcg Intravenous Q5 Min PRN Quinten Chu MD   100 mcg at 10/30/20 0920    lidocaine (PF) 10 mg/ml (1%) injection 10 mg  1 mL Intradermal Once [place], Rachele Latif MD        midazolam (VERSED) 1 mg/mL injection 0.5 mg  0.5 mg Intravenous PRN Sugey Edouard MD        midazolam (VERSED) 1 mg/mL injection 0.5 mg  0.5 mg Intravenous PRN Quinten Chu MD   1 mg at 10/30/20 0920       ALLERGIES:  Review of patient's allergies indicates:  No Known Allergies    FAMILY HISTORY:  Family History   Problem Relation Age of Onset    Alzheimer's disease Mother 84    Prostate cancer Father 70    Lupus Father     Emphysema Father     Cancer Father         prostate cancer    COPD Father     Alcohol abuse Father     Stroke Father     Arthritis Father     Hearing loss Father     Breast cancer Sister     Cancer Sister         breast cancer    Hepatitis Sister     Cirrhosis Sister     No Known Problems Sister     Cancer Brother         lymphoma from Rheumatoid arthritis Tx    Stroke Brother     Arthritis Brother     No Known Problems Brother     No Known Problems Daughter     No Known Problems Son     No Known Problems  "Son     Cancer Other     Arthritis Other     Colon cancer Neg Hx     Ovarian cancer Neg Hx     Melanoma Neg Hx        SOCIAL HISTORY:  Social History     Tobacco Use    Smoking status: Former     Current packs/day: 0.00     Average packs/day: 0.5 packs/day for 35.0 years (17.5 ttl pk-yrs)     Types: Cigarettes     Start date: 1988     Quit date: 2023     Years since quittin.5     Passive exposure: Current    Smokeless tobacco: Never    Tobacco comments:     Currently vaping with nicotine but reports she does not inhale.    Substance Use Topics    Alcohol use: Yes     Alcohol/week: 4.0 standard drinks of alcohol     Types: 2 Glasses of wine, 2 Drinks containing 0.5 oz of alcohol per week     Comment: Average1- 2 drinks a day    Drug use: No       Review of Systems:  12 system review of systems is negative except for the symptoms mentioned in HPI.      Objective:     Vitals:    23 1130   BP: 126/74   Pulse: 61   Weight: 75.8 kg (167 lb)   Height: 5' 4" (1.626 m)     General: NAD, well nourished   Eyes: no tearing, discharge, no erythema   ENT: moist mucous membranes of the oral cavity, nares patent    Neck: Supple, full range of motion  Cardiovascular: Warm and well perfused, pulses equal and symmetrical  Lungs: Normal work of breathing, normal chest wall excursions  Skin: No rash, lesions, or breakdown on exposed skin  Psychiatry: Mood and affect are appropriate   Abdomen: soft, non tender, non distended  Extremeties: No cyanosis, clubbing or edema.    Neurological   MENTAL STATUS: Alert and oriented to person, place, and time. Attention and concentration within normal limits. Speech without dysarthria, able to name and repeat without difficulty. Recent and remote memory within normal limits   CRANIAL NERVES: Visual fields intact. PERRL. EOMI. Facial sensation intact. Face symmetrical. Hearing grossly intact. Full shoulder shrug bilaterally. Tongue protrudes midline   SENSORY: Sensation is intact " to light touch throughout.  Joint position perception intact. Negative Romberg.   MOTOR: Normal bulk and tone. No pronator drift.  5/5 deltoid, biceps, triceps, interosseous, hand  bilaterally. 5/5 iliopsoas, knee extension/flexion, foot dorsi/plantarflexion bilaterally.    REFLEXES: Symmetric and 2+ throughout. Toes down going bilaterally.   CEREBELLAR/COORDINATION/GAIT: Gait steady with normal arm swing and stride length.  Heel to shin intact. Finger to nose intact. Normal rapid alternating movements.

## 2023-11-19 PROBLEM — Z96.651 HISTORY OF TOTAL KNEE ARTHROPLASTY, RIGHT: Status: ACTIVE | Noted: 2023-11-19

## 2023-11-19 NOTE — PROGRESS NOTES
Subjective:       Patient ID: Luz Burk is a 73 y.o. female.    Chief Complaint:   Pain of the Left Knee  She comes in for osteoarthritis pain of the left knee, wondering if she needs knee surgery.  She has 3/10 pain, and she really does not have much in the way of functional limitations.  No fall, accident, injury.  She has a history of right total knee replacement in 2012 by Dr. [facility].  She has been ambulating well since that point in time but does complain of pain in the anterior portion of the right knee intermittently.  She does complain of some clicking, popping and occasional locking of the left knee. Pain is exacerbated by extensive weight-bearing activity and relieved by rest.  No groin pain, distal numbness or tingling.  She is status post lap band surgery    Past Medical History:   Diagnosis Date    Cataract     Collagenous colitis     Dx 5/30/16    Depression     Dry mouth 01/30/2018    Excessive thirst 01/30/2018    Fecal incontinence 05/25/2016    Gastric banding status 08/20/2012    Hydrocephalus 01/15/2013    Hypothyroidism     LAP-BAND surgery status 08/06/2015    Muscle weakness 09/04/2016    OA (osteoarthritis)     Obesity     Other specified prophylactic or treatment measure 07/27/2012    Pain aggravated by changing postions 09/04/2016    Palpitations 01/29/2021    SI (sacroiliac) pain 09/04/2016    Sleep apnea     Thumb pain, right 12/11/2020    Urinary frequency 01/30/2018     Past Surgical History:   Procedure Laterality Date    APPENDECTOMY      ARTHROPLASTY OF JOINT OF FINGER Right 10/30/2020    Procedure: ARTHROPLASTY, FINGER cmc joint right thumb;  Surgeon: Sugey Ayon MD;  Location: [facility];  Service: Orthopedics;  Laterality: Right;  MAC/Regional    BRAIN SURGERY  2007 & 2009     shunt    CARPAL TUNNEL RELEASE Right 07/24/2020    Procedure: RELEASE, CARPAL TUNNEL, RIGHT;  Surgeon: Sugey Ayon MD;  Location: [facility] OR;  Service: Orthopedics;  Laterality:  "Right;  Regional & MAC    CARPAL TUNNEL RELEASE Right 10/31/2022    Procedure: RELEASE, CARPAL TUNNEL "REVISION" RIGHT NEUROLYSIS MEDIAL NERVE, PATCH PLACEMENT;  Surgeon: Sugey Ayon MD;  Location: [facility];  Service: Orthopedics;  Laterality: Right;    CATARACT EXTRACTION Bilateral     CERVICAL FUSION  1981     SECTION  1974,76&79    CHOLECYSTECTOMY      COLONOSCOPY N/A 2016    Procedure: COLONOSCOPY;  Surgeon: BINH Souza MD;  Location: [facility] (4TH FLR);  Service: Endoscopy;  Laterality: N/A;    COLONOSCOPY N/A 2020    Procedure: COLONOSCOPYSuprep;  Surgeon: Antonio Weller MD;  Location: [facility];  Service: Endoscopy;  Laterality: N/A;    ESOPHAGOGASTRODUODENOSCOPY N/A 2020    Procedure: EGD (ESOPHAGOGASTRODUODENOSCOPY);  Surgeon: Antonio Weller MD;  Location: [facility];  Service: Endoscopy;  Laterality: N/A;    ESOPHAGOGASTRODUODENOSCOPY N/A 2022    Procedure: EGD (ESOPHAGOGASTRODUODENOSCOPY;  Surgeon: Antonio Wleler MD;  Location: [facility];  Service: Endoscopy;  Laterality: N/A;    ESOPHAGOGASTRODUODENOSCOPY N/A 2023    Procedure: ESOPHAGOGASTRODUODENOSCOPY (EGD);  Surgeon: Eliu Wolf MD;  [address]  Service: Endoscopy;  Laterality: N/A;  referral Dr [facility]field[facility]  23- procedure date and arrival time confirmed with precall. OFF PPI medication confirmed with pt. NELDA Marlow RN    ESOPHAGOGASTRODUODENOSCOPY N/A 2023    Procedure: EGD (ESOPHAGOGASTRODUODENOSCOPY);  Surgeon: Susie Morgan MD;  Location: [facility] (4TH FLR);  Service: Endoscopy;  Laterality: N/A;  Pt wanted first available, won't be available in March when Dr. Wolf had availability   shunt  Referral:  Eliu Wolf MD  [facility] portal  LW    EXCISION OF GANGLION OF WRIST Right 2020    Procedure: EXCISION, GANGLION CYST, WRIST, RIGHT volar;  Surgeon: Sugey Ayon MD;  Location: [facility];  Service: Orthopedics;  Laterality: " Right;  Regional & MAC    EXCISIONAL BIOPSY Right 11/22/2022    Procedure: EXCISIONAL BIOPSY;  Surgeon: Nelly Cabrera MD;  Location: [facility] OR;  Service: Ophthalmology;  Laterality: Right;  Biopsy Left lower lid with cryo unit    EYE SURGERY  4-5 years ago    HERNIA REPAIR      HIATAL HERNIA REPAIR      HYSTERECTOMY      INTERNAL NEUROLYSIS USING OPERATING MICROSCOPE  10/31/2022    Procedure: NEUROLYSIS, INTERNAL, USING OPERATING MICROSCOPE;  Surgeon: Sugey Ayon MD;  Location: [facility] OR;  Service: Orthopedics;;    JOINT REPLACEMENT Right 2010    knee    lap band surgery  10/11/2011    Realize C Band (11mL)    LAPAROSCOPIC GASTRIC BANDING      LAPAROSCOPIC REMOVAL OF GASTRIC BAND N/A 04/07/2021    Procedure: REMOVAL-GASTRIC BAND-LAPAROSCOPIC-;  Surgeon: Oswaldo Mayfield MD;  Location: [facility] OR [address] [place] [address];  Service: General;  Laterality: N/A;    LAPAROSCOPIC SLEEVE GASTRECTOMY N/A 04/07/2021    Procedure: GASTRECTOMY-SLEEVE-LAPAROSCOPIC;  Surgeon: Oswaldo Mayfield MD;  Location: [facility] OR [address] [place] [address];  Service: General;  Laterality: N/A;    OOPHORECTOMY      SPINE SURGERY  70s    TONSILLECTOMY      TOTAL KNEE ARTHROPLASTY  07/23/2012    VENTRICULOPERITONEAL SHUNT       Family History   Problem Relation Age of Onset    Alzheimer's disease Mother 84    Prostate cancer Father 70    Lupus Father     Emphysema Father     Cancer Father         prostate cancer    COPD Father     Alcohol abuse Father     Stroke Father     Arthritis Father     Hearing loss Father     Breast cancer Sister     Cancer Sister         breast cancer    Hepatitis Sister     Cirrhosis Sister     No Known Problems Sister     Cancer Brother         lymphoma from Rheumatoid arthritis Tx    Stroke Brother     Arthritis Brother     No Known Problems Brother     No Known Problems Daughter     No Known Problems Son     No Known Problems Son     Cancer Other     Arthritis Other     Colon cancer Neg Hx     Ovarian cancer Neg Hx     Melanoma Neg  Hx      Social History     Socioeconomic History    Marital status:    Tobacco Use    Smoking status: Former     Current packs/day: 0.00     Average packs/day: 0.5 packs/day for 35.0 years (17.5 ttl pk-yrs)     Types: Cigarettes     Start date: 1988     Quit date: 2023     Years since quittin.5     Passive exposure: Current    Smokeless tobacco: Never    Tobacco comments:     Currently vaping with nicotine but reports she does not inhale.    Substance and Sexual Activity    Alcohol use: Yes     Alcohol/week: 4.0 standard drinks of alcohol     Types: 2 Glasses of wine, 2 Drinks containing 0.5 oz of alcohol per week     Comment: Average1- 2 drinks a day    Drug use: No    Sexual activity: Yes     Partners: Male     Birth control/protection: None     Comment: Hysterectomy     Social Determinants of Health     Financial Resource Strain: Low Risk  (2023)    Overall Financial Resource Strain (CARDIA)     Difficulty of Paying Living Expenses: Not hard at all   Food Insecurity: No Food Insecurity (2023)    Hunger Vital Sign     Worried About Running Out of Food in the Last Year: Never true     Ran Out of Food in the Last Year: Never true   Transportation Needs: No Transportation Needs (2023)    PRAPARE - Transportation     Lack of Transportation (Medical): No     Lack of Transportation (Non-Medical): No   Physical Activity: Sufficiently Active (2023)    Exercise Vital Sign     Days of Exercise per Week: 5 days     Minutes of Exercise per Session: 30 min   Stress: Stress Concern Present (2023)    [employer] - Occupational Stress Questionnaire     Feeling of Stress : To some extent   Social Connections: Unknown (2023)    Social Connection and Isolation Panel [NHANES]     Frequency of Communication with Friends and Family: More than three times a week     Frequency of Social Gatherings with Friends and Family: Once a week     Active Member of  Clubs or Organizations: Yes     Attends Club or Organization Meetings: More than 4 times per year     Marital Status: Living with partner   Housing Stability: Low Risk  (11/9/2023)    Housing Stability Vital Sign     Unable to Pay for Housing in the Last Year: No     Number of Places Lived in the Last Year: 2     Unstable Housing in the Last Year: No       Current Outpatient Medications   Medication Sig Dispense Refill    ascorbic acid, vitamin C, (VITAMIN C) 100 MG tablet Take 100 mg by mouth once daily.      BIOTIN ORAL Take by mouth once daily.      calcium citrate-vitamin D3 315-200 mg (CITRACAL+D) 315 mg-5 mcg (200 unit) per tablet Take 1 tablet by mouth once daily.      cholecalciferol, vitamin D3, (VITAMIN D3) 125 mcg (5,000 unit) Tab Take 1 tablet (5,000 Units total) by mouth once daily. 90 tablet 3    citalopram (CELEXA) 20 MG tablet Take 1 tablet by mouth once daily 90 tablet 0    cyanocobalamin 1,000 mcg/mL injection INJECT 1 ML EVERY MONTH 10 mL 5    fluticasone (FLONASE) 50 mcg/actuation nasal spray 1 spray by Nasal route once daily.       furosemide (LASIX) 20 MG tablet Take 1 tablet (20 mg total) by mouth once daily. (Patient taking differently: Take 20 mg by mouth as needed.) 30 tablet 11    levothyroxine (SYNTHROID) 50 MCG tablet TAKE 1 TABLET BY MOUTH BEFORE BREAKFAST 90 tablet 3    loratadine (CLARITIN) 10 mg tablet Take 1 tablet (10 mg total) by mouth once daily. 90 tablet 3    meclizine (ANTIVERT) 25 mg tablet Take 1 tablet (25 mg total) by mouth 3 (three) times daily as needed for Dizziness. 20 tablet 0    melatonin 10 mg Tab Take by mouth every evening.      multivitamin capsule Take 1 capsule by mouth once daily.      potassium chloride (KLOR-CON) 10 MEQ TbSR TAKE TWO TABLETS BY MOUTH ONCE DAILY WITH LASIX  Strength: 10 mEq 180 tablet 3    valACYclovir (VALTREX) 1000 MG tablet TAKE 2 TABLETS BY MOUTH AS SOON AS POSSIBLE AFTER SYMPTOM ONSET(HERPES OUTBREAK), THEN TAKE 2 TABLETS 12 HOURS  "LATER(4 TABLETS TOTAL) 30 tablet 11    pantoprazole (PROTONIX) 40 MG tablet Take 1 tablet (40 mg total) by mouth 2 (two) times daily before meals. 180 tablet 0    triamcinolone acetonide 0.1% (KENALOG) 0.1 % cream Apply topically 2 (two) times daily. for 10 days 80 g 1     No current facility-administered medications for this visit.     Facility-Administered Medications Ordered in Other Visits   Medication Dose Route Frequency Provider Last Rate Last Admin    acetaminophen tablet 1,000 mg  1,000 mg Oral Once Sugey Edouard MD        fentaNYL injection 25 mcg  25 mcg Intravenous Q5 Min PRN Sugey Edouard MD        fentaNYL injection 25 mcg  25 mcg Intravenous Q5 Min PRN Quinten Chu MD   100 mcg at 10/30/20 0920    lidocaine (PF) 10 mg/ml (1%) injection 10 mg  1 mL Intradermal Once Taillac, Rachele Latif MD        midazolam (VERSED) 1 mg/mL injection 0.5 mg  0.5 mg Intravenous PRN Sugey Edouard MD        midazolam (VERSED) 1 mg/mL injection 0.5 mg  0.5 mg Intravenous PRN Quinten Chu MD   1 mg at 10/30/20 0920     Review of patient's allergies indicates:  No Known Allergies      Objective:      Vitals:    11/16/23 0843   Weight: 75.3 kg (166 lb 0.1 oz)   Height: 5' 4" (1.626 m)     Physical Exam  Left knee:  There is is a mild varus deformity, which is passively correctable.  Active range of motion is 0-120 degrees.  Fine crepitus with active extension.  Patellar mobility is mildly limited.  Boggy synovitis without effusion.  Medial joint line tenderness predominates.  No instability to varus/valgus/Lachman's stressing.  No pain in the groin with flexion and internal rotation of the hip which is not limited.  Skin intact.  Distal neurovascular intact.  Flip test negative.    Imaging Review:   Weightbearing x-rays show well-fixed and positioned right total knee arthroplasty without signs of loosening or other complications.  The left knee has Kellgren-Db grade 3 " medial gonarthrosis without acute findings or suspicious bone defects.  Assessment:   DJD, left knee  Plan:       We did not feel that her level of pain and disability would warrant major surgery at this stage.  She gets pretty good relief of her fairly mild pain from Tylenol and rest.  She will let me know if she feels we need to advance her treatment.  The patient's pathophysiology was explained in detail with reference to x-rays, models, other visual aids as appropriate.  Treatment options were discussed in detail.  Questions were invited and answered to the patient's satisfaction.    Clemente Mckenzie MD  Orthopaedic Surgery

## 2023-11-20 ENCOUNTER — PROCEDURE VISIT (OUTPATIENT)
Dept: HEPATOLOGY | Facility: CLINIC | Age: 73
End: 2023-11-20
Attending: INTERNAL MEDICINE
Payer: MEDICARE

## 2023-11-20 VITALS — BODY MASS INDEX: 28.56 KG/M2 | WEIGHT: 167.31 LBS | HEIGHT: 64 IN

## 2023-11-20 DIAGNOSIS — R74.8 ELEVATED LIVER ENZYMES: ICD-10-CM

## 2023-11-20 PROCEDURE — 76981 PR US, ELASTOGRAPHY, PARENCHYMA: ICD-10-PCS | Mod: S$GLB,,, | Performed by: NURSE PRACTITIONER

## 2023-11-20 PROCEDURE — 76981 USE PARENCHYMA: CPT | Mod: S$GLB,,, | Performed by: NURSE PRACTITIONER

## 2023-11-21 LAB — VIT B1 BLD-MCNC: 66 UG/L (ref 38–122)

## 2023-11-21 NOTE — PROCEDURES
Fibroscan Procedure     Name: Luz Burk  Date of Procedure : 2023   :: APOLINAR Bateman  Diagnosis: other    Probe: XL    Fibroscan readin.3 KPa    Fibrosis:F 0-1     CAP readin dB/m    Steatosis: :<S1       Interpretation:   No significant steatosis or fibrosis

## 2023-12-12 ENCOUNTER — PATIENT MESSAGE (OUTPATIENT)
Dept: BARIATRICS | Facility: CLINIC | Age: 73
End: 2023-12-12
Payer: MEDICARE

## 2023-12-13 ENCOUNTER — PATIENT MESSAGE (OUTPATIENT)
Dept: BARIATRICS | Facility: CLINIC | Age: 73
End: 2023-12-13
Payer: MEDICARE

## 2024-01-09 ENCOUNTER — PATIENT MESSAGE (OUTPATIENT)
Dept: BARIATRICS | Facility: CLINIC | Age: 74
End: 2024-01-09
Payer: MEDICARE

## 2024-01-22 ENCOUNTER — PATIENT MESSAGE (OUTPATIENT)
Dept: FAMILY MEDICINE | Facility: CLINIC | Age: 74
End: 2024-01-22
Payer: MEDICARE

## 2024-01-22 RX ORDER — CITALOPRAM 20 MG/1
TABLET, FILM COATED ORAL
Qty: 90 TABLET | Refills: 3 | Status: SHIPPED | OUTPATIENT
Start: 2024-01-22

## 2024-01-22 NOTE — TELEPHONE ENCOUNTER
Care Due:                  Date            Visit Type   Department     Provider  --------------------------------------------------------------------------------                                SAME DAY -                              ESTABLISHED   [facility] FAMILY  Last Visit: 10-      PATIENT      MEDICINE       Hilario Slaughter  Next Visit: None Scheduled  None         None Found                                                            Last  Test          Frequency    Reason                     Performed    Due Date  --------------------------------------------------------------------------------    Office Visit  15 months..  citalopram, furosemide,    10-   01-                             valACYclovir.............    [employer] Embedded Care Due Messages. Reference number: [id number].   1/22/2024 10:26:44 AM CST

## 2024-02-14 ENCOUNTER — PATIENT MESSAGE (OUTPATIENT)
Dept: BARIATRICS | Facility: CLINIC | Age: 74
End: 2024-02-14
Payer: MEDICARE

## 2024-02-15 NOTE — TELEPHONE ENCOUNTER
No care due was identified.  [facility] Embedded Care Due Messages. Reference number: [id number].   2/15/2024 1:26:32 PM CST

## 2024-02-16 RX ORDER — LEVOTHYROXINE SODIUM 50 UG/1
TABLET ORAL
Qty: 90 TABLET | Refills: 3 | Status: SHIPPED | OUTPATIENT
Start: 2024-02-16

## 2024-02-20 ENCOUNTER — PATIENT MESSAGE (OUTPATIENT)
Dept: BARIATRICS | Facility: CLINIC | Age: 74
End: 2024-02-20
Payer: MEDICARE

## 2024-02-29 ENCOUNTER — PATIENT MESSAGE (OUTPATIENT)
Dept: BARIATRICS | Facility: CLINIC | Age: 74
End: 2024-02-29
Payer: MEDICARE

## 2024-03-06 ENCOUNTER — PATIENT MESSAGE (OUTPATIENT)
Dept: BARIATRICS | Facility: CLINIC | Age: 74
End: 2024-03-06
Payer: MEDICARE

## 2024-04-03 ENCOUNTER — PATIENT MESSAGE (OUTPATIENT)
Dept: BARIATRICS | Facility: CLINIC | Age: 74
End: 2024-04-03
Payer: MEDICARE

## 2024-04-09 ENCOUNTER — PATIENT MESSAGE (OUTPATIENT)
Dept: BARIATRICS | Facility: CLINIC | Age: 74
End: 2024-04-09
Payer: MEDICARE

## 2024-05-14 ENCOUNTER — PATIENT MESSAGE (OUTPATIENT)
Dept: BARIATRICS | Facility: CLINIC | Age: 74
End: 2024-05-14
Payer: MEDICARE

## 2024-06-11 ENCOUNTER — PATIENT MESSAGE (OUTPATIENT)
Dept: BARIATRICS | Facility: CLINIC | Age: 74
End: 2024-06-11
Payer: MEDICARE

## 2024-06-25 DIAGNOSIS — Z00.00 ENCOUNTER FOR MEDICARE ANNUAL WELLNESS EXAM: ICD-10-CM

## 2024-07-03 ENCOUNTER — PATIENT MESSAGE (OUTPATIENT)
Dept: BARIATRICS | Facility: CLINIC | Age: 74
End: 2024-07-03
Payer: MEDICARE

## 2024-07-09 ENCOUNTER — PATIENT MESSAGE (OUTPATIENT)
Dept: BARIATRICS | Facility: CLINIC | Age: 74
End: 2024-07-09
Payer: MEDICARE

## 2024-07-24 ENCOUNTER — TELEPHONE (OUTPATIENT)
Dept: FAMILY MEDICINE | Facility: CLINIC | Age: 74
End: 2024-07-24
Payer: MEDICARE

## 2024-07-24 DIAGNOSIS — F33.0 MAJOR DEPRESSIVE DISORDER, RECURRENT, MILD: ICD-10-CM

## 2024-07-24 DIAGNOSIS — Z13.6 SCREENING FOR CARDIOVASCULAR CONDITION: ICD-10-CM

## 2024-07-24 DIAGNOSIS — R73.9 HYPERGLYCEMIA: ICD-10-CM

## 2024-07-24 DIAGNOSIS — F41.9 ANXIETY: Primary | ICD-10-CM

## 2024-07-24 DIAGNOSIS — Z12.31 ENCOUNTER FOR SCREENING MAMMOGRAM FOR BREAST CANCER: Primary | ICD-10-CM

## 2024-07-24 NOTE — TELEPHONE ENCOUNTER
----- Message from Ines Guillen sent at 7/24/2024 10:52 AM CDT -----  Regarding: Labs  [facility],      Pt needs Annual Labs.    Thank you,  Ines

## 2024-07-24 NOTE — TELEPHONE ENCOUNTER
----- Message from Ann Martin sent at 7/24/2024  8:51 AM CDT -----  Type:  Patient Returning Call    Who Called:Pt   Would the patient rather a call back or a response via [facility]? Call back   Best Call Back Number:[phone]  Additional Information: pt is asking that her labs and mammogram be scheduled for 08/19 before her appt with Dr Foster ..

## 2024-07-24 NOTE — TELEPHONE ENCOUNTER
Pt is scheduled to see you for an annual on 8/19/24. She's requesting to have labs drawn prior. Would you be willing to place lab orders?

## 2024-08-03 DIAGNOSIS — E53.8 B12 DEFICIENCY: ICD-10-CM

## 2024-08-04 RX ORDER — CYANOCOBALAMIN 1000 UG/ML
INJECTION, SOLUTION INTRAMUSCULAR; SUBCUTANEOUS
Qty: 3 ML | Refills: 3 | Status: SHIPPED | OUTPATIENT
Start: 2024-08-04

## 2024-08-12 ENCOUNTER — PATIENT MESSAGE (OUTPATIENT)
Dept: BARIATRICS | Facility: CLINIC | Age: 74
End: 2024-08-12
Payer: MEDICARE

## 2024-08-19 ENCOUNTER — HOSPITAL ENCOUNTER (OUTPATIENT)
Dept: RADIOLOGY | Facility: HOSPITAL | Age: 74
Discharge: HOME | End: 2024-08-19
Attending: FAMILY MEDICINE
Payer: MEDICARE

## 2024-08-19 ENCOUNTER — OFFICE VISIT (OUTPATIENT)
Dept: FAMILY MEDICINE | Facility: CLINIC | Age: 74
End: 2024-08-19
Payer: MEDICARE

## 2024-08-19 VITALS
OXYGEN SATURATION: 96 % | WEIGHT: 170.19 LBS | TEMPERATURE: 98 F | HEART RATE: 71 BPM | SYSTOLIC BLOOD PRESSURE: 118 MMHG | DIASTOLIC BLOOD PRESSURE: 64 MMHG | HEIGHT: 64 IN | BODY MASS INDEX: 29.06 KG/M2

## 2024-08-19 DIAGNOSIS — D69.2 SENILE PURPURA: ICD-10-CM

## 2024-08-19 DIAGNOSIS — F33.0 MAJOR DEPRESSIVE DISORDER, RECURRENT, MILD: ICD-10-CM

## 2024-08-19 DIAGNOSIS — B00.1 RECURRENT HERPES LABIALIS: ICD-10-CM

## 2024-08-19 DIAGNOSIS — F41.9 ANXIETY: ICD-10-CM

## 2024-08-19 DIAGNOSIS — R09.81 SINUS CONGESTION: ICD-10-CM

## 2024-08-19 DIAGNOSIS — Z98.84 S/P BARIATRIC SURGERY: ICD-10-CM

## 2024-08-19 DIAGNOSIS — Z12.31 ENCOUNTER FOR SCREENING MAMMOGRAM FOR BREAST CANCER: ICD-10-CM

## 2024-08-19 DIAGNOSIS — E03.4 HYPOTHYROIDISM DUE TO ACQUIRED ATROPHY OF THYROID: ICD-10-CM

## 2024-08-19 DIAGNOSIS — I73.9 PERIPHERAL VASCULAR DISEASE, UNSPECIFIED: ICD-10-CM

## 2024-08-19 DIAGNOSIS — M79.10 MYALGIA: Primary | ICD-10-CM

## 2024-08-19 DIAGNOSIS — U07.1 COVID-19: ICD-10-CM

## 2024-08-19 LAB
CTP QC/QA: YES
SARS-COV-2 RDRP RESP QL NAA+PROBE: POSITIVE

## 2024-08-19 PROCEDURE — 3074F SYST BP LT 130 MM HG: CPT | Mod: CPTII,S$GLB,, | Performed by: STUDENT IN AN ORGANIZED HEALTH CARE EDUCATION/TRAINING PROGRAM

## 2024-08-19 PROCEDURE — 77067 SCR MAMMO BI INCL CAD: CPT | Mod: TC

## 2024-08-19 PROCEDURE — 1126F AMNT PAIN NOTED NONE PRSNT: CPT | Mod: CPTII,S$GLB,, | Performed by: STUDENT IN AN ORGANIZED HEALTH CARE EDUCATION/TRAINING PROGRAM

## 2024-08-19 PROCEDURE — 3078F DIAST BP <80 MM HG: CPT | Mod: CPTII,S$GLB,, | Performed by: STUDENT IN AN ORGANIZED HEALTH CARE EDUCATION/TRAINING PROGRAM

## 2024-08-19 PROCEDURE — 99397 PER PM REEVAL EST PAT 65+ YR: CPT | Mod: S$GLB,,, | Performed by: STUDENT IN AN ORGANIZED HEALTH CARE EDUCATION/TRAINING PROGRAM

## 2024-08-19 PROCEDURE — 1160F RVW MEDS BY RX/DR IN RCRD: CPT | Mod: CPTII,S$GLB,, | Performed by: STUDENT IN AN ORGANIZED HEALTH CARE EDUCATION/TRAINING PROGRAM

## 2024-08-19 PROCEDURE — 1159F MED LIST DOCD IN RCRD: CPT | Mod: CPTII,S$GLB,, | Performed by: STUDENT IN AN ORGANIZED HEALTH CARE EDUCATION/TRAINING PROGRAM

## 2024-08-19 PROCEDURE — 1101F PT FALLS ASSESS-DOCD LE1/YR: CPT | Mod: CPTII,S$GLB,, | Performed by: STUDENT IN AN ORGANIZED HEALTH CARE EDUCATION/TRAINING PROGRAM

## 2024-08-19 PROCEDURE — 87635 SARS-COV-2 COVID-19 AMP PRB: CPT | Mod: QW,S$GLB,, | Performed by: STUDENT IN AN ORGANIZED HEALTH CARE EDUCATION/TRAINING PROGRAM

## 2024-08-19 PROCEDURE — 3008F BODY MASS INDEX DOCD: CPT | Mod: CPTII,S$GLB,, | Performed by: STUDENT IN AN ORGANIZED HEALTH CARE EDUCATION/TRAINING PROGRAM

## 2024-08-19 PROCEDURE — 3288F FALL RISK ASSESSMENT DOCD: CPT | Mod: CPTII,S$GLB,, | Performed by: STUDENT IN AN ORGANIZED HEALTH CARE EDUCATION/TRAINING PROGRAM

## 2024-08-19 PROCEDURE — 77063 BREAST TOMOSYNTHESIS BI: CPT | Mod: 26,,, | Performed by: RADIOLOGY

## 2024-08-19 PROCEDURE — 77067 SCR MAMMO BI INCL CAD: CPT | Mod: 26,,, | Performed by: RADIOLOGY

## 2024-08-19 RX ORDER — FUROSEMIDE 20 MG/1
20 TABLET ORAL DAILY
Qty: 90 TABLET | Refills: 3 | Status: SHIPPED | OUTPATIENT
Start: 2024-08-19

## 2024-08-19 RX ORDER — FLUTICASONE PROPIONATE 50 MCG
1 SPRAY, SUSPENSION (ML) NASAL DAILY
Qty: 10 ML | Refills: 3 | Status: SHIPPED | OUTPATIENT
Start: 2024-08-19

## 2024-08-19 RX ORDER — PANTOPRAZOLE SODIUM 40 MG/1
40 TABLET, DELAYED RELEASE ORAL
Qty: 180 TABLET | Refills: 3 | Status: SHIPPED | OUTPATIENT
Start: 2024-08-19

## 2024-08-19 RX ORDER — LEVOTHYROXINE SODIUM 50 UG/1
50 TABLET ORAL
Qty: 90 TABLET | Refills: 3 | Status: SHIPPED | OUTPATIENT
Start: 2024-08-19

## 2024-08-19 RX ORDER — VALACYCLOVIR HYDROCHLORIDE 1 G/1
1000 TABLET, FILM COATED ORAL 2 TIMES DAILY PRN
Qty: 30 TABLET | Refills: 11 | Status: SHIPPED | OUTPATIENT
Start: 2024-08-19

## 2024-08-19 RX ORDER — ACETAMINOPHEN 500 MG
5000 TABLET ORAL DAILY
Qty: 90 TABLET | Refills: 3 | Status: SHIPPED | OUTPATIENT
Start: 2024-08-19

## 2024-08-19 RX ORDER — CITALOPRAM 20 MG/1
20 TABLET, FILM COATED ORAL DAILY
Qty: 90 TABLET | Refills: 3 | Status: SHIPPED | OUTPATIENT
Start: 2024-08-19

## 2024-08-19 NOTE — PROGRESS NOTES
Patient ID: Luz Burk is a 73 y.o. female.     Chief Complaint: Annual Exam    HPI   Patient here for annual exam. She is in town for her birthday celebration. She started to feel bad yesterday after the party. She started with sinus congestion and myalgias. She took a home covid test yesterday and it was positive. However, the test was . She would like to retest today.    She denies recent fevers. She denies chest pain and shortness of breath. She denies problems with bowel movements.     Review of Systems  Review of Systems   HENT:  Negative for hearing loss.    Eyes:  Negative for discharge.   Respiratory:  Negative for wheezing.    Cardiovascular:  Negative for chest pain and palpitations.   Gastrointestinal:  Positive for constipation and diarrhea. Negative for blood in stool and vomiting.   Genitourinary:  Negative for dysuria and hematuria.   Musculoskeletal:  Positive for neck pain.   Neurological:  Negative for weakness and headaches.   Endo/Heme/Allergies:  Negative for polydipsia.       Currently Medications  Current Outpatient Medications on File Prior to Visit   Medication Sig Dispense Refill    ascorbic acid, vitamin C, (VITAMIN C) 100 MG tablet Take 100 mg by mouth once daily.      BIOTIN ORAL Take by mouth once daily.      calcium citrate-vitamin D3 315-200 mg (CITRACAL+D) 315 mg-5 mcg (200 unit) per tablet Take 1 tablet by mouth once daily.      cyanocobalamin 1,000 mcg/mL injection INJECT 1 ML (CC) INTRAMUSCULARLY ONCE EVERY MONTH 3 mL 3    loratadine (CLARITIN) 10 mg tablet Take 1 tablet (10 mg total) by mouth once daily. 90 tablet 3    melatonin 10 mg Tab Take by mouth every evening.      multivitamin capsule Take 1 capsule by mouth once daily.      potassium chloride (KLOR-CON) 10 MEQ TbSR TAKE TWO TABLETS BY MOUTH ONCE DAILY WITH LASIX  Strength: 10 mEq 180 tablet 3    [DISCONTINUED] cholecalciferol, vitamin D3, (VITAMIN D3) 125 mcg (5,000 unit) Tab Take 1 tablet (5,000 Units  total) by mouth once daily. 90 tablet 3    [DISCONTINUED] citalopram (CELEXA) 20 MG tablet Take 1 tablet by mouth once daily 90 tablet 3    [DISCONTINUED] fluticasone (FLONASE) 50 mcg/actuation nasal spray 1 spray by Nasal route once daily.       [DISCONTINUED] furosemide (LASIX) 20 MG tablet Take 1 tablet (20 mg total) by mouth once daily. (Patient taking differently: Take 20 mg by mouth as needed.) 30 tablet 11    [DISCONTINUED] levothyroxine (SYNTHROID) 50 MCG tablet TAKE 1 TABLET BY MOUTH BEFORE BREAKFAST 90 tablet 3    [DISCONTINUED] pantoprazole (PROTONIX) 40 MG tablet Take 1 tablet (40 mg total) by mouth 2 (two) times daily before meals. 180 tablet 0    [DISCONTINUED] valACYclovir (VALTREX) 1000 MG tablet TAKE 2 TABLETS BY MOUTH AS SOON AS POSSIBLE AFTER SYMPTOM ONSET(HERPES OUTBREAK), THEN TAKE 2 TABLETS 12 HOURS LATER(4 TABLETS TOTAL) 30 tablet 11    meclizine (ANTIVERT) 25 mg tablet Take 1 tablet (25 mg total) by mouth 3 (three) times daily as needed for Dizziness. (Patient not taking: Reported on 8/19/2024) 20 tablet 0    triamcinolone acetonide 0.1% (KENALOG) 0.1 % cream Apply topically 2 (two) times daily. for 10 days (Patient not taking: Reported on 8/19/2024) 80 g 1     Current Facility-Administered Medications on File Prior to Visit   Medication Dose Route Frequency Provider Last Rate Last Admin    acetaminophen tablet 1,000 mg  1,000 mg Oral Once Sugey Edouard MD        fentaNYL injection 25 mcg  25 mcg Intravenous Q5 Min PRN Sugey Edouard MD        fentaNYL injection 25 mcg  25 mcg Intravenous Q5 Min PRN Quinten Chu MD   100 mcg at 10/30/20 0920    lidocaine (PF) 10 mg/ml (1%) injection 10 mg  1 mL Intradermal Once [place], Rachele Latif MD        midazolam (VERSED) 1 mg/mL injection 0.5 mg  0.5 mg Intravenous PRN Sugey Edouard MD        midazolam (VERSED) 1 mg/mL injection 0.5 mg  0.5 mg Intravenous PRN Quinten Chu MD   1 mg at 10/30/20 0920  "      Physical  Exam  Vitals:    08/19/24 1020   BP: 118/64   BP Location: Left arm   Patient Position: Sitting   Pulse: 71   Temp: 98 °F (36.7 °C)   TempSrc: Oral   SpO2: 96%   Weight: 77.2 kg (170 lb 3.1 oz)   Height: 5' 4" (1.626 m)      Body mass index is 29.21 kg/m².  Wt Readings from Last 3 Encounters:   08/19/24 77.2 kg (170 lb 3.1 oz)   11/20/23 75.9 kg (167 lb 5.3 oz)   11/16/23 75.8 kg (167 lb)       Physical Exam  Vitals and nursing note reviewed.   Constitutional:       General: She is not in acute distress.     Appearance: She is not ill-appearing.   HENT:      Head: Normocephalic and atraumatic.      Right Ear: External ear normal.      Left Ear: External ear normal.      Nose: Nose normal.      Mouth/Throat:      Mouth: Mucous membranes are moist.   Eyes:      Extraocular Movements: Extraocular movements intact.      Conjunctiva/sclera: Conjunctivae normal.   Cardiovascular:      Rate and Rhythm: Normal rate and regular rhythm.      Pulses: Normal pulses.      Heart sounds: No murmur heard.  Pulmonary:      Effort: Pulmonary effort is normal. No respiratory distress.      Breath sounds: No wheezing.   Abdominal:      General: There is no distension.      Palpations: Abdomen is soft. There is no mass.      Tenderness: There is no abdominal tenderness.   Musculoskeletal:         General: No swelling.      Cervical back: Normal range of motion.   Skin:     Coloration: Skin is not jaundiced.      Findings: No rash.   Neurological:      General: No focal deficit present.      Mental Status: She is alert and oriented to person, place, and time.   Psychiatric:         Mood and Affect: Mood normal.         Thought Content: Thought content normal.         Labs:    Complete Blood Count  Lab Results   Component Value Date    RBC 3.99 (L) 08/19/2024    HGB 12.9 08/19/2024    HCT 38.6 08/19/2024    MCV 97 08/19/2024    MCH 32.3 (H) 08/19/2024    MCHC 33.4 08/19/2024    RDW 11.9 08/19/2024     08/19/2024    " "MPV 9.8 08/19/2024    GRAN 2.6 08/19/2024    GRAN 52.3 08/19/2024    LYMPH 1.4 08/19/2024    LYMPH 27.7 08/19/2024    MONO 0.8 08/19/2024    MONO 16.8 (H) 08/19/2024    EOS 0.1 08/19/2024    BASO 0.03 08/19/2024    EOSINOPHIL 2.2 08/19/2024    BASOPHIL 0.6 08/19/2024    DIFFMETHOD Automated 08/19/2024       Comprehensive Metabolic Panel  No results found for: "GLU", "BUN", "CREATININE", "NA", "K", "CL", "PROT", "ALBUMIN", "BILITOT", "AST", "ALKPHOS", "CO2", "ALT", "ANIONGAP", "EGFRNONAA", "ESTGFRAFRICA"    TSH  No results found for: "TSH"    Imaging:  X-ray Knee Ortho Left with Flexion  Narrative: EXAMINATION:  XR KNEE ORTHO LEFT WITH FLEXION    CLINICAL HISTORY:  RM 3;. Pain in left knee    TECHNIQUE:  AP standing view of both knees, PA flexion standing views of both knees, and Merchant views of both knees were performed. A lateral view of the left knee was also performed.    COMPARISON:  01/02/2020    FINDINGS:  Skeletal structures show no acute fracture or dislocation.  Right knee shows stable postoperative findings from total knee arthroplasty.  Left knee again shows degenerative joint disease with the mild joint space narrowing and marginal spurring at the medial tibiofemoral joint space.  Other left knee joint spaces are satisfactory.  No significant joint effusion detected.  Impression: Right TKA.  Left knee DJD.    Electronically signed by: Charlie Ramírez MD  Date:    11/16/2023  Time:    09:21      Assessment/Plan:    1. Myalgia  -     POCT COVID-19 Rapid Screening    2. S/P bariatric surgery  -     pantoprazole (PROTONIX) 40 MG tablet; Take 1 tablet (40 mg total) by mouth 2 (two) times daily before meals.  Dispense: 180 tablet; Refill: 3    3. Recurrent herpes labialis  -     valACYclovir (VALTREX) 1000 MG tablet; Take 1 tablet (1,000 mg total) by mouth 2 (two) times daily as needed (for cold sore).  Dispense: 30 tablet; Refill: 11    4. Sinus congestion  -     POCT COVID-19 Rapid Screening    5. Senile " purpura  Assessment & Plan:  - chronic, stable      6. Major depressive disorder, recurrent, mild  Assessment & Plan:  - chronic  - in remission      7. Peripheral vascular disease, unspecified  Assessment & Plan:  - chronic  - no current issues    Orders:  -     furosemide (LASIX) 20 MG tablet; Take 1 tablet (20 mg total) by mouth once daily.  Dispense: 90 tablet; Refill: 3    8. COVID-19  -     nirmatrelvir-ritonavir 300 mg (150 mg x 2)-100 mg copackaged tablets (EUA); Take 3 tablets by mouth 2 (two) times daily for 5 days. Each dose contains 2 nirmatrelvir (pink tablets) and 1 ritonavir (white tablet). Take all 3 tablets together  Dispense: 30 tablet; Refill: 0    9. Anxiety  -     citalopram (CELEXA) 20 MG tablet; Take 1 tablet (20 mg total) by mouth once daily.  Dispense: 90 tablet; Refill: 3    10. Hypothyroidism due to acquired atrophy of thyroid  -     levothyroxine (SYNTHROID) 50 MCG tablet; Take 1 tablet (50 mcg total) by mouth before breakfast.  Dispense: 90 tablet; Refill: 3    Other orders  -     cholecalciferol, vitamin D3, (VITAMIN D3) 125 mcg (5,000 unit) Tab; Take 1 tablet (5,000 Units total) by mouth once daily.  Dispense: 90 tablet; Refill: 3  -     fluticasone propionate (FLONASE) 50 mcg/actuation nasal spray; 1 spray (50 mcg total) by Each Nostril route once daily.  Dispense: 10 mL; Refill: 3         Discussed how to stay healthy including: diet, exercise, refraining from smoking and discussed screening exams / tests needed for age, sex and family Hx.    Cristian Johnson MD      Answers submitted by the patient for this visit:  Review of Systems Questionnaire (Submitted on 8/12/2024)  activity change: No  unexpected weight change: No  rhinorrhea: Yes  trouble swallowing: No  visual disturbance: No  chest tightness: No  polyuria: No  difficulty urinating: No  menstrual problem: No  joint swelling: No  arthralgias: Yes  confusion: No  dysphoric mood: No

## 2024-09-03 ENCOUNTER — PATIENT MESSAGE (OUTPATIENT)
Dept: BARIATRICS | Facility: CLINIC | Age: 74
End: 2024-09-03
Payer: MEDICARE

## 2024-09-10 ENCOUNTER — PATIENT MESSAGE (OUTPATIENT)
Dept: BARIATRICS | Facility: CLINIC | Age: 74
End: 2024-09-10
Payer: MEDICARE

## 2024-10-01 ENCOUNTER — PATIENT MESSAGE (OUTPATIENT)
Dept: BARIATRICS | Facility: CLINIC | Age: 74
End: 2024-10-01
Payer: MEDICARE

## 2024-10-08 ENCOUNTER — PATIENT MESSAGE (OUTPATIENT)
Dept: BARIATRICS | Facility: CLINIC | Age: 74
End: 2024-10-08
Payer: MEDICARE

## 2024-11-02 ENCOUNTER — PATIENT MESSAGE (OUTPATIENT)
Dept: BARIATRICS | Facility: CLINIC | Age: 74
End: 2024-11-02
Payer: MEDICARE

## 2024-11-12 ENCOUNTER — PATIENT MESSAGE (OUTPATIENT)
Dept: BARIATRICS | Facility: CLINIC | Age: 74
End: 2024-11-12
Payer: MEDICARE

## 2024-12-03 ENCOUNTER — PATIENT MESSAGE (OUTPATIENT)
Dept: BARIATRICS | Facility: CLINIC | Age: 74
End: 2024-12-03
Payer: MEDICARE

## 2024-12-10 ENCOUNTER — PATIENT MESSAGE (OUTPATIENT)
Dept: BARIATRICS | Facility: CLINIC | Age: 74
End: 2024-12-10
Payer: MEDICARE

## 2025-01-06 ENCOUNTER — PATIENT MESSAGE (OUTPATIENT)
Dept: BARIATRICS | Facility: CLINIC | Age: 75
End: 2025-01-06
Payer: MEDICARE

## 2025-01-14 ENCOUNTER — PATIENT MESSAGE (OUTPATIENT)
Dept: BARIATRICS | Facility: CLINIC | Age: 75
End: 2025-01-14
Payer: MEDICARE

## 2025-02-10 ENCOUNTER — PATIENT MESSAGE (OUTPATIENT)
Dept: BARIATRICS | Facility: CLINIC | Age: 75
End: 2025-02-10
Payer: MEDICARE

## 2025-03-10 ENCOUNTER — PATIENT MESSAGE (OUTPATIENT)
Dept: BARIATRICS | Facility: CLINIC | Age: 75
End: 2025-03-10
Payer: MEDICARE

## 2025-04-08 ENCOUNTER — PATIENT MESSAGE (OUTPATIENT)
Dept: BARIATRICS | Facility: CLINIC | Age: 75
End: 2025-04-08
Payer: MEDICARE

## 2025-05-13 ENCOUNTER — PATIENT MESSAGE (OUTPATIENT)
Dept: BARIATRICS | Facility: CLINIC | Age: 75
End: 2025-05-13
Payer: MEDICARE

## 2025-05-16 ENCOUNTER — PATIENT OUTREACH (OUTPATIENT)
Dept: ADMINISTRATIVE | Facility: HOSPITAL | Age: 75
End: 2025-05-16
Payer: MEDICARE

## 2025-05-16 ENCOUNTER — PATIENT MESSAGE (OUTPATIENT)
Dept: ADMINISTRATIVE | Facility: HOSPITAL | Age: 75
End: 2025-05-16
Payer: MEDICARE

## 2025-05-16 NOTE — PROGRESS NOTES
Population Health Chart Review & Patient Outreach Details      Additional Pop Health Notes:               Updates Requested / Reviewed:      Updated Care Coordination Note         Health Maintenance Topics Overdue:      VBHM Score: 0     Patient is not due for any topics at this time.    RSV Vaccine                  Health Maintenance Topic(s) Outreach Outcomes & Actions Taken:    Osteoporosis Screening - Outreach Outcomes & Actions Taken  : External Records Requested, Care Team Updated if Applicable and Efax to [facility] in [place], [place].

## 2025-05-16 NOTE — LETTER
AUTHORIZATION FOR RELEASE OF   CONFIDENTIAL INFORMATION    Dear GUICHO Shannon,    We are seeing Luz Burk, date of birth 1950, in the clinic at [facility]. Hilario Slaughter MD is the patient's PCP. Luz Burk has an outstanding lab/procedure at the time we reviewed her chart. In order to help keep her health information updated, she has authorized us to request the following medical record(s):          ( X )  DEXA SCAN                                                Please fax records to [facility], [place], Colin G, MD, [phone]              Patient Name: Luz Burk  : 1950  Patient Phone #: [phone]

## 2025-05-20 ENCOUNTER — TELEPHONE (OUTPATIENT)
Dept: FAMILY MEDICINE | Facility: CLINIC | Age: 75
End: 2025-05-20
Payer: MEDICARE

## 2025-05-20 NOTE — TELEPHONE ENCOUNTER
----- Message from Hilario Slaughter MD sent at 5/20/2025  7:01 AM CDT -----  Regarding: FW: Bone densiry test  Contact: fax  No visit with me in 3 years; thought she moved away  ----- Message -----  From: Annalisa Bojorquez  Sent: 5/19/2025   4:16 PM CDT  To: Hilario Slaughter MD  Subject: Bone densiry test                                Dr. SlaughterPlease review document  scanned into patient's media. Bone densiry test

## 2025-06-03 ENCOUNTER — PATIENT MESSAGE (OUTPATIENT)
Dept: BARIATRICS | Facility: CLINIC | Age: 75
End: 2025-06-03
Payer: MEDICARE

## 2025-06-04 ENCOUNTER — DOCUMENTATION ONLY (OUTPATIENT)
Dept: ORTHOPEDICS | Facility: CLINIC | Age: 75
End: 2025-06-04
Payer: MEDICARE

## (undated) DEVICE — GUIDEWIRE STAINLES .059 X 5
Type: IMPLANTABLE DEVICE | Site: THUMB | Status: NON-FUNCTIONAL
Removed: 2020-10-30

## (undated) DEVICE — DRESSING ADAPTIC N ADH 3X8IN

## (undated) DEVICE — BANDAGE KERLIX P/P 2.25IN STER

## (undated) DEVICE — Device

## (undated) DEVICE — TROCAR ENDOPATH XCEL 12X100MM

## (undated) DEVICE — TROCAR ENDOPATH XCEL 5X100MM

## (undated) DEVICE — TROCAR ENDOPATH XCEL 12MM 10CM

## (undated) DEVICE — SEE MEDLINE ITEM 152622

## (undated) DEVICE — BIT DRILL BONE MINI 18MM LONG

## (undated) DEVICE — DRAPE STERI-DRAPE 1000 17X11IN

## (undated) DEVICE — STAPLER ECHELON FLEX 60MM 44CM

## (undated) DEVICE — TROCAR ENDOPATH XCEL 5MM 7.5CM

## (undated) DEVICE — RELOAD ECHELON FLEX GRN 60MM

## (undated) DEVICE — SUT 0 VICRYL / UR6 (J603)

## (undated) DEVICE — GLOVE BIOGEL SKINSENSE PI 7.0

## (undated) DEVICE — DRESSING N ADH OIL EMUL 3X3

## (undated) DEVICE — SEE MEDLINE ITEM 146268

## (undated) DEVICE — SYR 10CC LUER LOCK

## (undated) DEVICE — SLING ARM MEDIUM FOAM STRAP

## (undated) DEVICE — SEE MEDLINE ITEM 157131

## (undated) DEVICE — GAUZE SPONGE 4X4 12PLY

## (undated) DEVICE — DRAPE CORETEMP FLD WRM 56X62IN

## (undated) DEVICE — TOURNIQUET SB QC DP 18X4IN

## (undated) DEVICE — SHEARS HARMONIC 5CM 36CM

## (undated) DEVICE — SCRUB 10% POVIDONE IODINE 4OZ

## (undated) DEVICE — ADHESIVE DERMABOND ADVANCED

## (undated) DEVICE — NDL 18GA X1 1/2 REG BEVEL

## (undated) DEVICE — TUBING HF INSUFFLATION W/ FLTR

## (undated) DEVICE — SUT STRATAFIX PDO 2-0 SH

## (undated) DEVICE — TROCAR ENDOPATH XCEL 11MM 10CM

## (undated) DEVICE — SUT COATED VICRYL 4/0 27IN

## (undated) DEVICE — DRAPE C-ARM MINI DISP

## (undated) DEVICE — PADDING CAST 4IN SPECIALIST

## (undated) DEVICE — SYR ONLY LUER LOCK 20CC

## (undated) DEVICE — TROCAR ENDOPATH ECEL

## (undated) DEVICE — SYR SLIP TIP 1CC

## (undated) DEVICE — BLADE SCALP OPHTL BEVEL STR

## (undated) DEVICE — SUT 4/0 18IN ETHILON BL P3

## (undated) DEVICE — HEMOSTAT SURGICEL PWD 3G

## (undated) DEVICE — BANDAGE ELASTIC 2X5 VELCRO ST

## (undated) DEVICE — PACK LAPSCP/PELVSCPY III TIBRN

## (undated) DEVICE — TRAY MINOR GEN SURG

## (undated) DEVICE — IRRIGATOR ENDOSCOPY DISP.

## (undated) DEVICE — SOL NS 1000CC

## (undated) DEVICE — KIT VUETIP TROCAR SWAB

## (undated) DEVICE — DRESSING GAUZE 6PLY 4X4

## (undated) DEVICE — CANNULA ENDOPATH XCEL 5X100MM

## (undated) DEVICE — GOWN SURGICAL X-LARGE

## (undated) DEVICE — SUT FIBERWIRE 4-0 18 IN TAP

## (undated) DEVICE — NDL HYPO REG 25G X 1 1/2

## (undated) DEVICE — SUT CTD VICRYL 4-0 P-3 18IN

## (undated) DEVICE — GLOVE BIOGEL PI MICRO INDIC 7

## (undated) DEVICE — PENCIL BIPOLAR 18G STR 2 PIN

## (undated) DEVICE — GLOVE BIOGEL PI MICRO SZ 7

## (undated) DEVICE — LOOP VESSEL BLUE MAXI

## (undated) DEVICE — NDL 22GA X1 1/2 REG BEVEL

## (undated) DEVICE — SUT MONOCRYL 4-0 UD P-3 18

## (undated) DEVICE — SUT ETHILON BL MONO P3

## (undated) DEVICE — BIT DRILL ACUTRAK II MINI

## (undated) DEVICE — SOL NACL 0.9% INJ PF/50151

## (undated) DEVICE — CORD FOR BIPOLAR FORCEPS 12

## (undated) DEVICE — SUT FIBERLOOP 4-0 6

## (undated) DEVICE — SOL WATER STRL IRR 1000ML

## (undated) DEVICE — APPLICATOR ARISTA FLEX XL

## (undated) DEVICE — PAD CAST 2 IN X 4YDS STERILE

## (undated) DEVICE — SUT 4-0 VICRYL / P-3

## (undated) DEVICE — K-WIRE SNGL TRCR .045 D 6L N/S
Type: IMPLANTABLE DEVICE | Site: THUMB | Status: NON-FUNCTIONAL
Removed: 2020-10-30

## (undated) DEVICE — RELOAD ECHELON FLEX BLU 60MM

## (undated) DEVICE — SEE MEDLINE ITEM 146270

## (undated) DEVICE — SPLINT PLASTER F.S. 3INX15IN

## (undated) DEVICE — SUT GUT PL. 4-0 27 FS-2

## (undated) DEVICE — BLADE SURG CARBON STEEL SZ11

## (undated) DEVICE — SUT VICRYL 3-0 27 SH

## (undated) DEVICE — SCISSOR 5MMX35CM DIRECT DRIVE

## (undated) DEVICE — TOWEL OR XRAY WHITE 17X26IN

## (undated) DEVICE — DRESSING EYE OVAL LF

## (undated) DEVICE — DRESSING XEROFORM 1X8IN

## (undated) DEVICE — SYR B-D DISP CONTROL 10CC100/C

## (undated) DEVICE — ELECTRODE REM PLYHSV RETURN 9

## (undated) DEVICE — TOURNIQUET SB QC SP 18X4IN

## (undated) DEVICE — CORD BIPOLAR 12 FOOT

## (undated) DEVICE — BANDAGE ACE NON LATEX 2IN

## (undated) DEVICE — POWDER ARISTA AH 3G

## (undated) DEVICE — SUT VICRYL 4-0 18 P-3

## (undated) DEVICE — LUBRICANT SURGILUBE 2 OZ

## (undated) DEVICE — SPLINT CAST ROLL 2IN X 15 FT

## (undated) DEVICE — SUT TICRON BLUE O SK

## (undated) DEVICE — DRAPE STERI INSTRUMENT 1018

## (undated) DEVICE — BANDAGE MATRIX HK LOOP 2IN 5YD

## (undated) DEVICE — SEE MEDLINE ITEM 157117

## (undated) DEVICE — SUT ENDOLOOP PDSII 18 LIGA

## (undated) DEVICE — APPLICATOR CHLORAPREP ORN 26ML

## (undated) DEVICE — SUT TICRON BLUE 2-0 48 SK

## (undated) DEVICE — TROCAR ENDOPATH XCEL 15MM 10CM